# Patient Record
Sex: FEMALE | Race: ASIAN | NOT HISPANIC OR LATINO | URBAN - METROPOLITAN AREA
[De-identification: names, ages, dates, MRNs, and addresses within clinical notes are randomized per-mention and may not be internally consistent; named-entity substitution may affect disease eponyms.]

---

## 2019-08-03 ENCOUNTER — EMERGENCY (EMERGENCY)
Facility: HOSPITAL | Age: 84
LOS: 0 days | Discharge: HOME | End: 2019-08-04
Attending: EMERGENCY MEDICINE | Admitting: EMERGENCY MEDICINE
Payer: MEDICARE

## 2019-08-03 VITALS
RESPIRATION RATE: 20 BRPM | TEMPERATURE: 98 F | DIASTOLIC BLOOD PRESSURE: 86 MMHG | OXYGEN SATURATION: 98 % | SYSTOLIC BLOOD PRESSURE: 189 MMHG | HEART RATE: 96 BPM

## 2019-08-03 DIAGNOSIS — M79.661 PAIN IN RIGHT LOWER LEG: ICD-10-CM

## 2019-08-03 PROCEDURE — 99282 EMERGENCY DEPT VISIT SF MDM: CPT

## 2019-08-03 PROCEDURE — 99053 MED SERV 10PM-8AM 24 HR FAC: CPT

## 2019-08-03 NOTE — ED PROVIDER NOTE - PROGRESS NOTE DETAILS
Patient and family wants to leave because of long wait, patient told risks of leaving, including not evaluating for stroke and that stroke can lead to permanent neurological deficits and not evaluating for DVT which is clotting in legs that can cause clot in lungs. Both of these can cause death. Patient and family understand the risks and wants to see PMD as soon as possible. Grand daughter and grand son translating for patient in Mandarin Chinese. Patient and family wants to leave because of long wait, patient told risks of leaving, including not evaluating for stroke and that stroke can lead to permanent neurological deficits and not evaluating for DVT which is clotting in legs that can cause clot in lungs. Both of these can cause death. Patient and family understand the risks and wants to see PMD as soon as possible.

## 2019-08-03 NOTE — ED PROVIDER NOTE - PHYSICAL EXAMINATION
CONSTITUTIONAL: Well-developed; well-nourished; in no acute distress.   SKIN: warm, dry  HEAD: Normocephalic; atraumatic.  EYES: no conjunctival injection. PERRL.   ENT: No nasal discharge; airway clear.  NECK: Supple; non tender.  CARD: S1, S2 normal; no murmurs, gallops, or rubs. Regular rate and rhythm.   RESP: No wheezes, rales or rhonchi.  ABD: soft ntnd  EXT: Normal ROM.  No clubbing, cyanosis or edema. Not tender in right lower leg, not tender in leg lower leg. 2+ DP pulses b/l. Capillary refill <2 seconds in lower legs b/l.  LYMPH: No acute cervical adenopathy.  NEURO: Alert, oriented, grossly unremarkable  PSYCH: Cooperative, appropriate. CONSTITUTIONAL: Well-developed; well-nourished; in no acute distress.   SKIN: warm, dry  HEAD: Normocephalic; atraumatic.  EYES: no conjunctival injection. PERRL.   ENT: No nasal discharge; airway clear.  NECK: Supple; non tender.  CARD: S1, S2 normal; no murmurs, gallops, or rubs. Regular rate and rhythm.   RESP: No wheezes, rales or rhonchi.  ABD: soft ntnd  EXT: Normal ROM.  No clubbing, cyanosis or edema. Not tender in right lower leg, not tender in leg lower leg. 2+ DP pulses b/l. Capillary refill <2 seconds in lower legs b/l.  LYMPH: No acute cervical adenopathy.  NEURO: Alert, oriented, grossly unremarkable. CN 2 to 12 intact.  PSYCH: Cooperative, appropriate.

## 2019-08-03 NOTE — ED PROVIDER NOTE - ATTENDING CONTRIBUTION TO CARE
83 year old female, pmhx of afib on xarelto, comes in with complaint of rle pain that started this morning, patient denies falls, no cp/sob, no n/v/d, patient was a rapid response in the main lobby. Patient is accompanied by daughter and son who are also providing translation services.     CONSTITUTIONAL: Well-developed; well-nourished; in no acute distress. Sitting up and providing appropriate history and physical examination  SKIN: skin exam is warm and dry, no acute rash.  HEAD: Normocephalic; atraumatic.  EYES: PERRL, 3 mm bilateral, no nystagmus, EOM intact; conjunctiva and sclera clear.  ENT: No nasal discharge; airway clear.  NECK: Supple; non tender. + full passive ROM in all directions. No JVD  CARD: S1, S2 normal; no murmurs, gallops, or rubs. Regular rate and rhythm. + Symmetric Strong Pulses  RESP: No wheezes, rales or rhonchi. Good air movement bilaterally  ABD: soft; non-distended; non-tender. No Rebound, No Guarding, No signs of peritonitis, No CVA tenderness. No pulsatile abdominal mass. + Strong and Symmetric Pulses  EXT: Normal ROM. No clubbing, cyanosis or edema. Dp and Pt Pulses intact. Cap refill less than 3 seconds  NEURO: CN 2-12 intact, normal finger to nose, normal romberg, stable gait, no sensory or motor deficits, Alert, oriented, grossly unremarkable. No Focal deficits. GCS 15. NIH 0  PSYCH: Cooperative, appropriate.

## 2019-08-03 NOTE — ED PROVIDER NOTE - NS ED ROS FT
Review of Systems:  •	CONSTITUTIONAL - No fever, No diaphoresis, No weight change  •	SKIN - No rash  •	HEMATOLOGIC - No abnormal bleeding or bruising  •	EYES - No eye pain, No blurred vision  •	ENT - No change in hearing, No sore throat, No neck pain, No rhinorrhea, No ear pain  •	RESPIRATORY - No shortness of breath, No cough  •	CARDIAC -No chest pain, No palpitations  •	GI - No abdominal pain, No nausea, No vomiting, No diarrhea, No constipation, No bright red blood per rectum or melena. No flank pain  •                 - No dysuria, frequency, hematuria.   •	ENDO - No polydypsia, No polyuria, No heat/cold intolerance  •	MUSCULOSKELETAL - + right lower leg pain, No swelling, No back pain  •	NEUROLOGIC - No numbness, No focal weakness, No headache, No dizziness  All other systems negative, unless specified in HPI

## 2019-08-03 NOTE — ED PROVIDER NOTE - CLINICAL SUMMARY MEDICAL DECISION MAKING FREE TEXT BOX
The patient wishes to leave against medical advice.  I have discussed the risks, benefits and alternatives (including the possibility of worsening of disease, pain, permanent disability, and/or death) with the patient and his/her family (if available).  The patient voices understanding of these risks, benefits, and alternatives and still wishes to sign out against medical advice.  The patient is awake, alert, oriented x 3 and has demonstrated capacity to refuse/direct care.  I have advised the patient that they can and should return immediately should they develop any worse/different/additional symptoms, or if they change their mind and want to continue their care. Patient has full capacity and has verbalized understanding.  Given detailed returned precautions. Discussion held with patient in language of origon. Patient daughter and son understand that patient may be having a stroke, understand and are willing to accept the risks and benfits. patient will follow with PMD in the morning
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
## 2019-08-03 NOTE — ED ADULT NURSE NOTE - CHPI ED NUR SYMPTOMS NEG
no tingling/no weakness/no dizziness/no fever/no vomiting/no decreased eating/drinking/no nausea/no chills

## 2019-08-03 NOTE — ED PROVIDER NOTE - OBJECTIVE STATEMENT
The patient is a 84 yo female with PMHx of HTN and ?atrial fibrillation on Xarelto complaining of right lower leg pain x 12 hours. The patient was sitting down and stood up to walk. While walking, she had sudden onset of pain in the right lower leg on the medial side just under the knee that does not radiate. She is still able to walk but it is difficult for her because of pain. She cannot describe the pain. She denies any leg swelling, hx of PE/DVT, no recent long plane/car rides, no recent surgeries, no hormone therapy. She did have hx of breast cancer that was removed. She denies focal weakness, numbness or tingling, or facial droop. She denies chest pain, palpitations, shortness of breath, abdominal pain, nausea, or vomiting.

## 2019-08-04 NOTE — ED ADULT NURSE REASSESSMENT NOTE - NS ED NURSE REASSESS COMMENT FT1
patient wants to leave AMA.  Patient informed of the risk and dangers of leaving AMA.  Patient signed all AMA forms and witness by provider and RN./ Patient in stable condition and nad.  Patient ambulates with steady gain using cane.

## 2020-03-06 NOTE — ED ADULT NURSE NOTE - NSFALLRSKASSESSTYPE_ED_ALL_ED
Subjective:      Patient ID: Kristy Olson is a 56 y.o. female.    Chief Complaint: Annual Exam    Problem List Items Addressed This Visit     Fatty liver    Overview     She has fatty liver and is tracked on the liver panel.    Lab Results   Component Value Date    ALT 30 03/11/2019    AST 18 03/11/2019    ALKPHOS 64 03/11/2019    BILITOT 0.7 03/11/2019     No meds are given.  Diet is stressed.         History of colon polyps    Overview     The patient has had colon polyps in the past.  The next colonoscopy is due in September..           Hyperlipidemia    Overview     The patient presents with hyperlipidemia.  The patient reports tolerating the medication well and is in excellent compliance.  There have been no medication side effects.  The patient denies chest pain, neuropathy, and myalgias.  The patient has reduced fat intake and has been exercising.  Current treatment has included the medications listed in the med card.    Lab Results   Component Value Date    CHOL 172 03/11/2019    CHOL 171 04/05/2017    CHOL 178 11/30/2016       Lab Results   Component Value Date    HDL 51 03/11/2019    HDL 49 04/05/2017    HDL 51 11/30/2016       Lab Results   Component Value Date    LDLCALC 98.8 03/11/2019    LDLCALC 103.4 04/05/2017    LDLCALC 106.0 11/30/2016       Lab Results   Component Value Date    TRIG 111 03/11/2019    TRIG 93 04/05/2017    TRIG 105 11/30/2016       Lab Results   Component Value Date    CHOLHDL 29.7 03/11/2019    CHOLHDL 28.7 04/05/2017    CHOLHDL 28.7 11/30/2016     Lab Results   Component Value Date    ALT 30 03/11/2019    AST 18 03/11/2019    ALKPHOS 64 03/11/2019    BILITOT 0.7 03/11/2019              Hypertension    Overview     The patient presents with essential hypertension.  The patient is tolerating the medication well and is in excellent compliance.  The patient is experiencing no side effects.  Counseling was offered regarding low salt diets.  The patient has a reduced salt intake.   The patient denies chest pain, palpitations, shortness of breath, dyspnea on exertion, left or murmur neck pain, nausea, vomiting, diaphoresis, paroxysmal nocturnal dyspnea, and orthopnea.   Hypertension Medications             benazepril (LOTENSIN) 20 MG tablet Take 1 tablet (20 mg total) by mouth once daily.                   Other Visit Diagnoses     Annual physical exam    -  Primary          Past Medical History:  Past Medical History:   Diagnosis Date    Elevated liver enzymes 3/2/2015    Fatty liver 3/2/2015    Hyperlipidemia     Hypertension     Liver nodule 3/2/2015     Past Surgical History:   Procedure Laterality Date    COLONOSCOPY N/A 9/22/2015    Procedure: COLONOSCOPY;  Surgeon: Dar Padilla MD;  Location: [facility];  Service: Endoscopy;  Laterality: N/A;     Review of patient's allergies indicates:  No Known Allergies  Current Outpatient Medications on File Prior to Visit   Medication Sig Dispense Refill    atorvastatin (LIPITOR) 80 MG tablet Take 1 tablet (80 mg total) by mouth once daily. 90 tablet 3    benazepril (LOTENSIN) 20 MG tablet Take 1 tablet (20 mg total) by mouth once daily. 90 tablet 3    DOCOSAHEXANOIC ACID/EPA (FISH OIL ORAL) Take by mouth.      estradiol (ESTRACE) 0.5 MG tablet Take 0.5 mg by mouth once daily.      medroxyPROGESTERone (PROVERA) 2.5 MG tablet Take 2.5 mg by mouth once daily.      milk thistle 175 mg tablet Take 175 mg by mouth once daily.      multivitamin capsule Take 1 capsule by mouth once daily.      [DISCONTINUED] estrogen, conjugated,-medroxyprogesterone 0.3-1.5 mg (PREMPRO) 0.3-1.5 mg per tablet Take 1 tablet by mouth once daily.       No current facility-administered medications on file prior to visit.      Social History     Socioeconomic History    Marital status:      Spouse name: Not on file    Number of children: 1    Years of education: Not on file    Highest education level: Not on file   Occupational History    Not on file    Social Needs    Financial resource strain: Not on file    Food insecurity:     Worry: Not on file     Inability: Not on file    Transportation needs:     Medical: Not on file     Non-medical: Not on file   Tobacco Use    Smoking status: Never Smoker    Smokeless tobacco: Never Used   Substance and Sexual Activity    Alcohol use: Yes     Alcohol/week: 12.0 standard drinks     Types: 12 Cans of beer per week     Comment: per week    Drug use: No    Sexual activity: Yes   Lifestyle    Physical activity:     Days per week: Not on file     Minutes per session: Not on file    Stress: Not on file   Relationships    Social connections:     Talks on phone: Not on file     Gets together: Not on file     Attends L[employer] service: Not on file     Active member of club or organization: Not on file     Attends meetings of clubs or organizations: Not on file     Relationship status: Not on file   Other Topics Concern    Not on file   Social History Narrative    Not on file     Family History   Problem Relation Age of Onset    Hyperlipidemia Mother     Hypertension Mother     Stroke Father     Diabetes Father     Hyperlipidemia Maternal Grandmother     Hypertension Maternal Grandmother     Lung cancer Maternal Grandfather     Colon cancer Maternal Grandfather     Heart attack Neg Hx     Stomach cancer Neg Hx     Liver cancer Neg Hx        Review of Systems   Constitutional: Negative for activity change and unexpected weight change.   HENT: Positive for rhinorrhea. Negative for hearing loss and trouble swallowing.    Eyes: Negative for discharge and visual disturbance.   Respiratory: Negative for chest tightness and wheezing.    Cardiovascular: Negative for chest pain and palpitations.   Gastrointestinal: Negative for blood in stool, constipation, diarrhea and vomiting.   Endocrine: Negative for polydipsia and polyuria.   Genitourinary: Negative for difficulty urinating, dysuria, hematuria and menstrual  "problem.   Musculoskeletal: Negative for arthralgias, joint swelling and neck pain.   Neurological: Negative for weakness and headaches.   Psychiatric/Behavioral: Negative for confusion and dysphoric mood.       Objective:     /75   Pulse 84   Temp 97.8 °F (36.6 °C) (Oral)   Ht 5' 0.5" (1.537 m)   Wt 79.2 kg (174 lb 9.6 oz)   BMI 33.54 kg/m²     Physical Exam   Constitutional: She appears well-developed and well-nourished. She is cooperative.   HENT:   Head: Normocephalic and atraumatic.   Right Ear: Tympanic membrane, external ear and ear canal normal.   Left Ear: Tympanic membrane, external ear and ear canal normal.   Nose: Nose normal.   Mouth/Throat: Uvula is midline and mucous membranes are normal. No oral lesions. No oropharyngeal exudate, posterior oropharyngeal edema or posterior oropharyngeal erythema.   Eyes: Pupils are equal, round, and reactive to light. EOM and lids are normal. Right eye exhibits no discharge. Left eye exhibits no discharge. Right conjunctiva is not injected. Right conjunctiva has no hemorrhage. Left conjunctiva is not injected. Left conjunctiva has no hemorrhage. No scleral icterus. Right eye exhibits no nystagmus. Left eye exhibits no nystagmus.   Neck: Normal range of motion and full passive range of motion without pain. Neck supple. No JVD present. No tracheal tenderness present. Carotid bruit is not present. No tracheal deviation present. No thyroid mass and no thyromegaly present.   Cardiovascular: Normal rate, regular rhythm, S1 normal and S2 normal.   No murmur heard.  Pulses:       Carotid pulses are 2+ on the right side, and 2+ on the left side.       Radial pulses are 2+ on the right side, and 2+ on the left side.        Posterior tibial pulses are 2+ on the right side, and 2+ on the left side.   Pulmonary/Chest: Effort normal and breath sounds normal. No respiratory distress. She has no wheezes. She has no rhonchi. She has no rales.   Abdominal: Soft. Normal " appearance, normal aorta and bowel sounds are normal. She exhibits no distension, no abdominal bruit, no pulsatile midline mass and no mass. There is no hepatosplenomegaly. There is no tenderness. There is no rebound.   Musculoskeletal:        Right knee: She exhibits no swelling. No tenderness found.        Left knee: She exhibits no swelling. No tenderness found.   Lymphadenopathy:        Head (right side): No submental and no submandibular adenopathy present.        Head (left side): No submental and no submandibular adenopathy present.     She has no cervical adenopathy.   Neurological: She is alert. She has normal strength. No cranial nerve deficit or sensory deficit.   Skin: Skin is warm and dry. No rash noted. No cyanosis. Nails show no clubbing.   Psychiatric: She has a normal mood and affect. Her speech is normal and behavior is normal. Thought content normal. Cognition and memory are normal.     Assessment:     1. Annual physical exam    2. Hyperlipidemia, unspecified hyperlipidemia type    3. Fatty liver    4. History of colon polyps        Plan:     Problem List Items Addressed This Visit     Fatty liver    History of colon polyps    Hyperlipidemia      Other Visit Diagnoses     Annual physical exam    -  Primary        No follow-ups on file.      I have discontinued Kristy Olson's estrogen (conjugated)-medroxyprogesterone 0.3-1.5 mg. I am also having her maintain her multivitamin, milk thistle, DOCOSAHEXANOIC ACID/EPA (FISH OIL ORAL), atorvastatin, benazepriL, estradiol, and medroxyPROGESTERone.    Kristy was seen today for annual exam.    Diagnoses and all orders for this visit:    Annual physical exam  -     HIV 1/2 Ag/Ab (4th Gen); Future  -     Pneumococcal Polysaccharide Vaccine (23 Valent) (SQ/IM)  -     Tdap Vaccine    Hyperlipidemia, unspecified hyperlipidemia type  -     atorvastatin (LIPITOR) 80 MG tablet; Take 1 tablet (80 mg total) by mouth once daily.  -     Lipid panel; Future    Fatty  liver  -     Comprehensive metabolic panel; Future    History of colon polyps    Essential hypertension  -     benazepriL (LOTENSIN) 20 MG tablet; Take 1 tablet (20 mg total) by mouth once daily.    arrange a colonoscopy later this year.     The patient was instructed to stop the following meds:  Medications Discontinued During This Encounter   Medication Reason    estrogen, conjugated,-medroxyprogesterone 0.3-1.5 mg (PREMPRO) 0.3-1.5 mg per tablet     atorvastatin (LIPITOR) 80 MG tablet Reorder    benazepril (LOTENSIN) 20 MG tablet Reorder     Orders Placed This Encounter   Procedures    Pneumococcal Polysaccharide Vaccine (23 Valent) (SQ/IM)     Administer a Pneumococcal 23 vaccine to the patient.    Tdap Vaccine     Administer a Tdap vaccine to the patient.    Comprehensive metabolic panel     Standing Status:   Future     Standing Expiration Date:   3/6/2021    Lipid panel     Standing Status:   Future     Standing Expiration Date:   3/6/2021    HIV 1/2 Ag/Ab (4th Gen)     Standing Status:   Future     Standing Expiration Date:   5/6/2021   schedule a pap smear later this year with Dr. Stearns.  Get a copy of her last one.   Initial (On Arrival)

## 2021-04-28 PROBLEM — Z86.39 HISTORY OF TYPE 2 DIABETES MELLITUS: Status: RESOLVED | Noted: 2021-04-28 | Resolved: 2021-04-28

## 2021-04-28 PROBLEM — Z85.3 HISTORY OF MALIGNANT NEOPLASM OF BREAST: Status: RESOLVED | Noted: 2021-04-28 | Resolved: 2021-04-28

## 2021-04-28 PROBLEM — Z86.79 HISTORY OF HYPERTENSION: Status: RESOLVED | Noted: 2021-04-28 | Resolved: 2021-04-28

## 2021-04-28 PROBLEM — Z00.00 ENCOUNTER FOR PREVENTIVE HEALTH EXAMINATION: Status: ACTIVE | Noted: 2021-04-28

## 2021-04-29 ENCOUNTER — APPOINTMENT (OUTPATIENT)
Dept: THORACIC SURGERY | Facility: CLINIC | Age: 86
End: 2021-04-29
Payer: MEDICARE

## 2021-04-29 VITALS
DIASTOLIC BLOOD PRESSURE: 77 MMHG | TEMPERATURE: 97.3 F | WEIGHT: 137.1 LBS | BODY MASS INDEX: 26.92 KG/M2 | SYSTOLIC BLOOD PRESSURE: 135 MMHG | RESPIRATION RATE: 18 BRPM | HEIGHT: 59.84 IN | OXYGEN SATURATION: 98 % | HEART RATE: 84 BPM

## 2021-04-29 DIAGNOSIS — Z86.79 PERSONAL HISTORY OF OTHER DISEASES OF THE CIRCULATORY SYSTEM: ICD-10-CM

## 2021-04-29 DIAGNOSIS — Z86.39 PERSONAL HISTORY OF OTHER ENDOCRINE, NUTRITIONAL AND METABOLIC DISEASE: ICD-10-CM

## 2021-04-29 DIAGNOSIS — Z85.3 PERSONAL HISTORY OF MALIGNANT NEOPLASM OF BREAST: ICD-10-CM

## 2021-04-29 DIAGNOSIS — Z80.9 FAMILY HISTORY OF MALIGNANT NEOPLASM, UNSPECIFIED: ICD-10-CM

## 2021-04-29 DIAGNOSIS — Z92.21 PERSONAL HISTORY OF ANTINEOPLASTIC CHEMOTHERAPY: ICD-10-CM

## 2021-04-29 PROBLEM — I10 ESSENTIAL (PRIMARY) HYPERTENSION: Chronic | Status: ACTIVE | Noted: 2019-08-03

## 2021-04-29 PROCEDURE — 99205 OFFICE O/P NEW HI 60 MIN: CPT

## 2021-04-29 PROCEDURE — 99072 ADDL SUPL MATRL&STAF TM PHE: CPT

## 2021-04-30 PROBLEM — Z86.39 HISTORY OF HYPERLIPIDEMIA: Status: RESOLVED | Noted: 2021-04-30 | Resolved: 2021-04-30

## 2021-04-30 PROBLEM — Z80.9 FAMILY HISTORY OF MALIGNANT NEOPLASM: Status: ACTIVE | Noted: 2021-04-30

## 2021-04-30 PROBLEM — Z92.21 HISTORY OF CHEMOTHERAPY: Status: RESOLVED | Noted: 2021-04-30 | Resolved: 2021-04-30

## 2021-04-30 RX ORDER — METOPROLOL SUCCINATE 25 MG/1
25 TABLET, EXTENDED RELEASE ORAL
Refills: 0 | Status: ACTIVE | COMMUNITY

## 2021-04-30 RX ORDER — SITAGLIPTIN AND METFORMIN HYDROCHLORIDE 50; 500 MG/1; MG/1
50-500 TABLET, FILM COATED ORAL
Refills: 0 | Status: ACTIVE | COMMUNITY

## 2021-04-30 RX ORDER — ASPIRIN 81 MG
81 TABLET, DELAYED RELEASE (ENTERIC COATED) ORAL
Refills: 0 | Status: ACTIVE | COMMUNITY

## 2021-04-30 RX ORDER — AMLODIPINE BESYLATE 5 MG/1
5 TABLET ORAL
Refills: 0 | Status: ACTIVE | COMMUNITY

## 2021-04-30 RX ORDER — SIMVASTATIN 20 MG/1
20 TABLET, FILM COATED ORAL
Refills: 0 | Status: ACTIVE | COMMUNITY

## 2021-04-30 RX ORDER — FOLIC ACID 1 MG/1
1 TABLET ORAL
Refills: 0 | Status: ACTIVE | COMMUNITY

## 2021-04-30 RX ORDER — ERGOCALCIFEROL (VITAMIN D2) 1250 MCG
50000 CAPSULE ORAL
Refills: 0 | Status: ACTIVE | COMMUNITY

## 2021-04-30 RX ORDER — IBANDRONATE SODIUM 150 MG/1
150 TABLET ORAL
Refills: 0 | Status: ACTIVE | COMMUNITY

## 2021-04-30 NOTE — HISTORY OF PRESENT ILLNESS
[FreeTextEntry1] : Ms. FLORINDA SCHMID, 85 year old female, never smoker, w/ hx of HTN, HLD, DM2, Breast CA s/p Rt mastectomy in 2004 followed by 4 cycles of chemo and 5 yrs of AI, w/ newly diagnosed colon AdenoCA during routine colonoscopy, PET scan revealed mediastinal lymphadenopathy.\par \par Colonoscopy on 4/5/21 by Dr. Shola Osborne showed an ulcerated mass in the sigmoid colon measuring ~5cm. Path revealed AdenoCA, mod-diff.\par \par CT A/P w/ contrast on 4/6/21:\par - circumferential wall thickening in the rectosigmoid colon, c/w carcinoma, w/ probable invasion into the surrounding fat, involvement of the Rt adnexa/fallopian tube and mets disease in the adjacent lymph nodes\par \par PET/CT on 4/13/21:\par - focal activity in Lt mandible with corresponding lucency involving the canine tooth,. SUV=15.6\par - Lt Lvl 1B LN, 8 mm with SUV=5\par - thyroid is enlarged and heterogeneous without activity\par - extensive mediastinal lymphadenopathy: Rt paratracheal 2.1 cm SUV=17.3, subcarinal 1.5 cm SUV=15.9, mildly active bilateral hilar LNs SUV=4.9\par - bilateral tree-in-bud nodularity in ADALBERTO and RUL, attributed to infectious bronchiolitis\par - a few small areas of subsegmental atelectasis and scarring in both lungs\par - stable mass of sigmoid colon SUV=13.3 inseparable from the Rt adnexa\par \par CT Chest on 4/23/21:\par - subcarinal lymphadenopathy measuring ~1.5cm; Rt paratracheal lymphadenopathy measuring ~1.4cm\par - mosaic attenuation likely due to air trapping\par - enlarged thyroid gland w/ nodules bilaterally, measuring 2.5cm in the Rt\par \par Pt presents today for CT Sx consultation, referred by Dr. Hazel Bazzi. \par \par

## 2021-04-30 NOTE — ASSESSMENT
[FreeTextEntry1] : Ms. FLORINDA SCHMID, 85 year old female, never smoker, w/ hx of HTN, HLD, DM2, Breast CA s/p Rt mastectomy in 2004 followed by 4 cycles of chemo and 5 yrs of AI, w/ newly diagnosed colon AdenoCA during routine colonoscopy, PET scan revealed mediastinal lymphadenopathy.\par \par Colonoscopy on 4/5/21 by Dr. Shola Osborne showed an ulcerated mass in the sigmoid colon measuring ~5cm. Path revealed AdenoCA, mod-diff.\par \par CT A/P w/ contrast on 4/6/21:\par - circumferential wall thickening in the rectosigmoid colon, c/w carcinoma, w/ probable invasion into the surrounding fat, involvement of the Rt adnexa/fallopian tube and mets disease in the adjacent lymph nodes\par \par PET/CT on 4/13/21:\par - focal activity in Lt mandible with corresponding lucency involving the canine tooth,. SUV=15.6\par - Lt Lvl 1B LN, 8 mm with SUV=5\par - thyroid is enlarged and heterogeneous without activity\par - extensive mediastinal lymphadenopathy: Rt paratracheal 2.1 cm SUV=17.3, subcarinal 1.5 cm SUV=15.9, mildly active bilateral hilar LNs SUV=4.9\par - bilateral tree-in-bud nodularity in ADALBERTO and RUL, attributed to infectious bronchiolitis\par - a few small areas of subsegmental atelectasis and scarring in both lungs\par - stable mass of sigmoid colon SUV=13.3 inseparable from the Rt adnexa\par \par CT Chest on 4/23/21:\par - subcarinal lymphadenopathy measuring ~1.5cm; Rt paratracheal lymphadenopathy measuring ~1.4cm\par - mosaic attenuation likely due to air trapping\par - enlarged thyroid gland w/ nodules bilaterally, measuring 2.5cm in the Rt\par \par I have reviewed the patient's medical records and diagnostic images at time of this office consultation and have made the following recommendation:\par 1. CT and PET scans reviewed, mediastinal lymphadenopathy is suspicious for malignancy, must r/o mets disease (breast vs colorectal vs lung), I recommended a Flex Bronch EBUS biopsy on 5/6/21. Risks and benefits and alternatives explained to patient, all questions answered, patient agreed to proceed with procedure.\par 2. Medical clearance, PST and COVID-19 testing.\par \par \par I personally performed the services described in the documentation, reviewed the documentation recorded by the scribe in my presence and it accurately and completely records my words and actions. \par \par I, Christiano Gill NP, am scribing for and the presence of Dr. Speedy Lambert the following sections, HISTORY OF PRESENT ILLNESS, PAST MEDICAL/FAMILY/SOCIAL HISTORY; REVIEW OF SYSTEMS; VITAL SIGNS; PHYSICAL EXAM; DISPOSITION.\par

## 2021-04-30 NOTE — CONSULT LETTER
[Dear  ___] : Dear  [unfilled], [Consult Letter:] : I had the pleasure of evaluating your patient, [unfilled]. [( Thank you for referring [unfilled] for consultation for _____ )] : Thank you for referring [unfilled] for consultation for [unfilled] [Please see my note below.] : Please see my note below. [Consult Closing:] : Thank you very much for allowing me to participate in the care of this patient.  If you have any questions, please do not hesitate to contact me. [Sincerely,] : Sincerely, [DrFrancesco  ___] : Dr. HIGH [DrFrancesco ___] : Dr. HIGH [FreeTextEntry2] : Dr. Hazel Bazzi (Hem/Onc/referring)\par Dr. Shola Osborne (GI)\par Dr. Alirio Smith (Colorectal surgeon)\par Dr. Bret Mckenzie (PCP) [FreeTextEntry3] : Speedy Lambert MD, MPH \par System Director of Thoracic Surgery \par Director of Comprehensive Lung and Foregut [place] \par Professor Cardiovascular & Thoracic Surgery  \par [facility] [employer] [facility]\par

## 2021-04-30 NOTE — DATA REVIEWED
[FreeTextEntry1] : I have independently reviewed the following:\par CT A/P w/ contrast on 4/6/21:\par - circumferential wall thickening in the rectosigmoid colon, c/w carcinoma, w/ probable invasion into the surrounding fat, involvement of the Rt adnexa/fallopian tube and mets disease in the adjacent lymph nodes\par \par PET/CT on 4/13/21:\par - focal activity in Lt mandible with corresponding lucency involving the canine tooth,. SUV=15.6\par - Lt Lvl 1B LN, 8 mm with SUV=5\par - thyroid is enlarged and heterogeneous without activity\par - extensive mediastinal lymphadenopathy: Rt paratracheal 2.1 cm SUV=17.3, subcarinal 1.5 cm SUV=15.9, mildly active bilateral hilar LNs SUV=4.9\par - bilateral tree-in-bud nodularity in ADALBERTO and RUL, attributed to infectious bronchiolitis\par - a few small areas of subsegmental atelectasis and scarring in both lungs\par - stable mass of sigmoid colon SUV=13.3 inseparable from the Rt adnexa\par \par CT Chest on 4/23/21:\par - subcarinal lymphadenopathy measuring ~1.5cm; Rt paratracheal lymphadenopathy measuring ~1.4cm\par - mosaic attenuation likely due to air trapping\par - enlarged thyroid gland w/ nodules bilaterally, measuring 2.5cm in the Rt

## 2021-05-03 ENCOUNTER — LABORATORY RESULT (OUTPATIENT)
Age: 86
End: 2021-05-03

## 2021-05-03 ENCOUNTER — APPOINTMENT (OUTPATIENT)
Dept: DISASTER EMERGENCY | Facility: CLINIC | Age: 86
End: 2021-05-03

## 2021-05-04 ENCOUNTER — OUTPATIENT (OUTPATIENT)
Dept: OUTPATIENT SERVICES | Facility: HOSPITAL | Age: 86
LOS: 1 days | End: 2021-05-04

## 2021-05-04 VITALS
DIASTOLIC BLOOD PRESSURE: 68 MMHG | WEIGHT: 134.92 LBS | OXYGEN SATURATION: 98 % | SYSTOLIC BLOOD PRESSURE: 109 MMHG | HEART RATE: 70 BPM | TEMPERATURE: 99 F | RESPIRATION RATE: 16 BRPM | HEIGHT: 60 IN

## 2021-05-04 DIAGNOSIS — R59.0 LOCALIZED ENLARGED LYMPH NODES: ICD-10-CM

## 2021-05-04 DIAGNOSIS — I10 ESSENTIAL (PRIMARY) HYPERTENSION: ICD-10-CM

## 2021-05-04 DIAGNOSIS — Z90.49 ACQUIRED ABSENCE OF OTHER SPECIFIED PARTS OF DIGESTIVE TRACT: Chronic | ICD-10-CM

## 2021-05-04 DIAGNOSIS — Z98.49 CATARACT EXTRACTION STATUS, UNSPECIFIED EYE: Chronic | ICD-10-CM

## 2021-05-04 DIAGNOSIS — E11.9 TYPE 2 DIABETES MELLITUS WITHOUT COMPLICATIONS: ICD-10-CM

## 2021-05-04 DIAGNOSIS — Z90.11 ACQUIRED ABSENCE OF RIGHT BREAST AND NIPPLE: Chronic | ICD-10-CM

## 2021-05-04 LAB
A1C WITH ESTIMATED AVERAGE GLUCOSE RESULT: 5.9 % — HIGH (ref 4–5.6)
ANION GAP SERPL CALC-SCNC: 13 MMOL/L — SIGNIFICANT CHANGE UP (ref 7–14)
BUN SERPL-MCNC: 11 MG/DL — SIGNIFICANT CHANGE UP (ref 7–23)
CALCIUM SERPL-MCNC: 8.9 MG/DL — SIGNIFICANT CHANGE UP (ref 8.4–10.5)
CHLORIDE SERPL-SCNC: 102 MMOL/L — SIGNIFICANT CHANGE UP (ref 98–107)
CO2 SERPL-SCNC: 25 MMOL/L — SIGNIFICANT CHANGE UP (ref 22–31)
CREAT SERPL-MCNC: 1.34 MG/DL — HIGH (ref 0.5–1.3)
ESTIMATED AVERAGE GLUCOSE: 123 MG/DL — HIGH (ref 68–114)
GLUCOSE SERPL-MCNC: 105 MG/DL — HIGH (ref 70–99)
HCT VFR BLD CALC: 36.4 % — SIGNIFICANT CHANGE UP (ref 34.5–45)
HGB BLD-MCNC: 11.7 G/DL — SIGNIFICANT CHANGE UP (ref 11.5–15.5)
MCHC RBC-ENTMCNC: 28.9 PG — SIGNIFICANT CHANGE UP (ref 27–34)
MCHC RBC-ENTMCNC: 32.1 GM/DL — SIGNIFICANT CHANGE UP (ref 32–36)
MCV RBC AUTO: 89.9 FL — SIGNIFICANT CHANGE UP (ref 80–100)
NRBC # BLD: 0 /100 WBCS — SIGNIFICANT CHANGE UP
NRBC # FLD: 0 K/UL — SIGNIFICANT CHANGE UP
PLATELET # BLD AUTO: 224 K/UL — SIGNIFICANT CHANGE UP (ref 150–400)
POTASSIUM SERPL-MCNC: 3.8 MMOL/L — SIGNIFICANT CHANGE UP (ref 3.5–5.3)
POTASSIUM SERPL-SCNC: 3.8 MMOL/L — SIGNIFICANT CHANGE UP (ref 3.5–5.3)
RBC # BLD: 4.05 M/UL — SIGNIFICANT CHANGE UP (ref 3.8–5.2)
RBC # FLD: 13.6 % — SIGNIFICANT CHANGE UP (ref 10.3–14.5)
SODIUM SERPL-SCNC: 140 MMOL/L — SIGNIFICANT CHANGE UP (ref 135–145)
WBC # BLD: 8.73 K/UL — SIGNIFICANT CHANGE UP (ref 3.8–10.5)
WBC # FLD AUTO: 8.73 K/UL — SIGNIFICANT CHANGE UP (ref 3.8–10.5)

## 2021-05-04 RX ORDER — ASPIRIN/CALCIUM CARB/MAGNESIUM 324 MG
1 TABLET ORAL
Qty: 0 | Refills: 0 | DISCHARGE

## 2021-05-04 NOTE — H&P PST ADULT - NSICDXPROBLEM_GEN_ALL_CORE_FT
PROBLEM DIAGNOSES  Problem: Localized enlarged lymph nodes  Assessment and Plan:  Pt is scheduled for flexible bronchoscopy, endobronchial ultrasound biopsy with cytology on 5/6/21.  Verbal and written pre op instructions reviewed with patient and pt able to verbalize understanding.    COVID testing scheduled preop per pt.        Problem: HTN (hypertension)  Assessment and Plan: Pt instructed to take metoprolol and amlodipine AM of surgery with a sip of water, pt able to verbalize understanding.     Problem: DM2 (diabetes mellitus, type 2)  Assessment and Plan: OR booking notified of pt's DM status via fax.  Pt instructed to hold Janumet 5/5 and 5/6.       PROBLEM DIAGNOSES  Problem: Localized enlarged lymph nodes  Assessment and Plan:  Pt is scheduled for flexible bronchoscopy, endobronchial ultrasound biopsy with cytology on 5/6/21.  Verbal and written pre op instructions reviewed with patient and pt able to verbalize understanding.    COVID testing scheduled preop per pt.  Pt to obtain medical eval as requested by surgeon, will request document.         Problem: HTN (hypertension)  Assessment and Plan: Pt instructed to take metoprolol and amlodipine AM of surgery with a sip of water, pt able to verbalize understanding.     Problem: DM2 (diabetes mellitus, type 2)  Assessment and Plan: OR booking notified of pt's DM status via fax.  Pt instructed to hold Janumet 5/5 and 5/6.

## 2021-05-04 NOTE — H&P PST ADULT - NSICDXPASTMEDICALHX_GEN_ALL_CORE_FT
PAST MEDICAL HISTORY:  Breast cancer right 2004, s/p chemo    Colon cancer newly diagnosed 2021    DM2 (diabetes mellitus, type 2)     HLD (hyperlipidemia)     Hypertension      PAST MEDICAL HISTORY:  Breast cancer right 2004, s/p chemo    Colon cancer newly diagnosed 2021    DM2 (diabetes mellitus, type 2)     HLD (hyperlipidemia)     Hypertension     Localized enlarged lymph nodes

## 2021-05-04 NOTE — H&P PST ADULT - NSANTHOSAYNRD_GEN_A_CORE
No. SELINA screening performed.  STOP BANG Legend: 0-2 = LOW Risk; 3-4 = INTERMEDIATE Risk; 5-8 = HIGH Risk

## 2021-05-04 NOTE — H&P PST ADULT - NSICDXPASTSURGICALHX_GEN_ALL_CORE_FT
PAST SURGICAL HISTORY:  Cataract extraction status bilateral    History of cholecystectomy ?    S/P right mastectomy 2004

## 2021-05-04 NOTE — H&P PST ADULT - HISTORY OF PRESENT ILLNESS
84 yo female with preop dx. localized enlarged lymph nodes presents to pre surgical testing. Pt s/p routine colonoscopy4/2021 colon mass biopsy revealing AdenoCA, pt followed up with PET/CT and CT Chest revealing enlarged lymph nodes.   Pt is scheduled for flexible bronchoscopy, endobronchial ultrasound biopsy with cytology.

## 2021-05-21 PROBLEM — C18.9 MALIGNANT NEOPLASM OF COLON, UNSPECIFIED: Chronic | Status: ACTIVE | Noted: 2021-05-04

## 2021-05-21 PROBLEM — R59.0 LOCALIZED ENLARGED LYMPH NODES: Chronic | Status: ACTIVE | Noted: 2021-05-04

## 2021-05-21 PROBLEM — C50.919 MALIGNANT NEOPLASM OF UNSPECIFIED SITE OF UNSPECIFIED FEMALE BREAST: Chronic | Status: ACTIVE | Noted: 2021-05-04

## 2021-05-21 PROBLEM — E78.5 HYPERLIPIDEMIA, UNSPECIFIED: Chronic | Status: ACTIVE | Noted: 2021-05-04

## 2021-05-21 PROBLEM — E11.9 TYPE 2 DIABETES MELLITUS WITHOUT COMPLICATIONS: Chronic | Status: ACTIVE | Noted: 2021-05-04

## 2021-05-24 ENCOUNTER — APPOINTMENT (OUTPATIENT)
Dept: DISASTER EMERGENCY | Facility: CLINIC | Age: 86
End: 2021-05-24

## 2021-05-24 DIAGNOSIS — Z01.818 ENCOUNTER FOR OTHER PREPROCEDURAL EXAMINATION: ICD-10-CM

## 2021-05-24 LAB — SARS-COV-2 N GENE NPH QL NAA+PROBE: NOT DETECTED

## 2021-05-26 NOTE — ASU PATIENT PROFILE, ADULT - PMH
Breast cancer  right 2004, s/p chemo  Colon cancer  newly diagnosed 2021  DM2 (diabetes mellitus, type 2)    HLD (hyperlipidemia)    Hypertension    Localized enlarged lymph nodes

## 2021-05-27 ENCOUNTER — RESULT REVIEW (OUTPATIENT)
Age: 86
End: 2021-05-27

## 2021-05-27 ENCOUNTER — APPOINTMENT (OUTPATIENT)
Dept: THORACIC SURGERY | Facility: HOSPITAL | Age: 86
End: 2021-05-27

## 2021-05-27 ENCOUNTER — OUTPATIENT (OUTPATIENT)
Dept: OUTPATIENT SERVICES | Facility: HOSPITAL | Age: 86
LOS: 1 days | Discharge: HOME | End: 2021-05-27
Payer: MEDICARE

## 2021-05-27 VITALS
HEART RATE: 78 BPM | WEIGHT: 134.92 LBS | RESPIRATION RATE: 16 BRPM | HEIGHT: 60 IN | OXYGEN SATURATION: 96 % | DIASTOLIC BLOOD PRESSURE: 63 MMHG | SYSTOLIC BLOOD PRESSURE: 136 MMHG | TEMPERATURE: 99 F

## 2021-05-27 VITALS
OXYGEN SATURATION: 97 % | HEART RATE: 76 BPM | DIASTOLIC BLOOD PRESSURE: 61 MMHG | RESPIRATION RATE: 16 BRPM | SYSTOLIC BLOOD PRESSURE: 124 MMHG

## 2021-05-27 DIAGNOSIS — Z98.49 CATARACT EXTRACTION STATUS, UNSPECIFIED EYE: Chronic | ICD-10-CM

## 2021-05-27 DIAGNOSIS — Z90.49 ACQUIRED ABSENCE OF OTHER SPECIFIED PARTS OF DIGESTIVE TRACT: Chronic | ICD-10-CM

## 2021-05-27 DIAGNOSIS — R59.0 LOCALIZED ENLARGED LYMPH NODES: ICD-10-CM

## 2021-05-27 DIAGNOSIS — Z90.11 ACQUIRED ABSENCE OF RIGHT BREAST AND NIPPLE: Chronic | ICD-10-CM

## 2021-05-27 LAB
GLUCOSE BLDC GLUCOMTR-MCNC: 113 MG/DL — HIGH (ref 70–99)
GRAM STN FLD: SIGNIFICANT CHANGE UP
GRAM STN FLD: SIGNIFICANT CHANGE UP
NIGHT BLUE STAIN TISS: SIGNIFICANT CHANGE UP
NIGHT BLUE STAIN TISS: SIGNIFICANT CHANGE UP
SPECIMEN SOURCE: SIGNIFICANT CHANGE UP

## 2021-05-27 PROCEDURE — 88173 CYTOPATH EVAL FNA REPORT: CPT | Mod: 26

## 2021-05-27 PROCEDURE — 31629 BRONCHOSCOPY/NEEDLE BX EACH: CPT

## 2021-05-27 PROCEDURE — 31652 BRONCH EBUS SAMPLNG 1/2 NODE: CPT

## 2021-05-27 PROCEDURE — 31645 BRNCHSC W/THER ASPIR 1ST: CPT

## 2021-05-27 PROCEDURE — 88305 TISSUE EXAM BY PATHOLOGIST: CPT | Mod: 26

## 2021-05-27 PROCEDURE — 88112 CYTOPATH CELL ENHANCE TECH: CPT | Mod: 26,59

## 2021-05-27 PROCEDURE — 71045 X-RAY EXAM CHEST 1 VIEW: CPT | Mod: 26

## 2021-05-27 PROCEDURE — 31624 DX BRONCHOSCOPE/LAVAGE: CPT

## 2021-05-27 PROCEDURE — 31633 BRONCHOSCOPY/NEEDLE BX ADDL: CPT

## 2021-05-27 RX ORDER — METOPROLOL TARTRATE 50 MG
1 TABLET ORAL
Qty: 0 | Refills: 0 | DISCHARGE

## 2021-05-27 RX ORDER — AMLODIPINE BESYLATE 2.5 MG/1
1 TABLET ORAL
Qty: 0 | Refills: 0 | DISCHARGE

## 2021-05-27 RX ORDER — SODIUM CHLORIDE 9 MG/ML
1000 INJECTION, SOLUTION INTRAVENOUS
Refills: 0 | Status: DISCONTINUED | OUTPATIENT
Start: 2021-05-27 | End: 2021-06-10

## 2021-05-27 RX ORDER — SIMVASTATIN 20 MG/1
1 TABLET, FILM COATED ORAL
Qty: 0 | Refills: 0 | DISCHARGE

## 2021-05-27 RX ORDER — ERGOCALCIFEROL 1.25 MG/1
1 CAPSULE ORAL
Qty: 0 | Refills: 0 | DISCHARGE

## 2021-05-27 RX ORDER — FOLIC ACID 0.8 MG
1 TABLET ORAL
Qty: 0 | Refills: 0 | DISCHARGE

## 2021-05-27 RX ORDER — IBANDRONATE SODIUM 150 MG/1
1 TABLET ORAL
Qty: 0 | Refills: 0 | DISCHARGE

## 2021-05-27 RX ORDER — DIPHENHYDRAMINE HCL 50 MG
1 CAPSULE ORAL
Qty: 0 | Refills: 0 | DISCHARGE

## 2021-05-27 RX ORDER — SODIUM CHLORIDE 9 MG/ML
1000 INJECTION, SOLUTION INTRAVENOUS
Refills: 0 | Status: DISCONTINUED | OUTPATIENT
Start: 2021-05-27 | End: 2021-05-27

## 2021-05-27 RX ORDER — SITAGLIPTIN AND METFORMIN HYDROCHLORIDE 500; 50 MG/1; MG/1
1 TABLET, FILM COATED ORAL
Qty: 0 | Refills: 0 | DISCHARGE

## 2021-05-27 NOTE — BRIEF OPERATIVE NOTE - NSICDXBRIEFPROCEDURE_GEN_ALL_CORE_FT
PROCEDURES:  Ultrasound, endobronchial, with biopsy of mediastinal lymph node 27-May-2021 09:31:48  Audie Payne

## 2021-05-27 NOTE — ASU DISCHARGE PLAN (ADULT/PEDIATRIC) - CARE PROVIDER_API CALL
Speedy Lambert (MD)  Surgery; Thoracic Surgery  [phone] [place] [address]  Phone: [phone]  Fax: [phone]  Follow Up Time: 2 weeks

## 2021-05-29 LAB
CULTURE RESULTS: SIGNIFICANT CHANGE UP
SPECIMEN SOURCE: SIGNIFICANT CHANGE UP

## 2021-06-01 LAB
-  AMPICILLIN/SULBACTAM: SIGNIFICANT CHANGE UP
-  CEFAZOLIN: SIGNIFICANT CHANGE UP
-  CLINDAMYCIN: SIGNIFICANT CHANGE UP
-  ERYTHROMYCIN: SIGNIFICANT CHANGE UP
-  GENTAMICIN: SIGNIFICANT CHANGE UP
-  OXACILLIN: SIGNIFICANT CHANGE UP
-  PENICILLIN: SIGNIFICANT CHANGE UP
-  RIFAMPIN: SIGNIFICANT CHANGE UP
-  TETRACYCLINE: SIGNIFICANT CHANGE UP
-  TRIMETHOPRIM/SULFAMETHOXAZOLE: SIGNIFICANT CHANGE UP
-  VANCOMYCIN: SIGNIFICANT CHANGE UP
CULTURE RESULTS: SIGNIFICANT CHANGE UP
METHOD TYPE: SIGNIFICANT CHANGE UP
ORGANISM # SPEC MICROSCOPIC CNT: SIGNIFICANT CHANGE UP
ORGANISM # SPEC MICROSCOPIC CNT: SIGNIFICANT CHANGE UP
SPECIMEN SOURCE: SIGNIFICANT CHANGE UP

## 2021-06-02 LAB — NON-GYNECOLOGICAL CYTOLOGY STUDY: SIGNIFICANT CHANGE UP

## 2021-06-03 LAB — NON-GYNECOLOGICAL CYTOLOGY STUDY: SIGNIFICANT CHANGE UP

## 2021-06-10 ENCOUNTER — NON-APPOINTMENT (OUTPATIENT)
Age: 86
End: 2021-06-10

## 2021-06-10 ENCOUNTER — APPOINTMENT (OUTPATIENT)
Dept: THORACIC SURGERY | Facility: CLINIC | Age: 86
End: 2021-06-10
Payer: MEDICARE

## 2021-06-10 VITALS
DIASTOLIC BLOOD PRESSURE: 73 MMHG | RESPIRATION RATE: 18 BRPM | BODY MASS INDEX: 25.92 KG/M2 | SYSTOLIC BLOOD PRESSURE: 123 MMHG | HEART RATE: 84 BPM | OXYGEN SATURATION: 96 % | TEMPERATURE: 97.6 F | WEIGHT: 132 LBS

## 2021-06-10 DIAGNOSIS — A49.01 METHICILLIN SUSCEPTIBLE STAPHYLOCOCCUS AUREUS INFECTION, UNSPECIFIED SITE: ICD-10-CM

## 2021-06-10 PROCEDURE — 99215 OFFICE O/P EST HI 40 MIN: CPT

## 2021-06-10 RX ORDER — SULFAMETHOXAZOLE AND TRIMETHOPRIM 400; 80 MG/1; MG/1
400-80 TABLET ORAL DAILY
Qty: 7 | Refills: 0 | Status: ACTIVE | COMMUNITY
Start: 2021-06-10 | End: 1900-01-01

## 2021-06-11 NOTE — PHYSICAL EXAM
[Auscultation Breath Sounds / Voice Sounds] : lungs were clear to auscultation bilaterally [Heart Sounds] : normal S1 and S2 [Heart Rate And Rhythm] : heart rate was normal and rhythm regular [Heart Sounds Gallop] : no gallops [Murmurs] : no murmurs [Heart Sounds Pericardial Friction Rub] : no pericardial rub [Examination Of The Chest] : the chest was normal in appearance [Chest Visual Inspection Thoracic Asymmetry] : no chest asymmetry [Diminished Respiratory Excursion] : normal chest expansion [Bowel Sounds] : normal bowel sounds [Abdomen Soft] : soft [Abdomen Tenderness] : non-tender [Abdomen Mass (___ Cm)] : no abdominal mass palpated [Skin Color & Pigmentation] : normal skin color and pigmentation [Skin Turgor] : normal skin turgor [] : no rash

## 2021-06-13 NOTE — CONSULT LETTER
[Dear  ___] : Dear  [unfilled], [Consult Letter:] : I had the pleasure of evaluating your patient, [unfilled]. [( Thank you for referring [unfilled] for consultation for _____ )] : Thank you for referring [unfilled] for consultation for [unfilled] [Please see my note below.] : Please see my note below. [Consult Closing:] : Thank you very much for allowing me to participate in the care of this patient.  If you have any questions, please do not hesitate to contact me. [Sincerely,] : Sincerely, [DrFrancesco  ___] : Dr. HIGH [DrFrancesco ___] : Dr. HIGH [FreeTextEntry2] : Dr. Hazel Bazzi (Hem/Onc/referring)\par Dr. Shola Osborne (GI)\par Dr. Alirio Smith (Colorectal surgeon)\par Dr. Bret Mckenzie (PCP)  [FreeTextEntry3] : Speedy Lambert MD, MPH \par System Director of Thoracic Surgery \par Director of Comprehensive Lung and Foregut [place] \par Professor Cardiovascular & Thoracic Surgery  \par [facility] [employer] [facility]\par \par [facility]\par [address]\par Oncology [address]\[place] [address]\par Tel: [phone]\par Fax: [phone]\par

## 2021-06-13 NOTE — ASSESSMENT
[FreeTextEntry1] : Ms. FLORINDA SCHMID, 85 year old female, never smoker, w/ hx of HTN, HLD, DM2, Breast CA s/p Rt mastectomy in 2004 followed by 4 cycles of chemo and 5 yrs of AI, w/ newly diagnosed colon AdenoCA during routine colonoscopy, PET scan revealed mediastinal lymphadenopathy.\par \par Colonoscopy on 4/5/21 by Dr. Shola Osborne showed an ulcerated mass in the sigmoid colon measuring ~5cm. Path revealed AdenoCA, mod-diff.\par \par Flex Bronch w/ BAL of RUL bronchus, EBUS biopsy of Level 7 subcarinal LNs, biopsy of Rt level IV paratracheal LNs on 5/27/21. Path of Level 7 and Level 4R LNs both negative for malignancy. RUL BAL negative for malignancy.\par \par I have reviewed the patient's medical records and diagnostic images at time of this office consultation and have made the following recommendation:\par 1. path reviewed and explained to patient's son in law, mediastinal lymph nodes negative for malignancy, I recommended a repeat PET scan in 3 months.\par 2. Patient should schedule for colon rxn with Colorectal surgeon.\par \par \par I personally performed the services described in the documentation, reviewed the documentation recorded by the scribe in my presence and it accurately and completely records my words and actions.\par \par I, Christiano Gill NP, am scribing for and the presence of ÁLVARO Winkler, the following sections HISTORY OF PRESENT ILLNESS, PAST MEDICAL/FAMILY/SOCIAL HISTORY; REVIEW OF SYSTEMS; VITAL SIGNS; PHYSICAL EXAM; DISPOSITION.\par \par

## 2021-06-13 NOTE — DATA REVIEWED
[FreeTextEntry1] : I have independently reviewed patient's pathology:\par Flex Bronch w/ BAL of RUL bronchus, EBUS biopsy of Level 7 subcarinal LNs, biopsy of Rt level IV paratracheal LNs on 5/27/21. Path of Level 7 and Level 4R LNs both negative for malignancy. RUL BAL negative for malignancy.\par

## 2021-06-13 NOTE — HISTORY OF PRESENT ILLNESS
[FreeTextEntry1] : Ms. FLORINDA SCHMID, 85 year old female, never smoker, w/ hx of HTN, HLD, DM2, Breast CA s/p Rt mastectomy in 2004 followed by 4 cycles of chemo and 5 yrs of AI, w/ newly diagnosed colon AdenoCA during routine colonoscopy, PET scan revealed mediastinal lymphadenopathy.\par \par Colonoscopy on 4/5/21 by Dr. Shola Osborne showed an ulcerated mass in the sigmoid colon measuring ~5cm. Path revealed AdenoCA, mod-diff.\par \par CT A/P w/ contrast on 4/6/21:\par - circumferential wall thickening in the rectosigmoid colon, c/w carcinoma, w/ probable invasion into the surrounding fat, involvement of the Rt adnexa/fallopian tube and mets disease in the adjacent lymph nodes\par \par PET/CT on 4/13/21:\par - focal activity in Lt mandible with corresponding lucency involving the canine tooth,. SUV=15.6\par - Lt Lvl 1B LN, 8 mm with SUV=5\par - thyroid is enlarged and heterogeneous without activity\par - extensive mediastinal lymphadenopathy: Rt paratracheal 2.1 cm SUV=17.3, subcarinal 1.5 cm SUV=15.9, mildly active bilateral hilar LNs SUV=4.9\par - bilateral tree-in-bud nodularity in ADALBERTO and RUL, attributed to infectious bronchiolitis\par - a few small areas of subsegmental atelectasis and scarring in both lungs\par - stable mass of sigmoid colon SUV=13.3 inseparable from the Rt adnexa\par \par CT Chest on 4/23/21:\par - subcarinal lymphadenopathy measuring ~1.5cm; Rt paratracheal lymphadenopathy measuring ~1.4cm\par - mosaic attenuation likely due to air trapping\par - enlarged thyroid gland w/ nodules bilaterally, measuring 2.5cm in the Rt\par \par ECHO on 5/1/21 by Dr. Kofi Martínez, w/ normal LV systolic function.\par \par Now s/p Flex Bronch w/ BAL of RUL bronchus, EBUS biopsy of Level 7 subcarinal LNs, biopsy of Rt level IV paratracheal LNs on 5/27/21. Path of Level 7 and Level 4R LNs both negative for malignancy. RUL BAL negative for malignancy.\par \par Patient is here today for a follow up. Admits to on/off productive cough after the procedure; also with oral ulcers for a week; had a low grade fever last week, took Tylenol.\par

## 2021-06-26 LAB
CULTURE RESULTS: SIGNIFICANT CHANGE UP
CULTURE RESULTS: SIGNIFICANT CHANGE UP
SPECIMEN SOURCE: SIGNIFICANT CHANGE UP
SPECIMEN SOURCE: SIGNIFICANT CHANGE UP

## 2021-09-15 PROBLEM — R59.0 MEDIASTINAL ADENOPATHY: Status: ACTIVE | Noted: 2021-04-28

## 2021-09-16 ENCOUNTER — APPOINTMENT (OUTPATIENT)
Dept: THORACIC SURGERY | Facility: CLINIC | Age: 86
End: 2021-09-16
Payer: MEDICARE

## 2021-09-16 VITALS
BODY MASS INDEX: 23.95 KG/M2 | RESPIRATION RATE: 18 BRPM | WEIGHT: 122 LBS | DIASTOLIC BLOOD PRESSURE: 79 MMHG | OXYGEN SATURATION: 98 % | SYSTOLIC BLOOD PRESSURE: 131 MMHG | HEART RATE: 88 BPM | TEMPERATURE: 98.2 F

## 2021-09-16 DIAGNOSIS — C19 MALIGNANT NEOPLASM OF RECTOSIGMOID JUNCTION: ICD-10-CM

## 2021-09-16 DIAGNOSIS — R59.0 LOCALIZED ENLARGED LYMPH NODES: ICD-10-CM

## 2021-09-16 PROCEDURE — 99213 OFFICE O/P EST LOW 20 MIN: CPT

## 2021-09-17 PROBLEM — C19 COLORECTAL CANCER: Status: ACTIVE | Noted: 2021-04-30

## 2021-09-20 NOTE — ASSESSMENT
[FreeTextEntry1] : Ms. FLORINDA SCHMID, 85 year old female, never smoker, w/ hx of HTN, HLD, DM2, Breast CA s/p Rt mastectomy in 2004 followed by 4 cycles of chemo and 5 yrs of AI, w/ newly diagnosed colon AdenoCA during routine colonoscopy, PET scan revealed mediastinal lymphadenopathy.\par \par Colonoscopy on 4/5/21 by Dr. Shola Osborne showed an ulcerated mass in the sigmoid colon measuring ~5cm. Path revealed AdenoCA, mod-diff.\par \par CT A/P w/ contrast on 4/6/21:\par - circumferential wall thickening in the rectosigmoid colon, c/w carcinoma, w/ probable invasion into the surrounding fat, involvement of the Rt adnexa/fallopian tube and mets disease in the adjacent lymph nodes\par \par PET/CT on 4/13/21:\par - focal activity in Lt mandible with corresponding lucency involving the canine tooth,. SUV=15.6\par - Lt Lvl 1B LN, 8 mm with SUV=5\par - thyroid is enlarged and heterogeneous without activity\par - extensive mediastinal lymphadenopathy: Rt paratracheal 2.1 cm SUV=17.3, subcarinal 1.5 cm SUV=15.9, mildly active bilateral hilar LNs SUV=4.9\par - bilateral tree-in-bud nodularity in ADALBERTO and RUL, attributed to infectious bronchiolitis\par - a few small areas of subsegmental atelectasis and scarring in both lungs\par - stable mass of sigmoid colon SUV=13.3 inseparable from the Rt adnexa\par \par CT Chest on 4/23/21:\par - subcarinal lymphadenopathy measuring ~1.5cm; Rt paratracheal lymphadenopathy measuring ~1.4cm\par - mosaic attenuation likely due to air trapping\par - enlarged thyroid gland w/ nodules bilaterally, measuring 2.5cm in the Rt\par \par ECHO on 5/1/21 by Dr. Kofi Martínez, w/ normal LV systolic function.\par \par Now s/p Flex Bronch w/ BAL of RUL bronchus, EBUS biopsy of Level 7 subcarinal LNs, biopsy of Rt level IV paratracheal LNs on 5/27/21. Path of Level 7 and Level 4R LNs both negative for malignancy. RUL BAL negative for malignancy.\par \par Patient is s/p colon resection on 08/10/2021. \par \par Patient was recommended to RTC in 3 months with PET/CT, however, PET/CT was denied. Patient is here today for a follow up with CT C/A/P. \par \par CT C/A/P on 9/15/21:\par - tree-in-bud nodularity in periphery of RUL (3:63-69)\par - stable 3 mm ADALBERTO nodule (3:48)\par - stable enlarged Rt precarinal LN 1.7 cm (2:36), 1.5 cm subcarinal  and Rt hilar LN 1 cm\par - 2 cm Rt thyroid nodule, 1.3 cm Lt thyroid nodule \par \par I have reviewed the patient's medical records and diagnostic images at time of this office consultation and have made the following recommendation:\par 1. CT chest reviewed and explained to patient, stable mediastinal adenopathy, I recommended patient to return to office in 6 months with CT Chest without contrast.\par \par I personally performed the services described in the documentation, reviewed the documentation recorded by the scribe in my presence and it accurately and completely records my words and actions.\par \par I, Aide Pal NP, am scribing for and the presence of ÁLVARO Winkler, the following sections HISTORY OF PRESENT ILLNESS, PAST MEDICAL/FAMILY/SOCIAL HISTORY; REVIEW OF SYSTEMS; VITAL SIGNS; PHYSICAL EXAM; DISPOSITION.

## 2021-09-20 NOTE — CONSULT LETTER
[Dear  ___] : Dear  [unfilled], [Consult Letter:] : I had the pleasure of evaluating your patient, [unfilled]. [( Thank you for referring [unfilled] for consultation for _____ )] : Thank you for referring [unfilled] for consultation for [unfilled] [Please see my note below.] : Please see my note below. [Consult Closing:] : Thank you very much for allowing me to participate in the care of this patient.  If you have any questions, please do not hesitate to contact me. [Sincerely,] : Sincerely, [DrFrancesco  ___] : Dr. HIGH [DrFrancesco ___] : Dr. HIGH [FreeTextEntry2] : Dr. Hazel Bazzi (Hem/Onc/referring)\par Dr. Shola Osborne (GI)\par Dr. Alirio Smith (Colorectal surgeon)\par Dr. Bret Mckenzie (PCP)  [FreeTextEntry3] : Speedy Lambert MD, MPH \par System Director of Thoracic Surgery \par Director of Comprehensive Lung and Foregut [place] \par Professor Cardiovascular & Thoracic Surgery  \par [facility] [employer] [facility]\par \par [facility]\par [address]\par Oncology [address]\[place] [address]\par Tel: [phone]\par Fax: [phone]\par

## 2021-09-20 NOTE — HISTORY OF PRESENT ILLNESS
[FreeTextEntry1] : Ms. FLORINDA SCHMID, 85 year old female, never smoker, w/ hx of HTN, HLD, DM2, Breast CA s/p Rt mastectomy in 2004 followed by 4 cycles of chemo and 5 yrs of AI, w/ newly diagnosed colon AdenoCA during routine colonoscopy, PET scan revealed mediastinal lymphadenopathy.\par \par Colonoscopy on 4/5/21 by Dr. Shola Osborne showed an ulcerated mass in the sigmoid colon measuring ~5cm. Path revealed AdenoCA, mod-diff.\par \par CT A/P w/ contrast on 4/6/21:\par - circumferential wall thickening in the rectosigmoid colon, c/w carcinoma, w/ probable invasion into the surrounding fat, involvement of the Rt adnexa/fallopian tube and mets disease in the adjacent lymph nodes\par \par PET/CT on 4/13/21:\par - focal activity in Lt mandible with corresponding lucency involving the canine tooth,. SUV=15.6\par - Lt Lvl 1B LN, 8 mm with SUV=5\par - thyroid is enlarged and heterogeneous without activity\par - extensive mediastinal lymphadenopathy: Rt paratracheal 2.1 cm SUV=17.3, subcarinal 1.5 cm SUV=15.9, mildly active bilateral hilar LNs SUV=4.9\par - bilateral tree-in-bud nodularity in ADALBERTO and RUL, attributed to infectious bronchiolitis\par - a few small areas of subsegmental atelectasis and scarring in both lungs\par - stable mass of sigmoid colon SUV=13.3 inseparable from the Rt adnexa\par \par CT Chest on 4/23/21:\par - subcarinal lymphadenopathy measuring ~1.5cm; Rt paratracheal lymphadenopathy measuring ~1.4cm\par - mosaic attenuation likely due to air trapping\par - enlarged thyroid gland w/ nodules bilaterally, measuring 2.5cm in the Rt\par \par ECHO on 5/1/21 by Dr. Kofi Martínez, w/ normal LV systolic function.\par \par Now s/p Flex Bronch w/ BAL of RUL bronchus, EBUS biopsy of Level 7 subcarinal LNs, biopsy of Rt level IV paratracheal LNs on 5/27/21. Path of Level 7 and Level 4R LNs both negative for malignancy. RUL BAL negative for malignancy.\par \par Patient is s/p colon resection on 08/10/2021. \par \par Patient was recommended to RTC in 3 months with PET/CT, however, PET/CT was denied. Patient is here today for a follow up with CT C/A/P. \par \par CT C/A/P on 9/15/21:\par - tree-in-bud nodularity in periphery of RUL (3:63-69)\par - stable 3 mm ADALBERTO nodule (3:48)\par - stable enlarged Rt precarinal LN 1.7 cm (2:36), 1.5 cm subcarinal  and Rt hilar LN 1 cm\par - 2 cm Rt thyroid nodule, 1.3 cm Lt thyroid nodule \par \par Patient c/o chronic oral ulcer, denies shortness of breath, cough, chest pain, fever, chills.

## 2021-09-20 NOTE — REASON FOR VISIT
[Follow-Up: _____] : a [unfilled] follow-up visit [Other: _____] : [unfilled] [FreeTextEntry1] : Mediastinal adenopathy

## 2022-03-17 ENCOUNTER — APPOINTMENT (OUTPATIENT)
Dept: THORACIC SURGERY | Facility: CLINIC | Age: 87
End: 2022-03-17

## 2022-06-14 NOTE — H&P PST ADULT - CONSTITUTIONAL
Spoke with Rekha Fernandes with Dr. Alma De office. They can see patient next week at 12 or 12:30PM on 6/22 in office. He is available on Fridays after clinic around 12 or 1 for OR at [facility]. He is booked Monday and Tuesday currently and at [facility].  Dr. Meek Ventura and Dr. Rizzo/JOE will coordinate for a time Friday 6/24 at [facility] OR. I spoke with pt to confirm if he could get to Dr. Anna Nolen on Wednesday 6/22 at 12 or 12:30. He chose 12. I will relay this to Rekha Fernandes and she will get patient scheduled. Gave pt Marta's direct line: [phone] and office info: [id number] [facility], [address] [place] [address]. All questions answered. Patient expressed understanding. Statement Selected detailed exam

## 2022-08-04 NOTE — ASU PREOP CHECKLIST - SKIN PREP
Subjective:      Patient ID: Ralf Cuenca is a 45 y.o. male.    Chief Complaint: No chief complaint on file.      HPI  (Sergio)    Last seen by me on 6/10/22 for right wrist pain with normal XRs.     He was given motrin along with a wrist brace to use prn. He was sent to OT (has done 6 visits).     He is here for follow up.     He had covid and after that his wrist pain went away. He has minimal pain in his wrist with minimal stiffness. He did have improvement with OT as well. He had some numbness in both hands after covid for a few day but this has resolved. He is right hand dominant. He continues on prn motrin. He is still in OT.       No past medical history on file.      Current Outpatient Medications:     ibuprofen (ADVIL,MOTRIN) 800 MG tablet, Take 1 tablet (800 mg total) by mouth 3 (three) times daily after meals., Disp: 90 tablet, Rfl: 2    Review of patient's allergies indicates:   Allergen Reactions    Benadryl [diphenhydramine hcl]        Review of Systems   Constitutional: Negative for chills, fever, night sweats and weight gain.   Gastrointestinal: Negative for bowel incontinence, nausea and vomiting.   Genitourinary: Negative for bladder incontinence.   Neurological: Negative for disturbances in coordination and loss of balance.         Objective:        There were no vitals taken for this visit.    Ortho/SPM Exam     RIGHT Hand/Wrist Examination:    Observation/Inspection:  Swelling  none    Deformity  none  Discoloration  none     Scars   none    Atrophy  none    HAND/WRIST EXAMINATION:  Finkelstein's Test   Neg  WHAT Test    Neg  Snuff box tenderness   Neg  Hook of Hamate Tenderness  Neg  CMC grind    Neg    Neurovascular Exam:  Digits WWP, brisk CR < 3s throughout  NVI motor/LTS to M/R/U nerves, radial pulse 2+  Tinel's Test - Carpal Tunnel  Neg  Tinel's Test - Cubital Tunnel  Neg  Phalen's Test    Neg  Median Nerve Compression Test Neg    ROM hand full, painless    Good ROM of wrist with no  pain. No current tenderness.          Assessment:       Encounter Diagnosis   Name Primary?    Acute pain of right wrist           Plan:       Diagnoses and all orders for this visit:    Acute pain of right wrist  -     Discontinue: ibuprofen (ADVIL,MOTRIN) 800 MG tablet; Take 1 tablet (800 mg total) by mouth 3 (three) times daily after meals.  -     ibuprofen (ADVIL,MOTRIN) 800 MG tablet; Take 1 tablet (800 mg total) by mouth 3 (three) times daily after meals.      He was seeing improvement in pain with OT then he had covid and after that his wrist pain went away. He has minimal pain in his wrist with minimal stiffness. Clicking is gone. He his right hand dominant.     Previous XRs showed no wrist fracture or dislocation.     Treatment options reviewed with patient and following plan made:      - Continue OT for strengthening.   - Continue prn motrin 800mg. Reviewed dosing and side effects. Take with food. Refill given as he wanted to use a different pharmacy.   - Stop gel brace since pain is better.      Follow up in about 2 months (around 10/5/2022).            n/a

## 2023-01-14 ENCOUNTER — INPATIENT (INPATIENT)
Facility: HOSPITAL | Age: 88
LOS: 12 days | DRG: 871 | End: 2023-01-27
Attending: INTERNAL MEDICINE | Admitting: STUDENT IN AN ORGANIZED HEALTH CARE EDUCATION/TRAINING PROGRAM
Payer: COMMERCIAL

## 2023-01-14 VITALS
HEIGHT: 61 IN | DIASTOLIC BLOOD PRESSURE: 90 MMHG | RESPIRATION RATE: 20 BRPM | WEIGHT: 125 LBS | TEMPERATURE: 98 F | OXYGEN SATURATION: 100 % | SYSTOLIC BLOOD PRESSURE: 182 MMHG | HEART RATE: 113 BPM

## 2023-01-14 DIAGNOSIS — Z90.49 ACQUIRED ABSENCE OF OTHER SPECIFIED PARTS OF DIGESTIVE TRACT: Chronic | ICD-10-CM

## 2023-01-14 DIAGNOSIS — Z98.49 CATARACT EXTRACTION STATUS, UNSPECIFIED EYE: Chronic | ICD-10-CM

## 2023-01-14 DIAGNOSIS — Z90.11 ACQUIRED ABSENCE OF RIGHT BREAST AND NIPPLE: Chronic | ICD-10-CM

## 2023-01-14 DIAGNOSIS — J18.9 PNEUMONIA, UNSPECIFIED ORGANISM: ICD-10-CM

## 2023-01-14 LAB
ALBUMIN SERPL ELPH-MCNC: 3.4 G/DL — SIGNIFICANT CHANGE UP (ref 3.3–5)
ALP SERPL-CCNC: 761 U/L — HIGH (ref 40–120)
ALT FLD-CCNC: 21 U/L — SIGNIFICANT CHANGE UP (ref 10–45)
ANION GAP SERPL CALC-SCNC: 14 MMOL/L — SIGNIFICANT CHANGE UP (ref 5–17)
ANISOCYTOSIS BLD QL: SLIGHT — SIGNIFICANT CHANGE UP
APPEARANCE UR: CLEAR — SIGNIFICANT CHANGE UP
APTT BLD: 29.9 SEC — SIGNIFICANT CHANGE UP (ref 27.5–35.5)
AST SERPL-CCNC: 42 U/L — HIGH (ref 10–40)
BACTERIA # UR AUTO: NEGATIVE — SIGNIFICANT CHANGE UP
BASE EXCESS BLDV CALC-SCNC: -0.8 MMOL/L — SIGNIFICANT CHANGE UP (ref -2–3)
BASE EXCESS BLDV CALC-SCNC: -1.7 MMOL/L — SIGNIFICANT CHANGE UP (ref -2–3)
BASOPHILS # BLD AUTO: 0 K/UL — SIGNIFICANT CHANGE UP (ref 0–0.2)
BASOPHILS NFR BLD AUTO: 0 % — SIGNIFICANT CHANGE UP (ref 0–2)
BILIRUB SERPL-MCNC: 1 MG/DL — SIGNIFICANT CHANGE UP (ref 0.2–1.2)
BILIRUB UR-MCNC: NEGATIVE — SIGNIFICANT CHANGE UP
BUN SERPL-MCNC: 16 MG/DL — SIGNIFICANT CHANGE UP (ref 7–23)
BURR CELLS BLD QL SMEAR: PRESENT — SIGNIFICANT CHANGE UP
CA-I SERPL-SCNC: 1.1 MMOL/L — LOW (ref 1.15–1.33)
CA-I SERPL-SCNC: 1.11 MMOL/L — LOW (ref 1.15–1.33)
CALCIUM SERPL-MCNC: 8.2 MG/DL — LOW (ref 8.4–10.5)
CHLORIDE BLDV-SCNC: 87 MMOL/L — LOW (ref 96–108)
CHLORIDE BLDV-SCNC: 89 MMOL/L — LOW (ref 96–108)
CHLORIDE SERPL-SCNC: 87 MMOL/L — LOW (ref 96–108)
CO2 BLDV-SCNC: 23 MMOL/L — SIGNIFICANT CHANGE UP (ref 22–26)
CO2 BLDV-SCNC: 24 MMOL/L — SIGNIFICANT CHANGE UP (ref 22–26)
CO2 SERPL-SCNC: 20 MMOL/L — LOW (ref 22–31)
COLOR SPEC: YELLOW — SIGNIFICANT CHANGE UP
CREAT ?TM UR-MCNC: 100 MG/DL — SIGNIFICANT CHANGE UP
CREAT SERPL-MCNC: 0.51 MG/DL — SIGNIFICANT CHANGE UP (ref 0.5–1.3)
DIFF PNL FLD: ABNORMAL
EGFR: 91 ML/MIN/1.73M2 — SIGNIFICANT CHANGE UP
EOSINOPHIL # BLD AUTO: 0 K/UL — SIGNIFICANT CHANGE UP (ref 0–0.5)
EOSINOPHIL NFR BLD AUTO: 0 % — SIGNIFICANT CHANGE UP (ref 0–6)
EPI CELLS # UR: 2 /HPF — SIGNIFICANT CHANGE UP
FLUAV AG NPH QL: SIGNIFICANT CHANGE UP
FLUBV AG NPH QL: SIGNIFICANT CHANGE UP
GAS PNL BLDV: 117 MMOL/L — CRITICAL LOW (ref 136–145)
GAS PNL BLDV: SIGNIFICANT CHANGE UP
GLUCOSE BLDV-MCNC: 154 MG/DL — HIGH (ref 70–99)
GLUCOSE BLDV-MCNC: 160 MG/DL — HIGH (ref 70–99)
GLUCOSE SERPL-MCNC: 162 MG/DL — HIGH (ref 70–99)
GLUCOSE UR QL: NEGATIVE — SIGNIFICANT CHANGE UP
HCO3 BLDV-SCNC: 22 MMOL/L — SIGNIFICANT CHANGE UP (ref 22–29)
HCO3 BLDV-SCNC: 23 MMOL/L — SIGNIFICANT CHANGE UP (ref 22–29)
HCT VFR BLD CALC: 35.2 % — SIGNIFICANT CHANGE UP (ref 34.5–45)
HCT VFR BLDA CALC: 36 % — SIGNIFICANT CHANGE UP (ref 34.5–46.5)
HCT VFR BLDA CALC: 38 % — SIGNIFICANT CHANGE UP (ref 34.5–46.5)
HGB BLD CALC-MCNC: 11.9 G/DL — SIGNIFICANT CHANGE UP (ref 11.7–16.1)
HGB BLD CALC-MCNC: 12.7 G/DL — SIGNIFICANT CHANGE UP (ref 11.7–16.1)
HGB BLD-MCNC: 12.1 G/DL — SIGNIFICANT CHANGE UP (ref 11.5–15.5)
HOROWITZ INDEX BLDV+IHG-RTO: SIGNIFICANT CHANGE UP
HYALINE CASTS # UR AUTO: 3 /LPF — HIGH (ref 0–2)
INR BLD: 1.57 RATIO — HIGH (ref 0.88–1.16)
KETONES UR-MCNC: NEGATIVE — SIGNIFICANT CHANGE UP
LACTATE BLDV-MCNC: 1.2 MMOL/L — SIGNIFICANT CHANGE UP (ref 0.5–2)
LACTATE BLDV-MCNC: 2.7 MMOL/L — HIGH (ref 0.5–2)
LEUKOCYTE ESTERASE UR-ACNC: NEGATIVE — SIGNIFICANT CHANGE UP
LYMPHOCYTES # BLD AUTO: 1.02 K/UL — SIGNIFICANT CHANGE UP (ref 1–3.3)
LYMPHOCYTES # BLD AUTO: 5.1 % — LOW (ref 13–44)
MANUAL SMEAR VERIFICATION: SIGNIFICANT CHANGE UP
MCHC RBC-ENTMCNC: 26.8 PG — LOW (ref 27–34)
MCHC RBC-ENTMCNC: 34.4 GM/DL — SIGNIFICANT CHANGE UP (ref 32–36)
MCV RBC AUTO: 78 FL — LOW (ref 80–100)
MICROCYTES BLD QL: SLIGHT — SIGNIFICANT CHANGE UP
MONOCYTES # BLD AUTO: 3.93 K/UL — HIGH (ref 0–0.9)
MONOCYTES NFR BLD AUTO: 19.7 % — HIGH (ref 2–14)
NEUTROPHILS # BLD AUTO: 15.02 K/UL — HIGH (ref 1.8–7.4)
NEUTROPHILS NFR BLD AUTO: 75.2 % — SIGNIFICANT CHANGE UP (ref 43–77)
NITRITE UR-MCNC: NEGATIVE — SIGNIFICANT CHANGE UP
NT-PROBNP SERPL-SCNC: 2522 PG/ML — HIGH (ref 0–300)
OSMOLALITY UR: 584 MOS/KG — SIGNIFICANT CHANGE UP (ref 300–900)
OVALOCYTES BLD QL SMEAR: SLIGHT — SIGNIFICANT CHANGE UP
PCO2 BLDV: 32 MMHG — LOW (ref 39–42)
PCO2 BLDV: 36 MMHG — LOW (ref 39–42)
PH BLDV: 7.42 — SIGNIFICANT CHANGE UP (ref 7.32–7.43)
PH BLDV: 7.44 — HIGH (ref 7.32–7.43)
PH UR: 6 — SIGNIFICANT CHANGE UP (ref 5–8)
PLAT MORPH BLD: NORMAL — SIGNIFICANT CHANGE UP
PLATELET # BLD AUTO: 129 K/UL — LOW (ref 150–400)
PO2 BLDV: 57 MMHG — HIGH (ref 25–45)
PO2 BLDV: 60 MMHG — HIGH (ref 25–45)
POIKILOCYTOSIS BLD QL AUTO: SIGNIFICANT CHANGE UP
POTASSIUM BLDV-SCNC: 3.6 MMOL/L — SIGNIFICANT CHANGE UP (ref 3.5–5.1)
POTASSIUM BLDV-SCNC: 3.8 MMOL/L — SIGNIFICANT CHANGE UP (ref 3.5–5.1)
POTASSIUM SERPL-MCNC: 3.9 MMOL/L — SIGNIFICANT CHANGE UP (ref 3.5–5.3)
POTASSIUM SERPL-SCNC: 3.9 MMOL/L — SIGNIFICANT CHANGE UP (ref 3.5–5.3)
POTASSIUM UR-SCNC: 38 MMOL/L — SIGNIFICANT CHANGE UP
PROT ?TM UR-MCNC: 51 MG/DL — HIGH (ref 0–12)
PROT SERPL-MCNC: 6.7 G/DL — SIGNIFICANT CHANGE UP (ref 6–8.3)
PROT UR-MCNC: ABNORMAL
PROT/CREAT UR-RTO: 0.5 RATIO — HIGH (ref 0–0.2)
PROTHROM AB SERPL-ACNC: 18.1 SEC — HIGH (ref 10.5–13.4)
RBC # BLD: 4.51 M/UL — SIGNIFICANT CHANGE UP (ref 3.8–5.2)
RBC # FLD: 14.3 % — SIGNIFICANT CHANGE UP (ref 10.3–14.5)
RBC BLD AUTO: ABNORMAL
RBC CASTS # UR COMP ASSIST: 21 /HPF — HIGH (ref 0–4)
RSV RNA NPH QL NAA+NON-PROBE: SIGNIFICANT CHANGE UP
SAO2 % BLDV: 90.5 % — HIGH (ref 67–88)
SAO2 % BLDV: 90.9 % — HIGH (ref 67–88)
SARS-COV-2 RNA SPEC QL NAA+PROBE: SIGNIFICANT CHANGE UP
SCHISTOCYTES BLD QL AUTO: SLIGHT — SIGNIFICANT CHANGE UP
SODIUM SERPL-SCNC: 121 MMOL/L — LOW (ref 135–145)
SODIUM UR-SCNC: 16 MMOL/L — SIGNIFICANT CHANGE UP
SP GR SPEC: 1.02 — SIGNIFICANT CHANGE UP (ref 1.01–1.02)
UROBILINOGEN FLD QL: ABNORMAL
WBC # BLD: 19.97 K/UL — HIGH (ref 3.8–10.5)
WBC # FLD AUTO: 19.97 K/UL — HIGH (ref 3.8–10.5)
WBC UR QL: 4 /HPF — SIGNIFICANT CHANGE UP (ref 0–5)

## 2023-01-14 PROCEDURE — 99285 EMERGENCY DEPT VISIT HI MDM: CPT

## 2023-01-14 PROCEDURE — 71250 CT THORAX DX C-: CPT | Mod: 26

## 2023-01-14 PROCEDURE — 71045 X-RAY EXAM CHEST 1 VIEW: CPT | Mod: 26

## 2023-01-14 PROCEDURE — 93308 TTE F-UP OR LMTD: CPT | Mod: 26

## 2023-01-14 RX ORDER — CEFEPIME 1 G/1
2000 INJECTION, POWDER, FOR SOLUTION INTRAMUSCULAR; INTRAVENOUS ONCE
Refills: 0 | Status: COMPLETED | OUTPATIENT
Start: 2023-01-14 | End: 2023-01-14

## 2023-01-14 RX ORDER — SODIUM CHLORIDE 9 MG/ML
250 INJECTION INTRAMUSCULAR; INTRAVENOUS; SUBCUTANEOUS ONCE
Refills: 0 | Status: COMPLETED | OUTPATIENT
Start: 2023-01-14 | End: 2023-01-14

## 2023-01-14 RX ORDER — ACETAMINOPHEN 500 MG
1000 TABLET ORAL ONCE
Refills: 0 | Status: COMPLETED | OUTPATIENT
Start: 2023-01-14 | End: 2023-01-14

## 2023-01-14 RX ORDER — VANCOMYCIN HCL 1 G
1000 VIAL (EA) INTRAVENOUS ONCE
Refills: 0 | Status: COMPLETED | OUTPATIENT
Start: 2023-01-14 | End: 2023-01-14

## 2023-01-14 RX ORDER — AZITHROMYCIN 500 MG/1
500 TABLET, FILM COATED ORAL ONCE
Refills: 0 | Status: COMPLETED | OUTPATIENT
Start: 2023-01-14 | End: 2023-01-15

## 2023-01-14 RX ADMIN — Medication 250 MILLIGRAM(S): at 23:00

## 2023-01-14 RX ADMIN — CEFEPIME 2000 MILLIGRAM(S): 1 INJECTION, POWDER, FOR SOLUTION INTRAMUSCULAR; INTRAVENOUS at 21:30

## 2023-01-14 RX ADMIN — Medication 1000 MILLIGRAM(S): at 21:30

## 2023-01-14 RX ADMIN — SODIUM CHLORIDE 250 MILLILITER(S): 9 INJECTION INTRAMUSCULAR; INTRAVENOUS; SUBCUTANEOUS at 20:30

## 2023-01-14 RX ADMIN — Medication 1000 MILLIGRAM(S): at 20:45

## 2023-01-14 RX ADMIN — SODIUM CHLORIDE 250 MILLILITER(S): 9 INJECTION INTRAMUSCULAR; INTRAVENOUS; SUBCUTANEOUS at 21:30

## 2023-01-14 RX ADMIN — CEFEPIME 100 MILLIGRAM(S): 1 INJECTION, POWDER, FOR SOLUTION INTRAMUSCULAR; INTRAVENOUS at 21:00

## 2023-01-14 RX ADMIN — Medication 400 MILLIGRAM(S): at 20:30

## 2023-01-14 NOTE — ED ADULT NURSE NOTE - OBJECTIVE STATEMENT
Patient alert and oriented. [place] speaking only. Family at bedside. SOB reported by patient and family members. 02 inplace via n/c for comfort. Sp02 on room air 94-96. Skin warm and dry.  Decreased appetite reported.

## 2023-01-14 NOTE — ED PROVIDER NOTE - PHYSICAL EXAMINATION
GENERAL: Awake. Alert. NAD. Well nourished.  HEENT: NC/AT, PERRL, EOMI, Conjunctiva pink, no scleral icterus. Airway patent. Moist mucous membranes.  LUNGS: Course breath sounds with rales b/l in all lungs fields  CARDIAC: Chest non-tender to palpation. RRR.  ABDOMEN: No masses noted. Soft, NT, ND, no rebound, no guarding.  EXT: No edema, no calf tenderness, distal pulses 2+ bilaterally  NEURO: A&Ox3. Moving all extremities. Sensation and strength intact throughout.   SKIN: Warm and dry.   PSYCH: Normal affect. GENERAL: Awake. Alert. NAD. Well nourished.  HEENT: NC/AT, PERRL, EOMI, Conjunctiva pink, no scleral icterus. Airway patent. Moist mucous membranes.  LUNGS: Course breath sounds with rales b/l in all lungs fields  CARDIAC: Chest non-tender to palpation. RRR.  ABDOMEN: No masses noted. Soft, NT, ND, no rebound, no guarding.  EXT: No edema, no calf tenderness, distal pulses 2+ bilaterally  NEURO: A&Ox3. Moving all extremities. Sensation and strength intact throughout.   SKIN: Warm and dry.   PSYCH: Normal affect.  Attending Isela [place]: Gen: increased wob, heent: atrauamtic, eomi, perrla, mmm, op pink, uvula midline, neck; nttp, no nuchal rigidity, chest: nttp, no crepitus, cv: irregular, lungs:decreased bl, abd: soft, nontender, nondistended, no peritoneal signs, +no guarding, ext: wwp, neg homans, skin: no rash, neuro: awake and alert, following commands, speech clear, sensation and strength intact, no focal deficits

## 2023-01-14 NOTE — ED CLERICAL - DIVISION
[facility]... [facility]... [facility]...

## 2023-01-14 NOTE — ED PROVIDER NOTE - ATTENDING CONTRIBUTION TO CARE
Attending MD Isela [place]:  I personally have seen and examined this patient.  Resident note reviewed and agree on plan of care and except where noted.  See HPI, PE, and MDM for details.

## 2023-01-14 NOTE — ED ADULT NURSE NOTE - CAS EDP DISCH DISPOSITION ADMI
[facility]/[employer] [facility]/[employer]/[employer]

## 2023-01-14 NOTE — ED PROVIDER NOTE - OBJECTIVE STATEMENT
87F PMH HTN, HLD presenting with a few days of worsening SOB, pt diagnosed with pneumonia 2 weeks ago and started on levaquin (just finished antibiotic course). Pt found to be hypoxic to 88% on RA, improved to 96% on 3L. Reports associated dry cough. Denies chest pain, fever, chills, n/v, abdominal pain, diarrhea. Denies headache, calf pain/swelling.

## 2023-01-14 NOTE — ED PROVIDER NOTE - PROGRESS NOTE DETAILS
Shelly Gruber DO (PGY2): rectal temp 100.6, blood cultures sent, abx ordered. Jean PGY2: Patient endorsed to me at sign out. Seen and assessed at bedside. Pt comfortable on NC. Discussed results with pt, daughter and niece Kandi [phone]. PCP is Dr Bret Mckenzie    Confirmed with family and has hx of COLON CA that got surgical removal but knew she had spread with likely nodules in lungs. Was not going to pursue chemo but was told by onc given age that she would likely poorly tolerate aggressive chemo. Agreeable to plan. Jean PGY2: Patient endorsed to me at sign out. Seen and assessed at bedside. Pt comfortable on NC. Discussed results with pt, daughter and niece Kandi [phone]. PCP is Dr Bret Mckenzie    Confirmed with family and has hx of COLON CA that got surgical removal but knew she had spread with likely nodules in lungs. Was not going to pursue chemo but was told by onc given age that she would likely poorly tolerate aggressive chemo. Agreeable to plan. Jean PGY2: Patient endorsed to me at sign out. Seen and assessed at bedside. Pt comfortable on NC. Discussed results with pt, daughter and niece Kandi [phone]. PCP is Dr Bret Mckenzie    Confirmed with family and has hx of COLON CA that got surgical removal but knew she had spread with likely nodules in lungs. Was not going to pursue chemo but was told by onc given age that she would likely poorly tolerate aggressive chemo. Agreeable to plan.

## 2023-01-14 NOTE — ED ADULT NURSE NOTE - PAIN: PRESENCE, MLM
How Severe Is Your Acne?: mild Is This A New Presentation, Or A Follow-Up?: Follow Up Acne denies pain/discomfort

## 2023-01-14 NOTE — ED ADULT TRIAGE NOTE - NS ED NURSE AMBULANCES
[facility]

## 2023-01-14 NOTE — ED PROVIDER NOTE - CLINICAL SUMMARY MEDICAL DECISION MAKING FREE TEXT BOX
87F PMH HTN, HLD presenting with a few days of worsening SOB, pt diagnosed with pneumonia 2 weeks ago and started on levaquin (just finished antibiotic course). Given hx and physical, ddx includes but is not limited to pneumonia, sepsis, pleural effusion, chf. Plan for ecg, xr, meds, labs, tba 87F PMH HTN, HLD presenting with a few days of worsening SOB, pt diagnosed with pneumonia 2 weeks ago and started on levaquin (just finished antibiotic course). Given hx and physical, ddx includes but is not limited to pneumonia, sepsis, pleural effusion, chf. Plan for ecg, xr, meds, labs, tba  Attending Isela [place]: 88 yo female with multiple medical issues presenting with sob. pt was recently treated for pna with levaquin. pt reports worsening sob. upona rrival pt tachypnic and hypoxic requiring supplemental oxygen. pt also with low grade fever. pocus performed showing B lines and IVC with respiratory variation. concern for possible worsening infection, consider empyema. less likely PE.  pt pan cultured and started on broad spectrum abx. will consider ct chest to further evaluate. pt will need admission. 87F PMH HTN, HLD presenting with a few days of worsening SOB, pt diagnosed with pneumonia 2 weeks ago and started on levaquin (just finished antibiotic course). Given hx and physical, ddx includes but is not limited to pneumonia, sepsis, pleural effusion, chf. Plan for ecg, xr, meds, labs, tba  Attending Isela [place]: 86 yo female with multiple medical issues presenting with sob. pt was recently treated for pna with levaquin. pt reports worsening sob. upona rrival pt tachypnic and hypoxic requiring supplemental oxygen. pt also with low grade fever. pocus performed showing B lines and IVC with respiratory variation. concern for possible worsening infection, consider empyema. less likely PE.  pt pan cultured and started on broad spectrum abx. will consider ct chest to further evaluate. pt will need admission.

## 2023-01-15 DIAGNOSIS — Z29.9 ENCOUNTER FOR PROPHYLACTIC MEASURES, UNSPECIFIED: ICD-10-CM

## 2023-01-15 DIAGNOSIS — I48.91 UNSPECIFIED ATRIAL FIBRILLATION: ICD-10-CM

## 2023-01-15 DIAGNOSIS — R74.8 ABNORMAL LEVELS OF OTHER SERUM ENZYMES: ICD-10-CM

## 2023-01-15 DIAGNOSIS — F03.90 UNSPECIFIED DEMENTIA WITHOUT BEHAVIORAL DISTURBANCE: ICD-10-CM

## 2023-01-15 DIAGNOSIS — A41.9 SEPSIS, UNSPECIFIED ORGANISM: ICD-10-CM

## 2023-01-15 DIAGNOSIS — Z90.49 ACQUIRED ABSENCE OF OTHER SPECIFIED PARTS OF DIGESTIVE TRACT: Chronic | ICD-10-CM

## 2023-01-15 DIAGNOSIS — Z90.11 ACQUIRED ABSENCE OF RIGHT BREAST AND NIPPLE: Chronic | ICD-10-CM

## 2023-01-15 DIAGNOSIS — E11.9 TYPE 2 DIABETES MELLITUS WITHOUT COMPLICATIONS: ICD-10-CM

## 2023-01-15 DIAGNOSIS — E04.1 NONTOXIC SINGLE THYROID NODULE: ICD-10-CM

## 2023-01-15 DIAGNOSIS — I50.9 HEART FAILURE, UNSPECIFIED: ICD-10-CM

## 2023-01-15 DIAGNOSIS — E87.1 HYPO-OSMOLALITY AND HYPONATREMIA: ICD-10-CM

## 2023-01-15 DIAGNOSIS — J18.9 PNEUMONIA, UNSPECIFIED ORGANISM: ICD-10-CM

## 2023-01-15 DIAGNOSIS — Z90.12 ACQUIRED ABSENCE OF LEFT BREAST AND NIPPLE: Chronic | ICD-10-CM

## 2023-01-15 DIAGNOSIS — I10 ESSENTIAL (PRIMARY) HYPERTENSION: ICD-10-CM

## 2023-01-15 LAB
A1C WITH ESTIMATED AVERAGE GLUCOSE RESULT: 6.4 % — HIGH (ref 4–5.6)
ALBUMIN SERPL ELPH-MCNC: 3.1 G/DL — LOW (ref 3.3–5)
ALBUMIN SERPL ELPH-MCNC: 3.2 G/DL — LOW (ref 3.3–5)
ALBUMIN SERPL ELPH-MCNC: 3.4 G/DL — SIGNIFICANT CHANGE UP (ref 3.3–5)
ALP SERPL-CCNC: 1051 U/L — HIGH (ref 40–120)
ALP SERPL-CCNC: 1277 U/L — HIGH (ref 40–120)
ALP SERPL-CCNC: 851 U/L — HIGH (ref 40–120)
ALT FLD-CCNC: 19 U/L — SIGNIFICANT CHANGE UP (ref 10–45)
ALT FLD-CCNC: 23 U/L — SIGNIFICANT CHANGE UP (ref 10–45)
ALT FLD-CCNC: 24 U/L — SIGNIFICANT CHANGE UP (ref 10–45)
ANION GAP SERPL CALC-SCNC: 11 MMOL/L — SIGNIFICANT CHANGE UP (ref 5–17)
ANION GAP SERPL CALC-SCNC: 14 MMOL/L — SIGNIFICANT CHANGE UP (ref 5–17)
AST SERPL-CCNC: 43 U/L — HIGH (ref 10–40)
AST SERPL-CCNC: 58 U/L — HIGH (ref 10–40)
AST SERPL-CCNC: 61 U/L — HIGH (ref 10–40)
BASOPHILS # BLD AUTO: 0 K/UL — SIGNIFICANT CHANGE UP (ref 0–0.2)
BASOPHILS NFR BLD AUTO: 0 % — SIGNIFICANT CHANGE UP (ref 0–2)
BILIRUB SERPL-MCNC: 1.1 MG/DL — SIGNIFICANT CHANGE UP (ref 0.2–1.2)
BILIRUB SERPL-MCNC: 1.2 MG/DL — SIGNIFICANT CHANGE UP (ref 0.2–1.2)
BILIRUB SERPL-MCNC: 1.5 MG/DL — HIGH (ref 0.2–1.2)
BUN SERPL-MCNC: 11 MG/DL — SIGNIFICANT CHANGE UP (ref 7–23)
BUN SERPL-MCNC: 12 MG/DL — SIGNIFICANT CHANGE UP (ref 7–23)
BUN SERPL-MCNC: 14 MG/DL — SIGNIFICANT CHANGE UP (ref 7–23)
CALCIUM SERPL-MCNC: 7.6 MG/DL — LOW (ref 8.4–10.5)
CALCIUM SERPL-MCNC: 8 MG/DL — LOW (ref 8.4–10.5)
CHLORIDE SERPL-SCNC: 82 MMOL/L — LOW (ref 96–108)
CHLORIDE SERPL-SCNC: 86 MMOL/L — LOW (ref 96–108)
CHLORIDE SERPL-SCNC: 88 MMOL/L — LOW (ref 96–108)
CK MB BLD-MCNC: 2.9 % — SIGNIFICANT CHANGE UP (ref 0–3.5)
CK MB CFR SERPL CALC: 4.8 NG/ML — HIGH (ref 0–3.8)
CK SERPL-CCNC: 166 U/L — SIGNIFICANT CHANGE UP (ref 25–170)
CO2 SERPL-SCNC: 21 MMOL/L — LOW (ref 22–31)
CO2 SERPL-SCNC: 22 MMOL/L — SIGNIFICANT CHANGE UP (ref 22–31)
CREAT SERPL-MCNC: 0.47 MG/DL — LOW (ref 0.5–1.3)
CREAT SERPL-MCNC: 0.49 MG/DL — LOW (ref 0.5–1.3)
EGFR: 92 ML/MIN/1.73M2 — SIGNIFICANT CHANGE UP
EGFR: 93 ML/MIN/1.73M2 — SIGNIFICANT CHANGE UP
EOSINOPHIL # BLD AUTO: 0 K/UL — SIGNIFICANT CHANGE UP (ref 0–0.5)
EOSINOPHIL NFR BLD AUTO: 0 % — SIGNIFICANT CHANGE UP (ref 0–6)
ESTIMATED AVERAGE GLUCOSE: 137 MG/DL — HIGH (ref 68–114)
GGT SERPL-CCNC: 31 U/L — SIGNIFICANT CHANGE UP (ref 8–40)
GLUCOSE BLDC GLUCOMTR-MCNC: 133 MG/DL — HIGH (ref 70–99)
GLUCOSE BLDC GLUCOMTR-MCNC: 172 MG/DL — HIGH (ref 70–99)
GLUCOSE BLDC GLUCOMTR-MCNC: 183 MG/DL — HIGH (ref 70–99)
GLUCOSE BLDC GLUCOMTR-MCNC: 225 MG/DL — HIGH (ref 70–99)
GLUCOSE SERPL-MCNC: 128 MG/DL — HIGH (ref 70–99)
GLUCOSE SERPL-MCNC: 133 MG/DL — HIGH (ref 70–99)
GLUCOSE SERPL-MCNC: 177 MG/DL — HIGH (ref 70–99)
HAPTOGLOB SERPL-MCNC: 159 MG/DL — SIGNIFICANT CHANGE UP (ref 34–200)
HCT VFR BLD CALC: 33 % — LOW (ref 34.5–45)
HGB BLD-MCNC: 11.6 G/DL — SIGNIFICANT CHANGE UP (ref 11.5–15.5)
LDH SERPL L TO P-CCNC: 597 U/L — HIGH (ref 50–242)
LYMPHOCYTES # BLD AUTO: 1.7 K/UL — SIGNIFICANT CHANGE UP (ref 1–3.3)
LYMPHOCYTES # BLD AUTO: 7 % — LOW (ref 13–44)
MAGNESIUM SERPL-MCNC: 1.8 MG/DL — SIGNIFICANT CHANGE UP (ref 1.6–2.6)
MAGNESIUM SERPL-MCNC: 2.1 MG/DL — SIGNIFICANT CHANGE UP (ref 1.6–2.6)
MCHC RBC-ENTMCNC: 27.2 PG — SIGNIFICANT CHANGE UP (ref 27–34)
MCHC RBC-ENTMCNC: 35.2 GM/DL — SIGNIFICANT CHANGE UP (ref 32–36)
MCV RBC AUTO: 77.5 FL — LOW (ref 80–100)
MONOCYTES # BLD AUTO: 2.19 K/UL — HIGH (ref 0–0.9)
MONOCYTES NFR BLD AUTO: 9 % — SIGNIFICANT CHANGE UP (ref 2–14)
MRSA PCR RESULT.: SIGNIFICANT CHANGE UP
NEUTROPHILS # BLD AUTO: 19.69 K/UL — HIGH (ref 1.8–7.4)
NEUTROPHILS NFR BLD AUTO: 81 % — HIGH (ref 43–77)
OSMOLALITY SERPL: 252 MOSMOL/KG — LOW (ref 280–301)
PHOSPHATE SERPL-MCNC: 2.8 MG/DL — SIGNIFICANT CHANGE UP (ref 2.5–4.5)
PLATELET # BLD AUTO: 111 K/UL — LOW (ref 150–400)
POTASSIUM SERPL-MCNC: 3.4 MMOL/L — LOW (ref 3.5–5.3)
POTASSIUM SERPL-MCNC: 3.8 MMOL/L — SIGNIFICANT CHANGE UP (ref 3.5–5.3)
POTASSIUM SERPL-MCNC: 4.1 MMOL/L — SIGNIFICANT CHANGE UP (ref 3.5–5.3)
POTASSIUM SERPL-SCNC: 3.4 MMOL/L — LOW (ref 3.5–5.3)
POTASSIUM SERPL-SCNC: 3.8 MMOL/L — SIGNIFICANT CHANGE UP (ref 3.5–5.3)
POTASSIUM SERPL-SCNC: 4.1 MMOL/L — SIGNIFICANT CHANGE UP (ref 3.5–5.3)
PROCALCITONIN SERPL-MCNC: 0.27 NG/ML — HIGH (ref 0.02–0.1)
PROT SERPL-MCNC: 6.1 G/DL — SIGNIFICANT CHANGE UP (ref 6–8.3)
PROT SERPL-MCNC: 6.4 G/DL — SIGNIFICANT CHANGE UP (ref 6–8.3)
RAPID RVP RESULT: SIGNIFICANT CHANGE UP
RBC # BLD: 4.26 M/UL — SIGNIFICANT CHANGE UP (ref 3.8–5.2)
RBC # FLD: 14.2 % — SIGNIFICANT CHANGE UP (ref 10.3–14.5)
S AUREUS DNA NOSE QL NAA+PROBE: SIGNIFICANT CHANGE UP
SARS-COV-2 RNA SPEC QL NAA+PROBE: SIGNIFICANT CHANGE UP
SODIUM SERPL-SCNC: 117 MMOL/L — CRITICAL LOW (ref 135–145)
SODIUM SERPL-SCNC: 121 MMOL/L — LOW (ref 135–145)
SODIUM SERPL-SCNC: 122 MMOL/L — LOW (ref 135–145)
TROPONIN T, HIGH SENSITIVITY RESULT: 18 NG/L — SIGNIFICANT CHANGE UP (ref 0–51)
TROPONIN T, HIGH SENSITIVITY RESULT: 19 NG/L — SIGNIFICANT CHANGE UP (ref 0–51)
UUN UR-MCNC: 986 MG/DL — SIGNIFICANT CHANGE UP
WBC # BLD: 24.31 K/UL — HIGH (ref 3.8–10.5)
WBC # FLD AUTO: 24.31 K/UL — HIGH (ref 3.8–10.5)

## 2023-01-15 PROCEDURE — 76705 ECHO EXAM OF ABDOMEN: CPT | Mod: 26

## 2023-01-15 PROCEDURE — 99223 1ST HOSP IP/OBS HIGH 75: CPT | Mod: GC

## 2023-01-15 PROCEDURE — 12345: CPT | Mod: NC,GC

## 2023-01-15 RX ORDER — POTASSIUM CHLORIDE 20 MEQ
40 PACKET (EA) ORAL ONCE
Refills: 0 | Status: COMPLETED | OUTPATIENT
Start: 2023-01-15 | End: 2023-01-15

## 2023-01-15 RX ORDER — SIMVASTATIN 20 MG/1
20 TABLET, FILM COATED ORAL AT BEDTIME
Refills: 0 | Status: DISCONTINUED | OUTPATIENT
Start: 2023-01-15 | End: 2023-01-27

## 2023-01-15 RX ORDER — INSULIN LISPRO 100/ML
VIAL (ML) SUBCUTANEOUS AT BEDTIME
Refills: 0 | Status: DISCONTINUED | OUTPATIENT
Start: 2023-01-15 | End: 2023-01-20

## 2023-01-15 RX ORDER — FUROSEMIDE 40 MG
40 TABLET ORAL
Refills: 0 | Status: DISCONTINUED | OUTPATIENT
Start: 2023-01-15 | End: 2023-01-16

## 2023-01-15 RX ORDER — FOLIC ACID 0.8 MG
1 TABLET ORAL DAILY
Refills: 0 | Status: DISCONTINUED | OUTPATIENT
Start: 2023-01-15 | End: 2023-01-27

## 2023-01-15 RX ORDER — ENOXAPARIN SODIUM 100 MG/ML
40 INJECTION SUBCUTANEOUS EVERY 24 HOURS
Refills: 0 | Status: DISCONTINUED | OUTPATIENT
Start: 2023-01-15 | End: 2023-01-16

## 2023-01-15 RX ORDER — TRAZODONE HCL 50 MG
50 TABLET ORAL AT BEDTIME
Refills: 0 | Status: DISCONTINUED | OUTPATIENT
Start: 2023-01-15 | End: 2023-01-19

## 2023-01-15 RX ORDER — INSULIN LISPRO 100/ML
VIAL (ML) SUBCUTANEOUS
Refills: 0 | Status: DISCONTINUED | OUTPATIENT
Start: 2023-01-15 | End: 2023-01-20

## 2023-01-15 RX ORDER — SODIUM CHLORIDE 9 MG/ML
1000 INJECTION, SOLUTION INTRAVENOUS
Refills: 0 | Status: DISCONTINUED | OUTPATIENT
Start: 2023-01-15 | End: 2023-01-20

## 2023-01-15 RX ORDER — ASPIRIN/CALCIUM CARB/MAGNESIUM 324 MG
81 TABLET ORAL DAILY
Refills: 0 | Status: DISCONTINUED | OUTPATIENT
Start: 2023-01-15 | End: 2023-01-19

## 2023-01-15 RX ORDER — GLUCAGON INJECTION, SOLUTION 0.5 MG/.1ML
1 INJECTION, SOLUTION SUBCUTANEOUS ONCE
Refills: 0 | Status: DISCONTINUED | OUTPATIENT
Start: 2023-01-15 | End: 2023-01-20

## 2023-01-15 RX ORDER — AZITHROMYCIN 500 MG/1
500 TABLET, FILM COATED ORAL EVERY 24 HOURS
Refills: 0 | Status: DISCONTINUED | OUTPATIENT
Start: 2023-01-15 | End: 2023-01-16

## 2023-01-15 RX ORDER — CEFEPIME 1 G/1
1000 INJECTION, POWDER, FOR SOLUTION INTRAMUSCULAR; INTRAVENOUS EVERY 8 HOURS
Refills: 0 | Status: DISCONTINUED | OUTPATIENT
Start: 2023-01-15 | End: 2023-01-20

## 2023-01-15 RX ORDER — DEXTROSE 50 % IN WATER 50 %
15 SYRINGE (ML) INTRAVENOUS ONCE
Refills: 0 | Status: DISCONTINUED | OUTPATIENT
Start: 2023-01-15 | End: 2023-01-20

## 2023-01-15 RX ORDER — DONEPEZIL HYDROCHLORIDE 10 MG/1
5 TABLET, FILM COATED ORAL AT BEDTIME
Refills: 0 | Status: DISCONTINUED | OUTPATIENT
Start: 2023-01-15 | End: 2023-01-27

## 2023-01-15 RX ORDER — SODIUM,POTASSIUM PHOSPHATES 278-250MG
1 POWDER IN PACKET (EA) ORAL ONCE
Refills: 0 | Status: COMPLETED | OUTPATIENT
Start: 2023-01-15 | End: 2023-01-15

## 2023-01-15 RX ORDER — SODIUM CHLORIDE 9 MG/ML
1000 INJECTION, SOLUTION INTRAVENOUS
Refills: 0 | Status: DISCONTINUED | OUTPATIENT
Start: 2023-01-15 | End: 2023-01-15

## 2023-01-15 RX ORDER — METOPROLOL TARTRATE 50 MG
25 TABLET ORAL
Refills: 0 | Status: DISCONTINUED | OUTPATIENT
Start: 2023-01-15 | End: 2023-01-15

## 2023-01-15 RX ORDER — SENNA PLUS 8.6 MG/1
2 TABLET ORAL AT BEDTIME
Refills: 0 | Status: DISCONTINUED | OUTPATIENT
Start: 2023-01-15 | End: 2023-01-27

## 2023-01-15 RX ORDER — MAGNESIUM SULFATE 500 MG/ML
1 VIAL (ML) INJECTION ONCE
Refills: 0 | Status: COMPLETED | OUTPATIENT
Start: 2023-01-15 | End: 2023-01-15

## 2023-01-15 RX ORDER — SODIUM CHLORIDE 9 MG/ML
1 INJECTION INTRAMUSCULAR; INTRAVENOUS; SUBCUTANEOUS ONCE
Refills: 0 | Status: COMPLETED | OUTPATIENT
Start: 2023-01-15 | End: 2023-01-15

## 2023-01-15 RX ORDER — DEXTROSE 50 % IN WATER 50 %
25 SYRINGE (ML) INTRAVENOUS ONCE
Refills: 0 | Status: DISCONTINUED | OUTPATIENT
Start: 2023-01-15 | End: 2023-01-20

## 2023-01-15 RX ORDER — IPRATROPIUM/ALBUTEROL SULFATE 18-103MCG
3 AEROSOL WITH ADAPTER (GRAM) INHALATION EVERY 6 HOURS
Refills: 0 | Status: DISCONTINUED | OUTPATIENT
Start: 2023-01-15 | End: 2023-01-25

## 2023-01-15 RX ORDER — METOPROLOL TARTRATE 50 MG
25 TABLET ORAL
Refills: 0 | Status: DISCONTINUED | OUTPATIENT
Start: 2023-01-15 | End: 2023-01-16

## 2023-01-15 RX ORDER — DEXTROSE 50 % IN WATER 50 %
12.5 SYRINGE (ML) INTRAVENOUS ONCE
Refills: 0 | Status: DISCONTINUED | OUTPATIENT
Start: 2023-01-15 | End: 2023-01-20

## 2023-01-15 RX ORDER — FUROSEMIDE 40 MG
40 TABLET ORAL ONCE
Refills: 0 | Status: COMPLETED | OUTPATIENT
Start: 2023-01-15 | End: 2023-01-15

## 2023-01-15 RX ADMIN — Medication 40 MILLIEQUIVALENT(S): at 13:44

## 2023-01-15 RX ADMIN — Medication 3 MILLILITER(S): at 18:05

## 2023-01-15 RX ADMIN — SENNA PLUS 2 TABLET(S): 8.6 TABLET ORAL at 22:34

## 2023-01-15 RX ADMIN — Medication 1: at 17:44

## 2023-01-15 RX ADMIN — Medication 25 MILLIGRAM(S): at 11:35

## 2023-01-15 RX ADMIN — Medication 100 GRAM(S): at 11:37

## 2023-01-15 RX ADMIN — Medication 81 MILLIGRAM(S): at 11:35

## 2023-01-15 RX ADMIN — AZITHROMYCIN 255 MILLIGRAM(S): 500 TABLET, FILM COATED ORAL at 01:10

## 2023-01-15 RX ADMIN — Medication 25 MILLIGRAM(S): at 17:44

## 2023-01-15 RX ADMIN — Medication 40 MILLIGRAM(S): at 05:38

## 2023-01-15 RX ADMIN — CEFEPIME 100 MILLIGRAM(S): 1 INJECTION, POWDER, FOR SOLUTION INTRAMUSCULAR; INTRAVENOUS at 05:33

## 2023-01-15 RX ADMIN — CEFEPIME 100 MILLIGRAM(S): 1 INJECTION, POWDER, FOR SOLUTION INTRAMUSCULAR; INTRAVENOUS at 23:00

## 2023-01-15 RX ADMIN — SIMVASTATIN 20 MILLIGRAM(S): 20 TABLET, FILM COATED ORAL at 22:35

## 2023-01-15 RX ADMIN — Medication 2: at 11:36

## 2023-01-15 RX ADMIN — SODIUM CHLORIDE 1 GRAM(S): 9 INJECTION INTRAMUSCULAR; INTRAVENOUS; SUBCUTANEOUS at 20:48

## 2023-01-15 RX ADMIN — Medication 50 MILLIGRAM(S): at 22:34

## 2023-01-15 RX ADMIN — Medication 40 MILLIGRAM(S): at 13:43

## 2023-01-15 RX ADMIN — DONEPEZIL HYDROCHLORIDE 5 MILLIGRAM(S): 10 TABLET, FILM COATED ORAL at 21:03

## 2023-01-15 RX ADMIN — Medication 40 MILLIGRAM(S): at 02:30

## 2023-01-15 RX ADMIN — Medication 1 MILLIGRAM(S): at 11:36

## 2023-01-15 RX ADMIN — Medication 1 PACKET(S): at 13:42

## 2023-01-15 RX ADMIN — Medication 3 MILLILITER(S): at 11:36

## 2023-01-15 RX ADMIN — CEFEPIME 100 MILLIGRAM(S): 1 INJECTION, POWDER, FOR SOLUTION INTRAMUSCULAR; INTRAVENOUS at 13:45

## 2023-01-15 RX ADMIN — ENOXAPARIN SODIUM 40 MILLIGRAM(S): 100 INJECTION SUBCUTANEOUS at 17:44

## 2023-01-15 NOTE — H&P ADULT - PROBLEM SELECTOR PLAN 8
-History of dementia noted; will continue to administer home donepezil -History of diabetes mellitus noted and managed at home with metformin and sitagliptin; insulin sliding scale to be administered on inpatient stay

## 2023-01-15 NOTE — PROGRESS NOTE ADULT - PROBLEM SELECTOR PLAN 1
-Persistent shortness of breath refractory to antibiotic therapy with evident bilateral pleural effusions, jugular venous distension, and lower extremity edema and with an elevated serum bnp level   -Most likely that persistent shortness of breath is not secondary to an infectious etiology but rather to acute decompensated heart failure   -Etiology of heart failure is uncertain at this time; tte as outpatient in 2021 demonstrated impaired diastolic function but normal systolic function; patient has no known history of coronary artery disease; however, patient did receive chemotherapy and radiation for management of breast cancer; unknown if this was adriamycin-containing regimen   -Continue Lasix 40mg IV BID. Keep net negative 1L daily   - Strict I/Os, daily weights  -Trans-thoracic echocardiogram ordered -Persistent shortness of breath refractory to antibiotic therapy with evident bilateral pleural effusions, jugular venous distension, and lower extremity edema and with an elevated serum bnp level   -Most likely that persistent shortness of breath is not secondary to an infectious etiology but rather to acute decompensated heart failure   -Etiology of heart failure is uncertain at this time; tte as outpatient in 2021 demonstrated impaired diastolic function but normal systolic function; patient has no known history of coronary artery disease; however, patient did receive chemotherapy and radiation for management of breast cancer; unknown if this was adriamycin-containing regimen   -Continue Lasix 40mg IV BID. Keep net negative 1L daily for now  - Strict I/Os, daily weights  -Trans-thoracic echocardiogram ordered

## 2023-01-15 NOTE — PATIENT PROFILE ADULT - STATED REASON FOR ADMISSION
Unable to get the information, as per enterpreter pt speaks Cantonese dialect and they are unable to provide [facility] with this dialect, unable to get information at this time Unable to get the information, as per enterpreter pt speaks [place]ones[place] dialect and they are unable to provide [facility] with this dialect, unable to get information at this time Unable to get the information, as per enterpreter pt speaks [place]ones[place] dialect and they are unable to provide [facility] with this dialect, unable to get information at this time

## 2023-01-15 NOTE — PROGRESS NOTE ADULT - PROBLEM SELECTOR PLAN 3
-Patient presents with persistent shortness of breath and fevers noted at home (although none noted in hospital) despite outpatient treatment for community acquired pneumonia  -Although pneumonia suggested on chest xray and ct chest with bilateral tree-in-but opacifications, not entirely sure that this is not resolving inflammation form recent pneumonia treated as outpatient   -Will manage as for community-acquired pneumonia at this time; patient already received five-day course of antibiotics on outpatient side;   - F/u legionella and RVP  - On cefepime and azithromycin (1/15 - )

## 2023-01-15 NOTE — PROGRESS NOTE ADULT - PROBLEM SELECTOR PLAN 5
-Elevated serum ast and alkaline phosphatase noted   -Suspect that this is most likely secondary to fluid overload in setting of acute decompensated heart failure   -Will manage with diuretics and will follow serum liver enzymes daily   -Ordered right upper quadrant ultrasound to rule out biliary pathology; in context of predominant elevation in alkaline phosphatase, also worry for post-hepatic pathology or for infiltrative pathology -Elevated serum ast and alkaline phosphatase noted   -Suspect that this is most likely secondary to fluid overload in setting of acute decompensated heart failure   -Will manage with diuretics and will follow serum liver enzymes daily   - RUQ US showing ?7mm calculus in mid common bile duct. Will hold off MRCP for now given absence of abdominal symptoms  - GGT wnl suggestive that elevated Alk phos not biliary in etiology, possibly from bone. Ordered skeletal survey to assess for possible bony mets.

## 2023-01-15 NOTE — H&P ADULT - PROBLEM SELECTOR PLAN 9
-Lovenox for dvt prophylaxis   -PT consult   -Full code -History of dementia noted; will continue to administer home donepezil

## 2023-01-15 NOTE — H&P ADULT - PROBLEM SELECTOR PLAN 2
-Patient does meet sepsis criteria on presentation given leukocytosis to greater than 19 and tachypnea; however, no evident fevers, and despite shortness of breath, patient does not have a productive cough   -Elevated serum lactate (cleared within four hours) on initial presentation is suggestive of impaired organ perfusion   -Cultures of blood and urine sent on presentation   -No obvious source of infection if not for pneumonia; although pneumonia suggested on chest xray and ct chest with bilateral tree-in-but opacifications, not entirely sure that this is not resolving inflammation form recent pneumonia treated as outpatient   -Will manage as for community-acquired pneumonia at this time; patient already received five-day course of antibiotics on outpatient side; will manage at this time with cefepime and with azithromycin; legionella and rvp ordered; can itzel robertson if rvp and legionella negative for atypical organisms   -Ordered serum procalcitonin, if negative, will consider shortening antibiotic course -Patient does meet sepsis criteria on presentation given leukocytosis to greater than 19 and tachypnea; however, no evident fevers, and despite shortness of breath, patient does not have a productive cough   -Elevated serum lactate (cleared within four hours) on initial presentation is suggestive of impaired organ perfusion   -Cultures of blood and urine sent on presentation   -No obvious source of infection if not for pneumonia; although pneumonia suggested on chest xray and ct chest with bilateral tree-in-but opacifications, not entirely sure that this is not resolving inflammation form recent pneumonia treated as outpatient   -Will manage as for community-acquired pneumonia at this time; patient already received five-day course of antibiotics on outpatient side; will manage at this time with cefepime and with azithromycin; legionella and rvp ordered; can itzle robertson if rvp and legionella negative for atypical organisms   -Ordered serum procalcitonin, if negative, will consider shortening antibiotic course

## 2023-01-15 NOTE — H&P ADULT - NSHPSOCIALHISTORY_GEN_ALL_CORE
The patient immigrated from [place] about twenty years ago. She lives at home with her daughter, son-in-law, and grandchildren. She has not worked since she moved to the [place]. She has no known history of alcohol use, tobacco smoking, or illicit drug use.

## 2023-01-15 NOTE — PATIENT PROFILE ADULT - FALL HARM RISK - HARM RISK INTERVENTIONS
Assistance with ambulation/Assistance OOB with selected safe patient handling equipment/Communicate Risk of Fall with Harm to all staff/Reinforce activity limits and safety measures with patient and family/Tailored Fall Risk Interventions/Visual Cue: Yellow wristband and red socks/Bed in lowest position, wheels locked, appropriate side rails in place/Call bell, personal items and telephone in reach/Instruct patient to call for assistance before getting out of bed or chair/Non-slip footwear when patient is out of bed/[place] to call system/Physically safe environment - no spills, clutter or unnecessary equipment/Purposeful Proactive Rounding/Room/bathroom lighting operational, light cord in reach Assistance with ambulation/Assistance OOB with selected safe patient handling equipment/Communicate Risk of Fall with Harm to all staff/Reinforce activity limits and safety measures with patient and family/Tailored Fall Risk Interventions/Visual Cue: Yellow wristband and red socks/Bed in lowest position, wheels locked, appropriate side rails in place/Call bell, personal items and telephone in reach/Instruct patient to call for assistance before getting out of bed or chair/Non-slip footwear when patient is out of bed/[place] to call system/Physically safe environment - no spills, clutter or unnecessary equipment/Purposeful Proactive Rounding/Room/bathroom lighting operational, light cord in reach Assistance with ambulation/Assistance OOB with selected safe patient handling equipment/Communicate Risk of Fall with Harm to all staff/Reinforce activity limits and safety measures with patient and family/Tailored Fall Risk Interventions/Visual Cue: Yellow wristband and red socks/Bed in lowest position, wheels locked, appropriate side rails in place/Call bell, personal items and telephone in reach/Instruct patient to call for assistance before getting out of bed or chair/Non-slip footwear when patient is out of bed/[place] to call system/Physically safe environment - no spills, clutter or unnecessary equipment/Purposeful Proactive Rounding/Room/bathroom lighting operational, light cord in reach

## 2023-01-15 NOTE — H&P ADULT - PROBLEM SELECTOR PLAN 4
-Serum sodium level noted to measure 121; suspect that this is secondary to hypervolemic hyponatremia in context of acute decompensated heart failure but no urine studies yet to confirm this   -SIADH possible in setting of recent pneumonia; volume contraction less likely   -Will obtain urine osmolarity and urine sodium level to confirm suspicion of hypervolemic hyponatremia   -Will manage at this time with intravenous diuretics as above

## 2023-01-15 NOTE — H&P ADULT - PROBLEM SELECTOR PLAN 3
-Patient presents with persistent shortness of breath and fevers noted at home (although none noted in hospital) despite outpatient treatment for community acquired pneumonia  -Although pneumonia suggested on chest xray and ct chest with bilateral tree-in-but opacifications, not entirely sure that this is not resolving inflammation form recent pneumonia treated as outpatient   -Will manage as for community-acquired pneumonia at this time; patient already received five-day course of antibiotics on outpatient side; will manage at this time with cefepime and with azithromycin; legionella and rvp ordered; can itzel robertson if rvp and legionella negative for atypical organisms   -Ordered serum procalcitonin, if negative, will consider shortening antibiotic course

## 2023-01-15 NOTE — H&P ADULT - NSHPPHYSICALEXAM_GEN_ALL_CORE
Vital Signs Last 24 Hrs  T(C): 36.8 (15 Usama 2023 00:00), Max: 38.1 (14 Jan 2023 19:00)  T(F): 98.2 (15 Usama 2023 00:00), Max: 100.5 (14 Jan 2023 19:00)  HR: 107 (15 Usama 2023 00:00) (103 - 114)  BP: 135/81 (15 Usama 2023 00:00) (133/78 - 182/90)  BP(mean): --  RR: 19 (15 Usama 2023 00:00) (19 - 26)  SpO2: 96% (15 Usama 2023 00:00) (96% - 100%)    Parameters below as of 15 Usama 2023 00:00  Patient On (Oxygen Delivery Method): room air    GENERAL: Tachypneic, using accessory muscles; completing full sentences in cantonese via    EYES: EOMI, PERRLA, conjunctiva and sclera clear  ENMT: No tonsillar erythema, exudates, or enlargement; Moist mucous membranes, Good dentition, No lesions  NECK: Supple, about 10 cm JVD, Normal thyroid  NERVOUS SYSTEM:  Alert & Oriented X3, Good concentration; Motor Strength 5/5 B/L upper and lower extremities; DTRs 2+ intact and symmetric  CHEST/LUNG: Clear to auscultation and tympanic to percussion bilaterally; diminished lung sounds at bases; No rales, rhonchi, wheezing, or rubs  HEART: Regular rate and rhythm; No murmurs, rubs, or gallops  ABDOMEN: Soft, Nontender, Nondistended; Bowel sounds present  EXTREMITIES:  2+ Peripheral Pulses,1+ bilateral lower extremity pitting edema; No clubbing, no cyanosis   LYMPH: No lymphadenopathy noted  SKIN: No rashes or lesions

## 2023-01-15 NOTE — CHART NOTE - NSCHARTNOTEFT_GEN_A_CORE
Patient Name: Aminta HewittBirth Date: 1935  Address: [address] [place], [place] [id number]: Female  Rx Written	Rx Dispensed	Drug	Quantity	Days Supply	Prescriber Name	Prescriber Angélica #	Payment Method	Dispenser  01/09/2023	01/09/2023	tramadol hcl 50 mg tablet	7	7	Bret Mckenzie	[id number]	Insurance	[facility]  01/09/2023	01/09/2023	promethazine-codeine solution	240	12	Bret Mckenzie	[id number]	Cash	[facility] Patient Name: Aminta HewittBirth Date: 1935  Address: [address] [place], [place] [id number]: Female  Rx Written	Rx Dispensed	Drug	Quantity	Days Supply	Prescriber Name	Prescriber Angélica #	Payment Method	Dispenser  01/09/2023	01/09/2023	tramadol hcl 50 mg tablet	7	7	Bret Mckenzie	[id number]	Insurance	[facility]  01/09/2023	01/09/2023	promethazine-codeine solution	240	12	Bret Mckenzie	[id number]	Cash	[facility] Patient Name: Aminta HewittBirth Date: 1935  Address: [address] [place], [place] [id number]: Female  Rx Written	Rx Dispensed	Drug	Quantity	Days Supply	Prescriber Name	Prescriber Angélica #	Payment Method	Dispenser  01/09/2023	01/09/2023	tramadol hcl 50 mg tablet	7	7	Bret Mckenzie	[id number]	Insurance	[facility]  01/09/2023	01/09/2023	promethazine-codeine solution	240	12	Bret Mckenzie	[id number]	Cash	[facility]

## 2023-01-15 NOTE — PROGRESS NOTE ADULT - SUBJECTIVE AND OBJECTIVE BOX
PROGRESS NOTE:   Authored by Ivan Martínez MD, MPH  PGY-3, Internal Medicine      Patient is a 87y old  Female who presents with a chief complaint of SOB (15 Usama 2023 00:45)      SUBJECTIVE / OVERNIGHT EVENTS: No events overnight.    ADDITIONAL REVIEW OF SYSTEMS: Denies fevers, chills, n/v.    MEDICATIONS  (STANDING):  aspirin enteric coated 81 milliGRAM(s) Oral daily  azithromycin  IVPB 500 milliGRAM(s) IV Intermittent every 24 hours  cefepime   IVPB 1000 milliGRAM(s) IV Intermittent every 8 hours  dextrose 5%. 1000 milliLiter(s) (100 mL/Hr) IV Continuous <Continuous>  dextrose 5%. 1000 milliLiter(s) (50 mL/Hr) IV Continuous <Continuous>  dextrose 50% Injectable 25 Gram(s) IV Push once  dextrose 50% Injectable 12.5 Gram(s) IV Push once  dextrose 50% Injectable 25 Gram(s) IV Push once  donepezil 5 milliGRAM(s) Oral at bedtime  enoxaparin Injectable 40 milliGRAM(s) SubCutaneous every 24 hours  folic acid 1 milliGRAM(s) Oral daily  furosemide   Injectable 40 milliGRAM(s) IV Push two times a day  glucagon  Injectable 1 milliGRAM(s) IntraMuscular once  insulin lispro (ADMELOG) corrective regimen sliding scale   SubCutaneous three times a day before meals  insulin lispro (ADMELOG) corrective regimen sliding scale   SubCutaneous at bedtime  senna 2 Tablet(s) Oral at bedtime  simvastatin 20 milliGRAM(s) Oral at bedtime  traZODone 50 milliGRAM(s) Oral at bedtime    MEDICATIONS  (PRN):  dextrose Oral Gel 15 Gram(s) Oral once PRN Blood Glucose LESS THAN 70 milliGRAM(s)/deciliter      CAPILLARY BLOOD GLUCOSE        I&O's Summary      PHYSICAL EXAM:  Vital Signs Last 24 Hrs  T(C): 36.4 (15 Usama 2023 05:30), Max: 38.1 (2023 19:00)  T(F): 97.6 (15 Usama 2023 05:30), Max: 100.5 (2023 19:00)  HR: 96 (15 Usama 2023 05:30) (96 - 114)  BP: 124/63 (15 Usama 2023 05:30) (124/63 - 182/90)  BP(mean): --  RR: 20 (15 Usama 2023 05:30) (19 - 26)  SpO2: 96% (15 Usama 2023 05:30) (96% - 100%)    Parameters below as of 15 Usama 2023 05:30  Patient On (Oxygen Delivery Method): nasal cannula  O2 Flow (L/min): 2      CONSTITUTIONAL: NAD, lying in bed comfortably  RESPIRATORY: Normal respiratory effort; CTABL  CARDIOVASCULAR: Regular rate and rhythm, normal S1 and S2, no murmur/rub/gallop  ABDOMEN: Soft, nondistended, nontender to palpation, normoactive bowel sounds, no rebound/guarding  MUSCLOSKELETAL: no joint swelling or tenderness to palpation, FROM all extremities  NEURO: AAOx3 to person, place, and time, full and equal strength all extremities   EXTREMITIES: no pedal edema    LABS:                        11.6   24.31 )-----------( 111      ( 15 Usama 2023 05:37 )             33.0     01-15    122<L>  |  86<L>  |  11  ----------------------------<  133<H>  3.4<L>   |  22  |  0.47<L>    Ca    7.6<L>      15 Usama 2023 05:37  Phos  2.8     -15  Mg     1.8     15    TPro  6.4  /  Alb  3.4  /  TBili  1.5<H>  /  DBili  x   /  AST  61<H>  /  ALT  24  /  AlkPhos  1051<H>  -15    PT/INR - ( 2023 19:06 )   PT: 18.1 sec;   INR: 1.57 ratio         PTT - ( 2023 19:06 )  PTT:29.9 sec      Urinalysis Basic - ( 2023 21:40 )    Color: Yellow / Appearance: Clear / S.023 / pH: x  Gluc: x / Ketone: Negative  / Bili: Negative / Urobili: 3 mg/dL   Blood: x / Protein: 30 mg/dL / Nitrite: Negative   Leuk Esterase: Negative / RBC: 21 /hpf / WBC 4 /HPF   Sq Epi: x / Non Sq Epi: 2 /hpf / Bacteria: Negative          RADIOLOGY & ADDITIONAL TESTS:     PROGRESS NOTE:   Authored by Ivan Martínez MD, MPH  PGY-3, Internal Medicine      Patient is a 87y old  Female who presents with a chief complaint of SOB (15 Usama 2023 00:45)      SUBJECTIVE / OVERNIGHT EVENTS: No events overnight. Intermittent episodes of chest discomfort 5/10 in intensity. Denies other symptoms. SOB improving.     ADDITIONAL REVIEW OF SYSTEMS: Denies fevers, chills, n/v.    MEDICATIONS  (STANDING):  aspirin enteric coated 81 milliGRAM(s) Oral daily  azithromycin  IVPB 500 milliGRAM(s) IV Intermittent every 24 hours  cefepime   IVPB 1000 milliGRAM(s) IV Intermittent every 8 hours  dextrose 5%. 1000 milliLiter(s) (100 mL/Hr) IV Continuous <Continuous>  dextrose 5%. 1000 milliLiter(s) (50 mL/Hr) IV Continuous <Continuous>  dextrose 50% Injectable 25 Gram(s) IV Push once  dextrose 50% Injectable 12.5 Gram(s) IV Push once  dextrose 50% Injectable 25 Gram(s) IV Push once  donepezil 5 milliGRAM(s) Oral at bedtime  enoxaparin Injectable 40 milliGRAM(s) SubCutaneous every 24 hours  folic acid 1 milliGRAM(s) Oral daily  furosemide   Injectable 40 milliGRAM(s) IV Push two times a day  glucagon  Injectable 1 milliGRAM(s) IntraMuscular once  insulin lispro (ADMELOG) corrective regimen sliding scale   SubCutaneous three times a day before meals  insulin lispro (ADMELOG) corrective regimen sliding scale   SubCutaneous at bedtime  senna 2 Tablet(s) Oral at bedtime  simvastatin 20 milliGRAM(s) Oral at bedtime  traZODone 50 milliGRAM(s) Oral at bedtime    MEDICATIONS  (PRN):  dextrose Oral Gel 15 Gram(s) Oral once PRN Blood Glucose LESS THAN 70 milliGRAM(s)/deciliter      CAPILLARY BLOOD GLUCOSE        I&O's Summary      PHYSICAL EXAM:  Vital Signs Last 24 Hrs  T(C): 36.4 (15 Usama 2023 05:30), Max: 38.1 (2023 19:00)  T(F): 97.6 (15 Usama 2023 05:30), Max: 100.5 (2023 19:00)  HR: 96 (15 Usama 2023 05:30) (96 - 114)  BP: 124/63 (15 Usama 2023 05:30) (124/63 - 182/90)  BP(mean): --  RR: 20 (15 Usama 2023 05:30) (19 - 26)  SpO2: 96% (15 Usama 2023 05:30) (96% - 100%)    Parameters below as of 15 Usama 2023 05:30  Patient On (Oxygen Delivery Method): nasal cannula  O2 Flow (L/min): 2      CONSTITUTIONAL: NAD, lying in bed comfortably  RESPIRATORY: Normal respiratory effort; expiratory wheezing noted on exam in R lung anterior lung field.   CARDIOVASCULAR: Regular rate and rhythm, normal S1 and S2, no murmur/rub/gallop  ABDOMEN: Soft, nondistended, nontender to palpation, normoactive bowel sounds, no rebound/guarding  MUSCULOSKELETAL: no joint swelling or tenderness to palpation, FROM all extremities  NEURO: awake, AAOx1-2   EXTREMITIES: no pedal edema    LABS:                        11.6   24.31 )-----------( 111      ( 15 Usama 2023 05:37 )             33.0     01-15    122<L>  |  86<L>  |  11  ----------------------------<  133<H>  3.4<L>   |  22  |  0.47<L>    Ca    7.6<L>      15 Usama 2023 05:37  Phos  2.8     01-15  Mg     1.8     01-15    TPro  6.4  /  Alb  3.4  /  TBili  1.5<H>  /  DBili  x   /  AST  61<H>  /  ALT  24  /  AlkPhos  1051<H>  01-15    PT/INR - ( 2023 19:06 )   PT: 18.1 sec;   INR: 1.57 ratio         PTT - ( 2023 19:06 )  PTT:29.9 sec      Urinalysis Basic - ( 2023 21:40 )    Color: Yellow / Appearance: Clear / S.023 / pH: x  Gluc: x / Ketone: Negative  / Bili: Negative / Urobili: 3 mg/dL   Blood: x / Protein: 30 mg/dL / Nitrite: Negative   Leuk Esterase: Negative / RBC: 21 /hpf / WBC 4 /HPF   Sq Epi: x / Non Sq Epi: 2 /hpf / Bacteria: Negative          RADIOLOGY & ADDITIONAL TESTS:

## 2023-01-15 NOTE — PHYSICAL THERAPY INITIAL EVALUATION ADULT - BALANCE TRAINING, PT EVAL
Pt to improve balance in sitting/standing by 1/st1stgstrstastdstest x 4 wks Pt to improve balance in sitting/standing by 1/rd3rdgrdrrdarddrderd x 4 wks

## 2023-01-15 NOTE — PHYSICAL THERAPY INITIAL EVALUATION ADULT - PERTINENT HX OF CURRENT PROBLEM, REHAB EVAL
Pt is an 86y/o F adm to [facility] on 1/14/23 via ED who is followed for HTN, DM, dementia, h/o of breast and colon Ca (now in remission as per family) who presented today with SOB. Per discussion with the patient's family, she noted progressive shortness of breath that began about one week ago. At that time, she saw her PMD who administered her a 5-day course of oral levofloxacin. Although the patient's sx improved subtly for about two days following completion of her medication regimen, her sx again worsened progressively--prompting her family to bring her to the emergency department today. The patient was noted to have fevers at home (up to about 102 degrees measured orally), and she had a dry cough. The patient has had some swelling in her bilateral lower extremities. She has some chest discomfort and difficulty catching her breath, but she has not noticed any pleuritic chest pain. She has not fallen, and she has not suffered form any trauma to her chest. The patient has no known history of neuromuscular disease and no known history of anemia. The patient does suffer from diabetes mellitus-now in remission--but she has not suffered from any episodes of hypo- or hyperglycemia. In the ED, the pt was noted to be tachypneic. Chest xray/CT demonstrated bilateral pleural effusions with interstitial infiltrates. Her serum bnp was elevated to greater than 2500. The pt was administered a dose each of ceftriaxone, azithromycin, and vancomycin, and the patient was admitted to internal medicine for further mgmt. HC Problem: Acute decompensated HF, Severe sepsis-community acquired pna, hyponatremia Pt is an 86y/o F adm to [facility] on 1/14/23 via ED who is followed for HTN, DM, dementia, h/o of breast and colon Ca (now in remission as per family) who presented today with SOB. Per discussion with the patient's family, she noted progressive shortness of breath that began about one week ago. At that time, she saw her PMD who administered her a 5-day course of oral levofloxacin. Although the patient's sx improved subtly for about two days following completion of her medication regimen, her sx again worsened progressively--prompting her family to bring her to the emergency department today. The patient was noted to have fevers at home (up to about 102 degrees measured orally), and she had a dry cough. The patient has had some swelling in her bilateral lower extremities. She has some chest discomfort and difficulty catching her breath, but she has not noticed any pleuritic chest pain. She has not fallen, and she has not suffered form any trauma to her chest. The patient has no known history of neuromuscular disease and no known history of anemia. The patient does suffer from diabetes mellitus-now in remission--but she has not suffered from any episodes of hypo- or hyperglycemia. In the ED, the pt was noted to be tachypneic. Chest xray/CT demonstrated bilateral pleural effusions with interstitial infiltrates. Her serum bnp was elevated to greater than 2500. The pt was administered a dose each of ceftriaxone, azithromycin, and vancomycin, and the patient was admitted to internal medicine for further mgmt. HC Problem: Acute decompensated HF, Severe sepsis-community acquired pna, hyponatremia Pt is an 88y/o F adm to [facility] on 1/14/23 via ED who is followed for HTN, DM, dementia, h/o of breast and colon Ca (now in remission as per family) who presented today with SOB. Per discussion with the patient's family, she noted progressive shortness of breath that began about one week ago. At that time, she saw her PMD who administered her a 5-day course of oral levofloxacin. Although the patient's sx improved subtly for about two days following completion of her medication regimen, her sx again worsened progressively--prompting her family to bring her to the emergency department today. The patient was noted to have fevers at home (up to about 102 degrees measured orally), and she had a dry cough. The patient has had some swelling in her bilateral lower extremities. She has some chest discomfort and difficulty catching her breath, but she has not noticed any pleuritic chest pain. She has not fallen, and she has not suffered form any trauma to her chest. The patient has no known history of neuromuscular disease and no known history of anemia. The patient does suffer from diabetes mellitus-now in remission--but she has not suffered from any episodes of hypo- or hyperglycemia. In the ED, the pt was noted to be tachypneic. Chest xray/CT demonstrated bilateral pleural effusions with interstitial infiltrates. Her serum bnp was elevated to greater than 2500. The pt was administered a dose each of ceftriaxone, azithromycin, and vancomycin, and the patient was admitted to internal medicine for further mgmt. HC Problem: Acute decompensated HF, Severe sepsis-community acquired pna, hyponatremia

## 2023-01-15 NOTE — H&P ADULT - PROBLEM SELECTOR PLAN 1
-Persistent shortness of breath refractory to antibiotic therapy with evident bilateral pleural effusions, jugular venous distension, and lower extremity edema and with an elevated serum bnp level   -Most likely that persistent shortness of breath is not secondary to an infectious etiology but rather to acute decompensated heart failure   -Etiology of heart failure is uncertain at this time; tte as outpatient in 2021 demonstrated impaired diastolic function but normal systolic function; patient has no known history of coronary artery disease; however, patient did receive chemotherapy and radiation for management of breast cancer; unknown if this was adriamycin-containing regimen   -Will manage at this time for acute decompensated heart failure with administration of furosemide 40 mg iv push twice daily for goal net negative fluid balance about one liter daily; strict intake and output and standing weights daily   -Trans-thoracic echocardiogram ordered

## 2023-01-15 NOTE — H&P ADULT - NSHPREVIEWOFSYSTEMS_GEN_ALL_CORE
CONSTITUTIONAL: No fever, weight loss, or fatigue  EYES: No eye pain, visual disturbances, or discharge  ENMT:  No difficulty hearing, tinnitus, vertigo; No sinus or throat pain  NECK: No pain, no stiffness  BREASTS: No pain, masses, or nipple discharge  RESPIRATORY: No cough, wheezing, chills or hemoptysis; Profound shortness of breath noted   CARDIOVASCULAR: Some chest discomfort noted but no niesha pain; no palpitations, dizziness, or leg swelling  GASTROINTESTINAL: No abdominal or epigastric pain. No nausea, vomiting, or hematemesis; No diarrhea or constipation. No melena or hematochezia.  GENITOURINARY: No dysuria, frequency, hematuria, or incontinence  NEUROLOGICAL: No headaches, memory loss, loss of strength, numbness, or tremors  SKIN: No itching, burning, rashes, or lesions   LYMPH NODES: No enlarged glands  ENDOCRINE: No heat or cold intolerance; No hair loss  MUSCULOSKELETAL: No joint pain or swelling; No muscle, back, or extremity pain  PSYCHIATRIC: No depression, anxiety, mood swings, or difficulty sleeping  HEME/LYMPH: No easy bruising, or bleeding gums  ALLERY AND IMMUNOLOGIC: No hives or eczema

## 2023-01-15 NOTE — H&P ADULT - NSHPLABSRESULTS_GEN_ALL_CORE
LABS personally reviewed:	 	                        12.1   19.97 )-----------( 129      ( 14 Jan 2023 19:06 )             35.2     01-14    121<L>  |  88<L>  |  14  ----------------------------<  128<H>  3.8   |  22  |  0.47<L>  01-14    121<L>  |  87<L>  |  16  ----------------------------<  162<H>  3.9   |  20<L>  |  0.51    Ca    8.0<L>      14 Jan 2023 23:31  Ca    8.2<L>      14 Jan 2023 19:06    TPro  6.1  /  Alb  3.2<L>  /  TBili  1.2  /  DBili  x   /  AST  43<H>  /  ALT  19  /  AlkPhos  851<H>  01-14  TPro  6.7  /  Alb  3.4  /  TBili  1.0  /  DBili  x   /  AST  42<H>  /  ALT  21  /  AlkPhos  761<H>  01-14    BCX/UCX: sent     Activated Partial Thromboplastin Time: 29.9 sec (01-14-23 @ 19:06)  Prothrombin Time, Plasma: 18.1 sec (01-14-23 @ 19:06)  INR: 1.57 ratio (01-14-23 @ 19:06)    CARDIAC MARKERS: troponin T 19, bnp 2522    23:28 - VBG - pH: 7.42  | pCO2: 36    | pO2: 57    | Lactate: 1.2    18:50 - VBG - pH: 7.44  | pCO2: 32    | pO2: 60    | Lactate: 2.7      ECG personally reviewed: sinus tachycardia noted without any evident ischemic changes     RADIOLOGY: chest xray with bilateral interstitial infiltrates; ct chest demonstrates bilateral pleural effusions and interstitial infiltrates

## 2023-01-15 NOTE — H&P ADULT - PROBLEM SELECTOR PLAN 6
-History of hypertension noted and managed at home with metoprolol and with amlodipine; holding at this time in context of acute presentation -Right lobe thyroid nodule noted incidentally on ct scan of abdomen and pelvis   -Will require dedicated thyroid ultrasound and outpatient follow-up for consideration of biopsy vs. close follow-up

## 2023-01-15 NOTE — H&P ADULT - HISTORY OF PRESENT ILLNESS
The patient is an 87-year-old woman who is followed for hypertension, diabetes mellitus, and dementia and who has a history of breast and colon cancers (now in remission as per family) who presented today with shortness of breath. Per discussion with the patient's family, she noted progressive shortness of breath that began about one week ago. At that time, she saw her primary care doctor who administered her a five-day course of oral levofloxacin. Although the patient's symptoms improved subtly for about two days following completion of her medication regimen, her symptoms again worsened progressively--prompting her family to bring her to the emergency department today. The patient was noted to have fevers at home (up to about 102 degrees measured orally), and she had a dry cough. The patient has had some swelling in her bilateral lower extremities. She has some chest discomfort and difficulty catching her breath, but she has not noticed any pleuritic chest pain. She has not fallen, and she has not suffered form any trauma to her chest. The patient has no known history of neuromuscular disease and no known history of anemia. The patient does suffer from diabetes mellitus--now in remission--but she has not suffered from any episodes of hypo- or hyperglycemia.     In the emergency department, the patient was noted to be tachypneic. A chest xray and chest ct were performed, and they demonstrated bilateral pleural effusions with interstitial infiltrates. Her serum bnp was elevated to greater than 2500. The patient was administered a dose each of ceftriaxone, azithromycin, and vancomycin, and the patient was admitted to internal medicine for further management.

## 2023-01-15 NOTE — PROGRESS NOTE ADULT - PROBLEM SELECTOR PLAN 2
-Patient does meet sepsis criteria on presentation given leukocytosis to greater than 19 and tachypnea; however, no evident fevers, and despite shortness of breath, patient does not have a productive cough   -Elevated serum lactate (cleared within four hours) on initial presentation is suggestive of impaired organ perfusion   -Cultures of blood and urine sent on presentation   -No obvious source of infection if not for pneumonia; although pneumonia suggested on chest xray and ct chest with bilateral tree-in-but opacifications, not entirely sure that this is not resolving inflammation form recent pneumonia treated as outpatient   -Will manage as for community-acquired pneumonia at this time; patient already received five-day course of antibiotics on outpatient side;   -Ordered serum procalcitonin, if negative, will consider shortening antibiotic course

## 2023-01-15 NOTE — PATIENT PROFILE ADULT - HAVE YOU HAD COVID IN THE LAST 60 DAYS?
any of your medications? - no    Have you seen any other physician or provider since your last visit? yes - ED Visit 11/7/18    Have you had any other diagnostic tests since your last visit?  no   Have you been seen in the emergency room and/or had an admission in a hospital since we last saw you?  yes - ED visit 2018    Have you had your routine dental cleaning in the past 6 months?  no     Do you have an active MyChart account? If no, what is the barrier? Yes    Patient Care Team:  DAVID Sanchez CNP as PCP - General (Pediatrics)    Medical History Review  Past Medical, Family, and Social History reviewed and does not contribute to the patient presenting condition    Health Maintenance   Topic Date Due    Hepatitis B vaccine 0-18 (3 of 3 - 3-dose primary series) 2018    Hib vaccine 0-6 (3 of 4 - Standard series) 2018    Polio vaccine 0-18 (3 of 4 - All-IPV series) 2018    Pneumococcal (PCV) vaccine 0-5 (3 of 4 - Standard Series) 2018    Rotavirus vaccine 0-6 (3 of 3 - 3-dose series) 2018    DTaP/Tdap/Td vaccine (3 - DTaP) 2018    Flu vaccine (1 of 2) 2018    Hepatitis A vaccine 0-18 (1 of 2 - 2-dose series) 05/21/2019    Measles,Mumps,Rubella (MMR) vaccine (1 of 2 - Standard series) 05/21/2019    Varicella vaccine 1-18 (1 of 2 - 2-dose childhood series) 05/21/2019    Meningococcal (MCV) Vaccine Age 0-22 Years (1 of 2 - 2-dose series) 05/21/2029                  Objective:      Growth parameters are noted and are appropriate for age. General:   alert, appears stated age and cooperative   Skin:   normal   Head:   normal fontanelles, normal appearance, normal palate and supple neck   Eyes:   sclerae white, pupils equal and reactive, red reflex normal bilaterally   Ears:   normal bilaterally   Mouth:   No perioral or gingival cyanosis or lesions. Tongue is normal in appearance.    Lungs:   clear to auscultation bilaterally   Heart:   regular
No

## 2023-01-15 NOTE — PATIENT PROFILE ADULT - OTHER LANGUAGE
Dialect [facility] Dialect [facility] Dialect [facility]

## 2023-01-15 NOTE — ED ADULT NURSE REASSESSMENT NOTE - NS ED NURSE REASSESS COMMENT FT1
Received patient from RN ALANA Nava, patient at baseline mental status, able to make needs known, VSS, patient agreeable to plan of care, pending bed assignment, comfort and safety provided.

## 2023-01-15 NOTE — H&P ADULT - NSICDXPASTMEDICALHX_GEN_ALL_CORE_FT
PAST MEDICAL HISTORY:  Breast cancer     Colon cancer     Dementia     Diabetes mellitus     Hypertension

## 2023-01-15 NOTE — H&P ADULT - PROBLEM SELECTOR PLAN 5
-Elevated serum ast and alkaline phosphatase noted   -Suspect that this is most likely secondary to fluid overload in setting of acute decompensated heart failure   -Will manage with diuretics and will follow serum liver enzymes daily   -Ordered right upper quadrant ultrasound to rule out biliary pathology; in context of predominant elevation in alkaline phosphatase, also worry for post-hepatic pathology or for infiltrative pathology

## 2023-01-15 NOTE — H&P ADULT - ASSESSMENT
The patient is an 87-year-old woman who is followed for hypertension, diabetes mellitus, and dementia and who has a history of breast and colon cancers (now in remission) who is admitted for evaluation and management of persistent shortness of breath, refractory to outpatient antibiotic therapy for community acquired pneumonia, in the context of bilateral pleural effusions, lower extremity edema, and an elevated serum bnp--most consistent with a diagnosis of acute decompensated heart failure of uncertain etiology.

## 2023-01-15 NOTE — PROGRESS NOTE ADULT - PROBLEM SELECTOR PLAN 6
-Right lobe thyroid nodule noted incidentally on ct scan of abdomen and pelvis   -Will require dedicated thyroid ultrasound and outpatient follow-up for consideration of biopsy vs. close follow-up Noted to be in Afib with HR to 130s  - Resumed home lopressor 25mg BID with hold parameters

## 2023-01-15 NOTE — PROGRESS NOTE ADULT - ATTENDING COMMENTS
Hospitalist- Robert Stroud MD  Available on MS Teams  If no response or off-hours, page [phone]  -------------------------------------  Pt with SOB and reportedly with B lines on ED pocus, elevated pro BNP suspicious for new HF. Also with leukocytosis and patchy opacities seen on CT scan concerning for PNA. Hyponatremia which slightly improved with lasix. Will continue management for PNA + ADHF for now, trending BMP q12 and monitoring strict I/O. Also noted to have uptrending ALP of non-biliary origin, suggestive of bone so will get skeletal survey to assess for kirill mets. Wean o2 as tolerated. Hospitalist- Robert Stroud MD  Available on MS Teams  If no response or off-hours, page [phone]  -------------------------------------  Pt with SOB and reportedly with B lines on ED pocus, elevated pro BNP suspicious for new HF. Also with leukocytosis and patchy opacities seen on CT scan concerning for PNA. Hyponatremia which slightly improved with lasix. Will continue management for PNA + ADHF for now, trending BMP q12 and monitoring strict I/O. Also noted to have uptrending ALP of non-biliary origin, suggestive of bone so will get skeletal survey to assess for kirill mets. Wean o2 as tolerated. Hospitalist- Robert Stroud MD  Available on MS Teams  If no response or off-hours, page [phone]  -------------------------------------  Pt with SOB and reportedly with B lines on ED pocus, elevated pro BNP suspicious for new HF. Also with leukocytosis and patchy opacities seen on CT scan concerning for PNA. Hyponatremia which slightly improved with lasix. Will continue management for PNA + ADHF for now, trending BMP q12 and monitoring strict I/O. Also noted to have uptrending ALP of non-biliary origin, suggestive of bone so will get skeletal survey to assess for kirill mets. Wean o2 as tolerated.

## 2023-01-15 NOTE — H&P ADULT - ATTENDING COMMENTS
86yo F w/ PMH of HTN, DM2, dementia, hx of breast and colon CA pw persistent SOB after recent pna treated outpt along w/ c/f AdHF  # PNA  - Unclear if imaging findings are i/s/o recent infx vs new pna  - Given leukocytosis and sepsis on presentation, would c/w abx to treat for CAP  - f/u legionella given atypical pna on CT and hypoNa  - f/u remainder of infectious w/u as above  # AdHF  - LE edema w/ s/s of VOL   - Lasix 40 IV BID, tailor and wean based on daily clinical exam and UOP  - Tele monitoring  - f/u TTE  # HypoNa  - c/f i/s/o VOL  - f/u repeat BMP s/p Lasix and monitor BMP closely to avoid overcorrection    Rest of plan as above 88yo F w/ PMH of HTN, DM2, dementia, hx of breast and colon CA pw persistent SOB after recent pna treated outpt along w/ c/f AdHF  # PNA  - Unclear if imaging findings are i/s/o recent infx vs new pna  - Given leukocytosis and sepsis on presentation, would c/w abx to treat for CAP  - f/u legionella given atypical pna on CT and hypoNa  - f/u remainder of infectious w/u as above  # AdHF  - LE edema w/ s/s of VOL   - Lasix 40 IV BID, tailor and wean based on daily clinical exam and UOP  - Tele monitoring  - f/u TTE  # HypoNa  - c/f i/s/o VOL  - f/u repeat BMP s/p Lasix and monitor BMP closely to avoid overcorrection    Rest of plan as above

## 2023-01-15 NOTE — H&P ADULT - PROBLEM SELECTOR PLAN 7
-History of diabetes mellitus noted and managed at home with metformin and sitagliptin; insulin sliding scale to be administered on inpatient stay -History of hypertension noted and managed at home with metoprolol and with amlodipine; holding at this time in context of acute presentation

## 2023-01-15 NOTE — PROGRESS NOTE ADULT - PROBLEM SELECTOR PLAN 4
-Serum sodium level noted to measure 121; suspect that this is secondary to hypervolemic hyponatremia in context of acute decompensated heart failure but no urine studies yet to confirm this   -SIADH possible in setting of recent pneumonia; volume contraction less likely   -Will obtain urine osmolarity and urine sodium level to confirm suspicion of hypervolemic hyponatremia   -Will manage at this time with intravenous diuretics as above -Serum sodium level noted to measure 121; suspect that this is secondary to hypervolemic hyponatremia in context of acute decompensated heart failure but no urine studies yet to confirm this   - Slight improvement to 122 after lasix  -Will obtain urine osmolarity and urine sodium level to confirm suspicion of hypervolemic hyponatremia   -Will manage at this time with intravenous diuretics as above

## 2023-01-15 NOTE — PHYSICAL THERAPY INITIAL EVALUATION ADULT - LEVEL OF CONSCIOUSNESS, REHAB EVAL
[employer] [place] [place] [id number]/alert [employer] [place] [place] [id number]/alert [employer] [place] [place] [id number]/alert

## 2023-01-15 NOTE — PHYSICAL THERAPY INITIAL EVALUATION ADULT - ADDITIONAL COMMENTS
She lives at home with her daughter, son-in-law, and grandchildren. She lives at home with her daughter, son-in-law, and grandchildren. Uses cane. Lives in pvt house with steps to enter. None inside.

## 2023-01-16 LAB
ALBUMIN SERPL ELPH-MCNC: 3.1 G/DL — LOW (ref 3.3–5)
ALP SERPL-CCNC: 1266 U/L — HIGH (ref 40–120)
ALT FLD-CCNC: 28 U/L — SIGNIFICANT CHANGE UP (ref 10–45)
ANION GAP SERPL CALC-SCNC: 12 MMOL/L — SIGNIFICANT CHANGE UP (ref 5–17)
ANION GAP SERPL CALC-SCNC: 14 MMOL/L — SIGNIFICANT CHANGE UP (ref 5–17)
AST SERPL-CCNC: 65 U/L — HIGH (ref 10–40)
BILIRUB SERPL-MCNC: 1 MG/DL — SIGNIFICANT CHANGE UP (ref 0.2–1.2)
BUN SERPL-MCNC: 21 MG/DL — SIGNIFICANT CHANGE UP (ref 7–23)
CALCIUM SERPL-MCNC: 7.8 MG/DL — LOW (ref 8.4–10.5)
CALCIUM SERPL-MCNC: 8 MG/DL — LOW (ref 8.4–10.5)
CHLORIDE SERPL-SCNC: 82 MMOL/L — LOW (ref 96–108)
CO2 SERPL-SCNC: 21 MMOL/L — LOW (ref 22–31)
CO2 SERPL-SCNC: 23 MMOL/L — SIGNIFICANT CHANGE UP (ref 22–31)
CREAT SERPL-MCNC: 0.49 MG/DL — LOW (ref 0.5–1.3)
CREAT SERPL-MCNC: 0.72 MG/DL — SIGNIFICANT CHANGE UP (ref 0.5–1.3)
CULTURE RESULTS: SIGNIFICANT CHANGE UP
EGFR: 82 ML/MIN/1.73M2 — SIGNIFICANT CHANGE UP
EGFR: 92 ML/MIN/1.73M2 — SIGNIFICANT CHANGE UP
GGT SERPL-CCNC: 29 U/L — SIGNIFICANT CHANGE UP (ref 8–40)
GLUCOSE BLDC GLUCOMTR-MCNC: 125 MG/DL — HIGH (ref 70–99)
GLUCOSE BLDC GLUCOMTR-MCNC: 171 MG/DL — HIGH (ref 70–99)
GLUCOSE BLDC GLUCOMTR-MCNC: 185 MG/DL — HIGH (ref 70–99)
GLUCOSE BLDC GLUCOMTR-MCNC: 188 MG/DL — HIGH (ref 70–99)
GLUCOSE SERPL-MCNC: 159 MG/DL — HIGH (ref 70–99)
GLUCOSE SERPL-MCNC: 162 MG/DL — HIGH (ref 70–99)
HCT VFR BLD CALC: 32.7 % — LOW (ref 34.5–45)
HGB BLD-MCNC: 11.3 G/DL — LOW (ref 11.5–15.5)
LEGIONELLA AG UR QL: NEGATIVE — SIGNIFICANT CHANGE UP
MCHC RBC-ENTMCNC: 26.8 PG — LOW (ref 27–34)
MCHC RBC-ENTMCNC: 34.6 GM/DL — SIGNIFICANT CHANGE UP (ref 32–36)
MCV RBC AUTO: 77.7 FL — LOW (ref 80–100)
NRBC # BLD: 0 /100 WBCS — SIGNIFICANT CHANGE UP (ref 0–0)
OSMOLALITY SERPL: 253 MOSMOL/KG — LOW (ref 280–301)
OSMOLALITY UR: 547 MOS/KG — SIGNIFICANT CHANGE UP (ref 300–900)
PLATELET # BLD AUTO: 97 K/UL — LOW (ref 150–400)
POTASSIUM SERPL-MCNC: 4.8 MMOL/L — SIGNIFICANT CHANGE UP (ref 3.5–5.3)
POTASSIUM SERPL-SCNC: 4.8 MMOL/L — SIGNIFICANT CHANGE UP (ref 3.5–5.3)
PROT SERPL-MCNC: 6.5 G/DL — SIGNIFICANT CHANGE UP (ref 6–8.3)
RBC # BLD: 4.21 M/UL — SIGNIFICANT CHANGE UP (ref 3.8–5.2)
RBC # FLD: 14.5 % — SIGNIFICANT CHANGE UP (ref 10.3–14.5)
SODIUM SERPL-SCNC: 117 MMOL/L — CRITICAL LOW (ref 135–145)
SODIUM UR-SCNC: 9 MMOL/L — SIGNIFICANT CHANGE UP
SPECIMEN SOURCE: SIGNIFICANT CHANGE UP
TROPONIN T, HIGH SENSITIVITY RESULT: 15 NG/L — SIGNIFICANT CHANGE UP (ref 0–51)
URATE SERPL-MCNC: 3.7 MG/DL — SIGNIFICANT CHANGE UP (ref 2.5–7)
WBC # BLD: 26.08 K/UL — HIGH (ref 3.8–10.5)
WBC # FLD AUTO: 26.08 K/UL — HIGH (ref 3.8–10.5)

## 2023-01-16 PROCEDURE — 77075 RADEX OSSEOUS SURVEY COMPL: CPT | Mod: 26

## 2023-01-16 PROCEDURE — 99223 1ST HOSP IP/OBS HIGH 75: CPT | Mod: GC

## 2023-01-16 PROCEDURE — 99232 SBSQ HOSP IP/OBS MODERATE 35: CPT

## 2023-01-16 RX ORDER — SODIUM CHLORIDE 5 G/100ML
500 INJECTION, SOLUTION INTRAVENOUS
Refills: 0 | Status: DISCONTINUED | OUTPATIENT
Start: 2023-01-16 | End: 2023-01-17

## 2023-01-16 RX ORDER — METOPROLOL TARTRATE 50 MG
25 TABLET ORAL THREE TIMES A DAY
Refills: 0 | Status: DISCONTINUED | OUTPATIENT
Start: 2023-01-16 | End: 2023-01-20

## 2023-01-16 RX ORDER — APIXABAN 2.5 MG/1
2.5 TABLET, FILM COATED ORAL EVERY 12 HOURS
Refills: 0 | Status: DISCONTINUED | OUTPATIENT
Start: 2023-01-16 | End: 2023-01-27

## 2023-01-16 RX ORDER — FUROSEMIDE 40 MG
40 TABLET ORAL DAILY
Refills: 0 | Status: DISCONTINUED | OUTPATIENT
Start: 2023-01-17 | End: 2023-01-17

## 2023-01-16 RX ORDER — SODIUM CHLORIDE 9 MG/ML
1 INJECTION INTRAMUSCULAR; INTRAVENOUS; SUBCUTANEOUS
Refills: 0 | Status: DISCONTINUED | OUTPATIENT
Start: 2023-01-16 | End: 2023-01-16

## 2023-01-16 RX ORDER — METOPROLOL TARTRATE 50 MG
5 TABLET ORAL ONCE
Refills: 0 | Status: DISCONTINUED | OUTPATIENT
Start: 2023-01-16 | End: 2023-01-16

## 2023-01-16 RX ADMIN — Medication 50 MILLIGRAM(S): at 21:01

## 2023-01-16 RX ADMIN — Medication 1 MILLIGRAM(S): at 12:25

## 2023-01-16 RX ADMIN — Medication 3 MILLILITER(S): at 05:07

## 2023-01-16 RX ADMIN — CEFEPIME 100 MILLIGRAM(S): 1 INJECTION, POWDER, FOR SOLUTION INTRAMUSCULAR; INTRAVENOUS at 05:08

## 2023-01-16 RX ADMIN — DONEPEZIL HYDROCHLORIDE 5 MILLIGRAM(S): 10 TABLET, FILM COATED ORAL at 21:00

## 2023-01-16 RX ADMIN — Medication 25 MILLIGRAM(S): at 05:07

## 2023-01-16 RX ADMIN — Medication 25 MILLIGRAM(S): at 21:00

## 2023-01-16 RX ADMIN — SODIUM CHLORIDE 30 MILLILITER(S): 5 INJECTION, SOLUTION INTRAVENOUS at 17:52

## 2023-01-16 RX ADMIN — Medication 3 MILLILITER(S): at 00:30

## 2023-01-16 RX ADMIN — SIMVASTATIN 20 MILLIGRAM(S): 20 TABLET, FILM COATED ORAL at 21:04

## 2023-01-16 RX ADMIN — CEFEPIME 100 MILLIGRAM(S): 1 INJECTION, POWDER, FOR SOLUTION INTRAMUSCULAR; INTRAVENOUS at 15:00

## 2023-01-16 RX ADMIN — SENNA PLUS 2 TABLET(S): 8.6 TABLET ORAL at 21:00

## 2023-01-16 RX ADMIN — CEFEPIME 100 MILLIGRAM(S): 1 INJECTION, POWDER, FOR SOLUTION INTRAMUSCULAR; INTRAVENOUS at 22:30

## 2023-01-16 RX ADMIN — Medication 1: at 12:24

## 2023-01-16 RX ADMIN — Medication 25 MILLIGRAM(S): at 14:37

## 2023-01-16 RX ADMIN — Medication 1: at 17:27

## 2023-01-16 RX ADMIN — Medication 81 MILLIGRAM(S): at 12:25

## 2023-01-16 RX ADMIN — Medication 3 MILLILITER(S): at 12:25

## 2023-01-16 RX ADMIN — Medication 3 MILLILITER(S): at 17:28

## 2023-01-16 RX ADMIN — Medication 40 MILLIGRAM(S): at 05:07

## 2023-01-16 RX ADMIN — APIXABAN 2.5 MILLIGRAM(S): 2.5 TABLET, FILM COATED ORAL at 17:29

## 2023-01-16 NOTE — PROGRESS NOTE ADULT - PROBLEM SELECTOR PLAN 8
-History of hypertension noted and managed at home with metoprolol and with amlodipine; holding at this time in context of acute presentation

## 2023-01-16 NOTE — PROGRESS NOTE ADULT - PROBLEM SELECTOR PLAN 1
-Persistent shortness of breath refractory to antibiotic therapy with evident bilateral pleural effusions, jugular venous distension, and lower extremity edema and with an elevated serum bnp level   -Most likely that persistent shortness of breath is not secondary to an infectious etiology but rather to acute decompensated heart failure   -Etiology of heart failure is uncertain at this time; tte as outpatient in 2021 demonstrated impaired diastolic function but normal systolic function; patient has no known history of coronary artery disease; however, patient did receive chemotherapy and radiation for management of breast cancer; unknown if this was adriamycin-containing regimen   -Continue Lasix 40mg IV BID. Keep net negative 1L daily for now  - Strict I/Os, daily weights  -Trans-thoracic echocardiogram ordered -Persistent shortness of breath refractory to antibiotic therapy with evident bilateral pleural effusions, jugular venous distension, and lower extremity edema and with an elevated serum bnp level   -Most likely that persistent shortness of breath is not secondary to an infectious etiology but rather to acute decompensated heart failure   -Etiology of heart failure is uncertain at this time; tte as outpatient in 2021 demonstrated impaired diastolic function but normal systolic function; patient has no known history of coronary artery disease; however, patient did receive chemotherapy and radiation for management of breast cancer; unknown if this was adriamycin-containing regimen   -Decrease lasix to 40 IV qd  - Strict I/Os, daily weights  -Trans-thoracic echocardiogram ordered

## 2023-01-16 NOTE — PROGRESS NOTE ADULT - PROBLEM SELECTOR PLAN 5
-Elevated serum ast and alkaline phosphatase noted   -Suspect that this is most likely secondary to fluid overload in setting of acute decompensated heart failure   -Will manage with diuretics and will follow serum liver enzymes daily   - RUQ US showing ?7mm calculus in mid common bile duct. Will hold off MRCP for now given absence of abdominal symptoms  - GGT wnl suggestive that elevated Alk phos not biliary in etiology, possibly from bone. Ordered skeletal survey to assess for possible bony mets. -Elevated serum ast and alkaline phosphatase noted   -Suspect that this is most likely secondary to fluid overload in setting of acute decompensated heart failure   -Will manage with diuretics and will follow serum liver enzymes daily   - RUQ US showing ?7mm calculus in mid common bile duct.   - GGT wnl suggestive that elevated Alk phos not biliary in etiology, possibly from bone.   - f/u  skeletal survey to assess for possible bony mets.  - f/u MRCP

## 2023-01-16 NOTE — PROGRESS NOTE ADULT - PROBLEM SELECTOR PLAN 2
-Patient does meet sepsis criteria on presentation given leukocytosis to greater than 19 and tachypnea; however, no evident fevers, and despite shortness of breath, patient does not have a productive cough   -Elevated serum lactate (cleared within four hours) on initial presentation is suggestive of impaired organ perfusion   -Cultures of blood and urine sent on presentation   -No obvious source of infection if not for pneumonia; although pneumonia suggested on chest xray and ct chest with bilateral tree-in-but opacifications, not entirely sure that this is not resolving inflammation form recent pneumonia treated as outpatient   -Will manage as for community-acquired pneumonia at this time; patient already received five-day course of antibiotics on outpatient side;   -Ordered serum procalcitonin, if negative, will consider shortening antibiotic course -Patient does meet sepsis criteria on presentation given leukocytosis to greater than 19 and tachypnea; however, no evident fevers, and despite shortness of breath, patient does not have a productive cough   -Elevated serum lactate (cleared within four hours) on initial presentation is suggestive of impaired organ perfusion   -Cultures of blood and urine sent on presentation   -No obvious source of infection if not for pneumonia; although pneumonia suggested on chest xray and ct chest with bilateral tree-in-but opacifications, not entirely sure that this is not resolving inflammation form recent pneumonia treated as outpatient   -Will manage as for community-acquired pneumonia at this time; patient already received five-day course of antibiotics on outpatient side;

## 2023-01-16 NOTE — PROGRESS NOTE ADULT - PROBLEM SELECTOR PLAN 6
Noted to be in Afib with HR to 130s  - Resumed home lopressor 25mg BID with hold parameters Noted to be in Afib with HR to 130s  - Increased home lopressor 25mg BID to TID with hold parameters  - pt not on AC at home, unclear reason why  - per family, pt w/ mild rectal bleeding few years ago, likely 2/2 hemorrhoids   - Chadsvasc 3  - Will start Eliquis   - Expedite TTE

## 2023-01-16 NOTE — PROGRESS NOTE ADULT - PROBLEM SELECTOR PLAN 4
-Serum sodium level noted to measure 121; suspect that this is secondary to hypervolemic hyponatremia in context of acute decompensated heart failure but no urine studies yet to confirm this   - Slight improvement to 122 after lasix  -Will obtain urine osmolarity and urine sodium level to confirm suspicion of hypervolemic hyponatremia   -Will manage at this time with intravenous diuretics as above -Serum sodium level noted to measure 121; suspect that this is secondary to hypervolemic hyponatremia in context of acute decompensated heart failure but no urine studies yet to confirm this   - Worsening to 117 today, despite lasix IV BID  -Will obtain urine osmolarity and urine sodium level to confirm suspicion of hypervolemic hyponatremia   - c/w lasix  - PO fluid restrict, salt tabs  - f/u renal recs

## 2023-01-16 NOTE — CONSULT NOTE ADULT - PROBLEM SELECTOR RECOMMENDATION 9
Hypo-osmolar Hyponatremia due to high ADH state    SNa in 2021 was 140. No SNa on [facility] after that.  SNa on arrival 1/14 was 117 which increased to 121 the same day. Now SNa decreased to 117 today. Pt receiving vanco, cefepime & azithro all in D5 since arrival. Also received 250cc NS. Pt on lasix 40 mg IV daily & started on salt tabs 1 gm bid since this AM.     Pt. with asymptomatic hyponatremia. Unclear etiology but appears          volemic on exam. Juanita 16, UOsm 584, SOsm low at 253. Continue with fluid restriction to <1L/day for now. Start      followed by repeat BMP. Avoid over-correction by >4-6mEQ in 24 hours. Increase PO solute (protein) intake. Avoid isotonic or hypotonic fluids. Monitor SNa Q6 hours. Check TSH and Cortisol.         ###INCOMPLETE Hypo-osmolar Hyponatremia due to high ADH state    SNa in 2021 was 140. No SNa on [facility] after that.  SNa on arrival 1/14 was 117 which increased to 121 the same day. Now SNa decreased to 117 today. Pt receiving vanco, cefepime & azithro all in D5 since arrival. Also received 250cc NS. Pt on lasix 40 mg IV daily & started on salt tabs 1 gm bid since this AM.     Pt. with asymptomatic hyponatremia. Unclear etiology but appears          volemic on exam. Juanita 16, UOsm 584, SOsm low at 253. Continue with fluid restriction to <1L/day for now. Start      followed by repeat BMP. Avoid over-correction by >4-6mEQ in 24 hours. Increase PO solute (protein) intake. Avoid isotonic or hypotonic fluids. Monitor SNa Q6 hours. Check TSH and Cortisol.         ###INCOMPLETE Hypo-osmolar Hyponatremia due to high ADH state    SNa in 2021 was 140. No SNa on [facility] after that.  SNa on arrival 1/14 was 117 which increased to 121 the same day. Now SNa decreased to 117 today. Pt receiving vanco, cefepime & azithro all in D5 since arrival. Also received 250cc NS. Pt on lasix 40 mg IV daily & started on salt tabs 1 gm bid since this AM.     Pt. with asymptomatic hyponatremia. Unclear etiology but appears          volemic on exam. Juanita 16, UOsm 584, SOsm low at 253. Continue with fluid restriction to <1L/day for now. Start      followed by repeat BMP. Avoid over-correction by >4-6mEQ in 24 hours. Increase PO solute (protein) intake. Avoid isotonic or hypotonic fluids. Monitor SNa Q6 hours. Check TSH and Cortisol.         ###INCOMPLETE Hypo-osmolar Hyponatremia due to high ADH state    SNa in 2021 was 140. No SNa on [facility] after that.  SNa on arrival 1/14 was 117 which increased to 121 the same day. Now SNa decreased to 117 today. Pt receiving vanco, cefepime & azithro all in D5 since arrival. Also received 250cc NS. Pt on lasix 40 mg IV daily since 1/15  & started on salt tabs 1 gm bid since this AM.     Pt. with asymptomatic hyponatremia. Unclear etiology but appears          volemic on exam. Juanita 16, UOsm 584, SOsm low at 253. Continue with fluid restriction to <1L/day for now. Start      followed by repeat BMP. Avoid over-correction by >4-6mEQ in 24 hours. Increase PO solute (protein) intake. Avoid isotonic or hypotonic fluids. Monitor SNa Q6 hours. Check TSH and Cortisol.         ###INCOMPLETE Hypo-osmolar Hyponatremia due to high ADH state    SNa in 2021 was 140. No SNa on [facility] after that.  SNa on arrival 1/14 was 117 which increased to 121 the same day. Now SNa decreased to 117 today. Pt receiving vanco, cefepime & azithro all in D5 since arrival. Also received 250cc NS. Pt on lasix 40 mg IV daily since 1/15  & started on salt tabs 1 gm bid since this AM.     Pt. with asymptomatic hyponatremia. Unclear etiology but appears          volemic on exam. Juanita 16, UOsm 584, SOsm low at 253. Continue with fluid restriction to <1L/day for now. Start      followed by repeat BMP. Avoid over-correction by >4-6mEQ in 24 hours. Increase PO solute (protein) intake. Avoid isotonic or hypotonic fluids. Monitor SNa Q6 hours. Check TSH and Cortisol.         ###INCOMPLETE Hypo-osmolar Hyponatremia due to high ADH state    SNa in 2021 was 140. No SNa on [facility] after that.  SNa on arrival 1/14 was 117 which increased to 121 the same day. Now SNa decreased to 117 today. Pt receiving vanco, cefepime & azithro all in D5 since arrival. Also received 250cc NS. Pt on lasix 40 mg IV daily since 1/15  & started on salt tabs 1 gm bid since this AM.     Pt. with asymptomatic hyponatremia. Unclear etiology but appears          volemic on exam. Juanita 16, UOsm 584, SOsm low at 253. Continue with fluid restriction to <1L/day for now. Start      followed by repeat BMP. Avoid over-correction by >4-6mEQ in 24 hours. Increase PO solute (protein) intake. Avoid isotonic or hypotonic fluids. Monitor SNa Q6 hours. Check TSH and Cortisol.         ###INCOMPLETE Hypo-osmolar Hyponatremia due to high ADH state from CHF & possible NSAID use    SNa in 2021 was 140. No SNa on [facility] after that.  SNa on arrival 1/14 was 117 which increased to 121 the same day. Now SNa decreased to 117 today. Pt receiving vanco, cefepime & azithro all in D5 since arrival. Also received 250cc NS. Pt on lasix 40 mg IV daily since 1/15  & started on salt tabs 1 gm bid since this AM.     Pt. with asymptomatic hyponatremia. Unclear etiology but appears mildly hypervolemic on exam. Juanita 16, UOsm 584, SOsm low at 253. Continue with fluid restriction to <1L/day for now. Start  3% HTS @ 30cc/hr x 4 hrs  followed by repeat BMP q4. Continue lasix 40 mg IV daily. Hold salt tabs. Avoid over-correction by >4-6mEQ in 24 hours. Hold celecoxib. NSAIDs can cause hyponatremia via increasing ADH (prostaglandins suppress ADH)Increase PO solute (protein) intake. Avoid isotonic or hypotonic fluids. Monitor SNa Q4 hours. Check bladder scan. Please insert Pal if retaining.    Disccussed with primary team Hypo-osmolar Hyponatremia due to high ADH state from CHF & possible NSAID use    SNa in 2021 was 140. No SNa on [facility] after that.  SNa on arrival 1/14 was 117 which increased to 121 the same day. Now SNa decreased to 117 today. Pt receiving vanco, cefepime & azithro all in D5 since arrival. Also received 250cc NS. Pt on lasix 40 mg IV daily since 1/15  & started on salt tabs 1 gm bid since this AM.     Pt. with asymptomatic hyponatremia. Unclear etiology but appears mildly hypervolemic on exam. Juanita 16, UOsm 584, SOsm low at 253. Continue with fluid restriction to <1L/day for now. Start  3% HTS @ 30cc/hr x 4 hrs  followed by repeat BMP q4. Continue lasix 40 mg IV daily. Hold salt tabs. Avoid over-correction by >4-6mEQ in 24 hours. Hold celecoxib. NSAIDs can cause hyponatremia via increasing ADH (prostaglandins suppress ADH)Increase PO solute (protein) intake. Avoid isotonic or hypotonic fluids. Monitor SNa Q4 hours. Check bladder scan. Please insert Pal if retaining.    Disccussed with primary team Hypo-osmolar Hyponatremia due to high ADH state from CHF & possible NSAID use    SNa in 2021 was 140. No SNa on [facility] after that.  SNa on arrival 1/14 was 117 which increased to 121 the same day. Now SNa decreased to 117 today. Pt receiving vanco, cefepime & azithro all in D5 since arrival. Also received 250cc NS. Pt on lasix 40 mg IV daily since 1/15  & started on salt tabs 1 gm bid since this AM.     Pt. with asymptomatic hyponatremia. Unclear etiology but appears mildly hypervolemic on exam. Juanita 16, UOsm 584, SOsm low at 253. Continue with fluid restriction to <1L/day for now. Start  3% HTS @ 30cc/hr x 4 hrs  followed by repeat BMP q4. Continue lasix 40 mg IV daily. Hold salt tabs. Avoid over-correction by >4-6mEQ in 24 hours. Hold celecoxib. NSAIDs can cause hyponatremia via increasing ADH (prostaglandins suppress ADH)Increase PO solute (protein) intake. Avoid isotonic or hypotonic fluids. Monitor SNa Q4 hours. Check bladder scan. Please insert Pal if retaining.    Disccussed with primary team

## 2023-01-16 NOTE — PROGRESS NOTE ADULT - ATTENDING COMMENTS
87F with PMH HTN, T2DM, dementia, history of breast and colon cancers (now in remission) p/w persistent shortness of breath; recently treated with abx for CAP. Pt with LE swelling, elevated BNP, elevated WBC, and imaging showing diffuse tree-in-bud nodules and airspace opacities - a/w AHRF 2/2 ADHF and CAP. C/w cefepime for gram negative CAP, MRSA negative, legionella negative. Started on lasix 40 IV BID, with improvement in SOB --> will switch to 40 IV lasix, wean O2 as tolerated. Labs also notable for hypoNa, worse yest evening 117 --> repeat labs from this AM pending - will check urine osm, serum osm, urine lytes; if Na not improved, will consult renal. Significantly elevated AlkP, GGT normal - unclear etiology, skeletal survey ordered given h/o mets; RUQ-US ordered showing 7mm ?stone in CBD, will check MRCP. 87F with PMH HTN, T2DM, dementia, history of breast and colon cancers (now in remission) p/w persistent shortness of breath; recently treated with abx for CAP. Pt with LE swelling, elevated BNP, elevated WBC, and imaging showing diffuse tree-in-bud nodules and airspace opacities - a/w AHRF 2/2 ADHF and CAP. C/w cefepime for gram negative CAP, MRSA negative, legionella negative. Started on lasix 40 IV BID, with improvement in SOB --> will switch to 40 IV lasix, wean O2 as tolerated. Labs also notable for hypoNa, worse yest evening 117 --> repeat labs from this AM pending - will check urine osm, serum osm, urine lytes; if Na not improved, will consult renal. Significantly elevated AlkP, GGT normal - unclear etiology, skeletal survey ordered given h/o mets; RUQ-US ordered showing 7mm ?stone in CBD, will check MRCP - pending imaging, will consider hepatology/GI consult. 87F with PMH HTN, T2DM, dementia, history of breast and colon cancers (now in remission) p/w persistent shortness of breath; recently treated with abx for CAP. Pt with LE swelling, elevated BNP, elevated WBC, and imaging showing diffuse tree-in-bud nodules and airspace opacities - a/w AHRF 2/2 ADHF and CAP. C/w cefepime for gram negative CAP, MRSA negative, legionella negative. Started on lasix 40 IV BID, with improvement in SOB --> will switch to 40 IV lasix, wean O2 as tolerated. Labs also notable for hypoNa, worse yest evening 117 --> repeat labs from this AM pending - will check urine osm, serum osm, urine lytes; if Na not improved, will consult renal. Significantly elevated AlkP, GGT normal - unclear etiology, skeletal survey ordered given h/o mets; RUQ-US ordered showing 7mm ?stone in CBD, will check MRCP - pending imaging, will consider hepatology/GI consult.  Dtr updated at bedside using [employer]  ID [id number] 87F with PMH HTN, T2DM, dementia, history of breast and colon cancers (now in remission) p/w persistent shortness of breath; recently treated with abx for CAP. Pt with LE swelling, elevated BNP, elevated WBC, and imaging showing diffuse tree-in-bud nodules and airspace opacities - a/w AHRF 2/2 ADHF and CAP. C/w cefepime for gram negative CAP, MRSA negative, legionella negative. Started on lasix 40 IV BID, with improvement in SOB --> will switch to 40 IV lasix, wean O2 as tolerated. Labs also notable for hypoNa, worse yest evening 117 --> repeat labs from this AM pending - will check urine osm, serum osm, urine lytes; if Na not improved, will consult renal. Significantly elevated AlkP, GGT normal - unclear etiology, skeletal survey ordered given h/o mets; RUQ-US ordered showing 7mm ?stone in CBD, will check MRCP - pending imaging, will consider hepatology/GI consult.  Dtr updated at bedside using [employer]  ID [id number] 87F with PMH HTN, T2DM, dementia, history of breast and colon cancers (now in remission) p/w persistent shortness of breath; recently treated with abx for CAP. Pt with LE swelling, elevated BNP, elevated WBC, and imaging showing diffuse tree-in-bud nodules and airspace opacities - a/w AHRF 2/2 ADHF and CAP. C/w cefepime for gram negative CAP, MRSA negative, legionella negative. Started on lasix 40 IV BID, with improvement in SOB --> will switch to 40 IV lasix, wean O2 as tolerated. Labs also notable for hypoNa, worse yest evening 117 --> repeat labs from this AM pending - will check urine osm, serum osm, urine lytes; if Na not improved, will consult renal. Significantly elevated AlkP, GGT normal - unclear etiology, skeletal survey ordered given h/o mets; RUQ-US ordered showing 7mm ?stone in CBD, will check MRCP - pending imaging, will consider hepatology/GI consult.  Dtr updated at bedside using [email or web address]  ID [id number]

## 2023-01-16 NOTE — PROGRESS NOTE ADULT - PROBLEM SELECTOR PLAN 9
-History of diabetes mellitus noted and managed at home with metformin and sitagliptin; insulin sliding scale to be administered on inpatient stay

## 2023-01-16 NOTE — CONSULT NOTE ADULT - ATTENDING COMMENTS
Rest per Dr.Gosalia Manjit Campbell MD  O: [phone]  Contact me on teams Rest per Dr.Gosalia Manjit Campbell MD  O: [phone]  Contact me on teams Rest per Dr.Gosalia Manjit Campbell MD  O: [phone]  Contact me on teams

## 2023-01-16 NOTE — PROGRESS NOTE ADULT - PROBLEM SELECTOR PLAN 3
-Patient presents with persistent shortness of breath and fevers noted at home (although none noted in hospital) despite outpatient treatment for community acquired pneumonia  -Although pneumonia suggested on chest xray and ct chest with bilateral tree-in-but opacifications, not entirely sure that this is not resolving inflammation form recent pneumonia treated as outpatient   -Will manage as for community-acquired pneumonia at this time; patient already received five-day course of antibiotics on outpatient side;   - F/u legionella and RVP  - On cefepime and azithromycin (1/15 - ) -Patient presents with persistent shortness of breath and fevers noted at home (although none noted in hospital) despite outpatient treatment for community acquired pneumonia  -Although pneumonia suggested on chest xray and ct chest with bilateral tree-in-but opacifications, not entirely sure that this is not resolving inflammation form recent pneumonia treated as outpatient   -Will manage as for community-acquired pneumonia at this time; patient already received five-day course of antibiotics on outpatient side;   - Legionella negative, will d/c azithromycin (1/15-1/16)  - C/w cefepime

## 2023-01-16 NOTE — CHART NOTE - NSCHARTNOTEFT_GEN_A_CORE
Pt w/ history of atrial fibrillation, on home metoprolol 25mg BID for rate control. Pt not on AC, unclear reason. Does not have outpatient cardiologist. Per grand-daughter, pt had mild rectal bleeding few years ago, which self resolved with bowel regimen. CHADSVASC score 3. Pt will benefit from AC. Given age >82 and weight <60kg, will start Eliquis 2.5mg BID. Will obtain TTE to further evaluate component of heart failure.

## 2023-01-16 NOTE — PROGRESS NOTE ADULT - PROBLEM SELECTOR PLAN 7
-Right lobe thyroid nodule noted incidentally on ct scan of abdomen and pelvis   -Will require dedicated thyroid ultrasound and outpatient follow-up for consideration of biopsy vs. close follow-up

## 2023-01-16 NOTE — CONSULT NOTE ADULT - SUBJECTIVE AND OBJECTIVE BOX
[facility] DIVISION OF KIDNEY DISEASES AND HYPERTENSION -- [phone]  -- INITIAL CONSULT NOTE  --------------------------------------------------------------------------------  HPI:  87-year-old with PMH of hypertension, diabetes mellitus, dementia, history of breast and colon cancers (now in remission) who is admitted for evaluation of persistent SOB after completing therapy for CAP. Currently being treated for acute decompensated CHF. CT chest with b/l patchy opacities PNA vs plum edema & small b/l pleural effusions. BNP 2K. Nephrology called for hyponatremia. SNa in 2021 was 140. No SNa on [facility] after that.  SNa on arrival 1/14 was 117 which increased to 121 the same day. Now SNa decreased to 117 today. Pt receiving vanco, cefepime & azithro all in D5 since arrival. Also received 250cc NS. Pt on lasix 40 mg IV daily & started on salt tabs 1 gm bid since this AM.       Patient seen & examined. Mental status at baseline. Pt on 3L NC. UOP not recorded      PAST HISTORY  --------------------------------------------------------------------------------  PAST MEDICAL & SURGICAL HISTORY:  Hypertension      Diabetes mellitus      Dementia      Breast cancer      Colon cancer      S/P cholecystectomy      S/P mastectomy, right      H/O hemicolectomy        FAMILY HISTORY:  No pertinent family history in first degree relatives      PAST SOCIAL HISTORY:    ALLERGIES & MEDICATIONS  --------------------------------------------------------------------------------  Allergies    No Known Allergies    Intolerances      Standing Inpatient Medications  albuterol/ipratropium for Nebulization 3 milliLiter(s) Nebulizer every 6 hours  aspirin enteric coated 81 milliGRAM(s) Oral daily  cefepime   IVPB 1000 milliGRAM(s) IV Intermittent every 8 hours  dextrose 5%. 1000 milliLiter(s) IV Continuous <Continuous>  dextrose 5%. 1000 milliLiter(s) IV Continuous <Continuous>  dextrose 50% Injectable 25 Gram(s) IV Push once  dextrose 50% Injectable 12.5 Gram(s) IV Push once  dextrose 50% Injectable 25 Gram(s) IV Push once  donepezil 5 milliGRAM(s) Oral at bedtime  enoxaparin Injectable 40 milliGRAM(s) SubCutaneous every 24 hours  folic acid 1 milliGRAM(s) Oral daily  glucagon  Injectable 1 milliGRAM(s) IntraMuscular once  insulin lispro (ADMELOG) corrective regimen sliding scale   SubCutaneous three times a day before meals  insulin lispro (ADMELOG) corrective regimen sliding scale   SubCutaneous at bedtime  metoprolol tartrate 25 milliGRAM(s) Oral two times a day  metoprolol tartrate Injectable 5 milliGRAM(s) IV Push once  senna 2 Tablet(s) Oral at bedtime  simvastatin 20 milliGRAM(s) Oral at bedtime  sodium chloride 1 Gram(s) Oral two times a day  traZODone 50 milliGRAM(s) Oral at bedtime    PRN Inpatient Medications  dextrose Oral Gel 15 Gram(s) Oral once PRN      REVIEW OF SYSTEMS  --------------------------------------------------------------------------------      All other systems were reviewed and are negative, except as noted.    VITALS/PHYSICAL EXAM  --------------------------------------------------------------------------------  T(C): 36.2 (01-16-23 @ 04:22), Max: 37.3 (01-15-23 @ 16:33)  HR: 130 (01-16-23 @ 12:55) (103 - 130)  BP: 145/88 (01-16-23 @ 12:55) (115/57 - 149/67)  RR: 20 (01-16-23 @ 12:55) (18 - 20)  SpO2: 97% (01-16-23 @ 12:55) (97% - 100%)  Wt(kg): --  Height (cm): 154.9 (01-14-23 @ 17:31)  Weight (kg): 56.7 (01-14-23 @ 17:31)  BMI (kg/m2): 23.6 (01-14-23 @ 17:31)  BSA (m2): 1.55 (01-14-23 @ 17:31)      01-16-23 @ 07:01  -  01-16-23 @ 14:18  --------------------------------------------------------  IN: 180 mL / OUT: 0 mL / NET: 180 mL      Physical Exam:  	Gen: elderly, NAD, 3L NC  	HEENT: Anicteric, MMM, no JVD  	Pulm: CTA B/L  	CV: S1S2, no rub  	Abd: Soft, +BS          No presacral edema  	Ext: No LE edema B/L  	Neuro: Awake, alert  	Skin: Warm and dry  	Vascular access:    LABS/STUDIES  --------------------------------------------------------------------------------              11.3   26.08 >-----------<  97       [01-16-23 @ 11:58]              32.7     117  |  82  |  21  ----------------------------<  162      [01-16-23 @ 11:59]  4.8   |  23  |  0.49        Ca     8.0     [01-16-23 @ 11:59]      Mg     2.1     [01-15-23 @ 18:30]      Phos  2.8     [01-15-23 @ 18:30]    TPro  6.5  /  Alb  3.1  /  TBili  1.0  /  DBili  x   /  AST  65  /  ALT  28  /  AlkPhos  1266  [01-16-23 @ 11:59]    PT/INR: PT 18.1 , INR 1.57       [01-14-23 @ 19:06]  PTT: 29.9       [01-14-23 @ 19:06]          [01-15-23 @ 10:49]        [01-14-23 @ 23:31]  Serum Osmolality 253      [01-16-23 @ 11:57]    Creatinine Trend:  SCr 0.49 [01-16 @ 11:59]  SCr 0.49 [01-15 @ 18:30]  SCr 0.47 [01-15 @ 05:37]  SCr 0.47 [01-14 @ 23:31]  SCr 0.51 [01-14 @ 19:06]    Urinalysis - [01-14-23 @ 21:40]      Color Yellow / Appearance Clear / SG 1.023 / pH 6.0      Gluc Negative / Ketone Negative  / Bili Negative / Urobili 3 mg/dL       Blood Moderate / Protein 30 mg/dL / Leuk Est Negative / Nitrite Negative      RBC 21 / WBC 4 / Hyaline 3 / Gran  / Sq Epi  / Non Sq Epi 2 / Bacteria Negative    Urine Creatinine 100      [01-14-23 @ 21:40]  Urine Protein 51      [01-14-23 @ 21:40]  Urine Sodium 16      [01-14-23 @ 21:40]  Urine Urea Nitrogen 986      [01-14-23 @ 21:40]  Urine Potassium 38      [01-14-23 @ 21:40]  Urine Osmolality 584      [01-14-23 @ 21:40]         [facility] DIVISION OF KIDNEY DISEASES AND HYPERTENSION -- [phone]  -- INITIAL CONSULT NOTE  --------------------------------------------------------------------------------  HPI:  87-year-old with PMH of hypertension, diabetes mellitus, dementia, history of breast and colon cancers (now in remission) who is admitted for evaluation of persistent SOB after completing therapy for CAP. Currently being treated for acute decompensated CHF. CT chest with b/l patchy opacities PNA vs plum edema & small b/l pleural effusions. BNP 2K. Nephrology called for hyponatremia. SNa in 2021 was 140. No SNa on [facility] after that.  SNa on arrival 1/14 was 117 which increased to 121 the same day. Now SNa decreased to 117 today. Pt receiving vanco, cefepime & azithro all in D5 since arrival. Also received 250cc NS. Pt on lasix 40 mg IV daily & started on salt tabs 1 gm bid since this AM.       Patient seen & examined. Mental status at baseline. Pt on 3L NC. UOP not recorded      PAST HISTORY  --------------------------------------------------------------------------------  PAST MEDICAL & SURGICAL HISTORY:  Hypertension      Diabetes mellitus      Dementia      Breast cancer      Colon cancer      S/P cholecystectomy      S/P mastectomy, right      H/O hemicolectomy        FAMILY HISTORY:  No pertinent family history in first degree relatives      PAST SOCIAL HISTORY:    ALLERGIES & MEDICATIONS  --------------------------------------------------------------------------------  Allergies    No Known Allergies    Intolerances      Standing Inpatient Medications  albuterol/ipratropium for Nebulization 3 milliLiter(s) Nebulizer every 6 hours  aspirin enteric coated 81 milliGRAM(s) Oral daily  cefepime   IVPB 1000 milliGRAM(s) IV Intermittent every 8 hours  dextrose 5%. 1000 milliLiter(s) IV Continuous <Continuous>  dextrose 5%. 1000 milliLiter(s) IV Continuous <Continuous>  dextrose 50% Injectable 25 Gram(s) IV Push once  dextrose 50% Injectable 12.5 Gram(s) IV Push once  dextrose 50% Injectable 25 Gram(s) IV Push once  donepezil 5 milliGRAM(s) Oral at bedtime  enoxaparin Injectable 40 milliGRAM(s) SubCutaneous every 24 hours  folic acid 1 milliGRAM(s) Oral daily  glucagon  Injectable 1 milliGRAM(s) IntraMuscular once  insulin lispro (ADMELOG) corrective regimen sliding scale   SubCutaneous three times a day before meals  insulin lispro (ADMELOG) corrective regimen sliding scale   SubCutaneous at bedtime  metoprolol tartrate 25 milliGRAM(s) Oral two times a day  metoprolol tartrate Injectable 5 milliGRAM(s) IV Push once  senna 2 Tablet(s) Oral at bedtime  simvastatin 20 milliGRAM(s) Oral at bedtime  sodium chloride 1 Gram(s) Oral two times a day  traZODone 50 milliGRAM(s) Oral at bedtime    PRN Inpatient Medications  dextrose Oral Gel 15 Gram(s) Oral once PRN      REVIEW OF SYSTEMS  --------------------------------------------------------------------------------      All other systems were reviewed and are negative, except as noted.    VITALS/PHYSICAL EXAM  --------------------------------------------------------------------------------  T(C): 36.2 (01-16-23 @ 04:22), Max: 37.3 (01-15-23 @ 16:33)  HR: 130 (01-16-23 @ 12:55) (103 - 130)  BP: 145/88 (01-16-23 @ 12:55) (115/57 - 149/67)  RR: 20 (01-16-23 @ 12:55) (18 - 20)  SpO2: 97% (01-16-23 @ 12:55) (97% - 100%)  Wt(kg): --  Height (cm): 154.9 (01-14-23 @ 17:31)  Weight (kg): 56.7 (01-14-23 @ 17:31)  BMI (kg/m2): 23.6 (01-14-23 @ 17:31)  BSA (m2): 1.55 (01-14-23 @ 17:31)      01-16-23 @ 07:01  -  01-16-23 @ 14:18  --------------------------------------------------------  IN: 180 mL / OUT: 0 mL / NET: 180 mL      Physical Exam:  	Gen: elderly, NAD, 3L NC  	HEENT: Anicteric, MMM, no JVD  	Pulm: CTA B/L  	CV: S1S2, no rub  	Abd: Soft, +BS          No presacral edema  	Ext: No LE edema B/L  	Neuro: Awake, alert  	Skin: Warm and dry  	Vascular access:    LABS/STUDIES  --------------------------------------------------------------------------------              11.3   26.08 >-----------<  97       [01-16-23 @ 11:58]              32.7     117  |  82  |  21  ----------------------------<  162      [01-16-23 @ 11:59]  4.8   |  23  |  0.49        Ca     8.0     [01-16-23 @ 11:59]      Mg     2.1     [01-15-23 @ 18:30]      Phos  2.8     [01-15-23 @ 18:30]    TPro  6.5  /  Alb  3.1  /  TBili  1.0  /  DBili  x   /  AST  65  /  ALT  28  /  AlkPhos  1266  [01-16-23 @ 11:59]    PT/INR: PT 18.1 , INR 1.57       [01-14-23 @ 19:06]  PTT: 29.9       [01-14-23 @ 19:06]          [01-15-23 @ 10:49]        [01-14-23 @ 23:31]  Serum Osmolality 253      [01-16-23 @ 11:57]    Creatinine Trend:  SCr 0.49 [01-16 @ 11:59]  SCr 0.49 [01-15 @ 18:30]  SCr 0.47 [01-15 @ 05:37]  SCr 0.47 [01-14 @ 23:31]  SCr 0.51 [01-14 @ 19:06]    Urinalysis - [01-14-23 @ 21:40]      Color Yellow / Appearance Clear / SG 1.023 / pH 6.0      Gluc Negative / Ketone Negative  / Bili Negative / Urobili 3 mg/dL       Blood Moderate / Protein 30 mg/dL / Leuk Est Negative / Nitrite Negative      RBC 21 / WBC 4 / Hyaline 3 / Gran  / Sq Epi  / Non Sq Epi 2 / Bacteria Negative    Urine Creatinine 100      [01-14-23 @ 21:40]  Urine Protein 51      [01-14-23 @ 21:40]  Urine Sodium 16      [01-14-23 @ 21:40]  Urine Urea Nitrogen 986      [01-14-23 @ 21:40]  Urine Potassium 38      [01-14-23 @ 21:40]  Urine Osmolality 584      [01-14-23 @ 21:40]         [facility] DIVISION OF KIDNEY DISEASES AND HYPERTENSION -- [phone]  -- INITIAL CONSULT NOTE  --------------------------------------------------------------------------------  HPI:  87-year-old with PMH of hypertension, diabetes mellitus, dementia, history of breast and colon cancers (now in remission) who is admitted for evaluation of persistent SOB after completing therapy for CAP. Currently being treated for acute decompensated CHF. CT chest with b/l patchy opacities PNA vs plum edema & small b/l pleural effusions. BNP 2K. Nephrology called for hyponatremia. SNa in 2021 was 140. No SNa on [facility] after that.  SNa on arrival 1/14 was 117 which increased to 121 the same day. Now SNa decreased to 117 today. Pt receiving vanco, cefepime & azithro all in D5 since arrival. Also received 250cc NS. Pt on lasix 40 mg IV daily & started on salt tabs 1 gm bid since this AM.       Patient seen & examined. Mental status at baseline. Pt on 3L NC. UOP not recorded      PAST HISTORY  --------------------------------------------------------------------------------  PAST MEDICAL & SURGICAL HISTORY:  Hypertension      Diabetes mellitus      Dementia      Breast cancer      Colon cancer      S/P cholecystectomy      S/P mastectomy, right      H/O hemicolectomy        FAMILY HISTORY:  No pertinent family history in first degree relatives      PAST SOCIAL HISTORY:    ALLERGIES & MEDICATIONS  --------------------------------------------------------------------------------  Allergies    No Known Allergies    Intolerances      Standing Inpatient Medications  albuterol/ipratropium for Nebulization 3 milliLiter(s) Nebulizer every 6 hours  aspirin enteric coated 81 milliGRAM(s) Oral daily  cefepime   IVPB 1000 milliGRAM(s) IV Intermittent every 8 hours  dextrose 5%. 1000 milliLiter(s) IV Continuous <Continuous>  dextrose 5%. 1000 milliLiter(s) IV Continuous <Continuous>  dextrose 50% Injectable 25 Gram(s) IV Push once  dextrose 50% Injectable 12.5 Gram(s) IV Push once  dextrose 50% Injectable 25 Gram(s) IV Push once  donepezil 5 milliGRAM(s) Oral at bedtime  enoxaparin Injectable 40 milliGRAM(s) SubCutaneous every 24 hours  folic acid 1 milliGRAM(s) Oral daily  glucagon  Injectable 1 milliGRAM(s) IntraMuscular once  insulin lispro (ADMELOG) corrective regimen sliding scale   SubCutaneous three times a day before meals  insulin lispro (ADMELOG) corrective regimen sliding scale   SubCutaneous at bedtime  metoprolol tartrate 25 milliGRAM(s) Oral two times a day  metoprolol tartrate Injectable 5 milliGRAM(s) IV Push once  senna 2 Tablet(s) Oral at bedtime  simvastatin 20 milliGRAM(s) Oral at bedtime  sodium chloride 1 Gram(s) Oral two times a day  traZODone 50 milliGRAM(s) Oral at bedtime    PRN Inpatient Medications  dextrose Oral Gel 15 Gram(s) Oral once PRN      REVIEW OF SYSTEMS  --------------------------------------------------------------------------------      All other systems were reviewed and are negative, except as noted.    VITALS/PHYSICAL EXAM  --------------------------------------------------------------------------------  T(C): 36.2 (01-16-23 @ 04:22), Max: 37.3 (01-15-23 @ 16:33)  HR: 130 (01-16-23 @ 12:55) (103 - 130)  BP: 145/88 (01-16-23 @ 12:55) (115/57 - 149/67)  RR: 20 (01-16-23 @ 12:55) (18 - 20)  SpO2: 97% (01-16-23 @ 12:55) (97% - 100%)  Wt(kg): --  Height (cm): 154.9 (01-14-23 @ 17:31)  Weight (kg): 56.7 (01-14-23 @ 17:31)  BMI (kg/m2): 23.6 (01-14-23 @ 17:31)  BSA (m2): 1.55 (01-14-23 @ 17:31)      01-16-23 @ 07:01  -  01-16-23 @ 14:18  --------------------------------------------------------  IN: 180 mL / OUT: 0 mL / NET: 180 mL      Physical Exam:  	Gen: elderly, NAD, 3L NC  	HEENT: Anicteric, MMM, no JVD  	Pulm: CTA B/L  	CV: S1S2, no rub  	Abd: Soft, +BS          No presacral edema  	Ext: No LE edema B/L  	Neuro: Awake, alert  	Skin: Warm and dry  	Vascular access:    LABS/STUDIES  --------------------------------------------------------------------------------              11.3   26.08 >-----------<  97       [01-16-23 @ 11:58]              32.7     117  |  82  |  21  ----------------------------<  162      [01-16-23 @ 11:59]  4.8   |  23  |  0.49        Ca     8.0     [01-16-23 @ 11:59]      Mg     2.1     [01-15-23 @ 18:30]      Phos  2.8     [01-15-23 @ 18:30]    TPro  6.5  /  Alb  3.1  /  TBili  1.0  /  DBili  x   /  AST  65  /  ALT  28  /  AlkPhos  1266  [01-16-23 @ 11:59]    PT/INR: PT 18.1 , INR 1.57       [01-14-23 @ 19:06]  PTT: 29.9       [01-14-23 @ 19:06]          [01-15-23 @ 10:49]        [01-14-23 @ 23:31]  Serum Osmolality 253      [01-16-23 @ 11:57]    Creatinine Trend:  SCr 0.49 [01-16 @ 11:59]  SCr 0.49 [01-15 @ 18:30]  SCr 0.47 [01-15 @ 05:37]  SCr 0.47 [01-14 @ 23:31]  SCr 0.51 [01-14 @ 19:06]    Urinalysis - [01-14-23 @ 21:40]      Color Yellow / Appearance Clear / SG 1.023 / pH 6.0      Gluc Negative / Ketone Negative  / Bili Negative / Urobili 3 mg/dL       Blood Moderate / Protein 30 mg/dL / Leuk Est Negative / Nitrite Negative      RBC 21 / WBC 4 / Hyaline 3 / Gran  / Sq Epi  / Non Sq Epi 2 / Bacteria Negative    Urine Creatinine 100      [01-14-23 @ 21:40]  Urine Protein 51      [01-14-23 @ 21:40]  Urine Sodium 16      [01-14-23 @ 21:40]  Urine Urea Nitrogen 986      [01-14-23 @ 21:40]  Urine Potassium 38      [01-14-23 @ 21:40]  Urine Osmolality 584      [01-14-23 @ 21:40]         [facility] DIVISION OF KIDNEY DISEASES AND HYPERTENSION -- [phone]  -- INITIAL CONSULT NOTE  --------------------------------------------------------------------------------  HPI:  87-year-old with PMH of hypertension, diabetes mellitus, dementia, history of breast and colon cancers (now in remission) who is admitted for evaluation of persistent SOB after completing therapy for CAP. Currently being treated for acute decompensated CHF. CT chest with b/l patchy opacities PNA vs plum edema & small b/l pleural effusions. BNP 2K. Nephrology called for hyponatremia. SNa in 2021 was 140. No SNa on [facility] after that.  SNa on arrival 1/14 was 117 which increased to 121 the same day. Now SNa decreased to 117 today. Pt receiving vanco, cefepime & azithro all in D5 since arrival. Also received 250cc NS. Pt on lasix 40 mg IV daily since yesterday 1/15 & started on salt tabs 1 gm bid since this AM.       Patient seen & examined. Mental status at baseline. Pt on 3L NC. UOP not recorded      PAST HISTORY  --------------------------------------------------------------------------------  PAST MEDICAL & SURGICAL HISTORY:  Hypertension      Diabetes mellitus      Dementia      Breast cancer      Colon cancer      S/P cholecystectomy      S/P mastectomy, right      H/O hemicolectomy        FAMILY HISTORY:  No pertinent family history in first degree relatives      PAST SOCIAL HISTORY:    ALLERGIES & MEDICATIONS  --------------------------------------------------------------------------------  Allergies    No Known Allergies    Intolerances      Standing Inpatient Medications  albuterol/ipratropium for Nebulization 3 milliLiter(s) Nebulizer every 6 hours  aspirin enteric coated 81 milliGRAM(s) Oral daily  cefepime   IVPB 1000 milliGRAM(s) IV Intermittent every 8 hours  dextrose 5%. 1000 milliLiter(s) IV Continuous <Continuous>  dextrose 5%. 1000 milliLiter(s) IV Continuous <Continuous>  dextrose 50% Injectable 25 Gram(s) IV Push once  dextrose 50% Injectable 12.5 Gram(s) IV Push once  dextrose 50% Injectable 25 Gram(s) IV Push once  donepezil 5 milliGRAM(s) Oral at bedtime  enoxaparin Injectable 40 milliGRAM(s) SubCutaneous every 24 hours  folic acid 1 milliGRAM(s) Oral daily  glucagon  Injectable 1 milliGRAM(s) IntraMuscular once  insulin lispro (ADMELOG) corrective regimen sliding scale   SubCutaneous three times a day before meals  insulin lispro (ADMELOG) corrective regimen sliding scale   SubCutaneous at bedtime  metoprolol tartrate 25 milliGRAM(s) Oral two times a day  metoprolol tartrate Injectable 5 milliGRAM(s) IV Push once  senna 2 Tablet(s) Oral at bedtime  simvastatin 20 milliGRAM(s) Oral at bedtime  sodium chloride 1 Gram(s) Oral two times a day  traZODone 50 milliGRAM(s) Oral at bedtime    PRN Inpatient Medications  dextrose Oral Gel 15 Gram(s) Oral once PRN      REVIEW OF SYSTEMS  --------------------------------------------------------------------------------      All other systems were reviewed and are negative, except as noted.    VITALS/PHYSICAL EXAM  --------------------------------------------------------------------------------  T(C): 36.2 (01-16-23 @ 04:22), Max: 37.3 (01-15-23 @ 16:33)  HR: 130 (01-16-23 @ 12:55) (103 - 130)  BP: 145/88 (01-16-23 @ 12:55) (115/57 - 149/67)  RR: 20 (01-16-23 @ 12:55) (18 - 20)  SpO2: 97% (01-16-23 @ 12:55) (97% - 100%)  Wt(kg): --  Height (cm): 154.9 (01-14-23 @ 17:31)  Weight (kg): 56.7 (01-14-23 @ 17:31)  BMI (kg/m2): 23.6 (01-14-23 @ 17:31)  BSA (m2): 1.55 (01-14-23 @ 17:31)      01-16-23 @ 07:01  -  01-16-23 @ 14:18  --------------------------------------------------------  IN: 180 mL / OUT: 0 mL / NET: 180 mL      Physical Exam:  	Gen: elderly, NAD, 3L NC  	HEENT: Anicteric, MMM, no JVD  	Pulm: CTA B/L  	CV: S1S2, no rub  	Abd: Soft, +BS          No presacral edema  	Ext: No LE edema B/L  	Neuro: Awake, alert  	Skin: Warm and dry  	Vascular access:    LABS/STUDIES  --------------------------------------------------------------------------------              11.3   26.08 >-----------<  97       [01-16-23 @ 11:58]              32.7     117  |  82  |  21  ----------------------------<  162      [01-16-23 @ 11:59]  4.8   |  23  |  0.49        Ca     8.0     [01-16-23 @ 11:59]      Mg     2.1     [01-15-23 @ 18:30]      Phos  2.8     [01-15-23 @ 18:30]    TPro  6.5  /  Alb  3.1  /  TBili  1.0  /  DBili  x   /  AST  65  /  ALT  28  /  AlkPhos  1266  [01-16-23 @ 11:59]    PT/INR: PT 18.1 , INR 1.57       [01-14-23 @ 19:06]  PTT: 29.9       [01-14-23 @ 19:06]          [01-15-23 @ 10:49]        [01-14-23 @ 23:31]  Serum Osmolality 253      [01-16-23 @ 11:57]    Creatinine Trend:  SCr 0.49 [01-16 @ 11:59]  SCr 0.49 [01-15 @ 18:30]  SCr 0.47 [01-15 @ 05:37]  SCr 0.47 [01-14 @ 23:31]  SCr 0.51 [01-14 @ 19:06]    Urinalysis - [01-14-23 @ 21:40]      Color Yellow / Appearance Clear / SG 1.023 / pH 6.0      Gluc Negative / Ketone Negative  / Bili Negative / Urobili 3 mg/dL       Blood Moderate / Protein 30 mg/dL / Leuk Est Negative / Nitrite Negative      RBC 21 / WBC 4 / Hyaline 3 / Gran  / Sq Epi  / Non Sq Epi 2 / Bacteria Negative    Urine Creatinine 100      [01-14-23 @ 21:40]  Urine Protein 51      [01-14-23 @ 21:40]  Urine Sodium 16      [01-14-23 @ 21:40]  Urine Urea Nitrogen 986      [01-14-23 @ 21:40]  Urine Potassium 38      [01-14-23 @ 21:40]  Urine Osmolality 584      [01-14-23 @ 21:40]         [facility] DIVISION OF KIDNEY DISEASES AND HYPERTENSION -- [phone]  -- INITIAL CONSULT NOTE  --------------------------------------------------------------------------------  HPI:  87-year-old with PMH of hypertension, diabetes mellitus, dementia, history of breast and colon cancers (now in remission) who is admitted for evaluation of persistent SOB after completing therapy for CAP. Currently being treated for acute decompensated CHF. CT chest with b/l patchy opacities PNA vs plum edema & small b/l pleural effusions. BNP 2K. Nephrology called for hyponatremia. SNa in 2021 was 140. No SNa on [facility] after that.  SNa on arrival 1/14 was 117 which increased to 121 the same day. Now SNa decreased to 117 today. Pt receiving vanco, cefepime & azithro all in D5 since arrival. Also received 250cc NS. Pt on lasix 40 mg IV daily since yesterday 1/15 & started on salt tabs 1 gm bid since this AM.       Patient seen & examined. Mental status at baseline. Pt on 3L NC. UOP not recorded      PAST HISTORY  --------------------------------------------------------------------------------  PAST MEDICAL & SURGICAL HISTORY:  Hypertension      Diabetes mellitus      Dementia      Breast cancer      Colon cancer      S/P cholecystectomy      S/P mastectomy, right      H/O hemicolectomy        FAMILY HISTORY:  No pertinent family history in first degree relatives      PAST SOCIAL HISTORY:    ALLERGIES & MEDICATIONS  --------------------------------------------------------------------------------  Allergies    No Known Allergies    Intolerances      Standing Inpatient Medications  albuterol/ipratropium for Nebulization 3 milliLiter(s) Nebulizer every 6 hours  aspirin enteric coated 81 milliGRAM(s) Oral daily  cefepime   IVPB 1000 milliGRAM(s) IV Intermittent every 8 hours  dextrose 5%. 1000 milliLiter(s) IV Continuous <Continuous>  dextrose 5%. 1000 milliLiter(s) IV Continuous <Continuous>  dextrose 50% Injectable 25 Gram(s) IV Push once  dextrose 50% Injectable 12.5 Gram(s) IV Push once  dextrose 50% Injectable 25 Gram(s) IV Push once  donepezil 5 milliGRAM(s) Oral at bedtime  enoxaparin Injectable 40 milliGRAM(s) SubCutaneous every 24 hours  folic acid 1 milliGRAM(s) Oral daily  glucagon  Injectable 1 milliGRAM(s) IntraMuscular once  insulin lispro (ADMELOG) corrective regimen sliding scale   SubCutaneous three times a day before meals  insulin lispro (ADMELOG) corrective regimen sliding scale   SubCutaneous at bedtime  metoprolol tartrate 25 milliGRAM(s) Oral two times a day  metoprolol tartrate Injectable 5 milliGRAM(s) IV Push once  senna 2 Tablet(s) Oral at bedtime  simvastatin 20 milliGRAM(s) Oral at bedtime  sodium chloride 1 Gram(s) Oral two times a day  traZODone 50 milliGRAM(s) Oral at bedtime    PRN Inpatient Medications  dextrose Oral Gel 15 Gram(s) Oral once PRN      REVIEW OF SYSTEMS  --------------------------------------------------------------------------------      All other systems were reviewed and are negative, except as noted.    VITALS/PHYSICAL EXAM  --------------------------------------------------------------------------------  T(C): 36.2 (01-16-23 @ 04:22), Max: 37.3 (01-15-23 @ 16:33)  HR: 130 (01-16-23 @ 12:55) (103 - 130)  BP: 145/88 (01-16-23 @ 12:55) (115/57 - 149/67)  RR: 20 (01-16-23 @ 12:55) (18 - 20)  SpO2: 97% (01-16-23 @ 12:55) (97% - 100%)  Wt(kg): --  Height (cm): 154.9 (01-14-23 @ 17:31)  Weight (kg): 56.7 (01-14-23 @ 17:31)  BMI (kg/m2): 23.6 (01-14-23 @ 17:31)  BSA (m2): 1.55 (01-14-23 @ 17:31)      01-16-23 @ 07:01  -  01-16-23 @ 14:18  --------------------------------------------------------  IN: 180 mL / OUT: 0 mL / NET: 180 mL      Physical Exam:  	Gen: elderly, NAD, 3L NC  	HEENT: Anicteric, MMM, no JVD  	Pulm: CTA B/L  	CV: S1S2, no rub  	Abd: Soft, +BS          No presacral edema  	Ext: No LE edema B/L  	Neuro: Awake, alert  	Skin: Warm and dry  	Vascular access:    LABS/STUDIES  --------------------------------------------------------------------------------              11.3   26.08 >-----------<  97       [01-16-23 @ 11:58]              32.7     117  |  82  |  21  ----------------------------<  162      [01-16-23 @ 11:59]  4.8   |  23  |  0.49        Ca     8.0     [01-16-23 @ 11:59]      Mg     2.1     [01-15-23 @ 18:30]      Phos  2.8     [01-15-23 @ 18:30]    TPro  6.5  /  Alb  3.1  /  TBili  1.0  /  DBili  x   /  AST  65  /  ALT  28  /  AlkPhos  1266  [01-16-23 @ 11:59]    PT/INR: PT 18.1 , INR 1.57       [01-14-23 @ 19:06]  PTT: 29.9       [01-14-23 @ 19:06]          [01-15-23 @ 10:49]        [01-14-23 @ 23:31]  Serum Osmolality 253      [01-16-23 @ 11:57]    Creatinine Trend:  SCr 0.49 [01-16 @ 11:59]  SCr 0.49 [01-15 @ 18:30]  SCr 0.47 [01-15 @ 05:37]  SCr 0.47 [01-14 @ 23:31]  SCr 0.51 [01-14 @ 19:06]    Urinalysis - [01-14-23 @ 21:40]      Color Yellow / Appearance Clear / SG 1.023 / pH 6.0      Gluc Negative / Ketone Negative  / Bili Negative / Urobili 3 mg/dL       Blood Moderate / Protein 30 mg/dL / Leuk Est Negative / Nitrite Negative      RBC 21 / WBC 4 / Hyaline 3 / Gran  / Sq Epi  / Non Sq Epi 2 / Bacteria Negative    Urine Creatinine 100      [01-14-23 @ 21:40]  Urine Protein 51      [01-14-23 @ 21:40]  Urine Sodium 16      [01-14-23 @ 21:40]  Urine Urea Nitrogen 986      [01-14-23 @ 21:40]  Urine Potassium 38      [01-14-23 @ 21:40]  Urine Osmolality 584      [01-14-23 @ 21:40]         [facility] DIVISION OF KIDNEY DISEASES AND HYPERTENSION -- [phone]  -- INITIAL CONSULT NOTE  --------------------------------------------------------------------------------  HPI:  87-year-old with PMH of hypertension, diabetes mellitus, dementia, history of breast and colon cancers (now in remission) who is admitted for evaluation of persistent SOB after completing therapy for CAP. Currently being treated for acute decompensated CHF. CT chest with b/l patchy opacities PNA vs plum edema & small b/l pleural effusions. BNP 2K. Nephrology called for hyponatremia. SNa in 2021 was 140. No SNa on [facility] after that.  SNa on arrival 1/14 was 117 which increased to 121 the same day. Now SNa decreased to 117 today. Pt receiving vanco, cefepime & azithro all in D5 since arrival. Also received 250cc NS. Pt on lasix 40 mg IV daily since yesterday 1/15 & started on salt tabs 1 gm bid since this AM.       Patient seen & examined. Mental status at baseline. Pt on 3L NC. UOP not recorded      PAST HISTORY  --------------------------------------------------------------------------------  PAST MEDICAL & SURGICAL HISTORY:  Hypertension      Diabetes mellitus      Dementia      Breast cancer      Colon cancer      S/P cholecystectomy      S/P mastectomy, right      H/O hemicolectomy        FAMILY HISTORY:  No pertinent family history in first degree relatives      PAST SOCIAL HISTORY:    ALLERGIES & MEDICATIONS  --------------------------------------------------------------------------------  Allergies    No Known Allergies    Intolerances      Standing Inpatient Medications  albuterol/ipratropium for Nebulization 3 milliLiter(s) Nebulizer every 6 hours  aspirin enteric coated 81 milliGRAM(s) Oral daily  cefepime   IVPB 1000 milliGRAM(s) IV Intermittent every 8 hours  dextrose 5%. 1000 milliLiter(s) IV Continuous <Continuous>  dextrose 5%. 1000 milliLiter(s) IV Continuous <Continuous>  dextrose 50% Injectable 25 Gram(s) IV Push once  dextrose 50% Injectable 12.5 Gram(s) IV Push once  dextrose 50% Injectable 25 Gram(s) IV Push once  donepezil 5 milliGRAM(s) Oral at bedtime  enoxaparin Injectable 40 milliGRAM(s) SubCutaneous every 24 hours  folic acid 1 milliGRAM(s) Oral daily  glucagon  Injectable 1 milliGRAM(s) IntraMuscular once  insulin lispro (ADMELOG) corrective regimen sliding scale   SubCutaneous three times a day before meals  insulin lispro (ADMELOG) corrective regimen sliding scale   SubCutaneous at bedtime  metoprolol tartrate 25 milliGRAM(s) Oral two times a day  metoprolol tartrate Injectable 5 milliGRAM(s) IV Push once  senna 2 Tablet(s) Oral at bedtime  simvastatin 20 milliGRAM(s) Oral at bedtime  sodium chloride 1 Gram(s) Oral two times a day  traZODone 50 milliGRAM(s) Oral at bedtime    PRN Inpatient Medications  dextrose Oral Gel 15 Gram(s) Oral once PRN      REVIEW OF SYSTEMS  --------------------------------------------------------------------------------      All other systems were reviewed and are negative, except as noted.    VITALS/PHYSICAL EXAM  --------------------------------------------------------------------------------  T(C): 36.2 (01-16-23 @ 04:22), Max: 37.3 (01-15-23 @ 16:33)  HR: 130 (01-16-23 @ 12:55) (103 - 130)  BP: 145/88 (01-16-23 @ 12:55) (115/57 - 149/67)  RR: 20 (01-16-23 @ 12:55) (18 - 20)  SpO2: 97% (01-16-23 @ 12:55) (97% - 100%)  Wt(kg): --  Height (cm): 154.9 (01-14-23 @ 17:31)  Weight (kg): 56.7 (01-14-23 @ 17:31)  BMI (kg/m2): 23.6 (01-14-23 @ 17:31)  BSA (m2): 1.55 (01-14-23 @ 17:31)      01-16-23 @ 07:01  -  01-16-23 @ 14:18  --------------------------------------------------------  IN: 180 mL / OUT: 0 mL / NET: 180 mL      Physical Exam:  	Gen: elderly, NAD, 3L NC  	HEENT: Anicteric, MMM, no JVD  	Pulm: CTA B/L  	CV: S1S2, no rub  	Abd: Soft, +BS          No presacral edema  	Ext: No LE edema B/L  	Neuro: Awake, alert  	Skin: Warm and dry  	Vascular access:    LABS/STUDIES  --------------------------------------------------------------------------------              11.3   26.08 >-----------<  97       [01-16-23 @ 11:58]              32.7     117  |  82  |  21  ----------------------------<  162      [01-16-23 @ 11:59]  4.8   |  23  |  0.49        Ca     8.0     [01-16-23 @ 11:59]      Mg     2.1     [01-15-23 @ 18:30]      Phos  2.8     [01-15-23 @ 18:30]    TPro  6.5  /  Alb  3.1  /  TBili  1.0  /  DBili  x   /  AST  65  /  ALT  28  /  AlkPhos  1266  [01-16-23 @ 11:59]    PT/INR: PT 18.1 , INR 1.57       [01-14-23 @ 19:06]  PTT: 29.9       [01-14-23 @ 19:06]          [01-15-23 @ 10:49]        [01-14-23 @ 23:31]  Serum Osmolality 253      [01-16-23 @ 11:57]    Creatinine Trend:  SCr 0.49 [01-16 @ 11:59]  SCr 0.49 [01-15 @ 18:30]  SCr 0.47 [01-15 @ 05:37]  SCr 0.47 [01-14 @ 23:31]  SCr 0.51 [01-14 @ 19:06]    Urinalysis - [01-14-23 @ 21:40]      Color Yellow / Appearance Clear / SG 1.023 / pH 6.0      Gluc Negative / Ketone Negative  / Bili Negative / Urobili 3 mg/dL       Blood Moderate / Protein 30 mg/dL / Leuk Est Negative / Nitrite Negative      RBC 21 / WBC 4 / Hyaline 3 / Gran  / Sq Epi  / Non Sq Epi 2 / Bacteria Negative    Urine Creatinine 100      [01-14-23 @ 21:40]  Urine Protein 51      [01-14-23 @ 21:40]  Urine Sodium 16      [01-14-23 @ 21:40]  Urine Urea Nitrogen 986      [01-14-23 @ 21:40]  Urine Potassium 38      [01-14-23 @ 21:40]  Urine Osmolality 584      [01-14-23 @ 21:40]         [facility] DIVISION OF KIDNEY DISEASES AND HYPERTENSION -- [phone]  -- INITIAL CONSULT NOTE  --------------------------------------------------------------------------------  HPI:  87-year-old with PMH of hypertension, diabetes mellitus, dementia, history of breast and colon cancers (now in remission) who is admitted for evaluation of persistent SOB after completing therapy for CAP. Currently being treated for acute decompensated CHF. CT chest with b/l patchy opacities PNA vs plum edema & small b/l pleural effusions. BNP 2K. Nephrology called for hyponatremia. SNa in 2021 was 140. No SNa on [facility] after that.  SNa on arrival 1/14 was 117 which increased to 121 the same day. Now SNa decreased to 117 today. Pt receiving vanco, cefepime & azithro all in D5 since arrival. Also received 250cc NS. Pt on lasix 40 mg IV daily since yesterday 1/15 & started on salt tabs 1 gm bid since this AM.       Patient seen & examined. Mental status at baseline. Pt on 3L NC. UOP not recorded      PAST HISTORY  --------------------------------------------------------------------------------  PAST MEDICAL & SURGICAL HISTORY:  Hypertension      Diabetes mellitus      Dementia      Breast cancer      Colon cancer      S/P cholecystectomy      S/P mastectomy, right      H/O hemicolectomy        FAMILY HISTORY:  No pertinent family history in first degree relatives      PAST SOCIAL HISTORY:    ALLERGIES & MEDICATIONS  --------------------------------------------------------------------------------  Allergies    No Known Allergies    Intolerances      Standing Inpatient Medications  albuterol/ipratropium for Nebulization 3 milliLiter(s) Nebulizer every 6 hours  aspirin enteric coated 81 milliGRAM(s) Oral daily  cefepime   IVPB 1000 milliGRAM(s) IV Intermittent every 8 hours  dextrose 5%. 1000 milliLiter(s) IV Continuous <Continuous>  dextrose 5%. 1000 milliLiter(s) IV Continuous <Continuous>  dextrose 50% Injectable 25 Gram(s) IV Push once  dextrose 50% Injectable 12.5 Gram(s) IV Push once  dextrose 50% Injectable 25 Gram(s) IV Push once  donepezil 5 milliGRAM(s) Oral at bedtime  enoxaparin Injectable 40 milliGRAM(s) SubCutaneous every 24 hours  folic acid 1 milliGRAM(s) Oral daily  glucagon  Injectable 1 milliGRAM(s) IntraMuscular once  insulin lispro (ADMELOG) corrective regimen sliding scale   SubCutaneous three times a day before meals  insulin lispro (ADMELOG) corrective regimen sliding scale   SubCutaneous at bedtime  metoprolol tartrate 25 milliGRAM(s) Oral two times a day  metoprolol tartrate Injectable 5 milliGRAM(s) IV Push once  senna 2 Tablet(s) Oral at bedtime  simvastatin 20 milliGRAM(s) Oral at bedtime  sodium chloride 1 Gram(s) Oral two times a day  traZODone 50 milliGRAM(s) Oral at bedtime    PRN Inpatient Medications  dextrose Oral Gel 15 Gram(s) Oral once PRN      REVIEW OF SYSTEMS  --------------------------------------------------------------------------------      All other systems were reviewed and are negative, except as noted.    VITALS/PHYSICAL EXAM  --------------------------------------------------------------------------------  T(C): 36.2 (01-16-23 @ 04:22), Max: 37.3 (01-15-23 @ 16:33)  HR: 130 (01-16-23 @ 12:55) (103 - 130)  BP: 145/88 (01-16-23 @ 12:55) (115/57 - 149/67)  RR: 20 (01-16-23 @ 12:55) (18 - 20)  SpO2: 97% (01-16-23 @ 12:55) (97% - 100%)  Wt(kg): --  Height (cm): 154.9 (01-14-23 @ 17:31)  Weight (kg): 56.7 (01-14-23 @ 17:31)  BMI (kg/m2): 23.6 (01-14-23 @ 17:31)  BSA (m2): 1.55 (01-14-23 @ 17:31)      01-16-23 @ 07:01  -  01-16-23 @ 14:18  --------------------------------------------------------  IN: 180 mL / OUT: 0 mL / NET: 180 mL      Physical Exam:  	Gen: elderly, mildly SOB at rest, 3L NC  	HEENT: Anicteric, MMM, no JVD  	Pulm: faint bibasilar crackles  	CV: S1S2, no rub  	Abd: Soft, +BS          No presacral edema  	Ext: No LE edema B/L  	Neuro: Awake  	Skin: Warm and dry  	Vascular access:    LABS/STUDIES  --------------------------------------------------------------------------------              11.3   26.08 >-----------<  97       [01-16-23 @ 11:58]              32.7     117  |  82  |  21  ----------------------------<  162      [01-16-23 @ 11:59]  4.8   |  23  |  0.49        Ca     8.0     [01-16-23 @ 11:59]      Mg     2.1     [01-15-23 @ 18:30]      Phos  2.8     [01-15-23 @ 18:30]    TPro  6.5  /  Alb  3.1  /  TBili  1.0  /  DBili  x   /  AST  65  /  ALT  28  /  AlkPhos  1266  [01-16-23 @ 11:59]    PT/INR: PT 18.1 , INR 1.57       [01-14-23 @ 19:06]  PTT: 29.9       [01-14-23 @ 19:06]          [01-15-23 @ 10:49]        [01-14-23 @ 23:31]  Serum Osmolality 253      [01-16-23 @ 11:57]    Creatinine Trend:  SCr 0.49 [01-16 @ 11:59]  SCr 0.49 [01-15 @ 18:30]  SCr 0.47 [01-15 @ 05:37]  SCr 0.47 [01-14 @ 23:31]  SCr 0.51 [01-14 @ 19:06]    Urinalysis - [01-14-23 @ 21:40]      Color Yellow / Appearance Clear / SG 1.023 / pH 6.0      Gluc Negative / Ketone Negative  / Bili Negative / Urobili 3 mg/dL       Blood Moderate / Protein 30 mg/dL / Leuk Est Negative / Nitrite Negative      RBC 21 / WBC 4 / Hyaline 3 / Gran  / Sq Epi  / Non Sq Epi 2 / Bacteria Negative    Urine Creatinine 100      [01-14-23 @ 21:40]  Urine Protein 51      [01-14-23 @ 21:40]  Urine Sodium 16      [01-14-23 @ 21:40]  Urine Urea Nitrogen 986      [01-14-23 @ 21:40]  Urine Potassium 38      [01-14-23 @ 21:40]  Urine Osmolality 584      [01-14-23 @ 21:40]         [facility] DIVISION OF KIDNEY DISEASES AND HYPERTENSION -- [phone]  -- INITIAL CONSULT NOTE  --------------------------------------------------------------------------------  HPI:  87-year-old with PMH of hypertension, diabetes mellitus, dementia, history of breast and colon cancers (now in remission) who is admitted for evaluation of persistent SOB after completing therapy for CAP. Currently being treated for acute decompensated CHF. CT chest with b/l patchy opacities PNA vs plum edema & small b/l pleural effusions. BNP 2K. Nephrology called for hyponatremia. SNa in 2021 was 140. No SNa on [facility] after that.  SNa on arrival 1/14 was 117 which increased to 121 the same day. Now SNa decreased to 117 today. Pt receiving vanco, cefepime & azithro all in D5 since arrival. Also received 250cc NS. Pt on lasix 40 mg IV daily since yesterday 1/15 & started on salt tabs 1 gm bid since this AM.       Patient seen & examined. Mental status at baseline. Pt on 3L NC. UOP not recorded      PAST HISTORY  --------------------------------------------------------------------------------  PAST MEDICAL & SURGICAL HISTORY:  Hypertension      Diabetes mellitus      Dementia      Breast cancer      Colon cancer      S/P cholecystectomy      S/P mastectomy, right      H/O hemicolectomy        FAMILY HISTORY:  No pertinent family history in first degree relatives      PAST SOCIAL HISTORY:    ALLERGIES & MEDICATIONS  --------------------------------------------------------------------------------  Allergies    No Known Allergies    Intolerances      Standing Inpatient Medications  albuterol/ipratropium for Nebulization 3 milliLiter(s) Nebulizer every 6 hours  aspirin enteric coated 81 milliGRAM(s) Oral daily  cefepime   IVPB 1000 milliGRAM(s) IV Intermittent every 8 hours  dextrose 5%. 1000 milliLiter(s) IV Continuous <Continuous>  dextrose 5%. 1000 milliLiter(s) IV Continuous <Continuous>  dextrose 50% Injectable 25 Gram(s) IV Push once  dextrose 50% Injectable 12.5 Gram(s) IV Push once  dextrose 50% Injectable 25 Gram(s) IV Push once  donepezil 5 milliGRAM(s) Oral at bedtime  enoxaparin Injectable 40 milliGRAM(s) SubCutaneous every 24 hours  folic acid 1 milliGRAM(s) Oral daily  glucagon  Injectable 1 milliGRAM(s) IntraMuscular once  insulin lispro (ADMELOG) corrective regimen sliding scale   SubCutaneous three times a day before meals  insulin lispro (ADMELOG) corrective regimen sliding scale   SubCutaneous at bedtime  metoprolol tartrate 25 milliGRAM(s) Oral two times a day  metoprolol tartrate Injectable 5 milliGRAM(s) IV Push once  senna 2 Tablet(s) Oral at bedtime  simvastatin 20 milliGRAM(s) Oral at bedtime  sodium chloride 1 Gram(s) Oral two times a day  traZODone 50 milliGRAM(s) Oral at bedtime    PRN Inpatient Medications  dextrose Oral Gel 15 Gram(s) Oral once PRN      REVIEW OF SYSTEMS  --------------------------------------------------------------------------------      All other systems were reviewed and are negative, except as noted.    VITALS/PHYSICAL EXAM  --------------------------------------------------------------------------------  T(C): 36.2 (01-16-23 @ 04:22), Max: 37.3 (01-15-23 @ 16:33)  HR: 130 (01-16-23 @ 12:55) (103 - 130)  BP: 145/88 (01-16-23 @ 12:55) (115/57 - 149/67)  RR: 20 (01-16-23 @ 12:55) (18 - 20)  SpO2: 97% (01-16-23 @ 12:55) (97% - 100%)  Wt(kg): --  Height (cm): 154.9 (01-14-23 @ 17:31)  Weight (kg): 56.7 (01-14-23 @ 17:31)  BMI (kg/m2): 23.6 (01-14-23 @ 17:31)  BSA (m2): 1.55 (01-14-23 @ 17:31)      01-16-23 @ 07:01  -  01-16-23 @ 14:18  --------------------------------------------------------  IN: 180 mL / OUT: 0 mL / NET: 180 mL      Physical Exam:  	Gen: elderly, mildly SOB at rest, 3L NC  	HEENT: Anicteric, MMM, no JVD  	Pulm: faint bibasilar crackles  	CV: S1S2, no rub  	Abd: Soft, +BS          No presacral edema  	Ext: No LE edema B/L  	Neuro: Awake  	Skin: Warm and dry  	Vascular access:    LABS/STUDIES  --------------------------------------------------------------------------------              11.3   26.08 >-----------<  97       [01-16-23 @ 11:58]              32.7     117  |  82  |  21  ----------------------------<  162      [01-16-23 @ 11:59]  4.8   |  23  |  0.49        Ca     8.0     [01-16-23 @ 11:59]      Mg     2.1     [01-15-23 @ 18:30]      Phos  2.8     [01-15-23 @ 18:30]    TPro  6.5  /  Alb  3.1  /  TBili  1.0  /  DBili  x   /  AST  65  /  ALT  28  /  AlkPhos  1266  [01-16-23 @ 11:59]    PT/INR: PT 18.1 , INR 1.57       [01-14-23 @ 19:06]  PTT: 29.9       [01-14-23 @ 19:06]          [01-15-23 @ 10:49]        [01-14-23 @ 23:31]  Serum Osmolality 253      [01-16-23 @ 11:57]    Creatinine Trend:  SCr 0.49 [01-16 @ 11:59]  SCr 0.49 [01-15 @ 18:30]  SCr 0.47 [01-15 @ 05:37]  SCr 0.47 [01-14 @ 23:31]  SCr 0.51 [01-14 @ 19:06]    Urinalysis - [01-14-23 @ 21:40]      Color Yellow / Appearance Clear / SG 1.023 / pH 6.0      Gluc Negative / Ketone Negative  / Bili Negative / Urobili 3 mg/dL       Blood Moderate / Protein 30 mg/dL / Leuk Est Negative / Nitrite Negative      RBC 21 / WBC 4 / Hyaline 3 / Gran  / Sq Epi  / Non Sq Epi 2 / Bacteria Negative    Urine Creatinine 100      [01-14-23 @ 21:40]  Urine Protein 51      [01-14-23 @ 21:40]  Urine Sodium 16      [01-14-23 @ 21:40]  Urine Urea Nitrogen 986      [01-14-23 @ 21:40]  Urine Potassium 38      [01-14-23 @ 21:40]  Urine Osmolality 584      [01-14-23 @ 21:40]         [facility] DIVISION OF KIDNEY DISEASES AND HYPERTENSION -- [phone]  -- INITIAL CONSULT NOTE  --------------------------------------------------------------------------------  HPI:  87-year-old with PMH of hypertension, diabetes mellitus, dementia, history of breast and colon cancers (now in remission) who is admitted for evaluation of persistent SOB after completing therapy for CAP. Currently being treated for acute decompensated CHF. CT chest with b/l patchy opacities PNA vs plum edema & small b/l pleural effusions. BNP 2K. Nephrology called for hyponatremia. SNa in 2021 was 140. No SNa on [facility] after that.  SNa on arrival 1/14 was 117 which increased to 121 the same day. Now SNa decreased to 117 today. Pt receiving vanco, cefepime & azithro all in D5 since arrival. Also received 250cc NS. Pt on lasix 40 mg IV daily since yesterday 1/15 & started on salt tabs 1 gm bid since this AM.       Patient seen & examined. Mental status at baseline. Pt on 3L NC. UOP not recorded      PAST HISTORY  --------------------------------------------------------------------------------  PAST MEDICAL & SURGICAL HISTORY:  Hypertension      Diabetes mellitus      Dementia      Breast cancer      Colon cancer      S/P cholecystectomy      S/P mastectomy, right      H/O hemicolectomy        FAMILY HISTORY:  No pertinent family history in first degree relatives      PAST SOCIAL HISTORY:    ALLERGIES & MEDICATIONS  --------------------------------------------------------------------------------  Allergies    No Known Allergies    Intolerances      Standing Inpatient Medications  albuterol/ipratropium for Nebulization 3 milliLiter(s) Nebulizer every 6 hours  aspirin enteric coated 81 milliGRAM(s) Oral daily  cefepime   IVPB 1000 milliGRAM(s) IV Intermittent every 8 hours  dextrose 5%. 1000 milliLiter(s) IV Continuous <Continuous>  dextrose 5%. 1000 milliLiter(s) IV Continuous <Continuous>  dextrose 50% Injectable 25 Gram(s) IV Push once  dextrose 50% Injectable 12.5 Gram(s) IV Push once  dextrose 50% Injectable 25 Gram(s) IV Push once  donepezil 5 milliGRAM(s) Oral at bedtime  enoxaparin Injectable 40 milliGRAM(s) SubCutaneous every 24 hours  folic acid 1 milliGRAM(s) Oral daily  glucagon  Injectable 1 milliGRAM(s) IntraMuscular once  insulin lispro (ADMELOG) corrective regimen sliding scale   SubCutaneous three times a day before meals  insulin lispro (ADMELOG) corrective regimen sliding scale   SubCutaneous at bedtime  metoprolol tartrate 25 milliGRAM(s) Oral two times a day  metoprolol tartrate Injectable 5 milliGRAM(s) IV Push once  senna 2 Tablet(s) Oral at bedtime  simvastatin 20 milliGRAM(s) Oral at bedtime  sodium chloride 1 Gram(s) Oral two times a day  traZODone 50 milliGRAM(s) Oral at bedtime    PRN Inpatient Medications  dextrose Oral Gel 15 Gram(s) Oral once PRN      REVIEW OF SYSTEMS  --------------------------------------------------------------------------------      All other systems were reviewed and are negative, except as noted.    VITALS/PHYSICAL EXAM  --------------------------------------------------------------------------------  T(C): 36.2 (01-16-23 @ 04:22), Max: 37.3 (01-15-23 @ 16:33)  HR: 130 (01-16-23 @ 12:55) (103 - 130)  BP: 145/88 (01-16-23 @ 12:55) (115/57 - 149/67)  RR: 20 (01-16-23 @ 12:55) (18 - 20)  SpO2: 97% (01-16-23 @ 12:55) (97% - 100%)  Wt(kg): --  Height (cm): 154.9 (01-14-23 @ 17:31)  Weight (kg): 56.7 (01-14-23 @ 17:31)  BMI (kg/m2): 23.6 (01-14-23 @ 17:31)  BSA (m2): 1.55 (01-14-23 @ 17:31)      01-16-23 @ 07:01  -  01-16-23 @ 14:18  --------------------------------------------------------  IN: 180 mL / OUT: 0 mL / NET: 180 mL      Physical Exam:  	Gen: elderly, mildly SOB at rest, 3L NC  	HEENT: Anicteric, MMM, no JVD  	Pulm: faint bibasilar crackles  	CV: S1S2, no rub  	Abd: Soft, +BS          No presacral edema  	Ext: No LE edema B/L  	Neuro: Awake  	Skin: Warm and dry  	Vascular access:    LABS/STUDIES  --------------------------------------------------------------------------------              11.3   26.08 >-----------<  97       [01-16-23 @ 11:58]              32.7     117  |  82  |  21  ----------------------------<  162      [01-16-23 @ 11:59]  4.8   |  23  |  0.49        Ca     8.0     [01-16-23 @ 11:59]      Mg     2.1     [01-15-23 @ 18:30]      Phos  2.8     [01-15-23 @ 18:30]    TPro  6.5  /  Alb  3.1  /  TBili  1.0  /  DBili  x   /  AST  65  /  ALT  28  /  AlkPhos  1266  [01-16-23 @ 11:59]    PT/INR: PT 18.1 , INR 1.57       [01-14-23 @ 19:06]  PTT: 29.9       [01-14-23 @ 19:06]          [01-15-23 @ 10:49]        [01-14-23 @ 23:31]  Serum Osmolality 253      [01-16-23 @ 11:57]    Creatinine Trend:  SCr 0.49 [01-16 @ 11:59]  SCr 0.49 [01-15 @ 18:30]  SCr 0.47 [01-15 @ 05:37]  SCr 0.47 [01-14 @ 23:31]  SCr 0.51 [01-14 @ 19:06]    Urinalysis - [01-14-23 @ 21:40]      Color Yellow / Appearance Clear / SG 1.023 / pH 6.0      Gluc Negative / Ketone Negative  / Bili Negative / Urobili 3 mg/dL       Blood Moderate / Protein 30 mg/dL / Leuk Est Negative / Nitrite Negative      RBC 21 / WBC 4 / Hyaline 3 / Gran  / Sq Epi  / Non Sq Epi 2 / Bacteria Negative    Urine Creatinine 100      [01-14-23 @ 21:40]  Urine Protein 51      [01-14-23 @ 21:40]  Urine Sodium 16      [01-14-23 @ 21:40]  Urine Urea Nitrogen 986      [01-14-23 @ 21:40]  Urine Potassium 38      [01-14-23 @ 21:40]  Urine Osmolality 584      [01-14-23 @ 21:40]

## 2023-01-16 NOTE — PROGRESS NOTE ADULT - SUBJECTIVE AND OBJECTIVE BOX
Patient is a 87y old  Female who presents with a chief complaint of SOB (15 Usama 2023 00:45)      SUBJECTIVE / OVERNIGHT EVENTS: No events overnight. Intermittent episodes of chest discomfort 5/10 in intensity. Denies other symptoms. SOB improving.     ADDITIONAL REVIEW OF SYSTEMS: Denies fevers, chills, n/v.    MEDICATIONS  (STANDING):  aspirin enteric coated 81 milliGRAM(s) Oral daily  azithromycin  IVPB 500 milliGRAM(s) IV Intermittent every 24 hours  cefepime   IVPB 1000 milliGRAM(s) IV Intermittent every 8 hours  dextrose 5%. 1000 milliLiter(s) (100 mL/Hr) IV Continuous <Continuous>  dextrose 5%. 1000 milliLiter(s) (50 mL/Hr) IV Continuous <Continuous>  dextrose 50% Injectable 25 Gram(s) IV Push once  dextrose 50% Injectable 12.5 Gram(s) IV Push once  dextrose 50% Injectable 25 Gram(s) IV Push once  donepezil 5 milliGRAM(s) Oral at bedtime  enoxaparin Injectable 40 milliGRAM(s) SubCutaneous every 24 hours  folic acid 1 milliGRAM(s) Oral daily  furosemide   Injectable 40 milliGRAM(s) IV Push two times a day  glucagon  Injectable 1 milliGRAM(s) IntraMuscular once  insulin lispro (ADMELOG) corrective regimen sliding scale   SubCutaneous three times a day before meals  insulin lispro (ADMELOG) corrective regimen sliding scale   SubCutaneous at bedtime  senna 2 Tablet(s) Oral at bedtime  simvastatin 20 milliGRAM(s) Oral at bedtime  traZODone 50 milliGRAM(s) Oral at bedtime    MEDICATIONS  (PRN):  dextrose Oral Gel 15 Gram(s) Oral once PRN Blood Glucose LESS THAN 70 milliGRAM(s)/deciliter      CAPILLARY BLOOD GLUCOSE        I&O's Summary      PHYSICAL EXAM:  Vital Signs Last 24 Hrs  T(C): 36.4 (15 Usama 2023 05:30), Max: 38.1 (2023 19:00)  T(F): 97.6 (15 Usama 2023 05:30), Max: 100.5 (2023 19:00)  HR: 96 (15 Usama 2023 05:30) (96 - 114)  BP: 124/63 (15 Usama 2023 05:30) (124/63 - 182/90)  BP(mean): --  RR: 20 (15 Usama 2023 05:30) (19 - 26)  SpO2: 96% (15 Usama 2023 05:30) (96% - 100%)    Parameters below as of 15 Usama 2023 05:30  Patient On (Oxygen Delivery Method): nasal cannula  O2 Flow (L/min): 2      CONSTITUTIONAL: NAD, lying in bed comfortably  RESPIRATORY: Normal respiratory effort; expiratory wheezing noted on exam in R lung anterior lung field.   CARDIOVASCULAR: Regular rate and rhythm, normal S1 and S2, no murmur/rub/gallop  ABDOMEN: Soft, nondistended, nontender to palpation, normoactive bowel sounds, no rebound/guarding  MUSCULOSKELETAL: no joint swelling or tenderness to palpation, FROM all extremities  NEURO: awake, AAOx1-2   EXTREMITIES: no pedal edema    LABS:                        11.6   24.31 )-----------( 111      ( 15 Usama 2023 05:37 )             33.0     01-15    122<L>  |  86<L>  |  11  ----------------------------<  133<H>  3.4<L>   |  22  |  0.47<L>    Ca    7.6<L>      15 Usama 2023 05:37  Phos  2.8     -15  Mg     1.8     15    TPro  6.4  /  Alb  3.4  /  TBili  1.5<H>  /  DBili  x   /  AST  61<H>  /  ALT  24  /  AlkPhos  1051<H>  15    PT/INR - ( 2023 19:06 )   PT: 18.1 sec;   INR: 1.57 ratio         PTT - ( 2023 19:06 )  PTT:29.9 sec      Urinalysis Basic - ( 2023 21:40 )    Color: Yellow / Appearance: Clear / S.023 / pH: x  Gluc: x / Ketone: Negative  / Bili: Negative / Urobili: 3 mg/dL   Blood: x / Protein: 30 mg/dL / Nitrite: Negative   Leuk Esterase: Negative / RBC: 21 /hpf / WBC 4 /HPF   Sq Epi: x / Non Sq Epi: 2 /hpf / Bacteria: Negative          RADIOLOGY & ADDITIONAL TESTS:

## 2023-01-17 DIAGNOSIS — J96.01 ACUTE RESPIRATORY FAILURE WITH HYPOXIA: ICD-10-CM

## 2023-01-17 LAB
ALBUMIN SERPL ELPH-MCNC: 2.7 G/DL — LOW (ref 3.3–5)
ALP SERPL-CCNC: 1207 U/L — HIGH (ref 40–120)
ALT FLD-CCNC: 27 U/L — SIGNIFICANT CHANGE UP (ref 10–45)
ANION GAP SERPL CALC-SCNC: 11 MMOL/L — SIGNIFICANT CHANGE UP (ref 5–17)
ANION GAP SERPL CALC-SCNC: 12 MMOL/L — SIGNIFICANT CHANGE UP (ref 5–17)
ANION GAP SERPL CALC-SCNC: 14 MMOL/L — SIGNIFICANT CHANGE UP (ref 5–17)
ANION GAP SERPL CALC-SCNC: 7 MMOL/L — SIGNIFICANT CHANGE UP (ref 5–17)
AST SERPL-CCNC: 54 U/L — HIGH (ref 10–40)
BASE EXCESS BLDV CALC-SCNC: 3.3 MMOL/L — HIGH (ref -2–3)
BASOPHILS # BLD AUTO: 0 K/UL — SIGNIFICANT CHANGE UP (ref 0–0.2)
BASOPHILS NFR BLD AUTO: 0 % — SIGNIFICANT CHANGE UP (ref 0–2)
BILIRUB SERPL-MCNC: 0.8 MG/DL — SIGNIFICANT CHANGE UP (ref 0.2–1.2)
BUN SERPL-MCNC: 18 MG/DL — SIGNIFICANT CHANGE UP (ref 7–23)
BUN SERPL-MCNC: 19 MG/DL — SIGNIFICANT CHANGE UP (ref 7–23)
BUN SERPL-MCNC: 20 MG/DL — SIGNIFICANT CHANGE UP (ref 7–23)
BUN SERPL-MCNC: 21 MG/DL — SIGNIFICANT CHANGE UP (ref 7–23)
CA-I SERPL-SCNC: 1.16 MMOL/L — SIGNIFICANT CHANGE UP (ref 1.15–1.33)
CALCIUM SERPL-MCNC: 6.1 MG/DL — CRITICAL LOW (ref 8.4–10.5)
CALCIUM SERPL-MCNC: 7.7 MG/DL — LOW (ref 8.4–10.5)
CALCIUM SERPL-MCNC: 7.9 MG/DL — LOW (ref 8.4–10.5)
CALCIUM SERPL-MCNC: 8.2 MG/DL — LOW (ref 8.4–10.5)
CALCIUM SERPL-MCNC: 8.3 MG/DL — LOW (ref 8.4–10.5)
CHLORIDE BLDV-SCNC: 85 MMOL/L — LOW (ref 96–108)
CHLORIDE SERPL-SCNC: 135 MMOL/L — HIGH (ref 96–108)
CHLORIDE SERPL-SCNC: 80 MMOL/L — LOW (ref 96–108)
CHLORIDE SERPL-SCNC: 82 MMOL/L — LOW (ref 96–108)
CHLORIDE SERPL-SCNC: 83 MMOL/L — LOW (ref 96–108)
CHLORIDE UR-SCNC: 52 MMOL/L — SIGNIFICANT CHANGE UP
CO2 BLDV-SCNC: 31 MMOL/L — HIGH (ref 22–26)
CO2 SERPL-SCNC: 18 MMOL/L — LOW (ref 22–31)
CO2 SERPL-SCNC: 23 MMOL/L — SIGNIFICANT CHANGE UP (ref 22–31)
CO2 SERPL-SCNC: 24 MMOL/L — SIGNIFICANT CHANGE UP (ref 22–31)
CO2 SERPL-SCNC: 25 MMOL/L — SIGNIFICANT CHANGE UP (ref 22–31)
CO2 SERPL-SCNC: 28 MMOL/L — SIGNIFICANT CHANGE UP (ref 22–31)
CREAT ?TM UR-MCNC: 56 MG/DL — SIGNIFICANT CHANGE UP
CREAT SERPL-MCNC: 0.4 MG/DL — LOW (ref 0.5–1.3)
CREAT SERPL-MCNC: 0.41 MG/DL — LOW (ref 0.5–1.3)
CREAT SERPL-MCNC: 0.48 MG/DL — LOW (ref 0.5–1.3)
CREAT SERPL-MCNC: 0.55 MG/DL — SIGNIFICANT CHANGE UP (ref 0.5–1.3)
CREAT SERPL-MCNC: 0.76 MG/DL — SIGNIFICANT CHANGE UP (ref 0.5–1.3)
EGFR: 77 ML/MIN/1.73M2 — SIGNIFICANT CHANGE UP
EGFR: 90 ML/MIN/1.73M2 — SIGNIFICANT CHANGE UP
EGFR: 93 ML/MIN/1.73M2 — SIGNIFICANT CHANGE UP
EGFR: 96 ML/MIN/1.73M2 — SIGNIFICANT CHANGE UP
EGFR: 97 ML/MIN/1.73M2 — SIGNIFICANT CHANGE UP
EOSINOPHIL # BLD AUTO: 0 K/UL — SIGNIFICANT CHANGE UP (ref 0–0.5)
EOSINOPHIL NFR BLD AUTO: 0 % — SIGNIFICANT CHANGE UP (ref 0–6)
GAS PNL BLDV: 116 MMOL/L — CRITICAL LOW (ref 136–145)
GAS PNL BLDV: SIGNIFICANT CHANGE UP
GLUCOSE BLDC GLUCOMTR-MCNC: 135 MG/DL — HIGH (ref 70–99)
GLUCOSE BLDC GLUCOMTR-MCNC: 154 MG/DL — HIGH (ref 70–99)
GLUCOSE BLDC GLUCOMTR-MCNC: 161 MG/DL — HIGH (ref 70–99)
GLUCOSE BLDC GLUCOMTR-MCNC: 166 MG/DL — HIGH (ref 70–99)
GLUCOSE BLDV-MCNC: 124 MG/DL — HIGH (ref 70–99)
GLUCOSE SERPL-MCNC: 108 MG/DL — HIGH (ref 70–99)
GLUCOSE SERPL-MCNC: 142 MG/DL — HIGH (ref 70–99)
GLUCOSE SERPL-MCNC: 144 MG/DL — HIGH (ref 70–99)
GLUCOSE SERPL-MCNC: 195 MG/DL — HIGH (ref 70–99)
GLUCOSE SERPL-MCNC: 261 MG/DL — HIGH (ref 70–99)
HCO3 BLDV-SCNC: 30 MMOL/L — HIGH (ref 22–29)
HCT VFR BLD CALC: 34.6 % — SIGNIFICANT CHANGE UP (ref 34.5–45)
HCT VFR BLDA CALC: 35 % — SIGNIFICANT CHANGE UP (ref 34.5–46.5)
HGB BLD CALC-MCNC: 11.5 G/DL — LOW (ref 11.7–16.1)
HGB BLD-MCNC: 11.3 G/DL — LOW (ref 11.5–15.5)
LACTATE BLDV-MCNC: 1.7 MMOL/L — SIGNIFICANT CHANGE UP (ref 0.5–2)
LACTATE BLDV-MCNC: 1.9 MMOL/L — SIGNIFICANT CHANGE UP (ref 0.5–2)
LDH SERPL L TO P-CCNC: 764 U/L — HIGH (ref 50–242)
LYMPHOCYTES # BLD AUTO: 0.61 K/UL — LOW (ref 1–3.3)
LYMPHOCYTES # BLD AUTO: 1.8 % — LOW (ref 13–44)
MAGNESIUM SERPL-MCNC: 2.1 MG/DL — SIGNIFICANT CHANGE UP (ref 1.6–2.6)
MANUAL SMEAR VERIFICATION: SIGNIFICANT CHANGE UP
MCHC RBC-ENTMCNC: 26.6 PG — LOW (ref 27–34)
MCHC RBC-ENTMCNC: 32.7 GM/DL — SIGNIFICANT CHANGE UP (ref 32–36)
MCV RBC AUTO: 81.4 FL — SIGNIFICANT CHANGE UP (ref 80–100)
MONOCYTES # BLD AUTO: 2.21 K/UL — HIGH (ref 0–0.9)
MONOCYTES NFR BLD AUTO: 6.5 % — SIGNIFICANT CHANGE UP (ref 2–14)
NEUTROPHILS # BLD AUTO: 31.12 K/UL — HIGH (ref 1.8–7.4)
NEUTROPHILS NFR BLD AUTO: 89.8 % — HIGH (ref 43–77)
NEUTS BAND # BLD: 1.9 % — SIGNIFICANT CHANGE UP (ref 0–8)
OSMOLALITY UR: 379 MOS/KG — SIGNIFICANT CHANGE UP (ref 300–900)
PCO2 BLDV: 51 MMHG — HIGH (ref 39–42)
PH BLDV: 7.37 — SIGNIFICANT CHANGE UP (ref 7.32–7.43)
PHOSPHATE SERPL-MCNC: 2.7 MG/DL — SIGNIFICANT CHANGE UP (ref 2.5–4.5)
PLAT MORPH BLD: NORMAL — SIGNIFICANT CHANGE UP
PLATELET # BLD AUTO: 115 K/UL — LOW (ref 150–400)
PO2 BLDV: 60 MMHG — HIGH (ref 25–45)
POTASSIUM BLDV-SCNC: 3.9 MMOL/L — SIGNIFICANT CHANGE UP (ref 3.5–5.1)
POTASSIUM SERPL-MCNC: 3 MMOL/L — LOW (ref 3.5–5.3)
POTASSIUM SERPL-MCNC: 3.6 MMOL/L — SIGNIFICANT CHANGE UP (ref 3.5–5.3)
POTASSIUM SERPL-MCNC: 3.9 MMOL/L — SIGNIFICANT CHANGE UP (ref 3.5–5.3)
POTASSIUM SERPL-MCNC: 4 MMOL/L — SIGNIFICANT CHANGE UP (ref 3.5–5.3)
POTASSIUM SERPL-MCNC: 4.2 MMOL/L — SIGNIFICANT CHANGE UP (ref 3.5–5.3)
POTASSIUM SERPL-MCNC: 4.9 MMOL/L — SIGNIFICANT CHANGE UP (ref 3.5–5.3)
POTASSIUM SERPL-SCNC: 3 MMOL/L — LOW (ref 3.5–5.3)
POTASSIUM SERPL-SCNC: 3.6 MMOL/L — SIGNIFICANT CHANGE UP (ref 3.5–5.3)
POTASSIUM SERPL-SCNC: 3.9 MMOL/L — SIGNIFICANT CHANGE UP (ref 3.5–5.3)
POTASSIUM SERPL-SCNC: 4 MMOL/L — SIGNIFICANT CHANGE UP (ref 3.5–5.3)
POTASSIUM SERPL-SCNC: 4.2 MMOL/L — SIGNIFICANT CHANGE UP (ref 3.5–5.3)
POTASSIUM SERPL-SCNC: 4.9 MMOL/L — SIGNIFICANT CHANGE UP (ref 3.5–5.3)
POTASSIUM UR-SCNC: 39 MMOL/L — SIGNIFICANT CHANGE UP
PROT ?TM UR-MCNC: 34 MG/DL — HIGH (ref 0–12)
PROT SERPL-MCNC: 6.2 G/DL — SIGNIFICANT CHANGE UP (ref 6–8.3)
PROT/CREAT UR-RTO: 0.6 RATIO — HIGH (ref 0–0.2)
RBC # BLD: 4.25 M/UL — SIGNIFICANT CHANGE UP (ref 3.8–5.2)
RBC # FLD: 14.6 % — HIGH (ref 10.3–14.5)
RBC BLD AUTO: SIGNIFICANT CHANGE UP
S PNEUM AG UR QL: NEGATIVE — SIGNIFICANT CHANGE UP
SAO2 % BLDV: 88.2 % — HIGH (ref 67–88)
SODIUM SERPL-SCNC: 114 MMOL/L — CRITICAL LOW (ref 135–145)
SODIUM SERPL-SCNC: 115 MMOL/L — CRITICAL LOW (ref 135–145)
SODIUM SERPL-SCNC: 117 MMOL/L — CRITICAL LOW (ref 135–145)
SODIUM SERPL-SCNC: 120 MMOL/L — CRITICAL LOW (ref 135–145)
SODIUM SERPL-SCNC: 121 MMOL/L — LOW (ref 135–145)
SODIUM SERPL-SCNC: 166 MMOL/L — CRITICAL HIGH (ref 135–145)
SODIUM UR-SCNC: 31 MMOL/L — SIGNIFICANT CHANGE UP
T3 SERPL-MCNC: 103 NG/DL — SIGNIFICANT CHANGE UP (ref 80–200)
T4 FREE SERPL-MCNC: 1.9 NG/DL — HIGH (ref 0.9–1.8)
TSH SERPL-MCNC: 0.08 UIU/ML — LOW (ref 0.27–4.2)
UUN UR-MCNC: 519 MG/DL — SIGNIFICANT CHANGE UP
WBC # BLD: 33.94 K/UL — HIGH (ref 3.8–10.5)
WBC # FLD AUTO: 33.94 K/UL — HIGH (ref 3.8–10.5)

## 2023-01-17 PROCEDURE — 99233 SBSQ HOSP IP/OBS HIGH 50: CPT | Mod: GC

## 2023-01-17 PROCEDURE — 74181 MRI ABDOMEN W/O CONTRAST: CPT | Mod: 26

## 2023-01-17 PROCEDURE — 93306 TTE W/DOPPLER COMPLETE: CPT | Mod: 26

## 2023-01-17 PROCEDURE — 70450 CT HEAD/BRAIN W/O DYE: CPT | Mod: 26

## 2023-01-17 RX ORDER — SODIUM CHLORIDE 5 G/100ML
500 INJECTION, SOLUTION INTRAVENOUS
Refills: 0 | Status: DISCONTINUED | OUTPATIENT
Start: 2023-01-17 | End: 2023-01-18

## 2023-01-17 RX ORDER — SODIUM CHLORIDE 5 G/100ML
500 INJECTION, SOLUTION INTRAVENOUS
Refills: 0 | Status: DISCONTINUED | OUTPATIENT
Start: 2023-01-17 | End: 2023-01-17

## 2023-01-17 RX ORDER — SODIUM CHLORIDE 5 G/100ML
100 INJECTION, SOLUTION INTRAVENOUS
Refills: 0 | Status: COMPLETED | OUTPATIENT
Start: 2023-01-17 | End: 2023-01-17

## 2023-01-17 RX ORDER — FUROSEMIDE 40 MG
40 TABLET ORAL
Refills: 0 | Status: DISCONTINUED | OUTPATIENT
Start: 2023-01-17 | End: 2023-01-20

## 2023-01-17 RX ADMIN — Medication 25 MILLIGRAM(S): at 13:17

## 2023-01-17 RX ADMIN — CEFEPIME 100 MILLIGRAM(S): 1 INJECTION, POWDER, FOR SOLUTION INTRAMUSCULAR; INTRAVENOUS at 07:05

## 2023-01-17 RX ADMIN — SODIUM CHLORIDE 30 MILLILITER(S): 5 INJECTION, SOLUTION INTRAVENOUS at 03:18

## 2023-01-17 RX ADMIN — SENNA PLUS 2 TABLET(S): 8.6 TABLET ORAL at 21:21

## 2023-01-17 RX ADMIN — Medication 3 MILLILITER(S): at 23:34

## 2023-01-17 RX ADMIN — Medication 50 MILLIGRAM(S): at 21:22

## 2023-01-17 RX ADMIN — SODIUM CHLORIDE 30 MILLILITER(S): 5 INJECTION, SOLUTION INTRAVENOUS at 11:40

## 2023-01-17 RX ADMIN — SIMVASTATIN 20 MILLIGRAM(S): 20 TABLET, FILM COATED ORAL at 21:22

## 2023-01-17 RX ADMIN — Medication 25 MILLIGRAM(S): at 05:21

## 2023-01-17 RX ADMIN — APIXABAN 2.5 MILLIGRAM(S): 2.5 TABLET, FILM COATED ORAL at 19:08

## 2023-01-17 RX ADMIN — Medication 3 MILLILITER(S): at 19:26

## 2023-01-17 RX ADMIN — Medication 3 MILLILITER(S): at 11:41

## 2023-01-17 RX ADMIN — CEFEPIME 100 MILLIGRAM(S): 1 INJECTION, POWDER, FOR SOLUTION INTRAMUSCULAR; INTRAVENOUS at 13:58

## 2023-01-17 RX ADMIN — Medication 81 MILLIGRAM(S): at 11:41

## 2023-01-17 RX ADMIN — Medication 1 MILLIGRAM(S): at 11:41

## 2023-01-17 RX ADMIN — Medication 1: at 13:17

## 2023-01-17 RX ADMIN — CEFEPIME 100 MILLIGRAM(S): 1 INJECTION, POWDER, FOR SOLUTION INTRAMUSCULAR; INTRAVENOUS at 21:38

## 2023-01-17 RX ADMIN — Medication 1: at 10:50

## 2023-01-17 RX ADMIN — SODIUM CHLORIDE 30 MILLILITER(S): 5 INJECTION, SOLUTION INTRAVENOUS at 22:11

## 2023-01-17 RX ADMIN — DONEPEZIL HYDROCHLORIDE 5 MILLIGRAM(S): 10 TABLET, FILM COATED ORAL at 21:21

## 2023-01-17 RX ADMIN — APIXABAN 2.5 MILLIGRAM(S): 2.5 TABLET, FILM COATED ORAL at 05:22

## 2023-01-17 RX ADMIN — SODIUM CHLORIDE 100 MILLILITER(S): 5 INJECTION, SOLUTION INTRAVENOUS at 10:38

## 2023-01-17 RX ADMIN — Medication 25 MILLIGRAM(S): at 21:22

## 2023-01-17 RX ADMIN — Medication 3 MILLILITER(S): at 05:22

## 2023-01-17 RX ADMIN — Medication 40 MILLIGRAM(S): at 13:17

## 2023-01-17 RX ADMIN — Medication 40 MILLIGRAM(S): at 05:22

## 2023-01-17 NOTE — PROGRESS NOTE ADULT - PROBLEM SELECTOR PLAN 8
-History of hypertension noted and managed at home with metoprolol and with amlodipine; holding at this time in context of acute presentation -Right lobe thyroid nodule noted incidentally on ct scan of abdomen and pelvis   -Will require dedicated thyroid ultrasound and outpatient follow-up for consideration of biopsy vs. close follow-up  - f/u TSH, T3, T4

## 2023-01-17 NOTE — PROGRESS NOTE ADULT - PROBLEM SELECTOR PLAN 1
-Persistent shortness of breath refractory to antibiotic therapy with evident bilateral pleural effusions, jugular venous distension, and lower extremity edema and with an elevated serum bnp level   -Most likely that persistent shortness of breath is not secondary to an infectious etiology but rather to acute decompensated heart failure   -Etiology of heart failure is uncertain at this time; tte as outpatient in 2021 demonstrated impaired diastolic function but normal systolic function; patient has no known history of coronary artery disease; however, patient did receive chemotherapy and radiation for management of breast cancer; unknown if this was adriamycin-containing regimen   -Decrease lasix to 40 IV qd  - Strict I/Os, daily weights  -Trans-thoracic echocardiogram ordered -Serum sodium level noted to measure 121; suspect that this is secondary to hypervolemic hyponatremia in context of acute decompensated heart failure but no urine studies yet to confirm this   - Worsening to 117 today, despite lasix IV BID  -Will obtain urine osmolarity and urine sodium level to confirm suspicion of hypervolemic hyponatremia   - c/w lasix  - PO fluid restrict, salt tabs  - f/u renal recs

## 2023-01-17 NOTE — PROGRESS NOTE ADULT - PROBLEM SELECTOR PLAN 2
-Patient does meet sepsis criteria on presentation given leukocytosis to greater than 19 and tachypnea; however, no evident fevers, and despite shortness of breath, patient does not have a productive cough   -Elevated serum lactate (cleared within four hours) on initial presentation is suggestive of impaired organ perfusion   -Cultures of blood and urine sent on presentation   -No obvious source of infection if not for pneumonia; although pneumonia suggested on chest xray and ct chest with bilateral tree-in-but opacifications, not entirely sure that this is not resolving inflammation form recent pneumonia treated as outpatient   -Will manage as for community-acquired pneumonia at this time; patient already received five-day course of antibiotics on outpatient side; -Persistent shortness of breath refractory to antibiotic therapy with evident bilateral pleural effusions, jugular venous distension, and lower extremity edema and with an elevated serum bnp level, suggesting heart failure   - ct chest with bilateral tree-in-but opacifications, not entirely sure that this is not resolving inflammation form recent pneumonia treated as outpatient   - c/w lasix to 40 IV qd  - Strict I/Os, daily weights  - f/u TTE  - sepsis/ PNA treatment as below

## 2023-01-17 NOTE — PROGRESS NOTE ADULT - PROBLEM SELECTOR PLAN 5
-Elevated serum ast and alkaline phosphatase noted   -Suspect that this is most likely secondary to fluid overload in setting of acute decompensated heart failure   -Will manage with diuretics and will follow serum liver enzymes daily   - RUQ US showing ?7mm calculus in mid common bile duct.   - GGT wnl suggestive that elevated Alk phos not biliary in etiology, possibly from bone.   - f/u  skeletal survey to assess for possible bony mets.  - f/u MRCP -Patient presents with persistent shortness of breath and fevers noted at home (although none noted in hospital) despite outpatient treatment for community acquired pneumonia  -Although pneumonia suggested on chest xray and ct chest with bilateral tree-in-but opacifications, not entirely sure that this is not resolving inflammation form recent pneumonia treated as outpatient   -Will manage as for community-acquired pneumonia at this time; patient already received five-day course of antibiotics on outpatient side;   - Legionella negative, will d/c azithromycin (1/15-1/16)  - Strep pneumo negative  - C/w cefepime  - wean down O2 as tolerated

## 2023-01-17 NOTE — CHART NOTE - NSCHARTNOTEFT_GEN_A_CORE
2pm sodium improved to 121. Will stop HTS at this time. Patient was 117 all day yesterday goal correction by this evening is ~123-125.   Please repeat labs around 6:30 pm and contact the nephrology fellow on call.

## 2023-01-17 NOTE — PROGRESS NOTE ADULT - ATTENDING COMMENTS
87F with PMH HTN, T2DM, dementia, history of breast and colon cancers (now in remission) p/w persistent shortness of breath; recently treated with abx for CAP. Pt with LE swelling, elevated BNP, elevated WBC, and imaging showing diffuse tree-in-bud nodules and airspace opacities, significantly elevated alk phos (normal GGT) - a/w AHRF 2/2 ADHF and CAP.   1. Hyponatremia - acute on chronic - appears SIADH, renal input appreciated, fluid restriction, 2% NaCl bolus, followed by maintenance, q4 hrs BMP with close followup.   2. Hypoxic respiratory failure - differential includes ADHF vs. pneumonia vs. malignancy -   c/w IV lasix 40mg IV bid - monitor fluid status, may need to reduce, follow up TTE  C/w cefepime for gram negative CAP, MRSA negative, legionella negative.   ? malignancy - follow up skeletal survey, may need CT with contrast to eval  3. Leukocytosis infectious vs. malignant - in setting of alk phos elevation -   - c/w cefepime for empiric coverage  - malignant evaluation - follow up skeletal survey, MRCP for GB duct lesion  4. Afib - c/w rate control, restarted on eliquis 1/16 - patient CHADSVASC 3, was on previously (stopped because of hematuria - ?malignancy)  6. Thrombocytopenia - could be sepsis vs. malignancy - on AC, no s/s of bleeding   7. Encephalopathy - multifactorial - likely due to hyponatremia, sepsis, vs. malignancy - supportive care  d/w daughter at length with

## 2023-01-17 NOTE — PROGRESS NOTE ADULT - PROBLEM SELECTOR PLAN 7
-Right lobe thyroid nodule noted incidentally on ct scan of abdomen and pelvis   -Will require dedicated thyroid ultrasound and outpatient follow-up for consideration of biopsy vs. close follow-up Noted to be in Afib with HR to 130s  - Increased home lopressor 25mg BID to TID with hold parameters  - pt not on AC at home, unclear reason why  - per family, pt w/ hematuria 5 years ago, AC was held, and never restarted. Hematuria self-resolved, no intervention/transfusion was needed.  - Chadsvasc 3  - Will start Eliquis   - f/u TTE

## 2023-01-17 NOTE — PROGRESS NOTE ADULT - PROBLEM SELECTOR PLAN 1
Hypo-osmolar Hyponatremia due to high ADH state from CHF & possible NSAID use.  SNa on arrival 1/14 was 117 which increased to 121 the same day, however on day of consult Na decreased to 117. Pt receiving vanco, cefepime & azithro all in D5 since arrival. S/p 8 hours of 3% HTS without improvement. Juanita 16, UOsm 584, SOsm low at 253.     Repeat U lytes and U osm. Will give 100 cc of 2% HTS and start 3% at 30/hour for 4 hours. Continue with fluid restriction to <1L/day for now.  BMP q4. Continue lasix 40 mg IV daily. Avoid over-correction by >6-8mEQ in 24 hours. Hold celecoxib. Avoid isotonic or hypotonic fluids. Liberalize PO intake of solute and protein    If you have any questions, please feel free to contact me  Jer Tamez  Nephrology Fellow  [phone]; Prefer [employer] TEAMS  (After 5pm or on weekends please page the on-call fellow).    Patient was discussed with Dr. Zapata who agrees with my A/P. Addendum to follow Hypo-osmolar Hyponatremia due to high ADH state from CHF & possible NSAID use.  SNa on arrival 1/14 was 117 which increased to 121 the same day, however on day of consult Na decreased to 117. Pt receiving vanco, cefepime & azithro all in D5 since arrival. S/p 8 hours of 3% HTS without improvement. Juanita 16, UOsm 584, SOsm low at 253.     Repeat U lytes and U osm. Will give 100 cc of 2% HTS and start 3% at 30/hour for 4 hours. Continue with fluid restriction to <1L/day for now.  BMP q4. Continue lasix 40 mg IV daily. Avoid over-correction by >6-8mEQ in 24 hours. Hold celecoxib. Avoid isotonic or hypotonic fluids. Liberalize PO intake of solute and protein    If you have any questions, please feel free to contact me  Jer Tamez  Nephrology Fellow  [phone]; Prefer [employer] TEAMS  (After 5pm or on weekends please page the on-call fellow).    Patient was discussed with Dr. Zapata who agrees with my A/P. Addendum to follow Hypo-osmolar Hyponatremia due to high ADH state from CHF & possible NSAID use.  SNa on arrival 1/14 was 117 which increased to 121 the same day, however on day of consult Na decreased to 117. Pt receiving vanco, cefepime & azithro all in D5 since arrival. S/p 8 hours of 3% HTS without improvement. Juanita 16, UOsm 584, SOsm low at 253.     Repeat U lytes and U osm. Will give 100 cc of 2% HTS and start 3% at 30/hour for 4 hours. Continue with fluid restriction to <1L/day for now.  BMP q4. Continue lasix 40 mg IV daily. Avoid over-correction by >6-8mEQ in 24 hours. Hold celecoxib. Avoid isotonic or hypotonic fluids. Liberalize PO intake of solute and protein    If you have any questions, please feel free to contact me  Jer Tamez  Nephrology Fellow  [phone]; Prefer [employer] TEAMS  (After 5pm or on weekends please page the on-call fellow).    Patient was discussed with Dr. Zapata who agrees with my A/P. Addendum to follow Hypo-osmolar Hyponatremia due to high ADH state from CHF & possible NSAID use.  SNa on arrival 1/14 was 117 which increased to 121 the same day, however on day of consult Na decreased to 117. Pt receiving vanco, cefepime & azithro all in D5 since arrival. S/p 8 hours of 3% HTS without improvement. Juanita 16, UOsm 584, SOsm low at 253.     Repeat U lytes and U osm. Will give 100 cc of 2% HTS and start 3% at 30/hour for 4 hours. Increase lasix 40 mg IV to BID. A Continue with fluid restriction to <1L/day for now.  BMP q4. Avoid over-correction by >6-8mEQ in 24 hours. Hold celecoxib. Avoid isotonic or hypotonic fluids. Liberalize PO intake of solute and protein    If you have any questions, please feel free to contact me  Jer Tamez  Nephrology Fellow  [phone]; Prefer [employer] TEAMS  (After 5pm or on weekends please page the on-call fellow).    Patient was discussed with Dr. Zapata who agrees with my A/P. Addendum to follow Hypo-osmolar Hyponatremia due to high ADH state from CHF & possible NSAID use.  SNa on arrival 1/14 was 117 which increased to 121 the same day, however on day of consult Na decreased to 117. Pt receiving vanco, cefepime & azithro all in D5 since arrival. S/p 8 hours of 3% HTS without improvement. Juanita 16, UOsm 584, SOsm low at 253.     Repeat U lytes and U osm. Will give 100 cc of 2% HTS and start 3% at 30/hour for 4 hours. Increase lasix 40 mg IV to BID. A Continue with fluid restriction to <1L/day for now.  BMP q4. Avoid over-correction by >6-8mEQ in 24 hours. Hold celecoxib. Avoid isotonic or hypotonic fluids. Liberalize PO intake of solute and protein    If you have any questions, please feel free to contact me  Jer Tamez  Nephrology Fellow  [phone]; Prefer [employer] TEAMS  (After 5pm or on weekends please page the on-call fellow).    Patient was discussed with Dr. Zapata who agrees with my A/P. Addendum to follow Hypo-osmolar Hyponatremia due to high ADH state from CHF & possible NSAID use.  SNa on arrival 1/14 was 117 which increased to 121 the same day, however on day of consult Na decreased to 117. Pt receiving vanco, cefepime & azithro all in D5 since arrival. S/p 8 hours of 3% HTS without improvement. Juanita 16, UOsm 584, SOsm low at 253.     Repeat U lytes and U osm. Will give 100 cc of 2% HTS and start 3% at 30/hour for 4 hours. Increase lasix 40 mg IV to BID. A Continue with fluid restriction to <1L/day for now.  BMP q4. Avoid over-correction by >6-8mEQ in 24 hours. Hold celecoxib. Avoid isotonic or hypotonic fluids. Liberalize PO intake of solute and protein    If you have any questions, please feel free to contact me  Jer Tamez  Nephrology Fellow  [phone]; Prefer [employer] TEAMS  (After 5pm or on weekends please page the on-call fellow).    Patient was discussed with Dr. Zapata who agrees with my A/P. Addendum to follow Hypo-osmolar Hyponatremia due to high ADH state from CHF & possible NSAID use.  SNa on arrival 1/14 was 117 which increased to 121 the same day, however on day of consult Na decreased to 117. Pt receiving vanco, cefepime & azithro all in D5 since arrival. S/p 8 hours of 3% HTS without improvement. Juanita 16, UOsm 584, SOsm low at 253.     Repeat U lytes and U osm. Will give 100 cc of 2% HTS and start 3% at 30/hour for 4 hours. Increase lasix 40 mg IV to BID. A Continue with fluid restriction to <1L/day for now.  BMP q4. Avoid over-correction by >6-8mEQ in 24 hours. Hold celecoxib. Avoid isotonic or hypotonic fluids. Liberalize PO intake of solute and protein    If you have any questions, please feel free to contact me  Jer Tamez  Nephrology Fellow  [phone]; Prefer [employer] TEAMS  (After 5pm or on weekends please page the on-call fellow). Hypo-osmolar Hyponatremia due to high ADH state from CHF & possible NSAID use.  SNa on arrival 1/14 was 117 which increased to 121 the same day, however on day of consult Na decreased to 117. Pt receiving vanco, cefepime & azithro all in D5 since arrival. S/p 8 hours of 3% HTS without improvement. Juanita 16, UOsm 584, SOsm low at 253.     Repeat U lytes and U osm. Will give 100 cc of 2% HTS and start 3% at 30/hour for 4 hours. Increase lasix 40 mg IV to BID. A Continue with fluid restriction to <1L/day for now.  BMP q4. Avoid over-correction by >6-8mEQ in 24 hours. Hold celecoxib. Avoid isotonic or hypotonic fluids. Liberalize PO intake of solute and protein    If you have any questions, please feel free to contact me  Jer Tamez  Nephrology Fellow  [phone]; Prefer [employer] TEAMS  (After 5pm or on weekends please page the on-call fellow). Hypo-osmolar Hyponatremia due to high ADH state from CHF & possible NSAID use.  SNa on arrival 1/14 was 117 which increased to 121 the same day, however on day of consult Na decreased to 117. Pt receiving vanco, cefepime & azithro all in D5 since arrival. S/p 8 hours of 3% HTS without improvement. Juanita 16, UOsm 584, SOsm low at 253.     Repeat U lytes and U osm. Will give 100 cc of 2% HTS and start 3% at 30/hour for 4 hours. Increase lasix 40 mg IV to BID. A Continue with fluid restriction to <1L/day for now.  BMP q4. Avoid over-correction by >6-8mEQ in 24 hours. Hold celecoxib. Avoid isotonic or hypotonic fluids. Liberalize PO intake of solute and protein    If you have any questions, please feel free to contact me  Jer Tamez  Nephrology Fellow  [phone]; Prefer [employer] TEAMS  (After 5pm or on weekends please page the on-call fellow).

## 2023-01-17 NOTE — PROGRESS NOTE ADULT - PROBLEM SELECTOR PLAN 3
-Patient presents with persistent shortness of breath and fevers noted at home (although none noted in hospital) despite outpatient treatment for community acquired pneumonia  -Although pneumonia suggested on chest xray and ct chest with bilateral tree-in-but opacifications, not entirely sure that this is not resolving inflammation form recent pneumonia treated as outpatient   -Will manage as for community-acquired pneumonia at this time; patient already received five-day course of antibiotics on outpatient side;   - Legionella negative, will d/c azithromycin (1/15-1/16)  - C/w cefepime -Persistent shortness of breath refractory to antibiotic therapy with evident bilateral pleural effusions, jugular venous distension, and lower extremity edema and with an elevated serum bnp level   -Most likely that persistent shortness of breath is not secondary to an infectious etiology but rather to acute decompensated heart failure   -Etiology of heart failure is uncertain at this time; tte as outpatient in 2021 demonstrated impaired diastolic function but normal systolic function; patient has no known history of coronary artery disease; however, patient did receive chemotherapy and radiation for management of breast cancer; unknown if this was adriamycin-containing regimen   - c/w lasix to 40 IV qd  - Strict I/Os, daily weights  - f/u TTE

## 2023-01-17 NOTE — PROVIDER CONTACT NOTE (CRITICAL VALUE NOTIFICATION) - BACKGROUND
Patient with hyponatremia. pneumonia and CHF.  Patient has received multiple fluids of 3%NS.
Patient admitted for CHF, pneumonia.  Patient's NA has been trending at 117.  Next Na draw is 0200 4 hours post fluids.
Pt admitted with SOB CHF
Pt with low Na. NS 3% infusing
Patient admitted with pneumonia, CHF, and mass in bile duct
Pt admitted with SOB CHF

## 2023-01-17 NOTE — PROGRESS NOTE ADULT - PROBLEM SELECTOR PLAN 6
Noted to be in Afib with HR to 130s  - Increased home lopressor 25mg BID to TID with hold parameters  - pt not on AC at home, unclear reason why  - per family, pt w/ mild rectal bleeding few years ago, likely 2/2 hemorrhoids   - Chadsvasc 3  - Will start Eliquis   - Expedite TTE -Elevated serum ast and alkaline phosphatase noted   -Suspect that this is most likely secondary to fluid overload in setting of acute decompensated heart failure   -Will manage with diuretics and will follow serum liver enzymes daily   - RUQ US showing ?7mm calculus in mid common bile duct.   - GGT wnl suggestive that elevated Alk phos not biliary in etiology, possibly from bone.   -  skeletal survey w/ questionable skull lesion  - f/u CTH  - f/u MRCP

## 2023-01-17 NOTE — PROGRESS NOTE ADULT - SUBJECTIVE AND OBJECTIVE BOX
Patient is a 87y old  Female who presents with a chief complaint of SOB (15 Usama 2023 00:45)      SUBJECTIVE / OVERNIGHT EVENTS:      ADDITIONAL REVIEW OF SYSTEMS: Denies fevers, chills, n/v.    MEDICATIONS  (STANDING):  aspirin enteric coated 81 milliGRAM(s) Oral daily  azithromycin  IVPB 500 milliGRAM(s) IV Intermittent every 24 hours  cefepime   IVPB 1000 milliGRAM(s) IV Intermittent every 8 hours  dextrose 5%. 1000 milliLiter(s) (100 mL/Hr) IV Continuous <Continuous>  dextrose 5%. 1000 milliLiter(s) (50 mL/Hr) IV Continuous <Continuous>  dextrose 50% Injectable 25 Gram(s) IV Push once  dextrose 50% Injectable 12.5 Gram(s) IV Push once  dextrose 50% Injectable 25 Gram(s) IV Push once  donepezil 5 milliGRAM(s) Oral at bedtime  enoxaparin Injectable 40 milliGRAM(s) SubCutaneous every 24 hours  folic acid 1 milliGRAM(s) Oral daily  furosemide   Injectable 40 milliGRAM(s) IV Push two times a day  glucagon  Injectable 1 milliGRAM(s) IntraMuscular once  insulin lispro (ADMELOG) corrective regimen sliding scale   SubCutaneous three times a day before meals  insulin lispro (ADMELOG) corrective regimen sliding scale   SubCutaneous at bedtime  senna 2 Tablet(s) Oral at bedtime  simvastatin 20 milliGRAM(s) Oral at bedtime  traZODone 50 milliGRAM(s) Oral at bedtime    MEDICATIONS  (PRN):  dextrose Oral Gel 15 Gram(s) Oral once PRN Blood Glucose LESS THAN 70 milliGRAM(s)/deciliter      CAPILLARY BLOOD GLUCOSE        I&O's Summary      PHYSICAL EXAM:  Vital Signs Last 24 Hrs  T(C): 36.4 (15 Usama 2023 05:30), Max: 38.1 (2023 19:00)  T(F): 97.6 (15 Usama 2023 05:30), Max: 100.5 (2023 19:00)  HR: 96 (15 Usama 2023 05:30) (96 - 114)  BP: 124/63 (15 Usama 2023 05:30) (124/63 - 182/90)  BP(mean): --  RR: 20 (15 Usama 2023 05:30) (19 - 26)  SpO2: 96% (15 Usama 2023 05:30) (96% - 100%)    Parameters below as of 15 Usama 2023 05:30  Patient On (Oxygen Delivery Method): nasal cannula  O2 Flow (L/min): 2      CONSTITUTIONAL: NAD, lying in bed comfortably  RESPIRATORY: Normal respiratory effort; expiratory wheezing noted on exam in R lung anterior lung field.   CARDIOVASCULAR: Regular rate and rhythm, normal S1 and S2, no murmur/rub/gallop  ABDOMEN: Soft, nondistended, nontender to palpation, normoactive bowel sounds, no rebound/guarding  MUSCULOSKELETAL: no joint swelling or tenderness to palpation, FROM all extremities  NEURO: awake, AAOx1-2   EXTREMITIES: no pedal edema    LABS:                        11.6   24.31 )-----------( 111      ( 15 Usama 2023 05:37 )             33.0     01-15    122<L>  |  86<L>  |  11  ----------------------------<  133<H>  3.4<L>   |  22  |  0.47<L>    Ca    7.6<L>      15 Usama 2023 05:37  Phos  2.8     -15  Mg     1.8     -15    TPro  6.4  /  Alb  3.4  /  TBili  1.5<H>  /  DBili  x   /  AST  61<H>  /  ALT  24  /  AlkPhos  1051<H>  -15    PT/INR - ( 2023 19:06 )   PT: 18.1 sec;   INR: 1.57 ratio         PTT - ( 2023 19:06 )  PTT:29.9 sec      Urinalysis Basic - ( 2023 21:40 )    Color: Yellow / Appearance: Clear / S.023 / pH: x  Gluc: x / Ketone: Negative  / Bili: Negative / Urobili: 3 mg/dL   Blood: x / Protein: 30 mg/dL / Nitrite: Negative   Leuk Esterase: Negative / RBC: 21 /hpf / WBC 4 /HPF   Sq Epi: x / Non Sq Epi: 2 /hpf / Bacteria: Negative          RADIOLOGY & ADDITIONAL TESTS:     Patient is a 87y old  Female who presents with a chief complaint of SOB (15 Usama 2023 00:45)    SUBJECTIVE / OVERNIGHT EVENTS:  This AM, per daughter, pt more lethargic than usual.     ADDITIONAL REVIEW OF SYSTEMS: Denies fevers, chills, n/v.    MEDICATIONS  (STANDING):  aspirin enteric coated 81 milliGRAM(s) Oral daily  azithromycin  IVPB 500 milliGRAM(s) IV Intermittent every 24 hours  cefepime   IVPB 1000 milliGRAM(s) IV Intermittent every 8 hours  dextrose 5%. 1000 milliLiter(s) (100 mL/Hr) IV Continuous <Continuous>  dextrose 5%. 1000 milliLiter(s) (50 mL/Hr) IV Continuous <Continuous>  dextrose 50% Injectable 25 Gram(s) IV Push once  dextrose 50% Injectable 12.5 Gram(s) IV Push once  dextrose 50% Injectable 25 Gram(s) IV Push once  donepezil 5 milliGRAM(s) Oral at bedtime  enoxaparin Injectable 40 milliGRAM(s) SubCutaneous every 24 hours  folic acid 1 milliGRAM(s) Oral daily  furosemide   Injectable 40 milliGRAM(s) IV Push two times a day  glucagon  Injectable 1 milliGRAM(s) IntraMuscular once  insulin lispro (ADMELOG) corrective regimen sliding scale   SubCutaneous three times a day before meals  insulin lispro (ADMELOG) corrective regimen sliding scale   SubCutaneous at bedtime  senna 2 Tablet(s) Oral at bedtime  simvastatin 20 milliGRAM(s) Oral at bedtime  traZODone 50 milliGRAM(s) Oral at bedtime    MEDICATIONS  (PRN):  dextrose Oral Gel 15 Gram(s) Oral once PRN Blood Glucose LESS THAN 70 milliGRAM(s)/deciliter      CAPILLARY BLOOD GLUCOSE        I&O's Summary      PHYSICAL EXAM:  Vital Signs Last 24 Hrs  T(C): 36.4 (15 Usama 2023 05:30), Max: 38.1 (2023 19:00)  T(F): 97.6 (15 Usama 2023 05:30), Max: 100.5 (2023 19:00)  HR: 96 (15 Usama 2023 05:30) (96 - 114)  BP: 124/63 (15 Usama 2023 05:30) (124/63 - 182/90)  BP(mean): --  RR: 20 (15 Usama 2023 05:30) (19 - 26)  SpO2: 96% (15 Usama 2023 05:30) (96% - 100%)    Parameters below as of 15 Usama 2023 05:30  Patient On (Oxygen Delivery Method): nasal cannula  O2 Flow (L/min): 2      CONSTITUTIONAL: NAD, lying in bed comfortably  RESPIRATORY: Normal respiratory effort; expiratory wheezing noted on exam in R lung anterior lung field.   CARDIOVASCULAR: Regular rate and rhythm, normal S1 and S2, no murmur/rub/gallop  ABDOMEN: Soft, nondistended, nontender to palpation, normoactive bowel sounds, no rebound/guarding  MUSCULOSKELETAL: no joint swelling or tenderness to palpation, FROM all extremities  NEURO: awake, AAOx1-2   EXTREMITIES: no pedal edema    LABS:                        11.6   24.31 )-----------( 111      ( 15 Usama 2023 05:37 )             33.0     01-15    122<L>  |  86<L>  |  11  ----------------------------<  133<H>  3.4<L>   |  22  |  0.47<L>    Ca    7.6<L>      15 Usama 2023 05:37  Phos  2.8     01-15  Mg     1.8     01-15    TPro  6.4  /  Alb  3.4  /  TBili  1.5<H>  /  DBili  x   /  AST  61<H>  /  ALT  24  /  AlkPhos  1051<H>  01-15    PT/INR - ( 2023 19:06 )   PT: 18.1 sec;   INR: 1.57 ratio         PTT - ( 2023 19:06 )  PTT:29.9 sec      Urinalysis Basic - ( 2023 21:40 )    Color: Yellow / Appearance: Clear / S.023 / pH: x  Gluc: x / Ketone: Negative  / Bili: Negative / Urobili: 3 mg/dL   Blood: x / Protein: 30 mg/dL / Nitrite: Negative   Leuk Esterase: Negative / RBC: 21 /hpf / WBC 4 /HPF   Sq Epi: x / Non Sq Epi: 2 /hpf / Bacteria: Negative          RADIOLOGY & ADDITIONAL TESTS:     Patient is a 87y old  Female who presents with a chief complaint of SOB (15 Usama 2023 00:45)    SUBJECTIVE / OVERNIGHT EVENTS:  This AM, per daughter, pt more lethargic than usual.     ADDITIONAL REVIEW OF SYSTEMS: Denies fevers, chills, n/v.    MEDICATIONS  (STANDING):  aspirin enteric coated 81 milliGRAM(s) Oral daily  azithromycin  IVPB 500 milliGRAM(s) IV Intermittent every 24 hours  cefepime   IVPB 1000 milliGRAM(s) IV Intermittent every 8 hours  dextrose 5%. 1000 milliLiter(s) (100 mL/Hr) IV Continuous <Continuous>  dextrose 5%. 1000 milliLiter(s) (50 mL/Hr) IV Continuous <Continuous>  dextrose 50% Injectable 25 Gram(s) IV Push once  dextrose 50% Injectable 12.5 Gram(s) IV Push once  dextrose 50% Injectable 25 Gram(s) IV Push once  donepezil 5 milliGRAM(s) Oral at bedtime  enoxaparin Injectable 40 milliGRAM(s) SubCutaneous every 24 hours  folic acid 1 milliGRAM(s) Oral daily  furosemide   Injectable 40 milliGRAM(s) IV Push two times a day  glucagon  Injectable 1 milliGRAM(s) IntraMuscular once  insulin lispro (ADMELOG) corrective regimen sliding scale   SubCutaneous three times a day before meals  insulin lispro (ADMELOG) corrective regimen sliding scale   SubCutaneous at bedtime  senna 2 Tablet(s) Oral at bedtime  simvastatin 20 milliGRAM(s) Oral at bedtime  traZODone 50 milliGRAM(s) Oral at bedtime    MEDICATIONS  (PRN):  dextrose Oral Gel 15 Gram(s) Oral once PRN Blood Glucose LESS THAN 70 milliGRAM(s)/deciliter      CAPILLARY BLOOD GLUCOSE        I&O's Summary      PHYSICAL EXAM:  Vital Signs Last 24 Hrs  T(C): 36.4 (15 Usama 2023 05:30), Max: 38.1 (2023 19:00)  T(F): 97.6 (15 Usama 2023 05:30), Max: 100.5 (2023 19:00)  HR: 96 (15 Usama 2023 05:30) (96 - 114)  BP: 124/63 (15 Usama 2023 05:30) (124/63 - 182/90)  BP(mean): --  RR: 20 (15 Usama 2023 05:30) (19 - 26)  SpO2: 96% (15 Usama 2023 05:30) (96% - 100%)    Parameters below as of 15 [place] 2023 05:30  Patient On (Oxygen Delivery Method): nasal cannula  O2 Flow (L/min): 2      CONSTITUTIONAL: NAD, lying in bed comfortably  RESPIRATORY: Normal respiratory effort; expiratory wheezing noted on exam in R lung anterior lung field.   CARDIOVASCULAR: Regular rate and rhythm, normal S1 and S2, no murmur/rub/gallop  ABDOMEN: Soft, nondistended, nontender to palpation, normoactive bowel sounds, no rebound/guarding  MUSCULOSKELETAL: no joint swelling or tenderness to palpation, FROM all extremities  NEURO: awake, AAOx1-2   EXTREMITIES: no pedal edema    LABS:                        11.6   24.31 )-----------( 111      ( 15 Usama 2023 05:37 )             33.0     01-15    122<L>  |  86<L>  |  11  ----------------------------<  133<H>  3.4<L>   |  22  |  0.47<L>    Ca    7.6<L>      15 Usama 2023 05:37  Phos  2.8     01-15  Mg     1.8     01-15    TPro  6.4  /  Alb  3.4  /  TBili  1.5<H>  /  DBili  x   /  AST  61<H>  /  ALT  24  /  AlkPhos  1051<H>  01-15    PT/INR - ( 2023 19:06 )   PT: 18.1 sec;   INR: 1.57 ratio         PTT - ( 2023 19:06 )  PTT:29.9 sec      Urinalysis Basic - ( 2023 21:40 )    Color: Yellow / Appearance: Clear / S.023 / pH: x  Gluc: x / Ketone: Negative  / Bili: Negative / Urobili: 3 mg/dL   Blood: x / Protein: 30 mg/dL / Nitrite: Negative   Leuk Esterase: Negative / RBC: 21 /hpf / WBC 4 /HPF   Sq Epi: x / Non Sq Epi: 2 /hpf / Bacteria: Negative          RADIOLOGY & ADDITIONAL TESTS:     Patient is a 87y old  Female who presents with a chief complaint of SOB (15 Usama 2023 00:45)    SUBJECTIVE / OVERNIGHT EVENTS:  This AM, per daughter, pt more lethargic than usual. Denies fevers, chills, n/v.    MEDICATIONS  (STANDING):  aspirin enteric coated 81 milliGRAM(s) Oral daily  azithromycin  IVPB 500 milliGRAM(s) IV Intermittent every 24 hours  cefepime   IVPB 1000 milliGRAM(s) IV Intermittent every 8 hours  dextrose 5%. 1000 milliLiter(s) (100 mL/Hr) IV Continuous <Continuous>  dextrose 5%. 1000 milliLiter(s) (50 mL/Hr) IV Continuous <Continuous>  dextrose 50% Injectable 25 Gram(s) IV Push once  dextrose 50% Injectable 12.5 Gram(s) IV Push once  dextrose 50% Injectable 25 Gram(s) IV Push once  donepezil 5 milliGRAM(s) Oral at bedtime  enoxaparin Injectable 40 milliGRAM(s) SubCutaneous every 24 hours  folic acid 1 milliGRAM(s) Oral daily  furosemide   Injectable 40 milliGRAM(s) IV Push two times a day  glucagon  Injectable 1 milliGRAM(s) IntraMuscular once  insulin lispro (ADMELOG) corrective regimen sliding scale   SubCutaneous three times a day before meals  insulin lispro (ADMELOG) corrective regimen sliding scale   SubCutaneous at bedtime  senna 2 Tablet(s) Oral at bedtime  simvastatin 20 milliGRAM(s) Oral at bedtime  traZODone 50 milliGRAM(s) Oral at bedtime    MEDICATIONS  (PRN):  dextrose Oral Gel 15 Gram(s) Oral once PRN Blood Glucose LESS THAN 70 milliGRAM(s)/deciliter      CAPILLARY BLOOD GLUCOSE        I&O's Summary      PHYSICAL EXAM:  Vital Signs Last 24 Hrs  T(C): 36.4 (17 Jan 2023 11:30), Max: 36.4 (16 Jan 2023 20:17)  T(F): 97.5 (17 Jan 2023 11:30), Max: 97.6 (16 Jan 2023 20:17)  HR: 102 (17 Jan 2023 11:30) (89 - 121)  BP: 106/64 (17 Jan 2023 11:30) (106/64 - 162/76)  BP(mean): --  RR: 18 (17 Jan 2023 11:30) (18 - 18)  SpO2: 98% (17 Jan 2023 11:30) (95% - 98%)    Parameters below as of 17 Jan 2023 11:30  Patient On (Oxygen Delivery Method): nasal cannula  O2 Flow (L/min): 3      CONSTITUTIONAL: NAD, lying in bed comfortably  RESPIRATORY: Normal respiratory effort; expiratory wheezing noted on exam in R lung anterior lung field.   CARDIOVASCULAR: Regular rate and rhythm, normal S1 and S2, no murmur/rub/gallop  ABDOMEN: Soft, nondistended, nontender to palpation, normoactive bowel sounds, no rebound/guarding  MUSCULOSKELETAL: no joint swelling or tenderness to palpation, FROM all extremities  NEURO: awake, AAOx1-2   EXTREMITIES: no pedal edema      LABS:                        11.3   33.94 )-----------( 115      ( 17 Jan 2023 07:08 )             34.6     01-17    121<L>  |  82<L>  |  20  ----------------------------<  142<H>  3.6   |  28  |  0.48<L>    Ca    8.2<L>      17 Jan 2023 14:35  Phos  2.7     01-17  Mg     2.1     01-17    TPro  6.2  /  Alb  2.7<L>  /  TBili  0.8  /  DBili  x   /  AST  54<H>  /  ALT  27  /  AlkPhos  1207<H>  01-17              Culture - Urine (collected 14 Jan 2023 21:37)  Source: Clean Catch Clean Catch (Midstream)  Final Report (16 Jan 2023 09:54):    <10,000 CFU/mL Normal Urogenital [place]    Culture - Blood (collected 14 Jan 2023 19:00)  Source: .Blood Blood-Peripheral  Preliminary Report (16 Jan 2023 01:02):    No growth to date.    Culture - Blood (collected 14 Jan 2023 18:45)  Source: .Blood Blood-Peripheral  Preliminary Report (16 Jan 2023 01:02):    No growth to date.

## 2023-01-17 NOTE — PROVIDER CONTACT NOTE (CRITICAL VALUE NOTIFICATION) - RECOMMENDATIONS
?
?
Blood was not drawn any where neer IV but all labs are abnormal
Re evaluated Na at 0200
additional 3% NS fluids
Unknown

## 2023-01-17 NOTE — PROGRESS NOTE ADULT - PROBLEM SELECTOR PLAN 4
-Serum sodium level noted to measure 121; suspect that this is secondary to hypervolemic hyponatremia in context of acute decompensated heart failure but no urine studies yet to confirm this   - Worsening to 117 today, despite lasix IV BID  -Will obtain urine osmolarity and urine sodium level to confirm suspicion of hypervolemic hyponatremia   - c/w lasix  - PO fluid restrict, salt tabs  - f/u renal recs -Patient does meet sepsis criteria on presentation given leukocytosis to greater than 19 and tachypnea; however, no evident fevers, and despite shortness of breath, patient does not have a productive cough   -Elevated serum lactate (cleared within four hours) on initial presentation is suggestive of impaired organ perfusion   -No obvious source of infection if not for pneumonia; although pneumonia suggested on chest xray and ct chest with bilateral tree-in-but opacifications, not entirely sure that this is not resolving inflammation form recent pneumonia treated as outpatient   - BCx NGTD  - MRSA negative  - c/w cefepime

## 2023-01-17 NOTE — PROGRESS NOTE ADULT - SUBJECTIVE AND OBJECTIVE BOX
[facility] Division of Kidney Diseases & Hypertension  FOLLOW UP NOTE  [phone]--------------------------------------------------------------------------------  Chief Complaint:Pneumonia due to infectious organism      87-year-old with PMH of hypertension, diabetes mellitus, dementia, history of breast and colon cancers (now in remission) who is admitted for evaluation of persistent SOB after completing therapy for CAP. Currently being treated for acute decompensated CHF. CT chest with b/l patchy opacities PNA vs plum edema & small b/l pleural effusions. BNP 2K. Nephrology called for hyponatremia. SNa in 2021 was 140. No SNa on [facility] after that.  SNa on arrival 1/14 was 117 which increased to 121 the same day. Now SNa decreased to 117 today. Pt receiving vanco, cefepime & azithro all in D5 since arrival. Also received 250cc NS. Pt on lasix 40 mg IV daily and on salt tabs on day of consult      Interval History:  Patient was given 8 hours of 3% HTS without improvement in Serum osm. Urine osm of ~500. Patient seen walking from bathroom to the chair today with 1 assist.     PAST HISTORY  --------------------------------------------------------------------------------  No significant changes to PMH, PSH, FHx, SHx, unless otherwise noted    ALLERGIES & MEDICATIONS  --------------------------------------------------------------------------------  Allergies    No Known Allergies    Intolerances      Standing Inpatient Medications  albuterol/ipratropium for Nebulization 3 milliLiter(s) Nebulizer every 6 hours  apixaban 2.5 milliGRAM(s) Oral every 12 hours  aspirin enteric coated 81 milliGRAM(s) Oral daily  cefepime   IVPB 1000 milliGRAM(s) IV Intermittent every 8 hours  dextrose 5%. 1000 milliLiter(s) IV Continuous <Continuous>  dextrose 5%. 1000 milliLiter(s) IV Continuous <Continuous>  dextrose 50% Injectable 25 Gram(s) IV Push once  dextrose 50% Injectable 12.5 Gram(s) IV Push once  dextrose 50% Injectable 25 Gram(s) IV Push once  donepezil 5 milliGRAM(s) Oral at bedtime  folic acid 1 milliGRAM(s) Oral daily  furosemide   Injectable 40 milliGRAM(s) IV Push two times a day  glucagon  Injectable 1 milliGRAM(s) IntraMuscular once  insulin lispro (ADMELOG) corrective regimen sliding scale   SubCutaneous three times a day before meals  insulin lispro (ADMELOG) corrective regimen sliding scale   SubCutaneous at bedtime  metoprolol tartrate 25 milliGRAM(s) Oral three times a day  senna 2 Tablet(s) Oral at bedtime  simvastatin 20 milliGRAM(s) Oral at bedtime  sodium chloride 3%. 500 milliLiter(s) IV Continuous <Continuous>  traZODone 50 milliGRAM(s) Oral at bedtime    PRN Inpatient Medications  dextrose Oral Gel 15 Gram(s) Oral once PRN      REVIEW OF SYSTEMS  --------------------------------------------------------------------------------  Gen: No  fevers/chills  Skin: No rashes  Head/Eyes/Ears/Mouth: No headache; Normal hearing; Normal vision w/o blurriness  Respiratory: No dyspnea, cough, wheezing, hemoptysis  CV: No chest pain, PND, orthopnea  GI: No abdominal pain, diarrhea, constipation, nausea, vomiting  : No increased frequency, dysuria, hematuria, nocturia  MSK: No joint pain/swelling; no back pain; no edema  Neuro: No dizziness/lightheadedness, weakness, seizures, numbness, tingling      All other systems were reviewed and are negative, except as noted.    VITALS/PHYSICAL EXAM  --------------------------------------------------------------------------------  T(C): 36.3 (01-17-23 @ 04:26), Max: 37.2 (01-16-23 @ 16:00)  HR: 89 (01-17-23 @ 04:26) (89 - 130)  BP: 161/93 (01-17-23 @ 04:26) (132/62 - 162/76)  RR: 18 (01-17-23 @ 04:26) (18 - 20)  SpO2: 95% (01-17-23 @ 04:26) (95% - 97%)  Wt(kg): --        01-16-23 @ 07:01  -  01-17-23 @ 07:00  --------------------------------------------------------  IN: 1050 mL / OUT: 1200 mL / NET: -150 mL      Physical Exam:  	Gen: elderly, walking around withNC  	HEENT: Anicteric, MMM, no JVD  	Pulm: faint bibasilar crackles  	CV: S1S2, no rub  	Abd: Soft, +BS          No presacral edema  	Ext: No LE edema B/L  	Neuro: Awake  	Skin: Warm and dry  	Vascular access:      LABS/STUDIES  --------------------------------------------------------------------------------              11.3   33.94 >-----------<  115      [01-17-23 @ 07:08]              34.6     114  |  82  |  19  ----------------------------<  108      [01-17-23 @ 07:08]  4.9   |  18  |  0.40        Ca     8.3     [01-17-23 @ 07:08]      Mg     2.1     [01-17-23 @ 07:08]      Phos  2.7     [01-17-23 @ 07:08]    TPro  6.2  /  Alb  2.7  /  TBili  0.8  /  DBili  x   /  AST  54  /  ALT  27  /  AlkPhos  1207  [01-17-23 @ 07:08]        Uric acid 3.7      [01-16-23 @ 11:59]        [01-17-23 @ 07:08]  Serum Osmolality 253      [01-16-23 @ 11:57]    Creatinine Trend:  SCr 0.40 [01-17 @ 07:08]  SCr 0.55 [01-17 @ 01:36]  SCr 0.72 [01-16 @ 22:03]  SCr 0.49 [01-16 @ 11:59]  SCr 0.49 [01-15 @ 18:30]    Urinalysis - [01-14-23 @ 21:40]      Color Yellow / Appearance Clear / SG 1.023 / pH 6.0      Gluc Negative / Ketone Negative  / Bili Negative / Urobili 3 mg/dL       Blood Moderate / Protein 30 mg/dL / Leuk Est Negative / Nitrite Negative      RBC 21 / WBC 4 / Hyaline 3 / Gran  / Sq Epi  / Non Sq Epi 2 / Bacteria Negative    Urine Creatinine 100      [01-14-23 @ 21:40]  Urine Protein 51      [01-14-23 @ 21:40]  Urine Sodium 31      [01-17-23 @ 10:51]  Urine Urea Nitrogen 986      [01-14-23 @ 21:40]  Urine Potassium 38      [01-14-23 @ 21:40]  Urine Chloride 52      [01-17-23 @ 10:51]  Urine Osmolality 547      [01-16-23 @ 15:02]         [facility] Division of Kidney Diseases & Hypertension  FOLLOW UP NOTE  [phone]--------------------------------------------------------------------------------  Chief Complaint:Pneumonia due to infectious organism      87-year-old with PMH of hypertension, diabetes mellitus, dementia, history of breast and colon cancers (now in remission) who is admitted for evaluation of persistent SOB after completing therapy for CAP. Currently being treated for acute decompensated CHF. CT chest with b/l patchy opacities PNA vs plum edema & small b/l pleural effusions. BNP 2K. Nephrology called for hyponatremia. SNa in 2021 was 140. No SNa on [facility] after that.  SNa on arrival 1/14 was 117 which increased to 121 the same day. Now SNa decreased to 117 today. Pt receiving vanco, cefepime & azithro all in D5 since arrival. Also received 250cc NS. Pt on lasix 40 mg IV daily and on salt tabs on day of consult      Interval History:  Patient was given 8 hours of 3% HTS without improvement in Serum osm. Urine osm of ~500. Patient seen walking from bathroom to the chair today with 1 assist.     PAST HISTORY  --------------------------------------------------------------------------------  No significant changes to PMH, PSH, FHx, SHx, unless otherwise noted    ALLERGIES & MEDICATIONS  --------------------------------------------------------------------------------  Allergies    No Known Allergies    Intolerances      Standing Inpatient Medications  albuterol/ipratropium for Nebulization 3 milliLiter(s) Nebulizer every 6 hours  apixaban 2.5 milliGRAM(s) Oral every 12 hours  aspirin enteric coated 81 milliGRAM(s) Oral daily  cefepime   IVPB 1000 milliGRAM(s) IV Intermittent every 8 hours  dextrose 5%. 1000 milliLiter(s) IV Continuous <Continuous>  dextrose 5%. 1000 milliLiter(s) IV Continuous <Continuous>  dextrose 50% Injectable 25 Gram(s) IV Push once  dextrose 50% Injectable 12.5 Gram(s) IV Push once  dextrose 50% Injectable 25 Gram(s) IV Push once  donepezil 5 milliGRAM(s) Oral at bedtime  folic acid 1 milliGRAM(s) Oral daily  furosemide   Injectable 40 milliGRAM(s) IV Push two times a day  glucagon  Injectable 1 milliGRAM(s) IntraMuscular once  insulin lispro (ADMELOG) corrective regimen sliding scale   SubCutaneous three times a day before meals  insulin lispro (ADMELOG) corrective regimen sliding scale   SubCutaneous at bedtime  metoprolol tartrate 25 milliGRAM(s) Oral three times a day  senna 2 Tablet(s) Oral at bedtime  simvastatin 20 milliGRAM(s) Oral at bedtime  sodium chloride 3%. 500 milliLiter(s) IV Continuous <Continuous>  traZODone 50 milliGRAM(s) Oral at bedtime    PRN Inpatient Medications  dextrose Oral Gel 15 Gram(s) Oral once PRN      REVIEW OF SYSTEMS  --------------------------------------------------------------------------------  Gen: No  fevers/chills  Skin: No rashes  Head/Eyes/Ears/Mouth: No headache; Normal hearing; Normal vision w/o blurriness  Respiratory: No dyspnea, cough, wheezing, hemoptysis  CV: No chest pain, PND, orthopnea  GI: No abdominal pain, diarrhea, constipation, nausea, vomiting  : No increased frequency, dysuria, hematuria, nocturia  MSK: No joint pain/swelling; no back pain; no edema  Neuro: No dizziness/lightheadedness, weakness, seizures, numbness, tingling      All other systems were reviewed and are negative, except as noted.    VITALS/PHYSICAL EXAM  --------------------------------------------------------------------------------  T(C): 36.3 (01-17-23 @ 04:26), Max: 37.2 (01-16-23 @ 16:00)  HR: 89 (01-17-23 @ 04:26) (89 - 130)  BP: 161/93 (01-17-23 @ 04:26) (132/62 - 162/76)  RR: 18 (01-17-23 @ 04:26) (18 - 20)  SpO2: 95% (01-17-23 @ 04:26) (95% - 97%)  Wt(kg): --        01-16-23 @ 07:01  -  01-17-23 @ 07:00  --------------------------------------------------------  IN: 1050 mL / OUT: 1200 mL / NET: -150 mL      Physical Exam:  	Gen: elderly, walking around withNC  	HEENT: Anicteric, MMM, no JVD  	Pulm: faint bibasilar crackles  	CV: S1S2, no rub  	Abd: Soft, +BS          No presacral edema  	Ext: No LE edema B/L  	Neuro: Awake  	Skin: Warm and dry  	Vascular access:      LABS/STUDIES  --------------------------------------------------------------------------------              11.3   33.94 >-----------<  115      [01-17-23 @ 07:08]              34.6     114  |  82  |  19  ----------------------------<  108      [01-17-23 @ 07:08]  4.9   |  18  |  0.40        Ca     8.3     [01-17-23 @ 07:08]      Mg     2.1     [01-17-23 @ 07:08]      Phos  2.7     [01-17-23 @ 07:08]    TPro  6.2  /  Alb  2.7  /  TBili  0.8  /  DBili  x   /  AST  54  /  ALT  27  /  AlkPhos  1207  [01-17-23 @ 07:08]        Uric acid 3.7      [01-16-23 @ 11:59]        [01-17-23 @ 07:08]  Serum Osmolality 253      [01-16-23 @ 11:57]    Creatinine Trend:  SCr 0.40 [01-17 @ 07:08]  SCr 0.55 [01-17 @ 01:36]  SCr 0.72 [01-16 @ 22:03]  SCr 0.49 [01-16 @ 11:59]  SCr 0.49 [01-15 @ 18:30]    Urinalysis - [01-14-23 @ 21:40]      Color Yellow / Appearance Clear / SG 1.023 / pH 6.0      Gluc Negative / Ketone Negative  / Bili Negative / Urobili 3 mg/dL       Blood Moderate / Protein 30 mg/dL / Leuk Est Negative / Nitrite Negative      RBC 21 / WBC 4 / Hyaline 3 / Gran  / Sq Epi  / Non Sq Epi 2 / Bacteria Negative    Urine Creatinine 100      [01-14-23 @ 21:40]  Urine Protein 51      [01-14-23 @ 21:40]  Urine Sodium 31      [01-17-23 @ 10:51]  Urine Urea Nitrogen 986      [01-14-23 @ 21:40]  Urine Potassium 38      [01-14-23 @ 21:40]  Urine Chloride 52      [01-17-23 @ 10:51]  Urine Osmolality 547      [01-16-23 @ 15:02]         [facility] Division of Kidney Diseases & Hypertension  FOLLOW UP NOTE  [phone]--------------------------------------------------------------------------------  Chief Complaint:Pneumonia due to infectious organism      87-year-old with PMH of hypertension, diabetes mellitus, dementia, history of breast and colon cancers (now in remission) who is admitted for evaluation of persistent SOB after completing therapy for CAP. Currently being treated for acute decompensated CHF. CT chest with b/l patchy opacities PNA vs plum edema & small b/l pleural effusions. BNP 2K. Nephrology called for hyponatremia. SNa in 2021 was 140. No SNa on [facility] after that.  SNa on arrival 1/14 was 117 which increased to 121 the same day. Now SNa decreased to 117 today. Pt receiving vanco, cefepime & azithro all in D5 since arrival. Also received 250cc NS. Pt on lasix 40 mg IV daily and on salt tabs on day of consult      Interval History:  Patient was given 8 hours of 3% HTS without improvement in Serum osm. Urine osm of ~500. Patient seen walking from bathroom to the chair today with 1 assist.     PAST HISTORY  --------------------------------------------------------------------------------  No significant changes to PMH, PSH, FHx, SHx, unless otherwise noted    ALLERGIES & MEDICATIONS  --------------------------------------------------------------------------------  Allergies    No Known Allergies    Intolerances      Standing Inpatient Medications  albuterol/ipratropium for Nebulization 3 milliLiter(s) Nebulizer every 6 hours  apixaban 2.5 milliGRAM(s) Oral every 12 hours  aspirin enteric coated 81 milliGRAM(s) Oral daily  cefepime   IVPB 1000 milliGRAM(s) IV Intermittent every 8 hours  dextrose 5%. 1000 milliLiter(s) IV Continuous <Continuous>  dextrose 5%. 1000 milliLiter(s) IV Continuous <Continuous>  dextrose 50% Injectable 25 Gram(s) IV Push once  dextrose 50% Injectable 12.5 Gram(s) IV Push once  dextrose 50% Injectable 25 Gram(s) IV Push once  donepezil 5 milliGRAM(s) Oral at bedtime  folic acid 1 milliGRAM(s) Oral daily  furosemide   Injectable 40 milliGRAM(s) IV Push two times a day  glucagon  Injectable 1 milliGRAM(s) IntraMuscular once  insulin lispro (ADMELOG) corrective regimen sliding scale   SubCutaneous three times a day before meals  insulin lispro (ADMELOG) corrective regimen sliding scale   SubCutaneous at bedtime  metoprolol tartrate 25 milliGRAM(s) Oral three times a day  senna 2 Tablet(s) Oral at bedtime  simvastatin 20 milliGRAM(s) Oral at bedtime  sodium chloride 3%. 500 milliLiter(s) IV Continuous <Continuous>  traZODone 50 milliGRAM(s) Oral at bedtime    PRN Inpatient Medications  dextrose Oral Gel 15 Gram(s) Oral once PRN      REVIEW OF SYSTEMS  --------------------------------------------------------------------------------  Gen: No  fevers/chills  Skin: No rashes  Head/Eyes/Ears/Mouth: No headache; Normal hearing; Normal vision w/o blurriness  Respiratory: No dyspnea, cough, wheezing, hemoptysis  CV: No chest pain, PND, orthopnea  GI: No abdominal pain, diarrhea, constipation, nausea, vomiting  : No increased frequency, dysuria, hematuria, nocturia  MSK: No joint pain/swelling; no back pain; no edema  Neuro: No dizziness/lightheadedness, weakness, seizures, numbness, tingling      All other systems were reviewed and are negative, except as noted.    VITALS/PHYSICAL EXAM  --------------------------------------------------------------------------------  T(C): 36.3 (01-17-23 @ 04:26), Max: 37.2 (01-16-23 @ 16:00)  HR: 89 (01-17-23 @ 04:26) (89 - 130)  BP: 161/93 (01-17-23 @ 04:26) (132/62 - 162/76)  RR: 18 (01-17-23 @ 04:26) (18 - 20)  SpO2: 95% (01-17-23 @ 04:26) (95% - 97%)  Wt(kg): --        01-16-23 @ 07:01  -  01-17-23 @ 07:00  --------------------------------------------------------  IN: 1050 mL / OUT: 1200 mL / NET: -150 mL      Physical Exam:  	Gen: elderly, walking around withNC  	HEENT: Anicteric, MMM, no JVD  	Pulm: faint bibasilar crackles  	CV: S1S2, no rub  	Abd: Soft, +BS          No presacral edema  	Ext: No LE edema B/L  	Neuro: Awake  	Skin: Warm and dry  	Vascular access:      LABS/STUDIES  --------------------------------------------------------------------------------              11.3   33.94 >-----------<  115      [01-17-23 @ 07:08]              34.6     114  |  82  |  19  ----------------------------<  108      [01-17-23 @ 07:08]  4.9   |  18  |  0.40        Ca     8.3     [01-17-23 @ 07:08]      Mg     2.1     [01-17-23 @ 07:08]      Phos  2.7     [01-17-23 @ 07:08]    TPro  6.2  /  Alb  2.7  /  TBili  0.8  /  DBili  x   /  AST  54  /  ALT  27  /  AlkPhos  1207  [01-17-23 @ 07:08]        Uric acid 3.7      [01-16-23 @ 11:59]        [01-17-23 @ 07:08]  Serum Osmolality 253      [01-16-23 @ 11:57]    Creatinine Trend:  SCr 0.40 [01-17 @ 07:08]  SCr 0.55 [01-17 @ 01:36]  SCr 0.72 [01-16 @ 22:03]  SCr 0.49 [01-16 @ 11:59]  SCr 0.49 [01-15 @ 18:30]    Urinalysis - [01-14-23 @ 21:40]      Color Yellow / Appearance Clear / SG 1.023 / pH 6.0      Gluc Negative / Ketone Negative  / Bili Negative / Urobili 3 mg/dL       Blood Moderate / Protein 30 mg/dL / Leuk Est Negative / Nitrite Negative      RBC 21 / WBC 4 / Hyaline 3 / Gran  / Sq Epi  / Non Sq Epi 2 / Bacteria Negative    Urine Creatinine 100      [01-14-23 @ 21:40]  Urine Protein 51      [01-14-23 @ 21:40]  Urine Sodium 31      [01-17-23 @ 10:51]  Urine Urea Nitrogen 986      [01-14-23 @ 21:40]  Urine Potassium 38      [01-14-23 @ 21:40]  Urine Chloride 52      [01-17-23 @ 10:51]  Urine Osmolality 547      [01-16-23 @ 15:02]

## 2023-01-17 NOTE — PROGRESS NOTE ADULT - ATTENDING COMMENTS
Pt. with acute on chronic, severe hyponatremia in the setting of NSAIDs use and HF.   Noted to have worsened hypoNa on most recent labs, declines any symptoms of hypoNa.   Recommend HTS.   continue loop diuretics.   Recommend to switch IV cefepime (currently running in D5w) to IV NS.   Recommend free water restriction, and encourage PO solute intake.   Monitor serum sodium.

## 2023-01-17 NOTE — PROVIDER CONTACT NOTE (CRITICAL VALUE NOTIFICATION) - ASSESSMENT
Pt stable. VSS.
Patient stable. No signs of distress.
Patient stable. No signs of distress.
Patient stable, No signs of distress.  Sinus tach 109 on the monitor.
Pt stable. VSS.
Pt stable. VSS.

## 2023-01-18 LAB
ALBUMIN SERPL ELPH-MCNC: 2.8 G/DL — LOW (ref 3.3–5)
ALP SERPL-CCNC: 1011 U/L — HIGH (ref 40–120)
ALT FLD-CCNC: 56 U/L — HIGH (ref 10–45)
ANION GAP SERPL CALC-SCNC: 11 MMOL/L — SIGNIFICANT CHANGE UP (ref 5–17)
ANION GAP SERPL CALC-SCNC: 13 MMOL/L — SIGNIFICANT CHANGE UP (ref 5–17)
AST SERPL-CCNC: 88 U/L — HIGH (ref 10–40)
BASE EXCESS BLDV CALC-SCNC: 3.6 MMOL/L — HIGH (ref -2–3)
BILIRUB SERPL-MCNC: 0.8 MG/DL — SIGNIFICANT CHANGE UP (ref 0.2–1.2)
BUN SERPL-MCNC: 18 MG/DL — SIGNIFICANT CHANGE UP (ref 7–23)
BUN SERPL-MCNC: 19 MG/DL — SIGNIFICANT CHANGE UP (ref 7–23)
BUN SERPL-MCNC: 20 MG/DL — SIGNIFICANT CHANGE UP (ref 7–23)
CA-I SERPL-SCNC: 1.1 MMOL/L — LOW (ref 1.15–1.33)
CALCIUM SERPL-MCNC: 7.5 MG/DL — LOW (ref 8.4–10.5)
CALCIUM SERPL-MCNC: 7.6 MG/DL — LOW (ref 8.4–10.5)
CALCIUM SERPL-MCNC: 8.1 MG/DL — LOW (ref 8.4–10.5)
CALCIUM SERPL-MCNC: 8.5 MG/DL — SIGNIFICANT CHANGE UP (ref 8.4–10.5)
CHLORIDE BLDV-SCNC: 87 MMOL/L — LOW (ref 96–108)
CHLORIDE SERPL-SCNC: 85 MMOL/L — LOW (ref 96–108)
CHLORIDE SERPL-SCNC: 86 MMOL/L — LOW (ref 96–108)
CHLORIDE SERPL-SCNC: 87 MMOL/L — LOW (ref 96–108)
CO2 BLDV-SCNC: 30 MMOL/L — HIGH (ref 22–26)
CO2 SERPL-SCNC: 23 MMOL/L — SIGNIFICANT CHANGE UP (ref 22–31)
CO2 SERPL-SCNC: 25 MMOL/L — SIGNIFICANT CHANGE UP (ref 22–31)
CO2 SERPL-SCNC: 26 MMOL/L — SIGNIFICANT CHANGE UP (ref 22–31)
CO2 SERPL-SCNC: 27 MMOL/L — SIGNIFICANT CHANGE UP (ref 22–31)
CREAT SERPL-MCNC: 0.56 MG/DL — SIGNIFICANT CHANGE UP (ref 0.5–1.3)
CREAT SERPL-MCNC: 0.57 MG/DL — SIGNIFICANT CHANGE UP (ref 0.5–1.3)
CREAT SERPL-MCNC: 0.58 MG/DL — SIGNIFICANT CHANGE UP (ref 0.5–1.3)
CREAT SERPL-MCNC: 0.64 MG/DL — SIGNIFICANT CHANGE UP (ref 0.5–1.3)
EGFR: 87 ML/MIN/1.73M2 — SIGNIFICANT CHANGE UP
EGFR: 89 ML/MIN/1.73M2 — SIGNIFICANT CHANGE UP
GAS PNL BLDV: 121 MMOL/L — LOW (ref 136–145)
GAS PNL BLDV: SIGNIFICANT CHANGE UP
GLUCOSE BLDC GLUCOMTR-MCNC: 127 MG/DL — HIGH (ref 70–99)
GLUCOSE BLDC GLUCOMTR-MCNC: 132 MG/DL — HIGH (ref 70–99)
GLUCOSE BLDC GLUCOMTR-MCNC: 166 MG/DL — HIGH (ref 70–99)
GLUCOSE BLDV-MCNC: 111 MG/DL — HIGH (ref 70–99)
GLUCOSE SERPL-MCNC: 112 MG/DL — HIGH (ref 70–99)
GLUCOSE SERPL-MCNC: 118 MG/DL — HIGH (ref 70–99)
GLUCOSE SERPL-MCNC: 138 MG/DL — HIGH (ref 70–99)
GLUCOSE SERPL-MCNC: 199 MG/DL — HIGH (ref 70–99)
HCO3 BLDV-SCNC: 29 MMOL/L — SIGNIFICANT CHANGE UP (ref 22–29)
HCT VFR BLD CALC: 30.7 % — LOW (ref 34.5–45)
HCT VFR BLDA CALC: 33 % — LOW (ref 34.5–46.5)
HGB BLD CALC-MCNC: 10.9 G/DL — LOW (ref 11.7–16.1)
HGB BLD-MCNC: 10.3 G/DL — LOW (ref 11.5–15.5)
LACTATE BLDV-MCNC: 1.5 MMOL/L — SIGNIFICANT CHANGE UP (ref 0.5–2)
MAGNESIUM SERPL-MCNC: 2.3 MG/DL — SIGNIFICANT CHANGE UP (ref 1.6–2.6)
MCHC RBC-ENTMCNC: 26.5 PG — LOW (ref 27–34)
MCHC RBC-ENTMCNC: 33.6 GM/DL — SIGNIFICANT CHANGE UP (ref 32–36)
MCV RBC AUTO: 79.1 FL — LOW (ref 80–100)
NRBC # BLD: 0 /100 WBCS — SIGNIFICANT CHANGE UP (ref 0–0)
PCO2 BLDV: 47 MMHG — HIGH (ref 39–42)
PH BLDV: 7.4 — SIGNIFICANT CHANGE UP (ref 7.32–7.43)
PHOSPHATE SERPL-MCNC: 2.3 MG/DL — LOW (ref 2.5–4.5)
PLATELET # BLD AUTO: 128 K/UL — LOW (ref 150–400)
PO2 BLDV: 66 MMHG — HIGH (ref 25–45)
POTASSIUM BLDV-SCNC: 3.7 MMOL/L — SIGNIFICANT CHANGE UP (ref 3.5–5.1)
POTASSIUM SERPL-MCNC: 3.6 MMOL/L — SIGNIFICANT CHANGE UP (ref 3.5–5.3)
POTASSIUM SERPL-MCNC: 3.9 MMOL/L — SIGNIFICANT CHANGE UP (ref 3.5–5.3)
POTASSIUM SERPL-MCNC: 4 MMOL/L — SIGNIFICANT CHANGE UP (ref 3.5–5.3)
POTASSIUM SERPL-SCNC: 3.6 MMOL/L — SIGNIFICANT CHANGE UP (ref 3.5–5.3)
POTASSIUM SERPL-SCNC: 3.9 MMOL/L — SIGNIFICANT CHANGE UP (ref 3.5–5.3)
POTASSIUM SERPL-SCNC: 4 MMOL/L — SIGNIFICANT CHANGE UP (ref 3.5–5.3)
PROT SERPL-MCNC: 6 G/DL — SIGNIFICANT CHANGE UP (ref 6–8.3)
RBC # BLD: 3.88 M/UL — SIGNIFICANT CHANGE UP (ref 3.8–5.2)
RBC # FLD: 14.6 % — HIGH (ref 10.3–14.5)
SAO2 % BLDV: 93.8 % — HIGH (ref 67–88)
SODIUM SERPL-SCNC: 123 MMOL/L — LOW (ref 135–145)
SODIUM SERPL-SCNC: 125 MMOL/L — LOW (ref 135–145)
T3 SERPL-MCNC: 86 NG/DL — SIGNIFICANT CHANGE UP (ref 80–200)
T4 FREE SERPL-MCNC: 1.7 NG/DL — SIGNIFICANT CHANGE UP (ref 0.9–1.8)
TSH SERPL-MCNC: 0.1 UIU/ML — LOW (ref 0.27–4.2)
WBC # BLD: 24.49 K/UL — HIGH (ref 3.8–10.5)
WBC # FLD AUTO: 24.49 K/UL — HIGH (ref 3.8–10.5)

## 2023-01-18 PROCEDURE — 99233 SBSQ HOSP IP/OBS HIGH 50: CPT | Mod: GC

## 2023-01-18 PROCEDURE — 99233 SBSQ HOSP IP/OBS HIGH 50: CPT

## 2023-01-18 RX ORDER — SODIUM CHLORIDE 5 G/100ML
120 INJECTION, SOLUTION INTRAVENOUS
Refills: 0 | Status: DISCONTINUED | OUTPATIENT
Start: 2023-01-18 | End: 2023-01-18

## 2023-01-18 RX ORDER — SODIUM CHLORIDE 5 G/100ML
120 INJECTION, SOLUTION INTRAVENOUS
Refills: 0 | Status: DISCONTINUED | OUTPATIENT
Start: 2023-01-18 | End: 2023-01-20

## 2023-01-18 RX ORDER — SODIUM,POTASSIUM PHOSPHATES 278-250MG
1 POWDER IN PACKET (EA) ORAL EVERY 6 HOURS
Refills: 0 | Status: COMPLETED | OUTPATIENT
Start: 2023-01-18 | End: 2023-01-18

## 2023-01-18 RX ADMIN — Medication 25 MILLIGRAM(S): at 05:20

## 2023-01-18 RX ADMIN — Medication 81 MILLIGRAM(S): at 12:10

## 2023-01-18 RX ADMIN — Medication 25 MILLIGRAM(S): at 21:45

## 2023-01-18 RX ADMIN — SENNA PLUS 2 TABLET(S): 8.6 TABLET ORAL at 21:45

## 2023-01-18 RX ADMIN — CEFEPIME 100 MILLIGRAM(S): 1 INJECTION, POWDER, FOR SOLUTION INTRAMUSCULAR; INTRAVENOUS at 14:03

## 2023-01-18 RX ADMIN — Medication 3 MILLILITER(S): at 12:11

## 2023-01-18 RX ADMIN — APIXABAN 2.5 MILLIGRAM(S): 2.5 TABLET, FILM COATED ORAL at 05:20

## 2023-01-18 RX ADMIN — Medication 40 MILLIGRAM(S): at 05:22

## 2023-01-18 RX ADMIN — Medication 50 MILLIGRAM(S): at 21:45

## 2023-01-18 RX ADMIN — Medication 1 PACKET(S): at 12:10

## 2023-01-18 RX ADMIN — APIXABAN 2.5 MILLIGRAM(S): 2.5 TABLET, FILM COATED ORAL at 18:18

## 2023-01-18 RX ADMIN — Medication 1 PACKET(S): at 18:18

## 2023-01-18 RX ADMIN — Medication 1 MILLIGRAM(S): at 12:10

## 2023-01-18 RX ADMIN — Medication 40 MILLIGRAM(S): at 14:03

## 2023-01-18 RX ADMIN — DONEPEZIL HYDROCHLORIDE 5 MILLIGRAM(S): 10 TABLET, FILM COATED ORAL at 21:45

## 2023-01-18 RX ADMIN — CEFEPIME 100 MILLIGRAM(S): 1 INJECTION, POWDER, FOR SOLUTION INTRAMUSCULAR; INTRAVENOUS at 05:24

## 2023-01-18 RX ADMIN — Medication 3 MILLILITER(S): at 18:17

## 2023-01-18 RX ADMIN — Medication 25 MILLIGRAM(S): at 14:03

## 2023-01-18 RX ADMIN — SODIUM CHLORIDE 30 MILLILITER(S): 5 INJECTION, SOLUTION INTRAVENOUS at 22:53

## 2023-01-18 RX ADMIN — CEFEPIME 100 MILLIGRAM(S): 1 INJECTION, POWDER, FOR SOLUTION INTRAMUSCULAR; INTRAVENOUS at 21:45

## 2023-01-18 RX ADMIN — SODIUM CHLORIDE 30 MILLILITER(S): 5 INJECTION, SOLUTION INTRAVENOUS at 14:45

## 2023-01-18 RX ADMIN — SIMVASTATIN 20 MILLIGRAM(S): 20 TABLET, FILM COATED ORAL at 21:45

## 2023-01-18 RX ADMIN — Medication 3 MILLILITER(S): at 05:20

## 2023-01-18 NOTE — PROGRESS NOTE ADULT - PROBLEM SELECTOR PLAN 2
-Persistent shortness of breath refractory to antibiotic therapy with evident bilateral pleural effusions, jugular venous distension, and lower extremity edema and with an elevated serum bnp level, suggesting heart failure   - ct chest with bilateral tree-in-but opacifications, not entirely sure that this is not resolving inflammation form recent pneumonia treated as outpatient   - c/w lasix to 40 IV qd  - Strict I/Os, daily weights  - TTE 75%, trace pericardial effusion  - sepsis/ PNA treatment as below

## 2023-01-18 NOTE — DISCHARGE NOTE NURSING/CASE MANAGEMENT/SOCIAL WORK - NSDCPEFALRISK_GEN_ALL_CORE
For information on Fall & Injury Prevention, visit: [email or web address] OR  [email or web address] OR  https://www.cdc.gov/steadi/patient.html For information on Fall & Injury Prevention, visit: [email or web address] OR  [email or web address] OR  https://www.cdc.gov/steadi/patient.html For information on Fall & Injury Prevention, visit: [email or web address] OR  [email or web address] OR  https://www.cdc.gov/steadi/patient.html

## 2023-01-18 NOTE — PROGRESS NOTE ADULT - PROBLEM SELECTOR PLAN 1
Hypo-osmolar Hyponatremia due to high ADH state from CHF & possible NSAID use.  SNa on arrival 1/14 was 117 which increased to 121 the same day, however on day of consult Na decreased to 117. Pt receiving vanco, cefepime & azithro all in D5 since arrival. S/p 8 hours of 3% HTS without improvement. Juanita 16, UOsm 584, SOsm low at 253.     SNa improved to 123 today. Will follow up repeat BMP and decide on further treatment (more HTS vs contine with fluid restriction). C/w lasix 40 IV BID for now. Continue with fluid restriction to <1L/day for now.  BMP q6. Avoid over-correction by >6-8mEQ in 24 hours. Hold celecoxib. Avoid isotonic or hypotonic fluids. Liberalize PO intake of solute and protein    If you have any questions, please feel free to contact me  Jer Tamez  Nephrology Fellow  [phone]; Prefer [employer] TEAMS  (After 5pm or on weekends please page the on-call fellow). Hypo-osmolar Hyponatremia due to high ADH state from CHF & possible NSAID use.  SNa on arrival 1/14 was 117 which increased to 121 the same day, however on day of consult Na decreased to 117. Pt receiving vanco, cefepime & azithro all in D5 since arrival. S/p 8 hours of 3% HTS without improvement. Juanita 16, UOsm 584, SOsm low at 253.     SNa improved to 123 today. Will follow up repeat BMP and decide on further treatment (more HTS vs contine with fluid restriction). C/w lasix 40 IV BID for now. Continue with fluid restriction to <1L/day for now.  BMP q6. Avoid over-correction by >6-8mEQ in 24 hours. Hold celecoxib. Avoid isotonic or hypotonic fluids. Liberalize PO intake of solute and protein    If you have any questions, please feel free to contact me  Jer Tamez  Nephrology Fellow  [phone]; Prefer [employer] TEAMS  (After 5pm or on weekends please page the on-call fellow). Hypo-osmolar Hyponatremia due to high ADH state from CHF & possible NSAID use.  SNa on arrival 1/14 was 117 which increased to 121 the same day, however on day of consult Na decreased to 117. Pt receiving vanco, cefepime & azithro all in D5 since arrival. S/p 8 hours of 3% HTS without improvement. Juanita 16, UOsm 584, SOsm low at 253.     SNa improved to 123 today. Will follow up repeat BMP and decide on further treatment (more HTS vs contine with fluid restriction). C/w lasix 40 IV BID for now. Continue with fluid restriction to <1L/day for now.  BMP q6. Avoid over-correction by >6-8mEQ in 24 hours. Hold celecoxib. Avoid isotonic or hypotonic fluids. Liberalize PO intake of solute and protein    If you have any questions, please feel free to contact me  Jer Tamez  Nephrology Fellow  [phone]; Prefer [employer] TEAMS  (After 5pm or on weekends please page the on-call fellow).

## 2023-01-18 NOTE — PROGRESS NOTE ADULT - PROBLEM SELECTOR PLAN 4
-Patient does meet sepsis criteria on presentation given leukocytosis to greater than 19 and tachypnea; however, no evident fevers, and despite shortness of breath, patient does not have a productive cough   -Elevated serum lactate (cleared within four hours) on initial presentation is suggestive of impaired organ perfusion   -No obvious source of infection if not for pneumonia; although pneumonia suggested on chest xray and ct chest with bilateral tree-in-but opacifications, not entirely sure that this is not resolving inflammation form recent pneumonia treated as outpatient   - BCx NGTD  - MRSA negative  - c/w cefepime

## 2023-01-18 NOTE — PROGRESS NOTE ADULT - SUBJECTIVE AND OBJECTIVE BOX
Patient is a 87y old  Female who presents with a chief complaint of SOB (15 Usama 2023 00:45)    SUBJECTIVE / OVERNIGHT EVENTS: No acute events ON.  Denies fevers, chills, n/v.    MEDICATIONS  (STANDING):  aspirin enteric coated 81 milliGRAM(s) Oral daily  azithromycin  IVPB 500 milliGRAM(s) IV Intermittent every 24 hours  cefepime   IVPB 1000 milliGRAM(s) IV Intermittent every 8 hours  dextrose 5%. 1000 milliLiter(s) (100 mL/Hr) IV Continuous <Continuous>  dextrose 5%. 1000 milliLiter(s) (50 mL/Hr) IV Continuous <Continuous>  dextrose 50% Injectable 25 Gram(s) IV Push once  dextrose 50% Injectable 12.5 Gram(s) IV Push once  dextrose 50% Injectable 25 Gram(s) IV Push once  donepezil 5 milliGRAM(s) Oral at bedtime  enoxaparin Injectable 40 milliGRAM(s) SubCutaneous every 24 hours  folic acid 1 milliGRAM(s) Oral daily  furosemide   Injectable 40 milliGRAM(s) IV Push two times a day  glucagon  Injectable 1 milliGRAM(s) IntraMuscular once  insulin lispro (ADMELOG) corrective regimen sliding scale   SubCutaneous three times a day before meals  insulin lispro (ADMELOG) corrective regimen sliding scale   SubCutaneous at bedtime  senna 2 Tablet(s) Oral at bedtime  simvastatin 20 milliGRAM(s) Oral at bedtime  traZODone 50 milliGRAM(s) Oral at bedtime    MEDICATIONS  (PRN):  dextrose Oral Gel 15 Gram(s) Oral once PRN Blood Glucose LESS THAN 70 milliGRAM(s)/deciliter      CAPILLARY BLOOD GLUCOSE        I&O's Summary      PHYSICAL EXAM:  Vital Signs Last 24 Hrs  T(C): 36.3 (18 Jan 2023 04:33), Max: 36.4 (17 Jan 2023 11:30)  T(F): 97.3 (18 Jan 2023 04:33), Max: 97.5 (17 Jan 2023 11:30)  HR: 108 (18 Jan 2023 04:33) (102 - 108)  BP: 139/64 (18 Jan 2023 04:33) (106/64 - 139/64)  BP(mean): --  RR: 18 (18 Jan 2023 04:33) (18 - 18)  SpO2: 98% (18 Jan 2023 04:33) (98% - 98%)    Parameters below as of 18 Jan 2023 04:33  Patient On (Oxygen Delivery Method): nasal cannula  O2 Flow (L/min): 3      CONSTITUTIONAL: NAD, lying in bed comfortably  RESPIRATORY: Normal respiratory effort; expiratory wheezing noted on exam in R lung anterior lung field.   CARDIOVASCULAR: Regular rate and rhythm, normal S1 and S2, no murmur/rub/gallop  ABDOMEN: Soft, nondistended, nontender to palpation, normoactive bowel sounds, no rebound/guarding  MUSCULOSKELETAL: no joint swelling or tenderness to palpation, FROM all extremities  NEURO: awake, AAOx1-2   EXTREMITIES: no pedal edema        LABS:                        10.3   24.49 )-----------( 128      ( 18 Jan 2023 06:33 )             30.7     01-18    123<L>  |  87<L>  |  18  ----------------------------<  118<H>  3.9   |  25  |  0.57    Ca    8.1<L>      18 Jan 2023 06:33  Phos  2.3     01-18  Mg     2.3     01-18    TPro  6.0  /  Alb  2.8<L>  /  TBili  0.8  /  DBili  x   /  AST  88<H>  /  ALT  56<H>  /  AlkPhos  1011<H>  01-18

## 2023-01-18 NOTE — PROGRESS NOTE ADULT - PROBLEM SELECTOR PLAN 8
-Right lobe thyroid nodule noted incidentally on ct scan of abdomen and pelvis   -Will require dedicated thyroid ultrasound and outpatient follow-up for consideration of biopsy vs. close follow-up  - f/u TSH, T3, T4

## 2023-01-18 NOTE — PROGRESS NOTE ADULT - ATTENDING COMMENTS
87F who is followed for hypertension, diabetes mellitus, and dementia and who has a history of breast and colon cancers (now in remission) who is admitted for evaluation and management of persistent shortness of breath, refractory to outpatient antibiotic therapy for community acquired pneumonia, in the context of bilateral pleural effusions, lower extremity edema, and an elevated serum bnp--most consistent with a diagnosis of acute decompensated heart failure of uncertain etiology.     PE: Frail elderly lady in [place]. Lungs CTA, S1S2. Abd soft nontender. LE: no edema.     : [id number]: as per the son and daughter in law, states patient has been declining for the past few months. We discussed patient has lesions of the skull, which can from malignancy either a primary or more likely a secondary source (either from the breast or colon). Family would like to talk together, waiting for sister, regarding GOC and workup for malignancy.     Plan:   -C/w Cefepime for PNA, broaden antibiotics if patient clinically worsens. Maintain supplemental O2   -Hyponatremia, will give hypertonic saline and monitor BMP closely. Avoid overcorrection >6-8mEQ in 24 hours. Nephro following  -AFib: CHAVASC score of 3, on Eliquis 2.5mg BID   -Will continue with GOC with family 87F who is followed for hypertension, diabetes mellitus, and dementia and who has a history of breast and colon cancers (now in remission) who is admitted for evaluation and management of persistent shortness of breath, refractory to outpatient antibiotic therapy for community acquired pneumonia, in the context of bilateral pleural effusions, lower extremity edema, and an elevated serum bnp--most consistent with a diagnosis of acute decompensated heart failure of uncertain etiology.     PE: Frail elderly lady in [place]. Lungs CTA, S1S2. Abd soft nontender. LE: no edema.     : [id number]: as per the son and daughter in law, states patient has been declining for the past few months. We discussed patient has lesions of the skull, which can from malignancy either a primary or more likely a secondary source (either from the breast or colon). Family would like to talk together, waiting for sister, regarding GOC and workup for malignancy.     Plan:   -C/w Cefepime for PNA, broaden antibiotics if patient clinically worsens. Maintain supplemental O2   -Hyponatremia, will give hypertonic saline and monitor BMP closely. Avoid overcorrection >6-8mEQ in 24 hours. Nephro following  -AFib: CHAVASC score of 3, on Eliquis 2.5mg BID   -Will continue with GOC with family 87F who is followed for hypertension, diabetes mellitus, and dementia and who has a history of breast and colon cancers (now in remission) who is admitted for evaluation and management of persistent shortness of breath, refractory to outpatient antibiotic therapy for community acquired pneumonia, in the context of bilateral pleural effusions, lower extremity edema, and an elevated serum bnp--most consistent with a diagnosis of acute decompensated heart failure of uncertain etiology.     PE: Frail elderly lady in [place]. Lungs CTA, S1S2. Abd soft nontender. LE: no edema.     : [id number]: as per the son and daughter in law, states patient has been declining for the past few months. We discussed patient has lesions of the skull, which can from malignancy either a primary or more likely a secondary source (either from the breast or colon). Family would like to talk together, waiting for sister, regarding GOC and workup for malignancy.     Plan:   -C/w Cefepime for PNA, broaden antibiotics if patient clinically worsens. Maintain supplemental O2   -Hyponatremia, will give hypertonic saline and monitor BMP closely. Avoid overcorrection >6-8mEQ in 24 hours. Nephro following  -AFib: CHAVASC score of 3, on Eliquis 2.5mg BID   -Will continue with GOC with family

## 2023-01-18 NOTE — PROGRESS NOTE ADULT - PROBLEM SELECTOR PLAN 6
-Elevated serum ast and alkaline phosphatase noted   -Suspect that this is most likely secondary to fluid overload in setting of acute decompensated heart failure   -Will manage with diuretics and will follow serum liver enzymes daily   - RUQ US showing ?7mm calculus in mid common bile duct.   - GGT wnl suggestive that elevated Alk phos not biliary in etiology, possibly from bone.   -  skeletal survey w/ questionable skull lesion  - f/u [facility]  - f/u MRCP

## 2023-01-18 NOTE — PROGRESS NOTE ADULT - PROBLEM SELECTOR PLAN 1
-Serum sodium level noted to measure 121; suspect that this is secondary to hypervolemic hyponatremia in context of acute decompensated heart failure but no urine studies yet to confirm this   - Worsening to 117 today, despite lasix IV BID  -Will obtain urine osmolarity and urine sodium level to confirm suspicion of hypervolemic hyponatremia   - c/w lasix  - PO fluid restrict, salt tabs  - f/u renal recs

## 2023-01-18 NOTE — PROGRESS NOTE ADULT - PROBLEM SELECTOR PLAN 5
-Patient presents with persistent shortness of breath and fevers noted at home (although none noted in hospital) despite outpatient treatment for community acquired pneumonia  -Although pneumonia suggested on chest xray and ct chest with bilateral tree-in-but opacifications, not entirely sure that this is not resolving inflammation form recent pneumonia treated as outpatient   -Will manage as for community-acquired pneumonia at this time; patient already received five-day course of antibiotics on outpatient side;   - Legionella negative, will d/c azithromycin (1/15-1/16)  - Strep pneumo negative  - C/w cefepime  - wean down O2 as tolerated

## 2023-01-18 NOTE — PROGRESS NOTE ADULT - SUBJECTIVE AND OBJECTIVE BOX
[facility] Division of Kidney Diseases & Hypertension  FOLLOW UP NOTE  [phone]--------------------------------------------------------------------------------  Chief Complaint:Pneumonia due to infectious organism    87-year-old with PMH of hypertension, diabetes mellitus, dementia, history of breast and colon cancers (now in remission) who is admitted for evaluation of persistent SOB after completing therapy for CAP. Currently being treated for acute decompensated CHF. CT chest with b/l patchy opacities PNA vs plum edema & small b/l pleural effusions. BNP 2K. Nephrology called for hyponatremia. SNa in 2021 was 140. No SNa on [facility] after that.  SNa on arrival 1/14 was 117 which increased to 121 the same day. Now SNa decreased to 117 today. Pt receiving vanco, cefepime & azithro all in D5 since arrival. Also received 250cc NS. Pt on lasix 40 mg IV daily and on salt tabs on day of consult      Interval History:  Spoke to daughter at bedside with a Cantonese . Daughter reports patient appeared confused and weak yesterday. Today, unable to assess as patient had not woken up completely and had not tried to walk.  Sna improved today to 123 with HTS 3% yesterday ~7 hours of 30/hour and 2% 100 cc bolus.    PAST HISTORY  --------------------------------------------------------------------------------  No significant changes to PMH, PSH, FHx, SHx, unless otherwise noted    ALLERGIES & MEDICATIONS  --------------------------------------------------------------------------------  Allergies    No Known Allergies    Intolerances      Standing Inpatient Medications  albuterol/ipratropium for Nebulization 3 milliLiter(s) Nebulizer every 6 hours  apixaban 2.5 milliGRAM(s) Oral every 12 hours  aspirin enteric coated 81 milliGRAM(s) Oral daily  cefepime   IVPB 1000 milliGRAM(s) IV Intermittent every 8 hours  dextrose 5%. 1000 milliLiter(s) IV Continuous <Continuous>  dextrose 5%. 1000 milliLiter(s) IV Continuous <Continuous>  dextrose 50% Injectable 25 Gram(s) IV Push once  dextrose 50% Injectable 12.5 Gram(s) IV Push once  dextrose 50% Injectable 25 Gram(s) IV Push once  donepezil 5 milliGRAM(s) Oral at bedtime  folic acid 1 milliGRAM(s) Oral daily  furosemide   Injectable 40 milliGRAM(s) IV Push two times a day  glucagon  Injectable 1 milliGRAM(s) IntraMuscular once  insulin lispro (ADMELOG) corrective regimen sliding scale   SubCutaneous three times a day before meals  insulin lispro (ADMELOG) corrective regimen sliding scale   SubCutaneous at bedtime  metoprolol tartrate 25 milliGRAM(s) Oral three times a day  potassium phosphate / sodium phosphate Powder (PHOS-NaK) 1 Packet(s) Oral every 6 hours  senna 2 Tablet(s) Oral at bedtime  simvastatin 20 milliGRAM(s) Oral at bedtime  traZODone 50 milliGRAM(s) Oral at bedtime    PRN Inpatient Medications  dextrose Oral Gel 15 Gram(s) Oral once PRN      REVIEW OF SYSTEMS  --------------------------------------------------------------------------------  Unable to obtain given clinical status.    All other systems were reviewed and are negative, except as noted.    VITALS/PHYSICAL EXAM  --------------------------------------------------------------------------------  T(C): 36.6 (01-18-23 @ 11:45), Max: 36.6 (01-18-23 @ 11:45)  HR: 113 (01-18-23 @ 11:45) (107 - 113)  BP: 116/79 (01-18-23 @ 11:45) (116/79 - 139/64)  RR: 18 (01-18-23 @ 11:45) (18 - 18)  SpO2: 98% (01-18-23 @ 11:45) (98% - 98%)  Wt(kg): --        01-17-23 @ 07:01  -  01-18-23 @ 07:00  --------------------------------------------------------  IN: 180 mL / OUT: 401 mL / NET: -221 mL      Physical Exam:  	Gen: elderly, laying in bed in no acute distress  	HEENT: Anicteric, MMM, no JVD  	Pulm: CTA b/l  	CV: S1S2, no rub  	Abd: Soft, +BS          No presacral edema  	Ext: No LE edema B/L  	Neuro: Awake  	Skin: Warm and dry  	Vascular access: none      LABS/STUDIES  --------------------------------------------------------------------------------              10.3   24.49 >-----------<  128      [01-18-23 @ 06:33]              30.7     123  |  87  |  18  ----------------------------<  118      [01-18-23 @ 06:33]  3.9   |  25  |  0.57        Ca     8.1     [01-18-23 @ 06:33]      Mg     2.3     [01-18-23 @ 06:33]      Phos  2.3     [01-18-23 @ 06:33]    TPro  6.0  /  Alb  2.8  /  TBili  0.8  /  DBili  x   /  AST  88  /  ALT  56  /  AlkPhos  1011  [01-18-23 @ 06:33]        Uric acid 3.7      [01-16-23 @ 11:59]        [01-17-23 @ 07:08]  Serum Osmolality 253      [01-16-23 @ 11:57]    Creatinine Trend:  SCr 0.57 [01-18 @ 06:33]  SCr 0.58 [01-18 @ 02:48]  SCr 0.76 [01-17 @ 20:32]  SCr 0.48 [01-17 @ 14:35]  SCr 0.41 [01-17 @ 13:27]    Urinalysis - [01-14-23 @ 21:40]      Color Yellow / Appearance Clear / SG 1.023 / pH 6.0      Gluc Negative / Ketone Negative  / Bili Negative / Urobili 3 mg/dL       Blood Moderate / Protein 30 mg/dL / Leuk Est Negative / Nitrite Negative      RBC 21 / WBC 4 / Hyaline 3 / Gran  / Sq Epi  / Non Sq Epi 2 / Bacteria Negative    Urine Creatinine 56      [01-17-23 @ 10:51]  Urine Protein 34      [01-17-23 @ 10:51]  Urine Sodium 31      [01-17-23 @ 10:51]  Urine Urea Nitrogen 519      [01-17-23 @ 10:49]  Urine Potassium 39      [01-17-23 @ 10:51]  Urine Chloride 52      [01-17-23 @ 10:51]  Urine Osmolality 379      [01-17-23 @ 10:52]    TSH 0.10      [01-18-23 @ 06:33]       [facility] Division of Kidney Diseases & Hypertension  FOLLOW UP NOTE  [phone]--------------------------------------------------------------------------------  Chief Complaint:Pneumonia due to infectious organism    87-year-old with PMH of hypertension, diabetes mellitus, dementia, history of breast and colon cancers (now in remission) who is admitted for evaluation of persistent SOB after completing therapy for CAP. Currently being treated for acute decompensated CHF. CT chest with b/l patchy opacities PNA vs plum edema & small b/l pleural effusions. BNP 2K. Nephrology called for hyponatremia. SNa in 2021 was 140. No SNa on [facility] after that.  SNa on arrival 1/14 was 117 which increased to 121 the same day. Now SNa decreased to 117 today. Pt receiving vanco, cefepime & azithro all in D5 since arrival. Also received 250cc NS. Pt on lasix 40 mg IV daily and on salt tabs on day of consult      Interval History:  Spoke to daughter at bedside with a Cantonese . Daughter reports patient appeared confused and weak yesterday. Today, unable to assess as patient had not woken up completely and had not tried to walk.  Sna improved today to 123 with HTS 3% yesterday ~7 hours of 30/hour and 2% 100 cc bolus.    PAST HISTORY  --------------------------------------------------------------------------------  No significant changes to PMH, PSH, FHx, SHx, unless otherwise noted    ALLERGIES & MEDICATIONS  --------------------------------------------------------------------------------  Allergies    No Known Allergies    Intolerances      Standing Inpatient Medications  albuterol/ipratropium for Nebulization 3 milliLiter(s) Nebulizer every 6 hours  apixaban 2.5 milliGRAM(s) Oral every 12 hours  aspirin enteric coated 81 milliGRAM(s) Oral daily  cefepime   IVPB 1000 milliGRAM(s) IV Intermittent every 8 hours  dextrose 5%. 1000 milliLiter(s) IV Continuous <Continuous>  dextrose 5%. 1000 milliLiter(s) IV Continuous <Continuous>  dextrose 50% Injectable 25 Gram(s) IV Push once  dextrose 50% Injectable 12.5 Gram(s) IV Push once  dextrose 50% Injectable 25 Gram(s) IV Push once  donepezil 5 milliGRAM(s) Oral at bedtime  folic acid 1 milliGRAM(s) Oral daily  furosemide   Injectable 40 milliGRAM(s) IV Push two times a day  glucagon  Injectable 1 milliGRAM(s) IntraMuscular once  insulin lispro (ADMELOG) corrective regimen sliding scale   SubCutaneous three times a day before meals  insulin lispro (ADMELOG) corrective regimen sliding scale   SubCutaneous at bedtime  metoprolol tartrate 25 milliGRAM(s) Oral three times a day  potassium phosphate / sodium phosphate Powder (PHOS-NaK) 1 Packet(s) Oral every 6 hours  senna 2 Tablet(s) Oral at bedtime  simvastatin 20 milliGRAM(s) Oral at bedtime  traZODone 50 milliGRAM(s) Oral at bedtime    PRN Inpatient Medications  dextrose Oral Gel 15 Gram(s) Oral once PRN      REVIEW OF SYSTEMS  --------------------------------------------------------------------------------  Unable to obtain given clinical status.    All other systems were reviewed and are negative, except as noted.    VITALS/PHYSICAL EXAM  --------------------------------------------------------------------------------  T(C): 36.6 (01-18-23 @ 11:45), Max: 36.6 (01-18-23 @ 11:45)  HR: 113 (01-18-23 @ 11:45) (107 - 113)  BP: 116/79 (01-18-23 @ 11:45) (116/79 - 139/64)  RR: 18 (01-18-23 @ 11:45) (18 - 18)  SpO2: 98% (01-18-23 @ 11:45) (98% - 98%)  Wt(kg): --        01-17-23 @ 07:01  -  01-18-23 @ 07:00  --------------------------------------------------------  IN: 180 mL / OUT: 401 mL / NET: -221 mL      Physical Exam:  	Gen: elderly, laying in bed in no acute distress  	HEENT: Anicteric, MMM, no JVD  	Pulm: CTA b/l  	CV: S1S2, no rub  	Abd: Soft, +BS          No presacral edema  	Ext: No LE edema B/L  	Neuro: Awake  	Skin: Warm and dry  	Vascular access: none      LABS/STUDIES  --------------------------------------------------------------------------------              10.3   24.49 >-----------<  128      [01-18-23 @ 06:33]              30.7     123  |  87  |  18  ----------------------------<  118      [01-18-23 @ 06:33]  3.9   |  25  |  0.57        Ca     8.1     [01-18-23 @ 06:33]      Mg     2.3     [01-18-23 @ 06:33]      Phos  2.3     [01-18-23 @ 06:33]    TPro  6.0  /  Alb  2.8  /  TBili  0.8  /  DBili  x   /  AST  88  /  ALT  56  /  AlkPhos  1011  [01-18-23 @ 06:33]        Uric acid 3.7      [01-16-23 @ 11:59]        [01-17-23 @ 07:08]  Serum Osmolality 253      [01-16-23 @ 11:57]    Creatinine Trend:  SCr 0.57 [01-18 @ 06:33]  SCr 0.58 [01-18 @ 02:48]  SCr 0.76 [01-17 @ 20:32]  SCr 0.48 [01-17 @ 14:35]  SCr 0.41 [01-17 @ 13:27]    Urinalysis - [01-14-23 @ 21:40]      Color Yellow / Appearance Clear / SG 1.023 / pH 6.0      Gluc Negative / Ketone Negative  / Bili Negative / Urobili 3 mg/dL       Blood Moderate / Protein 30 mg/dL / Leuk Est Negative / Nitrite Negative      RBC 21 / WBC 4 / Hyaline 3 / Gran  / Sq Epi  / Non Sq Epi 2 / Bacteria Negative    Urine Creatinine 56      [01-17-23 @ 10:51]  Urine Protein 34      [01-17-23 @ 10:51]  Urine Sodium 31      [01-17-23 @ 10:51]  Urine Urea Nitrogen 519      [01-17-23 @ 10:49]  Urine Potassium 39      [01-17-23 @ 10:51]  Urine Chloride 52      [01-17-23 @ 10:51]  Urine Osmolality 379      [01-17-23 @ 10:52]    TSH 0.10      [01-18-23 @ 06:33]       [facility] Division of Kidney Diseases & Hypertension  FOLLOW UP NOTE  [phone]--------------------------------------------------------------------------------  Chief Complaint:Pneumonia due to infectious organism    87-year-old with PMH of hypertension, diabetes mellitus, dementia, history of breast and colon cancers (now in remission) who is admitted for evaluation of persistent SOB after completing therapy for CAP. Currently being treated for acute decompensated CHF. CT chest with b/l patchy opacities PNA vs plum edema & small b/l pleural effusions. BNP 2K. Nephrology called for hyponatremia. SNa in 2021 was 140. No SNa on [facility] after that.  SNa on arrival 1/14 was 117 which increased to 121 the same day. Now SNa decreased to 117 today. Pt receiving vanco, cefepime & azithro all in D5 since arrival. Also received 250cc NS. Pt on lasix 40 mg IV daily and on salt tabs on day of consult      Interval History:  Spoke to daughter at bedside with a Cantonese . Daughter reports patient appeared confused and weak yesterday. Today, unable to assess as patient had not woken up completely and had not tried to walk.  Sna improved today to 123 with HTS 3% yesterday ~7 hours of 30/hour and 2% 100 cc bolus.    PAST HISTORY  --------------------------------------------------------------------------------  No significant changes to PMH, PSH, FHx, SHx, unless otherwise noted    ALLERGIES & MEDICATIONS  --------------------------------------------------------------------------------  Allergies    No Known Allergies    Intolerances      Standing Inpatient Medications  albuterol/ipratropium for Nebulization 3 milliLiter(s) Nebulizer every 6 hours  apixaban 2.5 milliGRAM(s) Oral every 12 hours  aspirin enteric coated 81 milliGRAM(s) Oral daily  cefepime   IVPB 1000 milliGRAM(s) IV Intermittent every 8 hours  dextrose 5%. 1000 milliLiter(s) IV Continuous <Continuous>  dextrose 5%. 1000 milliLiter(s) IV Continuous <Continuous>  dextrose 50% Injectable 25 Gram(s) IV Push once  dextrose 50% Injectable 12.5 Gram(s) IV Push once  dextrose 50% Injectable 25 Gram(s) IV Push once  donepezil 5 milliGRAM(s) Oral at bedtime  folic acid 1 milliGRAM(s) Oral daily  furosemide   Injectable 40 milliGRAM(s) IV Push two times a day  glucagon  Injectable 1 milliGRAM(s) IntraMuscular once  insulin lispro (ADMELOG) corrective regimen sliding scale   SubCutaneous three times a day before meals  insulin lispro (ADMELOG) corrective regimen sliding scale   SubCutaneous at bedtime  metoprolol tartrate 25 milliGRAM(s) Oral three times a day  potassium phosphate / sodium phosphate Powder (PHOS-NaK) 1 Packet(s) Oral every 6 hours  senna 2 Tablet(s) Oral at bedtime  simvastatin 20 milliGRAM(s) Oral at bedtime  traZODone 50 milliGRAM(s) Oral at bedtime    PRN Inpatient Medications  dextrose Oral Gel 15 Gram(s) Oral once PRN      REVIEW OF SYSTEMS  --------------------------------------------------------------------------------  Unable to obtain given clinical status.    All other systems were reviewed and are negative, except as noted.    VITALS/PHYSICAL EXAM  --------------------------------------------------------------------------------  T(C): 36.6 (01-18-23 @ 11:45), Max: 36.6 (01-18-23 @ 11:45)  HR: 113 (01-18-23 @ 11:45) (107 - 113)  BP: 116/79 (01-18-23 @ 11:45) (116/79 - 139/64)  RR: 18 (01-18-23 @ 11:45) (18 - 18)  SpO2: 98% (01-18-23 @ 11:45) (98% - 98%)  Wt(kg): --        01-17-23 @ 07:01  -  01-18-23 @ 07:00  --------------------------------------------------------  IN: 180 mL / OUT: 401 mL / NET: -221 mL      Physical Exam:  	Gen: elderly, laying in bed in no acute distress  	HEENT: Anicteric, MMM, no JVD  	Pulm: CTA b/l  	CV: S1S2, no rub  	Abd: Soft, +BS          No presacral edema  	Ext: No LE edema B/L  	Neuro: Awake  	Skin: Warm and dry  	Vascular access: none      LABS/STUDIES  --------------------------------------------------------------------------------              10.3   24.49 >-----------<  128      [01-18-23 @ 06:33]              30.7     123  |  87  |  18  ----------------------------<  118      [01-18-23 @ 06:33]  3.9   |  25  |  0.57        Ca     8.1     [01-18-23 @ 06:33]      Mg     2.3     [01-18-23 @ 06:33]      Phos  2.3     [01-18-23 @ 06:33]    TPro  6.0  /  Alb  2.8  /  TBili  0.8  /  DBili  x   /  AST  88  /  ALT  56  /  AlkPhos  1011  [01-18-23 @ 06:33]        Uric acid 3.7      [01-16-23 @ 11:59]        [01-17-23 @ 07:08]  Serum Osmolality 253      [01-16-23 @ 11:57]    Creatinine Trend:  SCr 0.57 [01-18 @ 06:33]  SCr 0.58 [01-18 @ 02:48]  SCr 0.76 [01-17 @ 20:32]  SCr 0.48 [01-17 @ 14:35]  SCr 0.41 [01-17 @ 13:27]    Urinalysis - [01-14-23 @ 21:40]      Color Yellow / Appearance Clear / SG 1.023 / pH 6.0      Gluc Negative / Ketone Negative  / Bili Negative / Urobili 3 mg/dL       Blood Moderate / Protein 30 mg/dL / Leuk Est Negative / Nitrite Negative      RBC 21 / WBC 4 / Hyaline 3 / Gran  / Sq Epi  / Non Sq Epi 2 / Bacteria Negative    Urine Creatinine 56      [01-17-23 @ 10:51]  Urine Protein 34      [01-17-23 @ 10:51]  Urine Sodium 31      [01-17-23 @ 10:51]  Urine Urea Nitrogen 519      [01-17-23 @ 10:49]  Urine Potassium 39      [01-17-23 @ 10:51]  Urine Chloride 52      [01-17-23 @ 10:51]  Urine Osmolality 379      [01-17-23 @ 10:52]    TSH 0.10      [01-18-23 @ 06:33]

## 2023-01-18 NOTE — DISCHARGE NOTE NURSING/CASE MANAGEMENT/SOCIAL WORK - PATIENT PORTAL LINK FT
You can access the FollowMyHealth Patient Portal offered by [facility] by registering at the following website: [email or web address]. By joining [employer]’s FollowMyHealth portal, you will also be able to view your health information using other applications (apps) compatible with our system. You can access the FollowMyHealth Patient Portal offered by [facility] by registering at the following website: [email or web address]. By joining [employer]’s FollowMyHealth portal, you will also be able to view your health information using other applications (apps) compatible with our system. You can access the FollowMyHealth Patient Portal offered by [facility] by registering at the following website: [email or web address]. By joining [employer]’s FollowMyHealth portal, you will also be able to view your health information using other applications (apps) compatible with our system.

## 2023-01-18 NOTE — PROGRESS NOTE ADULT - PROBLEM SELECTOR PLAN 3
-Persistent shortness of breath refractory to antibiotic therapy with evident bilateral pleural effusions, jugular venous distension, and lower extremity edema and with an elevated serum bnp level   -Most likely that persistent shortness of breath is not secondary to an infectious etiology but rather to acute decompensated heart failure   -Etiology of heart failure is uncertain at this time; tte as outpatient in 2021 demonstrated impaired diastolic function but normal systolic function; patient has no known history of coronary artery disease; however, patient did receive chemotherapy and radiation for management of breast cancer; unknown if this was adriamycin-containing regimen   - c/w lasix to 40 IV qd  - Strict I/Os, daily weights  - TTE 75%, mild pericardial effusion

## 2023-01-18 NOTE — PROGRESS NOTE ADULT - PROBLEM SELECTOR PLAN 7
Noted to be in Afib with HR to 130s  - Increased home lopressor 25mg BID to TID with hold parameters  - pt not on AC at home, unclear reason why  - per family, pt w/ hematuria 5 years ago, AC was held, and never restarted. Hematuria self-resolved, no intervention/transfusion was needed.  - Chadsvasc 3  - Will start Eliquis   - f/u TTE

## 2023-01-19 LAB
ALBUMIN SERPL ELPH-MCNC: 2.7 G/DL — LOW (ref 3.3–5)
ALP SERPL-CCNC: 932 U/L — HIGH (ref 40–120)
ALT FLD-CCNC: 75 U/L — HIGH (ref 10–45)
ANION GAP SERPL CALC-SCNC: 10 MMOL/L — SIGNIFICANT CHANGE UP (ref 5–17)
ANION GAP SERPL CALC-SCNC: 11 MMOL/L — SIGNIFICANT CHANGE UP (ref 5–17)
AST SERPL-CCNC: 92 U/L — HIGH (ref 10–40)
BASE EXCESS BLDV CALC-SCNC: 5.3 MMOL/L — HIGH (ref -2–3)
BILIRUB SERPL-MCNC: 0.9 MG/DL — SIGNIFICANT CHANGE UP (ref 0.2–1.2)
BUN SERPL-MCNC: 17 MG/DL — SIGNIFICANT CHANGE UP (ref 7–23)
BUN SERPL-MCNC: 19 MG/DL — SIGNIFICANT CHANGE UP (ref 7–23)
CA-I SERPL-SCNC: 1.11 MMOL/L — LOW (ref 1.15–1.33)
CALCIUM SERPL-MCNC: 7.8 MG/DL — LOW (ref 8.4–10.5)
CALCIUM SERPL-MCNC: 8 MG/DL — LOW (ref 8.4–10.5)
CALCIUM SERPL-MCNC: 8.5 MG/DL — SIGNIFICANT CHANGE UP (ref 8.4–10.5)
CHLORIDE BLDV-SCNC: 88 MMOL/L — LOW (ref 96–108)
CHLORIDE SERPL-SCNC: 88 MMOL/L — LOW (ref 96–108)
CHLORIDE SERPL-SCNC: 89 MMOL/L — LOW (ref 96–108)
CHLORIDE SERPL-SCNC: 91 MMOL/L — LOW (ref 96–108)
CO2 BLDV-SCNC: 34 MMOL/L — HIGH (ref 22–26)
CO2 SERPL-SCNC: 28 MMOL/L — SIGNIFICANT CHANGE UP (ref 22–31)
CO2 SERPL-SCNC: 29 MMOL/L — SIGNIFICANT CHANGE UP (ref 22–31)
CO2 SERPL-SCNC: 30 MMOL/L — SIGNIFICANT CHANGE UP (ref 22–31)
CREAT SERPL-MCNC: 0.51 MG/DL — SIGNIFICANT CHANGE UP (ref 0.5–1.3)
CREAT SERPL-MCNC: 0.58 MG/DL — SIGNIFICANT CHANGE UP (ref 0.5–1.3)
CREAT SERPL-MCNC: 0.86 MG/DL — SIGNIFICANT CHANGE UP (ref 0.5–1.3)
EGFR: 66 ML/MIN/1.73M2 — SIGNIFICANT CHANGE UP
EGFR: 89 ML/MIN/1.73M2 — SIGNIFICANT CHANGE UP
EGFR: 91 ML/MIN/1.73M2 — SIGNIFICANT CHANGE UP
GAS PNL BLDV: 124 MMOL/L — LOW (ref 136–145)
GAS PNL BLDV: SIGNIFICANT CHANGE UP
GLUCOSE BLDC GLUCOMTR-MCNC: 105 MG/DL — HIGH (ref 70–99)
GLUCOSE BLDC GLUCOMTR-MCNC: 112 MG/DL — HIGH (ref 70–99)
GLUCOSE BLDC GLUCOMTR-MCNC: 114 MG/DL — HIGH (ref 70–99)
GLUCOSE BLDC GLUCOMTR-MCNC: 162 MG/DL — HIGH (ref 70–99)
GLUCOSE BLDV-MCNC: 117 MG/DL — HIGH (ref 70–99)
GLUCOSE SERPL-MCNC: 117 MG/DL — HIGH (ref 70–99)
GLUCOSE SERPL-MCNC: 159 MG/DL — HIGH (ref 70–99)
GLUCOSE SERPL-MCNC: 91 MG/DL — SIGNIFICANT CHANGE UP (ref 70–99)
HCO3 BLDV-SCNC: 32 MMOL/L — HIGH (ref 22–29)
HCT VFR BLD CALC: 33 % — LOW (ref 34.5–45)
HCT VFR BLDA CALC: 35 % — SIGNIFICANT CHANGE UP (ref 34.5–46.5)
HGB BLD CALC-MCNC: 11.5 G/DL — LOW (ref 11.7–16.1)
HGB BLD-MCNC: 11.1 G/DL — LOW (ref 11.5–15.5)
LACTATE BLDV-MCNC: 1.7 MMOL/L — SIGNIFICANT CHANGE UP (ref 0.5–2)
MAGNESIUM SERPL-MCNC: 2.4 MG/DL — SIGNIFICANT CHANGE UP (ref 1.6–2.6)
MCHC RBC-ENTMCNC: 27.1 PG — SIGNIFICANT CHANGE UP (ref 27–34)
MCHC RBC-ENTMCNC: 33.6 GM/DL — SIGNIFICANT CHANGE UP (ref 32–36)
MCV RBC AUTO: 80.7 FL — SIGNIFICANT CHANGE UP (ref 80–100)
NRBC # BLD: 0 /100 WBCS — SIGNIFICANT CHANGE UP (ref 0–0)
OSMOLALITY UR: 325 MOS/KG — SIGNIFICANT CHANGE UP (ref 300–900)
PCO2 BLDV: 55 MMHG — HIGH (ref 39–42)
PH BLDV: 7.37 — SIGNIFICANT CHANGE UP (ref 7.32–7.43)
PHOSPHATE SERPL-MCNC: 3 MG/DL — SIGNIFICANT CHANGE UP (ref 2.5–4.5)
PLATELET # BLD AUTO: 147 K/UL — LOW (ref 150–400)
PO2 BLDV: 82 MMHG — HIGH (ref 25–45)
POTASSIUM BLDV-SCNC: 3.9 MMOL/L — SIGNIFICANT CHANGE UP (ref 3.5–5.1)
POTASSIUM SERPL-MCNC: 3.9 MMOL/L — SIGNIFICANT CHANGE UP (ref 3.5–5.3)
POTASSIUM SERPL-MCNC: 4 MMOL/L — SIGNIFICANT CHANGE UP (ref 3.5–5.3)
POTASSIUM SERPL-SCNC: 3.9 MMOL/L — SIGNIFICANT CHANGE UP (ref 3.5–5.3)
POTASSIUM SERPL-SCNC: 4 MMOL/L — SIGNIFICANT CHANGE UP (ref 3.5–5.3)
PROT SERPL-MCNC: 6 G/DL — SIGNIFICANT CHANGE UP (ref 6–8.3)
RBC # BLD: 4.09 M/UL — SIGNIFICANT CHANGE UP (ref 3.8–5.2)
RBC # FLD: 14.7 % — HIGH (ref 10.3–14.5)
SAO2 % BLDV: 96.8 % — HIGH (ref 67–88)
SODIUM SERPL-SCNC: 127 MMOL/L — LOW (ref 135–145)
SODIUM SERPL-SCNC: 129 MMOL/L — LOW (ref 135–145)
SODIUM SERPL-SCNC: 130 MMOL/L — LOW (ref 135–145)
SODIUM UR-SCNC: 49 MMOL/L — SIGNIFICANT CHANGE UP
TROPONIN T, HIGH SENSITIVITY RESULT: 16 NG/L — SIGNIFICANT CHANGE UP (ref 0–51)
TROPONIN T, HIGH SENSITIVITY RESULT: 19 NG/L — SIGNIFICANT CHANGE UP (ref 0–51)
WBC # BLD: 23.48 K/UL — HIGH (ref 3.8–10.5)
WBC # FLD AUTO: 23.48 K/UL — HIGH (ref 3.8–10.5)

## 2023-01-19 PROCEDURE — 71045 X-RAY EXAM CHEST 1 VIEW: CPT | Mod: 26

## 2023-01-19 PROCEDURE — 99233 SBSQ HOSP IP/OBS HIGH 50: CPT | Mod: GC

## 2023-01-19 PROCEDURE — 74177 CT ABD & PELVIS W/CONTRAST: CPT | Mod: 26

## 2023-01-19 PROCEDURE — 99222 1ST HOSP IP/OBS MODERATE 55: CPT

## 2023-01-19 PROCEDURE — 99233 SBSQ HOSP IP/OBS HIGH 50: CPT

## 2023-01-19 PROCEDURE — 71275 CT ANGIOGRAPHY CHEST: CPT | Mod: 26

## 2023-01-19 RX ORDER — METOPROLOL TARTRATE 50 MG
5 TABLET ORAL ONCE
Refills: 0 | Status: DISCONTINUED | OUTPATIENT
Start: 2023-01-19 | End: 2023-01-19

## 2023-01-19 RX ORDER — ACETAMINOPHEN 500 MG
650 TABLET ORAL EVERY 6 HOURS
Refills: 0 | Status: DISCONTINUED | OUTPATIENT
Start: 2023-01-19 | End: 2023-01-27

## 2023-01-19 RX ORDER — SODIUM CHLORIDE 5 G/100ML
120 INJECTION, SOLUTION INTRAVENOUS
Refills: 0 | Status: DISCONTINUED | OUTPATIENT
Start: 2023-01-19 | End: 2023-01-20

## 2023-01-19 RX ADMIN — Medication 40 MILLIGRAM(S): at 05:25

## 2023-01-19 RX ADMIN — Medication 1 MILLIGRAM(S): at 13:34

## 2023-01-19 RX ADMIN — DONEPEZIL HYDROCHLORIDE 5 MILLIGRAM(S): 10 TABLET, FILM COATED ORAL at 22:01

## 2023-01-19 RX ADMIN — Medication 3 MILLILITER(S): at 13:35

## 2023-01-19 RX ADMIN — Medication 25 MILLIGRAM(S): at 05:26

## 2023-01-19 RX ADMIN — SODIUM CHLORIDE 30 MILLILITER(S): 5 INJECTION, SOLUTION INTRAVENOUS at 12:16

## 2023-01-19 RX ADMIN — Medication 3 MILLILITER(S): at 05:26

## 2023-01-19 RX ADMIN — CEFEPIME 100 MILLIGRAM(S): 1 INJECTION, POWDER, FOR SOLUTION INTRAMUSCULAR; INTRAVENOUS at 13:34

## 2023-01-19 RX ADMIN — APIXABAN 2.5 MILLIGRAM(S): 2.5 TABLET, FILM COATED ORAL at 18:06

## 2023-01-19 RX ADMIN — Medication 25 MILLIGRAM(S): at 13:34

## 2023-01-19 RX ADMIN — APIXABAN 2.5 MILLIGRAM(S): 2.5 TABLET, FILM COATED ORAL at 05:26

## 2023-01-19 RX ADMIN — Medication 40 MILLIGRAM(S): at 13:34

## 2023-01-19 RX ADMIN — SIMVASTATIN 20 MILLIGRAM(S): 20 TABLET, FILM COATED ORAL at 22:01

## 2023-01-19 RX ADMIN — Medication 3 MILLILITER(S): at 18:06

## 2023-01-19 RX ADMIN — CEFEPIME 100 MILLIGRAM(S): 1 INJECTION, POWDER, FOR SOLUTION INTRAMUSCULAR; INTRAVENOUS at 05:26

## 2023-01-19 RX ADMIN — Medication 1: at 09:32

## 2023-01-19 RX ADMIN — Medication 25 MILLIGRAM(S): at 22:01

## 2023-01-19 RX ADMIN — SENNA PLUS 2 TABLET(S): 8.6 TABLET ORAL at 22:01

## 2023-01-19 RX ADMIN — Medication 81 MILLIGRAM(S): at 13:35

## 2023-01-19 RX ADMIN — CEFEPIME 100 MILLIGRAM(S): 1 INJECTION, POWDER, FOR SOLUTION INTRAMUSCULAR; INTRAVENOUS at 22:39

## 2023-01-19 NOTE — PROGRESS NOTE ADULT - ATTENDING COMMENTS
Katy Zapata MD  Office : [phone]  Contact me on [employer] Teams. Katy Zapata MD  Office : [phone]  Contact me on [employer]. Katy Zapata MD  Office : [phone]  Contact me on [employer].

## 2023-01-19 NOTE — PROGRESS NOTE ADULT - PROBLEM SELECTOR PLAN 1
-Serum sodium level noted to measure 121; suspect that this is secondary to hypervolemic hyponatremia in context of acute decompensated heart failure but no urine studies yet to confirm this   - Worsening to 117 today, despite lasix IV BID  -Will obtain urine osmolarity and urine sodium level to confirm suspicion of hypervolemic hyponatremia   - c/w lasix  - PO fluid restrict, salt tabs  - f/u renal recs -Serum sodium level noted to measure 121 on admission, downtrended to 114 at lowest ; suspect that this is secondary to hypervolemic hyponatremia in context of acute decompensated heart failure vs SIADH.    - Hyponatremia progressed despite lasix IV BID  - urine lytes w. osmolality in 500s, sodium 9-16  - c/w lasix IV BID  - PO fluid restrict, liberalize solutes   - BMP q4  - c/w hypertonic saline PRN  - f/u renal recs

## 2023-01-19 NOTE — PROGRESS NOTE ADULT - CONVERSATION DETAILS
Discussed with the patient's daughter bedside with [facility] josee  [id number] Manjit. Discussed with the daughter that the patient likely has met Discussed with the patient's daughter bedside with [facility] josee  [id number] Manjit. Discussed with the daughter that the patient likely has met Discussed with the patient's daughter bedside with [facility] josee  [id number] Manjit. Discussed with the daughter that the patient likely has met Discussed with the patient's daughter bedside with [facility] diane  [id number] Manjit. Discussed with the daughter that the patient likely has metastatic disease contributing to her current condition. Discussed with the daughter that further workup of malignancy may not result in obtaining treatment, as patient has severe weakness and comorbidities. Also discussed whether patient would want CPR, intubation, mechanical ventilation. The daughter at bedside could not make a decision, but stated she would discuss with other family members and that Kandi, the grand-daughter will get in touch with us regarding this matter.     Later on, spoke to Kandi over the phone. Kandi states that they are discussing the options, and would likely not want very aggressive measures. She would like us to further work up for malignancy with imaging, but to hold off any biopsies or invasive procedures for now. She stated that the family would likely not want intubation or CPR, but needs to further discuss with other family members before making a decision. Will continue with further [facility] discussion tomorrow. Discussed with the patient's daughter bedside with [facility] diane  [id number] Manjit. Discussed with the daughter that the patient likely has metastatic disease contributing to her current condition. Discussed with the daughter that further workup of malignancy may not result in obtaining treatment, as patient has severe weakness and comorbidities. Also discussed whether patient would want CPR, intubation, mechanical ventilation. The daughter at bedside could not make a decision, but stated she would discuss with other family members and that Kandi, the grand-daughter will get in touch with us regarding this matter.     Later on, spoke to Kandi over the phone. Kandi states that they are discussing the options, and would likely not want very aggressive measures. She would like us to further work up for malignancy with imaging, but to hold off any biopsies or invasive procedures for now. She stated that the family would likely not want intubation or CPR, but needs to further discuss with other family members before making a decision. Will continue with further [facility] discussion tomorrow. Discussed with the patient's daughter bedside with [facility] diane  [id number] Manjit. Discussed with the daughter that the patient likely has metastatic disease contributing to her current condition. Discussed with the daughter that further workup of malignancy may not result in obtaining treatment, as patient has severe weakness and comorbidities. Also discussed whether patient would want CPR, intubation, mechanical ventilation. The daughter at bedside could not make a decision, but stated she would discuss with other family members and that Kandi, the grand-daughter will get in touch with us regarding this matter.     Later on, spoke to Kandi over the phone. Kandi states that they are discussing the options, and would likely not want very aggressive measures. She would like us to further work up for malignancy with imaging, but to hold off any biopsies or invasive procedures for now. She stated that the family would likely not want intubation or CPR, but needs to further discuss with other family members before making a decision. Will continue with further [facility] discussion tomorrow.

## 2023-01-19 NOTE — CONSULT NOTE ADULT - SUBJECTIVE AND OBJECTIVE BOX
Patient is a 87y old  Female who presents with a chief complaint of SOB (19 Jan 2023 10:26)    HPI:    87-year-old female with a past medical history Mo significant for hypertension, diabetes, dementia, breast and colon cancer who was admitted to the hospital due to shortness of breath.    Family reports patient had become progressively short of breath over a week prior to admission.  Was seen by primary care, prescribed levofloxacin however due to worsening symptoms brought to the ED.  In addition to shortness of breath, patient was noted to have fevers reportedly to 102 Fahrenheit as well as a dry cough.  Patient also reported some chest discomfort as well as bilateral lower extremity swelling.  Denies falls, trauma.    In the ED, T-max 100.5 Fahrenheit, tachycardic, tachypneic.  Initial labs showed leukocytosis of 20, BMP with hyponatremia, renal function within normal limits, , ALT 42.,  BNP 2522.  Urinalysis did not show evidence for infection.  SARS-CoV-2 negative.  Blood cultures obtained on admission are negative to date.  Urine culture also negative.  CT chest obtained showing tree-in-bud nodules, airspace opacities. Patient was admitted for further management of sepsis, heart failure. Started on cefepime and azithromycin.    Following admission, Legionella negative then azithromycin discontinued, continues on cefepime.  Nephrology consulted for management of hyponatremia.    Patient has been afebrile at this point more than 24 hours, continues to require supplemental oxygen at 3 L.  Latest labs show leukocytosis of 23.4, BMP with improved sodium to 127, renal function within normal limits, ALP 9 3282, AST 92, ALT 75.  CT head obtained on 1/17/2023 shows no acute findings.  Ultrasound of the right upper quadrant on 1/15/2023 shows dilated CBD up to 1 cm, has previous cholecystectomy.            prior hospital charts reviewed [  ]  primary team notes reviewed [  ]  other consultant notes reviewed [  ]    PAST MEDICAL & SURGICAL HISTORY:  Hypertension      Diabetes mellitus      Dementia      Breast cancer      Colon cancer      S/P cholecystectomy      S/P mastectomy, right      H/O hemicolectomy          Allergies  No Known Allergies    ANTIMICROBIALS (past 90 days)  MEDICATIONS  (STANDING):  azithromycin  IVPB   255 mL/Hr IV Intermittent (01-15-23 @ 01:10)    cefepime   IVPB   100 mL/Hr IV Intermittent (01-19-23 @ 13:34)   100 mL/Hr IV Intermittent (01-19-23 @ 05:26)   100 mL/Hr IV Intermittent (01-18-23 @ 21:45)   100 mL/Hr IV Intermittent (01-18-23 @ 14:03)   100 mL/Hr IV Intermittent (01-18-23 @ 05:24)   100 mL/Hr IV Intermittent (01-17-23 @ 21:38)   100 mL/Hr IV Intermittent (01-17-23 @ 13:58)   100 mL/Hr IV Intermittent (01-17-23 @ 07:05)   100 mL/Hr IV Intermittent (01-16-23 @ 22:30)   100 mL/Hr IV Intermittent (01-16-23 @ 15:00)   100 mL/Hr IV Intermittent (01-16-23 @ 05:08)   100 mL/Hr IV Intermittent (01-15-23 @ 23:00)   100 mL/Hr IV Intermittent (01-15-23 @ 13:45)   100 mL/Hr IV Intermittent (01-15-23 @ 05:33)    cefepime   IVPB   100 mL/Hr IV Intermittent (01-14-23 @ 21:00)    vancomycin  IVPB.   250 mL/Hr IV Intermittent (01-14-23 @ 23:00)        cefepime   IVPB 1000 every 8 hours    MEDICATIONS  (STANDING):  acetaminophen     Tablet .. 650 every 6 hours PRN  albuterol/ipratropium for Nebulization 3 every 6 hours  apixaban 2.5 every 12 hours  aspirin enteric coated 81 daily  dextrose 50% Injectable 25 once  dextrose 50% Injectable 12.5 once  dextrose 50% Injectable 25 once  dextrose Oral Gel 15 once PRN  donepezil 5 at bedtime  furosemide   Injectable 40 two times a day  glucagon  Injectable 1 once  insulin lispro (ADMELOG) corrective regimen sliding scale  three times a day before meals  insulin lispro (ADMELOG) corrective regimen sliding scale  at bedtime  metoprolol tartrate 25 three times a day  senna 2 at bedtime  simvastatin 20 at bedtime    SOCIAL HISTORY:       FAMILY HISTORY:  No pertinent family history in first degree relatives      REVIEW OF SYSTEMS  [  ] ROS unobtainable because:    [  ] All other systems negative except as noted below:	    Constitutional:  [ ] fever [ ] chills  [ ] weight loss  [ ] weakness  Skin:  [ ] rash [ ] phlebitis	  Eyes: [ ] icterus [ ] pain  [ ] discharge	  ENMT: [ ] sore throat  [ ] thrush [ ] ulcers [ ] exudates  Respiratory: [ ] dyspnea [ ] hemoptysis [ ] cough [ ] sputum	  Cardiovascular:  [ ] chest pain [ ] palpitations [ ] edema	  Gastrointestinal:  [ ] nausea [ ] vomiting [ ] diarrhea [ ] constipation [ ] pain	  Genitourinary:  [ ] dysuria [ ] frequency [ ] hematuria [ ] discharge [ ] flank pain  [ ] incontinence  Musculoskeletal:  [ ] myalgias [ ] arthralgias [ ] arthritis  [ ] back pain  Neurological:  [ ] headache [ ] seizures  [ ] confusion/altered mental status  Psychiatric:  [ ] anxiety [ ] depression	  Hematology/Lymphatics:  [ ] lymphadenopathy  Endocrine:  [ ] adrenal [ ] thyroid  Allergic/Immunologic:	 [ ] transplant [ ] seasonal    Vital Signs Last 24 Hrs  T(F): 97.5 (01-19-23 @ 12:10), Max: 100.5 (01-14-23 @ 19:00)  Vital Signs Last 24 Hrs  HR: 114 (01-19-23 @ 12:10) (107 - 116)  BP: 107/75 (01-19-23 @ 12:10) (107/75 - 135/81)  RR: 18 (01-19-23 @ 12:10)  SpO2: 100% (01-19-23 @ 12:10) (97% - 100%)  Wt(kg): --    PHYSICAL EXAM:  Constitutional: non-toxic, no distress  HEAD/EYES: anicteric, no conjunctival injection  ENT:  supple, no thrush  Cardiovascular:   normal S1, S2, no murmur, no edema  Respiratory:  clear BS bilaterally, no wheezes, no rales  GI:  soft, non-tender, normal bowel sounds  :  no elias, no CVA tenderness  Musculoskeletal:  no synovitis, normal ROM  Neurologic: awake and alert, normal strength, no focal findings  Skin:  no rash, no erythema, no phlebitis  Heme/Onc: no lymphadenopathy   Psychiatric:  awake, alert, appropriate mood                            11.1   23.48 )-----------( 147      ( 19 Jan 2023 07:17 )             33.0   01-19    127<L>  |  88<L>  |  17  ----------------------------<  117<H>  4.0   |  28  |  0.51    Ca    8.5      19 Jan 2023 07:17  Phos  3.0     01-19  Mg     2.4     01-19    TPro  6.0  /  Alb  2.7<L>  /  TBili  0.9  /  DBili  x   /  AST  92<H>  /  ALT  75<H>  /  AlkPhos  932<H>  01-19    MICROBIOLOGY:  Culture - Urine (collected 14 Jan 2023 21:37)  Source: Clean Catch Clean Catch (Midstream)  Final Report (16 Jan 2023 09:54):    <10,000 CFU/mL Normal Urogenital [place]    Culture - Blood (collected 14 Jan 2023 19:00)  Source: .Blood Blood-Peripheral  Preliminary Report (16 Jan 2023 01:02):    No growth to date.    Culture - Blood (collected 14 Jan 2023 18:45)  Source: .Blood Blood-Peripheral  Preliminary Report (16 Jan 2023 01:02):    No growth to date.              Rapid RVP Result: NotDetec (01-15 @ 01:52)      RADIOLOGY:  imaging below personally reviewed and agree with findings Patient is a 87y old  Female who presents with a chief complaint of SOB (19 Jan 2023 10:26)    HPI:    87-year-old female with a past medical history Mo significant for hypertension, diabetes, dementia, breast and colon cancer who was admitted to the hospital due to shortness of breath.    Family reports patient had become progressively short of breath over a week prior to admission.  Was seen by primary care, prescribed levofloxacin however due to worsening symptoms brought to the ED.  In addition to shortness of breath, patient was noted to have fevers reportedly to 102 Fahrenheit as well as a dry cough.  Patient also reported some chest discomfort as well as bilateral lower extremity swelling.  Denies falls, trauma.    In the ED, T-max 100.5 Fahrenheit, tachycardic, tachypneic.  Initial labs showed leukocytosis of 20, BMP with hyponatremia, renal function within normal limits, , ALT 42.,  BNP 2522.  Urinalysis did not show evidence for infection.  SARS-CoV-2 negative.  Blood cultures obtained on admission are negative to date.  Urine culture also negative.  CT chest obtained showing tree-in-bud nodules, airspace opacities. Patient was admitted for further management of sepsis, heart failure. Started on cefepime and azithromycin.    Following admission, Legionella negative then azithromycin discontinued, continues on cefepime.  Nephrology consulted for management of hyponatremia.    Patient has been afebrile at this point more than 24 hours, continues to require supplemental oxygen at 3 L.  Latest labs show leukocytosis of 23.4, BMP with improved sodium to 127, renal function within normal limits, ALP 9 3282, AST 92, ALT 75.  CT head obtained on 1/17/2023 shows no acute findings.  Ultrasound of the right upper quadrant on 1/15/2023 shows dilated CBD up to 1 cm, has previous cholecystectomy.            prior hospital charts reviewed [ x ]  primary team notes reviewed [x  ]  other consultant notes reviewed [x  ]    PAST MEDICAL & SURGICAL HISTORY:  Hypertension      Diabetes mellitus      Dementia      Breast cancer      Colon cancer      S/P cholecystectomy      S/P mastectomy, right      H/O hemicolectomy          Allergies  No Known Allergies    ANTIMICROBIALS (past 90 days)  MEDICATIONS  (STANDING):  azithromycin  IVPB   255 mL/Hr IV Intermittent (01-15-23 @ 01:10)    cefepime   IVPB   100 mL/Hr IV Intermittent (01-19-23 @ 13:34)   100 mL/Hr IV Intermittent (01-19-23 @ 05:26)   100 mL/Hr IV Intermittent (01-18-23 @ 21:45)   100 mL/Hr IV Intermittent (01-18-23 @ 14:03)   100 mL/Hr IV Intermittent (01-18-23 @ 05:24)   100 mL/Hr IV Intermittent (01-17-23 @ 21:38)   100 mL/Hr IV Intermittent (01-17-23 @ 13:58)   100 mL/Hr IV Intermittent (01-17-23 @ 07:05)   100 mL/Hr IV Intermittent (01-16-23 @ 22:30)   100 mL/Hr IV Intermittent (01-16-23 @ 15:00)   100 mL/Hr IV Intermittent (01-16-23 @ 05:08)   100 mL/Hr IV Intermittent (01-15-23 @ 23:00)   100 mL/Hr IV Intermittent (01-15-23 @ 13:45)   100 mL/Hr IV Intermittent (01-15-23 @ 05:33)    cefepime   IVPB   100 mL/Hr IV Intermittent (01-14-23 @ 21:00)    vancomycin  IVPB.   250 mL/Hr IV Intermittent (01-14-23 @ 23:00)        cefepime   IVPB 1000 every 8 hours    MEDICATIONS  (STANDING):  acetaminophen     Tablet .. 650 every 6 hours PRN  albuterol/ipratropium for Nebulization 3 every 6 hours  apixaban 2.5 every 12 hours  aspirin enteric coated 81 daily  dextrose 50% Injectable 25 once  dextrose 50% Injectable 12.5 once  dextrose 50% Injectable 25 once  dextrose Oral Gel 15 once PRN  donepezil 5 at bedtime  furosemide   Injectable 40 two times a day  glucagon  Injectable 1 once  insulin lispro (ADMELOG) corrective regimen sliding scale  three times a day before meals  insulin lispro (ADMELOG) corrective regimen sliding scale  at bedtime  metoprolol tartrate 25 three times a day  senna 2 at bedtime  simvastatin 20 at bedtime    SOCIAL HISTORY:     Social History:  · Substance use	No  · Social History (marital status, living situation, occupation, and sexual history)	The patient immigrated from [place] about twenty years ago. She lives at home with her daughter, son-in-law, and grandchildren. She has not worked since she moved to the [facility]. She has no known history of alcohol use, tobacco smoking, or illicit drug use.    FAMILY HISTORY:  No pertinent family history in first degree relatives of heart disease      REVIEW OF SYSTEMS  [ x ] ROS unobtainable because: mentation    [  ] All other systems negative except as noted below:	    Constitutional:  [ ] fever [ ] chills  [ ] weight loss  [ ] weakness  Skin:  [ ] rash [ ] phlebitis	  Eyes: [ ] icterus [ ] pain  [ ] discharge	  ENMT: [ ] sore throat  [ ] thrush [ ] ulcers [ ] exudates  Respiratory: [ ] dyspnea [ ] hemoptysis [ ] cough [ ] sputum	  Cardiovascular:  [ ] chest pain [ ] palpitations [ ] edema	  Gastrointestinal:  [ ] nausea [ ] vomiting [ ] diarrhea [ ] constipation [ ] pain	  Genitourinary:  [ ] dysuria [ ] frequency [ ] hematuria [ ] discharge [ ] flank pain  [ ] incontinence  Musculoskeletal:  [ ] myalgias [ ] arthralgias [ ] arthritis  [ ] back pain  Neurological:  [ ] headache [ ] seizures  [ ] confusion/altered mental status  Psychiatric:  [ ] anxiety [ ] depression	  Hematology/Lymphatics:  [ ] lymphadenopathy  Endocrine:  [ ] adrenal [ ] thyroid  Allergic/Immunologic:	 [ ] transplant [ ] seasonal    Vital Signs Last 24 Hrs  T(F): 97.5 (01-19-23 @ 12:10), Max: 100.5 (01-14-23 @ 19:00)  Vital Signs Last 24 Hrs  HR: 114 (01-19-23 @ 12:10) (107 - 116)  BP: 107/75 (01-19-23 @ 12:10) (107/75 - 135/81)  RR: 18 (01-19-23 @ 12:10)  SpO2: 100% (01-19-23 @ 12:10) (97% - 100%)  Wt(kg): --    PHYSICAL EXAM:  Constitutional: ill appearing, responsive however unable to clearly state symptoms  HEAD/EYES: anicteric, no conjunctival injection  ENT:  supple, no thrush  Cardiovascular:   normal S1, S2, no murmur, no edema  Respiratory:  mild diffuse rales  GI:  soft, non-tender, normal bowel sounds  :  no elias, no CVA tenderness  Musculoskeletal:  no synovitis, normal ROM  Neurologic: lethargic  Skin:  no rash, hematoma on left arm  Heme/Onc: no lymphadenopathy   Psychiatric: lethargic                            11.1   23.48 )-----------( 147      ( 19 Jan 2023 07:17 )             33.0   01-19    127<L>  |  88<L>  |  17  ----------------------------<  117<H>  4.0   |  28  |  0.51    Ca    8.5      19 Jan 2023 07:17  Phos  3.0     01-19  Mg     2.4     01-19    TPro  6.0  /  Alb  2.7<L>  /  TBili  0.9  /  DBili  x   /  AST  92<H>  /  ALT  75<H>  /  AlkPhos  932<H>  01-19    MICROBIOLOGY:  Culture - Urine (collected 14 Jan 2023 21:37)  Source: Clean Catch Clean Catch (Midstream)  Final Report (16 Jan 2023 09:54):    <10,000 CFU/mL Normal Urogenital [place]    Culture - Blood (collected 14 Jan 2023 19:00)  Source: .Blood Blood-Peripheral  Preliminary Report (16 Jan 2023 01:02):    No growth to date.    Culture - Blood (collected 14 Jan 2023 18:45)  Source: .Blood Blood-Peripheral  Preliminary Report (16 Jan 2023 01:02):    No growth to date.              Rapid RVP Result: NotDetec (01-15 @ 01:52)      RADIOLOGY:  imaging below personally reviewed and agree with findings Patient is a 87y old  Female who presents with a chief complaint of SOB (19 Jan 2023 10:26)    HPI:    87-year-old female with a past medical history Mo significant for hypertension, diabetes, dementia, breast and colon cancer who was admitted to the hospital due to shortness of breath.    Family reports patient had become progressively short of breath over a week prior to admission.  Was seen by primary care, prescribed levofloxacin however due to worsening symptoms brought to the ED.  In addition to shortness of breath, patient was noted to have fevers reportedly to 102 Fahrenheit as well as a dry cough.  Patient also reported some chest discomfort as well as bilateral lower extremity swelling.  Denies falls, trauma.    In the ED, T-max 100.5 Fahrenheit, tachycardic, tachypneic.  Initial labs showed leukocytosis of 20, BMP with hyponatremia, renal function within normal limits, , ALT 42.,  BNP 2522.  Urinalysis did not show evidence for infection.  SARS-CoV-2 negative.  Blood cultures obtained on admission are negative to date.  Urine culture also negative.  CT chest obtained showing tree-in-bud nodules, airspace opacities. Patient was admitted for further management of sepsis, heart failure. Started on cefepime and azithromycin.    Following admission, Legionella negative then azithromycin discontinued, continues on cefepime.  Nephrology consulted for management of hyponatremia.    Patient has been afebrile at this point more than 24 hours, continues to require supplemental oxygen at 3 L.  Latest labs show leukocytosis of 23.4, BMP with improved sodium to 127, renal function within normal limits, ALP 9 3282, AST 92, ALT 75.  CT head obtained on 1/17/2023 shows no acute findings.  Ultrasound of the right upper quadrant on 1/15/2023 shows dilated CBD up to 1 cm, has previous cholecystectomy.            prior hospital charts reviewed [ x ]  primary team notes reviewed [x  ]  other consultant notes reviewed [x  ]    PAST MEDICAL & SURGICAL HISTORY:  Hypertension      Diabetes mellitus      Dementia      Breast cancer      Colon cancer      S/P cholecystectomy      S/P mastectomy, right      H/O hemicolectomy          Allergies  No Known Allergies    ANTIMICROBIALS (past 90 days)  MEDICATIONS  (STANDING):  azithromycin  IVPB   255 mL/Hr IV Intermittent (01-15-23 @ 01:10)    cefepime   IVPB   100 mL/Hr IV Intermittent (01-19-23 @ 13:34)   100 mL/Hr IV Intermittent (01-19-23 @ 05:26)   100 mL/Hr IV Intermittent (01-18-23 @ 21:45)   100 mL/Hr IV Intermittent (01-18-23 @ 14:03)   100 mL/Hr IV Intermittent (01-18-23 @ 05:24)   100 mL/Hr IV Intermittent (01-17-23 @ 21:38)   100 mL/Hr IV Intermittent (01-17-23 @ 13:58)   100 mL/Hr IV Intermittent (01-17-23 @ 07:05)   100 mL/Hr IV Intermittent (01-16-23 @ 22:30)   100 mL/Hr IV Intermittent (01-16-23 @ 15:00)   100 mL/Hr IV Intermittent (01-16-23 @ 05:08)   100 mL/Hr IV Intermittent (01-15-23 @ 23:00)   100 mL/Hr IV Intermittent (01-15-23 @ 13:45)   100 mL/Hr IV Intermittent (01-15-23 @ 05:33)    cefepime   IVPB   100 mL/Hr IV Intermittent (01-14-23 @ 21:00)    vancomycin  IVPB.   250 mL/Hr IV Intermittent (01-14-23 @ 23:00)        cefepime   IVPB 1000 every 8 hours    MEDICATIONS  (STANDING):  acetaminophen     Tablet .. 650 every 6 hours PRN  albuterol/ipratropium for Nebulization 3 every 6 hours  apixaban 2.5 every 12 hours  aspirin enteric coated 81 daily  dextrose 50% Injectable 25 once  dextrose 50% Injectable 12.5 once  dextrose 50% Injectable 25 once  dextrose Oral Gel 15 once PRN  donepezil 5 at bedtime  furosemide   Injectable 40 two times a day  glucagon  Injectable 1 once  insulin lispro (ADMELOG) corrective regimen sliding scale  three times a day before meals  insulin lispro (ADMELOG) corrective regimen sliding scale  at bedtime  metoprolol tartrate 25 three times a day  senna 2 at bedtime  simvastatin 20 at bedtime    SOCIAL HISTORY:     Social History:  · Substance use	No  · Social History (marital status, living situation, occupation, and sexual history)	The patient immigrated from [place] about twenty years ago. She lives at home with her daughter, son-in-law, and grandchildren. She has not worked since she moved to the [facility]. She has no known history of alcohol use, tobacco smoking, or illicit drug use.    FAMILY HISTORY:  No pertinent family history in first degree relatives of heart disease      REVIEW OF SYSTEMS  [ x ] ROS unobtainable because: mentation    [  ] All other systems negative except as noted below:	    Constitutional:  [ ] fever [ ] chills  [ ] weight loss  [ ] weakness  Skin:  [ ] rash [ ] phlebitis	  Eyes: [ ] icterus [ ] pain  [ ] discharge	  ENMT: [ ] sore throat  [ ] thrush [ ] ulcers [ ] exudates  Respiratory: [ ] dyspnea [ ] hemoptysis [ ] cough [ ] sputum	  Cardiovascular:  [ ] chest pain [ ] palpitations [ ] edema	  Gastrointestinal:  [ ] nausea [ ] vomiting [ ] diarrhea [ ] constipation [ ] pain	  Genitourinary:  [ ] dysuria [ ] frequency [ ] hematuria [ ] discharge [ ] flank pain  [ ] incontinence  Musculoskeletal:  [ ] myalgias [ ] arthralgias [ ] arthritis  [ ] back pain  Neurological:  [ ] headache [ ] seizures  [ ] confusion/altered mental status  Psychiatric:  [ ] anxiety [ ] depression	  Hematology/Lymphatics:  [ ] lymphadenopathy  Endocrine:  [ ] adrenal [ ] thyroid  Allergic/Immunologic:	 [ ] transplant [ ] seasonal    Vital Signs Last 24 Hrs  T(F): 97.5 (01-19-23 @ 12:10), Max: 100.5 (01-14-23 @ 19:00)  Vital Signs Last 24 Hrs  HR: 114 (01-19-23 @ 12:10) (107 - 116)  BP: 107/75 (01-19-23 @ 12:10) (107/75 - 135/81)  RR: 18 (01-19-23 @ 12:10)  SpO2: 100% (01-19-23 @ 12:10) (97% - 100%)  Wt(kg): --    PHYSICAL EXAM:  Constitutional: ill appearing, responsive however unable to clearly state symptoms  HEAD/EYES: anicteric, no conjunctival injection  ENT:  supple, no thrush  Cardiovascular:   normal S1, S2, no murmur, no edema  Respiratory:  mild diffuse rales  GI:  soft, non-tender, normal bowel sounds  :  no elias, no CVA tenderness  Musculoskeletal:  no synovitis, normal ROM  Neurologic: lethargic  Skin:  no rash, hematoma on left arm  Heme/Onc: no lymphadenopathy   Psychiatric: lethargic                            11.1   23.48 )-----------( 147      ( 19 Jan 2023 07:17 )             33.0   01-19    127<L>  |  88<L>  |  17  ----------------------------<  117<H>  4.0   |  28  |  0.51    Ca    8.5      19 Jan 2023 07:17  Phos  3.0     01-19  Mg     2.4     01-19    TPro  6.0  /  Alb  2.7<L>  /  TBili  0.9  /  DBili  x   /  AST  92<H>  /  ALT  75<H>  /  AlkPhos  932<H>  01-19    MICROBIOLOGY:  Culture - Urine (collected 14 Jan 2023 21:37)  Source: Clean Catch Clean Catch (Midstream)  Final Report (16 Jan 2023 09:54):    <10,000 CFU/mL Normal Urogenital [place]    Culture - Blood (collected 14 Jan 2023 19:00)  Source: .Blood Blood-Peripheral  Preliminary Report (16 Jan 2023 01:02):    No growth to date.    Culture - Blood (collected 14 Jan 2023 18:45)  Source: .Blood Blood-Peripheral  Preliminary Report (16 Jan 2023 01:02):    No growth to date.              Rapid RVP Result: NotDetec (01-15 @ 01:52)      RADIOLOGY:  imaging below personally reviewed and agree with findings Patient is a 87y old  Female who presents with a chief complaint of SOB (19 Jan 2023 10:26)    HPI:    87-year-old female with a past medical history Mo significant for hypertension, diabetes, dementia, breast and colon cancer who was admitted to the hospital due to shortness of breath.    Family reports patient had become progressively short of breath over a week prior to admission.  Was seen by primary care, prescribed levofloxacin however due to worsening symptoms brought to the ED.  In addition to shortness of breath, patient was noted to have fevers reportedly to 102 Fahrenheit as well as a dry cough.  Patient also reported some chest discomfort as well as bilateral lower extremity swelling.  Denies falls, trauma.    In the ED, T-max 100.5 Fahrenheit, tachycardic, tachypneic.  Initial labs showed leukocytosis of 20, BMP with hyponatremia, renal function within normal limits, , ALT 42.,  BNP 2522.  Urinalysis did not show evidence for infection.  SARS-CoV-2 negative.  Blood cultures obtained on admission are negative to date.  Urine culture also negative.  CT chest obtained showing tree-in-bud nodules, airspace opacities. Patient was admitted for further management of sepsis, heart failure. Started on cefepime and azithromycin.    Following admission, Legionella negative then azithromycin discontinued, continues on cefepime.  Nephrology consulted for management of hyponatremia.    Patient has been afebrile at this point more than 24 hours, continues to require supplemental oxygen at 3 L.  Latest labs show leukocytosis of 23.4, BMP with improved sodium to 127, renal function within normal limits, ALP 9 3282, AST 92, ALT 75.  CT head obtained on 1/17/2023 shows no acute findings.  Ultrasound of the right upper quadrant on 1/15/2023 shows dilated CBD up to 1 cm, has previous cholecystectomy.            prior hospital charts reviewed [ x ]  primary team notes reviewed [x  ]  other consultant notes reviewed [x  ]    PAST MEDICAL & SURGICAL HISTORY:  Hypertension      Diabetes mellitus      Dementia      Breast cancer      Colon cancer      S/P cholecystectomy      S/P mastectomy, right      H/O hemicolectomy          Allergies  No Known Allergies    ANTIMICROBIALS (past 90 days)  MEDICATIONS  (STANDING):  azithromycin  IVPB   255 mL/Hr IV Intermittent (01-15-23 @ 01:10)    cefepime   IVPB   100 mL/Hr IV Intermittent (01-19-23 @ 13:34)   100 mL/Hr IV Intermittent (01-19-23 @ 05:26)   100 mL/Hr IV Intermittent (01-18-23 @ 21:45)   100 mL/Hr IV Intermittent (01-18-23 @ 14:03)   100 mL/Hr IV Intermittent (01-18-23 @ 05:24)   100 mL/Hr IV Intermittent (01-17-23 @ 21:38)   100 mL/Hr IV Intermittent (01-17-23 @ 13:58)   100 mL/Hr IV Intermittent (01-17-23 @ 07:05)   100 mL/Hr IV Intermittent (01-16-23 @ 22:30)   100 mL/Hr IV Intermittent (01-16-23 @ 15:00)   100 mL/Hr IV Intermittent (01-16-23 @ 05:08)   100 mL/Hr IV Intermittent (01-15-23 @ 23:00)   100 mL/Hr IV Intermittent (01-15-23 @ 13:45)   100 mL/Hr IV Intermittent (01-15-23 @ 05:33)    cefepime   IVPB   100 mL/Hr IV Intermittent (01-14-23 @ 21:00)    vancomycin  IVPB.   250 mL/Hr IV Intermittent (01-14-23 @ 23:00)        cefepime   IVPB 1000 every 8 hours    MEDICATIONS  (STANDING):  acetaminophen     Tablet .. 650 every 6 hours PRN  albuterol/ipratropium for Nebulization 3 every 6 hours  apixaban 2.5 every 12 hours  aspirin enteric coated 81 daily  dextrose 50% Injectable 25 once  dextrose 50% Injectable 12.5 once  dextrose 50% Injectable 25 once  dextrose Oral Gel 15 once PRN  donepezil 5 at bedtime  furosemide   Injectable 40 two times a day  glucagon  Injectable 1 once  insulin lispro (ADMELOG) corrective regimen sliding scale  three times a day before meals  insulin lispro (ADMELOG) corrective regimen sliding scale  at bedtime  metoprolol tartrate 25 three times a day  senna 2 at bedtime  simvastatin 20 at bedtime    SOCIAL HISTORY:     Social History:  · Substance use	No  · Social History (marital status, living situation, occupation, and sexual history)	The patient immigrated from [place] about twenty years ago. She lives at home with her daughter, son-in-law, and grandchildren. She has not worked since she moved to the [facility]. She has no known history of alcohol use, tobacco smoking, or illicit drug use.    FAMILY HISTORY:  No pertinent family history in first degree relatives of heart disease      REVIEW OF SYSTEMS  [ x ] ROS unobtainable because: mentation    [  ] All other systems negative except as noted below:	    Constitutional:  [ ] fever [ ] chills  [ ] weight loss  [ ] weakness  Skin:  [ ] rash [ ] phlebitis	  Eyes: [ ] icterus [ ] pain  [ ] discharge	  ENMT: [ ] sore throat  [ ] thrush [ ] ulcers [ ] exudates  Respiratory: [ ] dyspnea [ ] hemoptysis [ ] cough [ ] sputum	  Cardiovascular:  [ ] chest pain [ ] palpitations [ ] edema	  Gastrointestinal:  [ ] nausea [ ] vomiting [ ] diarrhea [ ] constipation [ ] pain	  Genitourinary:  [ ] dysuria [ ] frequency [ ] hematuria [ ] discharge [ ] flank pain  [ ] incontinence  Musculoskeletal:  [ ] myalgias [ ] arthralgias [ ] arthritis  [ ] back pain  Neurological:  [ ] headache [ ] seizures  [ ] confusion/altered mental status  Psychiatric:  [ ] anxiety [ ] depression	  Hematology/Lymphatics:  [ ] lymphadenopathy  Endocrine:  [ ] adrenal [ ] thyroid  Allergic/Immunologic:	 [ ] transplant [ ] seasonal    Vital Signs Last 24 Hrs  T(F): 97.5 (01-19-23 @ 12:10), Max: 100.5 (01-14-23 @ 19:00)  Vital Signs Last 24 Hrs  HR: 114 (01-19-23 @ 12:10) (107 - 116)  BP: 107/75 (01-19-23 @ 12:10) (107/75 - 135/81)  RR: 18 (01-19-23 @ 12:10)  SpO2: 100% (01-19-23 @ 12:10) (97% - 100%)  Wt(kg): --    PHYSICAL EXAM:  Constitutional: ill appearing, responsive however unable to clearly state symptoms  HEAD/EYES: anicteric, no conjunctival injection  ENT:  supple, no thrush  Cardiovascular:   normal S1, S2, no murmur, no edema  Respiratory:  mild diffuse rales  GI:  soft, non-tender, normal bowel sounds  :  no elias, no CVA tenderness  Musculoskeletal:  no synovitis, normal ROM  Neurologic: lethargic  Skin:  no rash, hematoma on left arm  Heme/Onc: no lymphadenopathy   Psychiatric: lethargic                            11.1   23.48 )-----------( 147      ( 19 Jan 2023 07:17 )             33.0   01-19    127<L>  |  88<L>  |  17  ----------------------------<  117<H>  4.0   |  28  |  0.51    Ca    8.5      19 Jan 2023 07:17  Phos  3.0     01-19  Mg     2.4     01-19    TPro  6.0  /  Alb  2.7<L>  /  TBili  0.9  /  DBili  x   /  AST  92<H>  /  ALT  75<H>  /  AlkPhos  932<H>  01-19    MICROBIOLOGY:  Culture - Urine (collected 14 Jan 2023 21:37)  Source: Clean Catch Clean Catch (Midstream)  Final Report (16 Jan 2023 09:54):    <10,000 CFU/mL Normal Urogenital [place]    Culture - Blood (collected 14 Jan 2023 19:00)  Source: .Blood Blood-Peripheral  Preliminary Report (16 Jan 2023 01:02):    No growth to date.    Culture - Blood (collected 14 Jan 2023 18:45)  Source: .Blood Blood-Peripheral  Preliminary Report (16 Jan 2023 01:02):    No growth to date.              Rapid RVP Result: NotDetec (01-15 @ 01:52)      RADIOLOGY:  imaging below personally reviewed and agree with findings

## 2023-01-19 NOTE — CONSULT NOTE ADULT - ASSESSMENT
87-year-old female with a past medical history Mo significant for hypertension, diabetes, dementia, breast and colon cancer who was admitted to the hospital due to shortness of breath.      #Sepsis, resolved  #Abnormal lung imaging  #Concern for pneumonia    #Leukocytosis     87-year-old female with a past medical history Mo significant for hypertension, diabetes, dementia, breast and colon cancer who was admitted to the hospital due to shortness of breath.    #Sepsis, resolved  #Abnormal lung imaging  #Concern for pneumonia  #Leukocytosis  Concern for infection remains, continues on cefepime  Previous outpatient antibiotics may be causing negative blood cultures however no other evidence for localizing infection at this time  Imaging thus far reveals infiltrate in CT lung  legionella, strep pneumo, MRSA nares negative  U/A negative  RVP negative       87-year-old female with a past medical history Mo significant for hypertension, diabetes, dementia, breast and colon cancer who was admitted to the hospital due to shortness of breath.    #Sepsis, resolved  #Abnormal lung imaging  #Concern for pneumonia  #Leukocytosis  Concern for infection remains, continues on cefepime  Previous outpatient antibiotics may be causing negative blood cultures however no other evidence for localizing infection at this time  Imaging thus far reveals infiltrate in CT lung  legionella, strep pneumo, MRSA nares negative  U/A negative  RVP negative  Bcx negative from admission  MRCP without evidence for calculus  Patient lethargic, microaspiration?    Recommendations   Would continue cefepime for a 7 day course at this time  Obtain CT abdomen/pelvis with contrast to evaluate for intraabdominal infection  Obtain repeat blood cultures  Speech eval  Follow fever curve and WBC count    Bridger Alves MD  Division of Infectious Diseases  Available on Teams     87-year-old female with a past medical history Mo significant for hypertension, diabetes, dementia, breast and colon cancer who was admitted to the hospital due to shortness of breath.    #Sepsis, resolved  #Abnormal lung imaging  #Concern for pneumonia  #Leukocytosis  Concern for infection remains, continues on cefepime  Previous outpatient antibiotics may be causing negative blood cultures however no other evidence for localizing infection at this time  Imaging thus far reveals infiltrate in CT lung  legionella, strep pneumo, MRSA nares negative  U/A negative  RVP negative  Bcx negative from admission  MRCP without evidence for calculus  Patient lethargic, microaspiration?    Recommendations   Would continue cefepime for a 7 day course at this time  Obtain CT abdomen/pelvis with contrast to evaluate for intraabdominal infection  Obtain repeat blood cultures  Speech eval  Obtain procalcitonin  Follow fever curve and WBC count    Bridger Alves MD  Division of Infectious Diseases  Available on Teams

## 2023-01-19 NOTE — PROGRESS NOTE ADULT - PROBLEM SELECTOR PLAN 8
-Right lobe thyroid nodule noted incidentally on ct scan of abdomen and pelvis   -Will require dedicated thyroid ultrasound and outpatient follow-up for consideration of biopsy vs. close follow-up  - TSH low, T3 and T4 normal. Pt will need to repeat TSH 4-6 weeks

## 2023-01-19 NOTE — PROGRESS NOTE ADULT - SUBJECTIVE AND OBJECTIVE BOX
[facility] Division of Kidney Diseases & Hypertension  FOLLOW UP NOTE  [phone]--------------------------------------------------------------------------------  Chief Complaint:Pneumonia due to infectious organism    87-year-old with PMH of hypertension, diabetes mellitus, dementia, history of breast and colon cancers (now in remission) who is admitted for evaluation of persistent SOB after completing therapy for CAP. Currently being treated for acute decompensated CHF. CT chest with b/l patchy opacities PNA vs plum edema & small b/l pleural effusions. BNP 2K. Nephrology called for hyponatremia. SNa in 2021 was 140. No SNa on [facility] after that.  SNa on arrival 1/14 was 117 which increased to 121 the same day. Now SNa decreased to 117 today. Pt receiving vanco, cefepime & azithro all in D5 since arrival. Also received 250cc NS. Pt on lasix 40 mg IV daily and on salt tabs on day of consult      Interval History:  Spoke to daughter at bedside with translation. Daughter reports patient still appears confused and weak. Sodium has improved to 127 so unlikely that the mental status is from low sodium. It appears patient has baseline dementia as she was on donepezil at home.       PAST HISTORY  --------------------------------------------------------------------------------  No significant changes to PMH, PSH, FHx, SHx, unless otherwise noted    ALLERGIES & MEDICATIONS  --------------------------------------------------------------------------------  Allergies    No Known Allergies    Intolerances      Standing Inpatient Medications  albuterol/ipratropium for Nebulization 3 milliLiter(s) Nebulizer every 6 hours  apixaban 2.5 milliGRAM(s) Oral every 12 hours  aspirin enteric coated 81 milliGRAM(s) Oral daily  cefepime   IVPB 1000 milliGRAM(s) IV Intermittent every 8 hours  dextrose 5%. 1000 milliLiter(s) IV Continuous <Continuous>  dextrose 5%. 1000 milliLiter(s) IV Continuous <Continuous>  dextrose 50% Injectable 25 Gram(s) IV Push once  dextrose 50% Injectable 25 Gram(s) IV Push once  dextrose 50% Injectable 12.5 Gram(s) IV Push once  donepezil 5 milliGRAM(s) Oral at bedtime  folic acid 1 milliGRAM(s) Oral daily  furosemide   Injectable 40 milliGRAM(s) IV Push two times a day  glucagon  Injectable 1 milliGRAM(s) IntraMuscular once  insulin lispro (ADMELOG) corrective regimen sliding scale   SubCutaneous three times a day before meals  insulin lispro (ADMELOG) corrective regimen sliding scale   SubCutaneous at bedtime  metoprolol tartrate 25 milliGRAM(s) Oral three times a day  senna 2 Tablet(s) Oral at bedtime  simvastatin 20 milliGRAM(s) Oral at bedtime  sodium chloride 2% . 120 milliLiter(s) IV Continuous <Continuous>  traZODone 50 milliGRAM(s) Oral at bedtime    PRN Inpatient Medications  acetaminophen     Tablet .. 650 milliGRAM(s) Oral every 6 hours PRN  dextrose Oral Gel 15 Gram(s) Oral once PRN      REVIEW OF SYSTEMS  --------------------------------------------------------------------------------  Unable to be obtained    All other systems were reviewed and are negative, except as noted.    VITALS/PHYSICAL EXAM  --------------------------------------------------------------------------------  T(C): 37.7 (01-19-23 @ 08:55), Max: 37.7 (01-19-23 @ 08:55)  HR: 116 (01-19-23 @ 07:58) (107 - 116)  BP: 115/78 (01-19-23 @ 07:58) (114/75 - 135/81)  RR: 18 (01-19-23 @ 07:58) (18 - 18)  SpO2: 98% (01-19-23 @ 07:58) (97% - 98%)  Wt(kg): --        01-18-23 @ 07:01  -  01-19-23 @ 07:00  --------------------------------------------------------  IN: 600 mL / OUT: 0 mL / NET: 600 mL      Physical Exam:    	Gen: elderly, sleeping with NC  	HEENT: Anicteric, MMM, no JVD  	Pulm: faint bibasilar crackles  	CV: S1S2, no rub  	Abd: Soft, +BS          No presacral edema  	Ext: No LE edema B/L  	Neuro: Awake  	Skin: Warm and dry  	Vascular access:        LABS/STUDIES  --------------------------------------------------------------------------------              11.1   23.48 >-----------<  147      [01-19-23 @ 07:17]              33.0     127  |  88  |  17  ----------------------------<  117      [01-19-23 @ 07:17]  4.0   |  28  |  0.51        Ca     8.5     [01-19-23 @ 07:17]      Mg     2.4     [01-19-23 @ 07:17]      Phos  3.0     [01-19-23 @ 07:17]    TPro  6.0  /  Alb  2.7  /  TBili  0.9  /  DBili  x   /  AST  92  /  ALT  75  /  AlkPhos  932  [01-19-23 @ 07:17]          Creatinine Trend:  SCr 0.51 [01-19 @ 07:17]  SCr 0.64 [01-18 @ 21:08]  SCr 0.56 [01-18 @ 12:40]  SCr 0.57 [01-18 @ 06:33]  SCr 0.58 [01-18 @ 02:48]    Urinalysis - [01-14-23 @ 21:40]      Color Yellow / Appearance Clear / SG 1.023 / pH 6.0      Gluc Negative / Ketone Negative  / Bili Negative / Urobili 3 mg/dL       Blood Moderate / Protein 30 mg/dL / Leuk Est Negative / Nitrite Negative      RBC 21 / WBC 4 / Hyaline 3 / Gran  / Sq Epi  / Non Sq Epi 2 / Bacteria Negative    Urine Creatinine 56      [01-17-23 @ 10:51]  Urine Protein 34      [01-17-23 @ 10:51]  Urine Sodium 31      [01-17-23 @ 10:51]  Urine Urea Nitrogen 519      [01-17-23 @ 10:49]  Urine Potassium 39      [01-17-23 @ 10:51]  Urine Chloride 52      [01-17-23 @ 10:51]  Urine Osmolality 379      [01-17-23 @ 10:52]    TSH 0.10      [01-18-23 @ 06:33]       [facility] Division of Kidney Diseases & Hypertension  FOLLOW UP NOTE  [phone]--------------------------------------------------------------------------------  Chief Complaint:Pneumonia due to infectious organism    87-year-old with PMH of hypertension, diabetes mellitus, dementia, history of breast and colon cancers (now in remission) who is admitted for evaluation of persistent SOB after completing therapy for CAP. Currently being treated for acute decompensated CHF. CT chest with b/l patchy opacities PNA vs plum edema & small b/l pleural effusions. BNP 2K. Nephrology called for hyponatremia. SNa in 2021 was 140. No SNa on [facility] after that.  SNa on arrival 1/14 was 117 which increased to 121 the same day. Now SNa decreased to 117 today. Pt receiving vanco, cefepime & azithro all in D5 since arrival. Also received 250cc NS. Pt on lasix 40 mg IV daily and on salt tabs on day of consult      Interval History:  Spoke to daughter at bedside with translation. Daughter reports patient still appears confused and weak. Sodium has improved to 127 so unlikely that the mental status is from low sodium. It appears patient has baseline dementia as she was on donepezil at home.       PAST HISTORY  --------------------------------------------------------------------------------  No significant changes to PMH, PSH, FHx, SHx, unless otherwise noted    ALLERGIES & MEDICATIONS  --------------------------------------------------------------------------------  Allergies    No Known Allergies    Intolerances      Standing Inpatient Medications  albuterol/ipratropium for Nebulization 3 milliLiter(s) Nebulizer every 6 hours  apixaban 2.5 milliGRAM(s) Oral every 12 hours  aspirin enteric coated 81 milliGRAM(s) Oral daily  cefepime   IVPB 1000 milliGRAM(s) IV Intermittent every 8 hours  dextrose 5%. 1000 milliLiter(s) IV Continuous <Continuous>  dextrose 5%. 1000 milliLiter(s) IV Continuous <Continuous>  dextrose 50% Injectable 25 Gram(s) IV Push once  dextrose 50% Injectable 25 Gram(s) IV Push once  dextrose 50% Injectable 12.5 Gram(s) IV Push once  donepezil 5 milliGRAM(s) Oral at bedtime  folic acid 1 milliGRAM(s) Oral daily  furosemide   Injectable 40 milliGRAM(s) IV Push two times a day  glucagon  Injectable 1 milliGRAM(s) IntraMuscular once  insulin lispro (ADMELOG) corrective regimen sliding scale   SubCutaneous three times a day before meals  insulin lispro (ADMELOG) corrective regimen sliding scale   SubCutaneous at bedtime  metoprolol tartrate 25 milliGRAM(s) Oral three times a day  senna 2 Tablet(s) Oral at bedtime  simvastatin 20 milliGRAM(s) Oral at bedtime  sodium chloride 2% . 120 milliLiter(s) IV Continuous <Continuous>  traZODone 50 milliGRAM(s) Oral at bedtime    PRN Inpatient Medications  acetaminophen     Tablet .. 650 milliGRAM(s) Oral every 6 hours PRN  dextrose Oral Gel 15 Gram(s) Oral once PRN      REVIEW OF SYSTEMS  --------------------------------------------------------------------------------  Unable to be obtained    All other systems were reviewed and are negative, except as noted.    VITALS/PHYSICAL EXAM  --------------------------------------------------------------------------------  T(C): 37.7 (01-19-23 @ 08:55), Max: 37.7 (01-19-23 @ 08:55)  HR: 116 (01-19-23 @ 07:58) (107 - 116)  BP: 115/78 (01-19-23 @ 07:58) (114/75 - 135/81)  RR: 18 (01-19-23 @ 07:58) (18 - 18)  SpO2: 98% (01-19-23 @ 07:58) (97% - 98%)  Wt(kg): --        01-18-23 @ 07:01  -  01-19-23 @ 07:00  --------------------------------------------------------  IN: 600 mL / OUT: 0 mL / NET: 600 mL      Physical Exam:    	Gen: elderly, sleeping with NC  	HEENT: Anicteric, MMM, no JVD  	Pulm: faint bibasilar crackles  	CV: S1S2, no rub  	Abd: Soft, +BS          No presacral edema  	Ext: No LE edema B/L  	Neuro: Awake  	Skin: Warm and dry  	Vascular access:        LABS/STUDIES  --------------------------------------------------------------------------------              11.1   23.48 >-----------<  147      [01-19-23 @ 07:17]              33.0     127  |  88  |  17  ----------------------------<  117      [01-19-23 @ 07:17]  4.0   |  28  |  0.51        Ca     8.5     [01-19-23 @ 07:17]      Mg     2.4     [01-19-23 @ 07:17]      Phos  3.0     [01-19-23 @ 07:17]    TPro  6.0  /  Alb  2.7  /  TBili  0.9  /  DBili  x   /  AST  92  /  ALT  75  /  AlkPhos  932  [01-19-23 @ 07:17]          Creatinine Trend:  SCr 0.51 [01-19 @ 07:17]  SCr 0.64 [01-18 @ 21:08]  SCr 0.56 [01-18 @ 12:40]  SCr 0.57 [01-18 @ 06:33]  SCr 0.58 [01-18 @ 02:48]    Urinalysis - [01-14-23 @ 21:40]      Color Yellow / Appearance Clear / SG 1.023 / pH 6.0      Gluc Negative / Ketone Negative  / Bili Negative / Urobili 3 mg/dL       Blood Moderate / Protein 30 mg/dL / Leuk Est Negative / Nitrite Negative      RBC 21 / WBC 4 / Hyaline 3 / Gran  / Sq Epi  / Non Sq Epi 2 / Bacteria Negative    Urine Creatinine 56      [01-17-23 @ 10:51]  Urine Protein 34      [01-17-23 @ 10:51]  Urine Sodium 31      [01-17-23 @ 10:51]  Urine Urea Nitrogen 519      [01-17-23 @ 10:49]  Urine Potassium 39      [01-17-23 @ 10:51]  Urine Chloride 52      [01-17-23 @ 10:51]  Urine Osmolality 379      [01-17-23 @ 10:52]    TSH 0.10      [01-18-23 @ 06:33]       [facility] Division of Kidney Diseases & Hypertension  FOLLOW UP NOTE  [phone]--------------------------------------------------------------------------------  Chief Complaint:Pneumonia due to infectious organism    87-year-old with PMH of hypertension, diabetes mellitus, dementia, history of breast and colon cancers (now in remission) who is admitted for evaluation of persistent SOB after completing therapy for CAP. Currently being treated for acute decompensated CHF. CT chest with b/l patchy opacities PNA vs plum edema & small b/l pleural effusions. BNP 2K. Nephrology called for hyponatremia. SNa in 2021 was 140. No SNa on [facility] after that.  SNa on arrival 1/14 was 117 which increased to 121 the same day. Now SNa decreased to 117 today. Pt receiving vanco, cefepime & azithro all in D5 since arrival. Also received 250cc NS. Pt on lasix 40 mg IV daily and on salt tabs on day of consult      Interval History:  Spoke to daughter at bedside with translation. Daughter reports patient still appears confused and weak. Sodium has improved to 127 so unlikely that the mental status is from low sodium. It appears patient has baseline dementia as she was on donepezil at home.       PAST HISTORY  --------------------------------------------------------------------------------  No significant changes to PMH, PSH, FHx, SHx, unless otherwise noted    ALLERGIES & MEDICATIONS  --------------------------------------------------------------------------------  Allergies    No Known Allergies    Intolerances      Standing Inpatient Medications  albuterol/ipratropium for Nebulization 3 milliLiter(s) Nebulizer every 6 hours  apixaban 2.5 milliGRAM(s) Oral every 12 hours  aspirin enteric coated 81 milliGRAM(s) Oral daily  cefepime   IVPB 1000 milliGRAM(s) IV Intermittent every 8 hours  dextrose 5%. 1000 milliLiter(s) IV Continuous <Continuous>  dextrose 5%. 1000 milliLiter(s) IV Continuous <Continuous>  dextrose 50% Injectable 25 Gram(s) IV Push once  dextrose 50% Injectable 25 Gram(s) IV Push once  dextrose 50% Injectable 12.5 Gram(s) IV Push once  donepezil 5 milliGRAM(s) Oral at bedtime  folic acid 1 milliGRAM(s) Oral daily  furosemide   Injectable 40 milliGRAM(s) IV Push two times a day  glucagon  Injectable 1 milliGRAM(s) IntraMuscular once  insulin lispro (ADMELOG) corrective regimen sliding scale   SubCutaneous three times a day before meals  insulin lispro (ADMELOG) corrective regimen sliding scale   SubCutaneous at bedtime  metoprolol tartrate 25 milliGRAM(s) Oral three times a day  senna 2 Tablet(s) Oral at bedtime  simvastatin 20 milliGRAM(s) Oral at bedtime  sodium chloride 2% . 120 milliLiter(s) IV Continuous <Continuous>  traZODone 50 milliGRAM(s) Oral at bedtime    PRN Inpatient Medications  acetaminophen     Tablet .. 650 milliGRAM(s) Oral every 6 hours PRN  dextrose Oral Gel 15 Gram(s) Oral once PRN      REVIEW OF SYSTEMS  --------------------------------------------------------------------------------  Unable to be obtained    All other systems were reviewed and are negative, except as noted.    VITALS/PHYSICAL EXAM  --------------------------------------------------------------------------------  T(C): 37.7 (01-19-23 @ 08:55), Max: 37.7 (01-19-23 @ 08:55)  HR: 116 (01-19-23 @ 07:58) (107 - 116)  BP: 115/78 (01-19-23 @ 07:58) (114/75 - 135/81)  RR: 18 (01-19-23 @ 07:58) (18 - 18)  SpO2: 98% (01-19-23 @ 07:58) (97% - 98%)  Wt(kg): --        01-18-23 @ 07:01  -  01-19-23 @ 07:00  --------------------------------------------------------  IN: 600 mL / OUT: 0 mL / NET: 600 mL      Physical Exam:    	Gen: elderly, sleeping with NC  	HEENT: Anicteric, MMM, no JVD  	Pulm: faint bibasilar crackles  	CV: S1S2, no rub  	Abd: Soft, +BS          No presacral edema  	Ext: No LE edema B/L  	Neuro: Awake  	Skin: Warm and dry  	Vascular access:        LABS/STUDIES  --------------------------------------------------------------------------------              11.1   23.48 >-----------<  147      [01-19-23 @ 07:17]              33.0     127  |  88  |  17  ----------------------------<  117      [01-19-23 @ 07:17]  4.0   |  28  |  0.51        Ca     8.5     [01-19-23 @ 07:17]      Mg     2.4     [01-19-23 @ 07:17]      Phos  3.0     [01-19-23 @ 07:17]    TPro  6.0  /  Alb  2.7  /  TBili  0.9  /  DBili  x   /  AST  92  /  ALT  75  /  AlkPhos  932  [01-19-23 @ 07:17]          Creatinine Trend:  SCr 0.51 [01-19 @ 07:17]  SCr 0.64 [01-18 @ 21:08]  SCr 0.56 [01-18 @ 12:40]  SCr 0.57 [01-18 @ 06:33]  SCr 0.58 [01-18 @ 02:48]    Urinalysis - [01-14-23 @ 21:40]      Color Yellow / Appearance Clear / SG 1.023 / pH 6.0      Gluc Negative / Ketone Negative  / Bili Negative / Urobili 3 mg/dL       Blood Moderate / Protein 30 mg/dL / Leuk Est Negative / Nitrite Negative      RBC 21 / WBC 4 / Hyaline 3 / Gran  / Sq Epi  / Non Sq Epi 2 / Bacteria Negative    Urine Creatinine 56      [01-17-23 @ 10:51]  Urine Protein 34      [01-17-23 @ 10:51]  Urine Sodium 31      [01-17-23 @ 10:51]  Urine Urea Nitrogen 519      [01-17-23 @ 10:49]  Urine Potassium 39      [01-17-23 @ 10:51]  Urine Chloride 52      [01-17-23 @ 10:51]  Urine Osmolality 379      [01-17-23 @ 10:52]    TSH 0.10      [01-18-23 @ 06:33]

## 2023-01-19 NOTE — PROGRESS NOTE ADULT - ATTENDING COMMENTS
87F who is followed for hypertension, diabetes mellitus, and dementia and who has a history of breast and colon cancers (now in remission) who is admitted for evaluation and management of persistent shortness of breath, refractory to outpatient antibiotic therapy for community acquired pneumonia, in the context of bilateral pleural effusions, lower extremity edema, and an elevated serum bnp--most consistent with a diagnosis of acute decompensated heart failure of uncertain etiology.     This morning, reports increased dyspnea with chest tightness.     PE: Frail elderly lady in [place], appears more tired than yesterday. Lungs CTA, S1S2. Abd soft nontender. LE: no edema.     : [id number]: Spoke to the daughter at bedside, updated on patient's management. Discussed patient is clinically worsening and it might be attributed to unknown malignancy given the lesions of the skulls, mediastinal lymphadenopathy, high leukocytosis refractory to abx treatment, with increased dyspnea/physical deconditioning. Daughter tearful but understanding, states she would like one more day to confirm patient's code status. Recommended DNR/DNI as patient would be a poor candidate for intubation and there is a low likely she will be extubated. Daughter would also like time to think about workup for malignancy.      Plan:   -F/u CTA chest to r/o PE and added CT Abd/pelvis in setting of possible malignancy  -C/w Cefepime for PNA, broaden antibiotics if patient clinically worsens. Maintain supplemental O2   -Hyponatremia, will give hypertonic saline and monitor BMP closely. Avoid overcorrection >6-8mEQ in 24 hours. Nephro following  -AFib: CHAVASC score of 3, on Eliquis 2.5mg BID   -Will continue with GOC with family   -Re-start diet, PT/OT when stable 87F who is followed for hypertension, diabetes mellitus, and dementia and who has a history of breast and colon cancers (now in remission) who is admitted for evaluation and management of persistent shortness of breath, refractory to outpatient antibiotic therapy for community acquired pneumonia, in the context of bilateral pleural effusions, lower extremity edema, and an elevated serum bnp--most consistent with a diagnosis of acute decompensated heart failure of uncertain etiology.     This morning, reports increased dyspnea with chest tightness.     PE: Frail elderly lady in [place], appears more tired than yesterday. Lungs CTA, S1S2. Abd soft nontender. LE: no edema.     : [id number]: Spoke to the daughter at bedside, updated on patient's management. Discussed patient is clinically worsening and it might be attributed to unknown malignancy given the lesions of the skulls, mediastinal lymphadenopathy, high leukocytosis refractory to abx treatment, with increased dyspnea/physical deconditioning. Daughter tearful but understanding, states she would like one more day to confirm patient's code status. Recommended DNR/DNI as patient would be a poor candidate for intubation and there is a low likely she will be extubated. Daughter would also like time to think about workup for malignancy.      Plan:   -F/u CTA chest to r/o PE and added CT Abd/pelvis in setting of possible malignancy  -C/w Cefepime for PNA, broaden antibiotics if patient clinically worsens. Maintain supplemental O2   -Hyponatremia, will give hypertonic saline and monitor BMP closely. Avoid overcorrection >6-8mEQ in 24 hours. Nephro following  -AFib: CHAVASC score of 3, on Eliquis 2.5mg BID   -Will continue with GOC with family   -Re-start diet, PT/OT when stable 87F who is followed for hypertension, diabetes mellitus, and dementia and who has a history of breast and colon cancers (now in remission) who is admitted for evaluation and management of persistent shortness of breath, refractory to outpatient antibiotic therapy for community acquired pneumonia, in the context of bilateral pleural effusions, lower extremity edema, and an elevated serum bnp--most consistent with a diagnosis of acute decompensated heart failure of uncertain etiology.     This morning, reports increased dyspnea with chest tightness.     PE: Frail elderly lady in [place], appears more tired than yesterday. Lungs CTA, S1S2. Abd soft nontender. LE: no edema.     : [id number]: Spoke to the daughter at bedside, updated on patient's management. Discussed patient is clinically worsening and it might be attributed to unknown malignancy given the lesions of the skulls, mediastinal lymphadenopathy, high leukocytosis refractory to abx treatment, with increased dyspnea/physical deconditioning. Daughter tearful but understanding, states she would like one more day to confirm patient's code status. Recommended DNR/DNI as patient would be a poor candidate for intubation and there is a low likely she will be extubated. Daughter would also like time to think about workup for malignancy.      Plan:   -F/u CTA chest to r/o PE and added CT Abd/pelvis in setting of possible malignancy  -C/w Cefepime for PNA, broaden antibiotics if patient clinically worsens. Maintain supplemental O2   -Hyponatremia, will give hypertonic saline and monitor BMP closely. Avoid overcorrection >6-8mEQ in 24 hours. Nephro following  -AFib: CHAVASC score of 3, on Eliquis 2.5mg BID   -Will continue with GOC with family   -Re-start diet, PT/OT when stable

## 2023-01-19 NOTE — PROVIDER CONTACT NOTE (OTHER) - ASSESSMENT
Patient A&Ox2. [id number]'s on cardiac monitor. /78, RR 18, O2 saturation 98% on room air. Patient complaining of chest discomfort and dizziness Patient A&Ox2. [id number]'[id number]'s on cardiac monitor. /78, RR 18, O2 saturation 98% on room air. Patient complaining of chest discomfort and dizziness Patient A&Ox2. [id number]'[id number]'s on cardiac monitor. /78, RR 18, O2 saturation 98% on room air. Patient complaining of chest discomfort and dizziness

## 2023-01-19 NOTE — PROGRESS NOTE ADULT - PROBLEM SELECTOR PLAN 1
Hypo-osmolar Hyponatremia due to high ADH state from CHF & possible NSAID use.  SNa on arrival 1/14 was 117 which increased to 121 the same day, however on day of consult Na decreased to 117. Pt receiving vanco, cefepime & azithro all in D5 since arrival. S/p 8 hours of 3% HTS without improvement. Juanita 16, UOsm 584, SOsm low at 253.     SNa improved to 127 today. Will give another 4 hours of 2% HTS to try to get patient over 130. C/w lasix 40 IV BID for now. Continue with fluid restriction to <1L/day for now.  BMP q8. Avoid over-correction by >6-8mEQ in 24 hours. Hold celecoxib. Avoid isotonic or hypotonic fluids. Liberalize PO intake of solute and protein    If you have any questions, please feel free to contact me  Jer Tamez  Nephrology Fellow  [phone]; Prefer [employer] TEAMS  (After 5pm or on weekends please page the on-call fellow). Hypo-osmolar Hyponatremia due to high ADH state from CHF & possible NSAID use.  SNa on arrival 1/14 was 117 which increased to 121 the same day, however on day of consult Na decreased to 117. Pt receiving vanco, cefepime & azithro all in D5 since arrival. S/p 8 hours of 3% HTS without improvement. Juanita 16, UOsm 584, SOsm low at 253.     SNa improved to 127 today. Will give another 4 hours of 2% HTS to try to get patient over 130. C/w lasix 40 IV BID for now. Continue with fluid restriction to <1L/day for now.  BMP q8. Avoid over-correction by >6-8mEQ in 24 hours. Hold celecoxib. Avoid isotonic or hypotonic fluids. Liberalize PO intake of solute and protein    If you have any questions, please feel free to contact me  Jer Tamez  Nephrology Fellow  [phone]; Prefer [employer] TEAMS  (After 5pm or on weekends please page the on-call fellow). Hypo-osmolar Hyponatremia due to high ADH state from CHF & possible NSAID use.  SNa on arrival 1/14 was 117 which increased to 121 the same day, however on day of consult Na decreased to 117. Pt receiving vanco, cefepime & azithro all in D5 since arrival. S/p 8 hours of 3% HTS without improvement. Juanita 16, UOsm 584, SOsm low at 253.     SNa improved to 127 today. Will give another 4 hours of 2% HTS to try to get patient over 130. C/w lasix 40 IV BID for now. Continue with fluid restriction to <1L/day for now.  BMP q8. Avoid over-correction by >6-8mEQ in 24 hours. Hold celecoxib. Avoid isotonic or hypotonic fluids. Liberalize PO intake of solute and protein    If you have any questions, please feel free to contact me  Jer Tamez  Nephrology Fellow  [phone]; Prefer [employer] TEAMS  (After 5pm or on weekends please page the on-call fellow).

## 2023-01-19 NOTE — PROGRESS NOTE ADULT - SUBJECTIVE AND OBJECTIVE BOX
Patient is a 87y old  Female who presents with a chief complaint of SOB (15 Usama 2023 00:45)    SUBJECTIVE / OVERNIGHT EVENTS: No acute events ON.  Denies fevers, chills, n/v.    MEDICATIONS  (STANDING):  aspirin enteric coated 81 milliGRAM(s) Oral daily  azithromycin  IVPB 500 milliGRAM(s) IV Intermittent every 24 hours  cefepime   IVPB 1000 milliGRAM(s) IV Intermittent every 8 hours  dextrose 5%. 1000 milliLiter(s) (100 mL/Hr) IV Continuous <Continuous>  dextrose 5%. 1000 milliLiter(s) (50 mL/Hr) IV Continuous <Continuous>  dextrose 50% Injectable 25 Gram(s) IV Push once  dextrose 50% Injectable 12.5 Gram(s) IV Push once  dextrose 50% Injectable 25 Gram(s) IV Push once  donepezil 5 milliGRAM(s) Oral at bedtime  enoxaparin Injectable 40 milliGRAM(s) SubCutaneous every 24 hours  folic acid 1 milliGRAM(s) Oral daily  furosemide   Injectable 40 milliGRAM(s) IV Push two times a day  glucagon  Injectable 1 milliGRAM(s) IntraMuscular once  insulin lispro (ADMELOG) corrective regimen sliding scale   SubCutaneous three times a day before meals  insulin lispro (ADMELOG) corrective regimen sliding scale   SubCutaneous at bedtime  senna 2 Tablet(s) Oral at bedtime  simvastatin 20 milliGRAM(s) Oral at bedtime  traZODone 50 milliGRAM(s) Oral at bedtime    MEDICATIONS  (PRN):  dextrose Oral Gel 15 Gram(s) Oral once PRN Blood Glucose LESS THAN 70 milliGRAM(s)/deciliter      CAPILLARY BLOOD GLUCOSE        I&O's Summary      PHYSICAL EXAM:  Vital Signs Last 24 Hrs  T(C): 36.6 (19 Jan 2023 04:20), Max: 36.6 (18 Jan 2023 11:45)  T(F): 97.8 (19 Jan 2023 04:20), Max: 97.9 (18 Jan 2023 11:45)  HR: 107 (19 Jan 2023 04:20) (107 - 116)  BP: 135/81 (19 Jan 2023 04:20) (114/75 - 135/81)  BP(mean): --  RR: 18 (19 Jan 2023 04:20) (18 - 18)  SpO2: 97% (19 Jan 2023 04:20) (97% - 98%)    Parameters below as of 19 Jan 2023 04:20  Patient On (Oxygen Delivery Method): nasal cannula  O2 Flow (L/min): 3        CONSTITUTIONAL: NAD, lying in bed comfortably  RESPIRATORY: Normal respiratory effort; expiratory wheezing noted on exam in R lung anterior lung field.   CARDIOVASCULAR: Regular rate and rhythm, normal S1 and S2, no murmur/rub/gallop  ABDOMEN: Soft, nondistended, nontender to palpation, normoactive bowel sounds, no rebound/guarding  MUSCULOSKELETAL: no joint swelling or tenderness to palpation, FROM all extremities  NEURO: awake, AAOx1-2   EXTREMITIES: no pedal edema          LABS:                        11.1   23.48 )-----------( 147      ( 19 Jan 2023 07:17 )             33.0     01-18    125<L>  |  86<L>  |  19  ----------------------------<  199<H>  3.6   |  26  |  0.64    Ca    7.5<L>      18 Jan 2023 21:08  Phos  2.3     01-18  Mg     2.3     01-18    TPro  6.0  /  Alb  2.8<L>  /  TBili  0.8  /  DBili  x   /  AST  88<H>  /  ALT  56<H>  /  AlkPhos  1011<H>  01-18                             Patient is a 87y old  Female who presents with a chief complaint of SOB (15 Usama 2023 00:45)    SUBJECTIVE / OVERNIGHT EVENTS: This morning, patient complained of worsening SOB, diaphoresis, and chest tightness since 4am. Per daughter patient more lethargic than usual. Denies F/C/N/V, denies diarrhea, LE edema.     MEDICATIONS  (STANDING):  aspirin enteric coated 81 milliGRAM(s) Oral daily  azithromycin  IVPB 500 milliGRAM(s) IV Intermittent every 24 hours  cefepime   IVPB 1000 milliGRAM(s) IV Intermittent every 8 hours  dextrose 5%. 1000 milliLiter(s) (100 mL/Hr) IV Continuous <Continuous>  dextrose 5%. 1000 milliLiter(s) (50 mL/Hr) IV Continuous <Continuous>  dextrose 50% Injectable 25 Gram(s) IV Push once  dextrose 50% Injectable 12.5 Gram(s) IV Push once  dextrose 50% Injectable 25 Gram(s) IV Push once  donepezil 5 milliGRAM(s) Oral at bedtime  enoxaparin Injectable 40 milliGRAM(s) SubCutaneous every 24 hours  folic acid 1 milliGRAM(s) Oral daily  furosemide   Injectable 40 milliGRAM(s) IV Push two times a day  glucagon  Injectable 1 milliGRAM(s) IntraMuscular once  insulin lispro (ADMELOG) corrective regimen sliding scale   SubCutaneous three times a day before meals  insulin lispro (ADMELOG) corrective regimen sliding scale   SubCutaneous at bedtime  senna 2 Tablet(s) Oral at bedtime  simvastatin 20 milliGRAM(s) Oral at bedtime  traZODone 50 milliGRAM(s) Oral at bedtime    MEDICATIONS  (PRN):  dextrose Oral Gel 15 Gram(s) Oral once PRN Blood Glucose LESS THAN 70 milliGRAM(s)/deciliter      CAPILLARY BLOOD GLUCOSE        I&O's Summary      PHYSICAL EXAM:  Vital Signs Last 24 Hrs  T(C): 36.6 (19 Jan 2023 04:20), Max: 36.6 (18 Jan 2023 11:45)  T(F): 97.8 (19 Jan 2023 04:20), Max: 97.9 (18 Jan 2023 11:45)  HR: 107 (19 Jan 2023 04:20) (107 - 116)  BP: 135/81 (19 Jan 2023 04:20) (114/75 - 135/81)  BP(mean): --  RR: 18 (19 Jan 2023 04:20) (18 - 18)  SpO2: 97% (19 Jan 2023 04:20) (97% - 98%)    Parameters below as of 19 Jan 2023 04:20  Patient On (Oxygen Delivery Method): nasal cannula  O2 Flow (L/min): 3        CONSTITUTIONAL: NAD, lying in bed comfortably  RESPIRATORY: Normal respiratory effort; expiratory wheezing noted on exam in R lung anterior lung field.   CARDIOVASCULAR: Regular rate and rhythm, normal S1 and S2, no murmur/rub/gallop  ABDOMEN: Soft, nondistended, nontender to palpation, normoactive bowel sounds, no rebound/guarding  MUSCULOSKELETAL: no joint swelling or tenderness to palpation, FROM all extremities  NEURO: awake, AAOx1-2   EXTREMITIES: no pedal edema          LABS:                        11.1   23.48 )-----------( 147      ( 19 Jan 2023 07:17 )             33.0     01-18    125<L>  |  86<L>  |  19  ----------------------------<  199<H>  3.6   |  26  |  0.64    Ca    7.5<L>      18 Jan 2023 21:08  Phos  2.3     01-18  Mg     2.3     01-18    TPro  6.0  /  Alb  2.8<L>  /  TBili  0.8  /  DBili  x   /  AST  88<H>  /  ALT  56<H>  /  AlkPhos  1011<H>  01-18

## 2023-01-19 NOTE — PROGRESS NOTE ADULT - PROBLEM SELECTOR PLAN 6
-Elevated serum ast and alkaline phosphatase noted   -Suspect that this is most likely secondary to fluid overload in setting of acute decompensated heart failure   -Will manage with diuretics and will follow serum liver enzymes daily   - RUQ US showing ?7mm calculus in mid common bile duct.   - GGT wnl suggestive that elevated Alk phos not biliary in etiology, possibly from bone.   -  skeletal survey w/ questionable skull lesion  - MRCP w/o stone or evidence of infection  - CTH w/ one lesion in left frontal bone and lesion in right frontal bone

## 2023-01-19 NOTE — PROGRESS NOTE ADULT - PROBLEM SELECTOR PLAN 7
Noted to be in Afib with HR to 130s  - Increased home lopressor 25mg BID to TID with hold parameters  - pt not on AC at home, unclear reason why  - per family, pt w/ hematuria 5 years ago, AC was held, and never restarted. Hematuria self-resolved, no intervention/transfusion was needed.  - Chadsvasc 3  - Will start Eliquis   - TTE w/ EF 75%

## 2023-01-20 LAB
ALBUMIN SERPL ELPH-MCNC: 2.7 G/DL — LOW (ref 3.3–5)
ALP SERPL-CCNC: 919 U/L — HIGH (ref 40–120)
ALT FLD-CCNC: 94 U/L — HIGH (ref 10–45)
ANION GAP SERPL CALC-SCNC: 11 MMOL/L — SIGNIFICANT CHANGE UP (ref 5–17)
ANION GAP SERPL CALC-SCNC: 17 MMOL/L — SIGNIFICANT CHANGE UP (ref 5–17)
AST SERPL-CCNC: 124 U/L — HIGH (ref 10–40)
BASE EXCESS BLDV CALC-SCNC: 9.4 MMOL/L — HIGH (ref -2–3)
BASOPHILS # BLD AUTO: 0 K/UL — SIGNIFICANT CHANGE UP (ref 0–0.2)
BASOPHILS NFR BLD AUTO: 0 % — SIGNIFICANT CHANGE UP (ref 0–2)
BILIRUB SERPL-MCNC: 0.9 MG/DL — SIGNIFICANT CHANGE UP (ref 0.2–1.2)
BUN SERPL-MCNC: 17 MG/DL — SIGNIFICANT CHANGE UP (ref 7–23)
BUN SERPL-MCNC: 19 MG/DL — SIGNIFICANT CHANGE UP (ref 7–23)
BURR CELLS BLD QL SMEAR: PRESENT — SIGNIFICANT CHANGE UP
BURR CELLS BLD QL SMEAR: SLIGHT — SIGNIFICANT CHANGE UP
CA-I SERPL-SCNC: 1.09 MMOL/L — LOW (ref 1.15–1.33)
CALCIUM SERPL-MCNC: 8.1 MG/DL — LOW (ref 8.4–10.5)
CALCIUM SERPL-MCNC: 8.4 MG/DL — SIGNIFICANT CHANGE UP (ref 8.4–10.5)
CHLORIDE BLDV-SCNC: 90 MMOL/L — LOW (ref 96–108)
CHLORIDE SERPL-SCNC: 89 MMOL/L — LOW (ref 96–108)
CHLORIDE SERPL-SCNC: 92 MMOL/L — LOW (ref 96–108)
CO2 BLDV-SCNC: 36 MMOL/L — HIGH (ref 22–26)
CO2 SERPL-SCNC: 21 MMOL/L — LOW (ref 22–31)
CO2 SERPL-SCNC: 29 MMOL/L — SIGNIFICANT CHANGE UP (ref 22–31)
CREAT SERPL-MCNC: 0.61 MG/DL — SIGNIFICANT CHANGE UP (ref 0.5–1.3)
CREAT SERPL-MCNC: 0.83 MG/DL — SIGNIFICANT CHANGE UP (ref 0.5–1.3)
CULTURE RESULTS: SIGNIFICANT CHANGE UP
EGFR: 69 ML/MIN/1.73M2 — SIGNIFICANT CHANGE UP
EGFR: 88 ML/MIN/1.73M2 — SIGNIFICANT CHANGE UP
ELLIPTOCYTES BLD QL SMEAR: SLIGHT — SIGNIFICANT CHANGE UP
EOSINOPHIL # BLD AUTO: 0.31 K/UL — SIGNIFICANT CHANGE UP (ref 0–0.5)
EOSINOPHIL NFR BLD AUTO: 1.7 % — SIGNIFICANT CHANGE UP (ref 0–6)
GAS PNL BLDV: 128 MMOL/L — LOW (ref 136–145)
GAS PNL BLDV: SIGNIFICANT CHANGE UP
GLUCOSE BLDC GLUCOMTR-MCNC: 106 MG/DL — HIGH (ref 70–99)
GLUCOSE BLDC GLUCOMTR-MCNC: 112 MG/DL — HIGH (ref 70–99)
GLUCOSE BLDC GLUCOMTR-MCNC: 113 MG/DL — HIGH (ref 70–99)
GLUCOSE BLDC GLUCOMTR-MCNC: 131 MG/DL — HIGH (ref 70–99)
GLUCOSE BLDV-MCNC: 131 MG/DL — HIGH (ref 70–99)
GLUCOSE SERPL-MCNC: 128 MG/DL — HIGH (ref 70–99)
GLUCOSE SERPL-MCNC: 138 MG/DL — HIGH (ref 70–99)
HCO3 BLDV-SCNC: 35 MMOL/L — HIGH (ref 22–29)
HCT VFR BLD CALC: 34.9 % — SIGNIFICANT CHANGE UP (ref 34.5–45)
HCT VFR BLDA CALC: 36 % — SIGNIFICANT CHANGE UP (ref 34.5–46.5)
HGB BLD CALC-MCNC: 11.9 G/DL — SIGNIFICANT CHANGE UP (ref 11.7–16.1)
HGB BLD-MCNC: 11.5 G/DL — SIGNIFICANT CHANGE UP (ref 11.5–15.5)
HYPOCHROMIA BLD QL: SLIGHT — SIGNIFICANT CHANGE UP
LACTATE BLDV-MCNC: 1.6 MMOL/L — SIGNIFICANT CHANGE UP (ref 0.5–2)
LYMPHOCYTES # BLD AUTO: 0.48 K/UL — LOW (ref 1–3.3)
LYMPHOCYTES # BLD AUTO: 2.6 % — LOW (ref 13–44)
MAGNESIUM SERPL-MCNC: 2.4 MG/DL — SIGNIFICANT CHANGE UP (ref 1.6–2.6)
MANUAL SMEAR VERIFICATION: SIGNIFICANT CHANGE UP
MCHC RBC-ENTMCNC: 27 PG — SIGNIFICANT CHANGE UP (ref 27–34)
MCHC RBC-ENTMCNC: 33 GM/DL — SIGNIFICANT CHANGE UP (ref 32–36)
MCV RBC AUTO: 81.9 FL — SIGNIFICANT CHANGE UP (ref 80–100)
MONOCYTES # BLD AUTO: 3.15 K/UL — HIGH (ref 0–0.9)
MONOCYTES NFR BLD AUTO: 17.1 % — HIGH (ref 2–14)
NEUTROPHILS # BLD AUTO: 14.48 K/UL — HIGH (ref 1.8–7.4)
NEUTROPHILS NFR BLD AUTO: 77.8 % — HIGH (ref 43–77)
NEUTS BAND # BLD: 0.8 % — SIGNIFICANT CHANGE UP (ref 0–8)
PCO2 BLDV: 50 MMHG — HIGH (ref 39–42)
PH BLDV: 7.45 — HIGH (ref 7.32–7.43)
PHOSPHATE SERPL-MCNC: 3.1 MG/DL — SIGNIFICANT CHANGE UP (ref 2.5–4.5)
PLAT MORPH BLD: NORMAL — SIGNIFICANT CHANGE UP
PLATELET # BLD AUTO: 160 K/UL — SIGNIFICANT CHANGE UP (ref 150–400)
PO2 BLDV: 43 MMHG — SIGNIFICANT CHANGE UP (ref 25–45)
POIKILOCYTOSIS BLD QL AUTO: SIGNIFICANT CHANGE UP
POLYCHROMASIA BLD QL SMEAR: SLIGHT — SIGNIFICANT CHANGE UP
POTASSIUM BLDV-SCNC: 4.3 MMOL/L — SIGNIFICANT CHANGE UP (ref 3.5–5.1)
POTASSIUM SERPL-MCNC: 4 MMOL/L — SIGNIFICANT CHANGE UP (ref 3.5–5.3)
POTASSIUM SERPL-MCNC: 4.5 MMOL/L — SIGNIFICANT CHANGE UP (ref 3.5–5.3)
POTASSIUM SERPL-SCNC: 4 MMOL/L — SIGNIFICANT CHANGE UP (ref 3.5–5.3)
POTASSIUM SERPL-SCNC: 4.5 MMOL/L — SIGNIFICANT CHANGE UP (ref 3.5–5.3)
PROCALCITONIN SERPL-MCNC: 0.42 NG/ML — HIGH (ref 0.02–0.1)
PROT SERPL-MCNC: 6.3 G/DL — SIGNIFICANT CHANGE UP (ref 6–8.3)
RBC # BLD: 4.26 M/UL — SIGNIFICANT CHANGE UP (ref 3.8–5.2)
RBC # FLD: 15.4 % — HIGH (ref 10.3–14.5)
RBC BLD AUTO: ABNORMAL
SAO2 % BLDV: 75.3 % — SIGNIFICANT CHANGE UP (ref 67–88)
SCHISTOCYTES BLD QL AUTO: SLIGHT — SIGNIFICANT CHANGE UP
SODIUM SERPL-SCNC: 129 MMOL/L — LOW (ref 135–145)
SODIUM SERPL-SCNC: 130 MMOL/L — LOW (ref 135–145)
SPECIMEN SOURCE: SIGNIFICANT CHANGE UP
WBC # BLD: 18.42 K/UL — HIGH (ref 3.8–10.5)
WBC # FLD AUTO: 18.42 K/UL — HIGH (ref 3.8–10.5)

## 2023-01-20 PROCEDURE — 99233 SBSQ HOSP IP/OBS HIGH 50: CPT | Mod: GC

## 2023-01-20 PROCEDURE — 99233 SBSQ HOSP IP/OBS HIGH 50: CPT

## 2023-01-20 PROCEDURE — 99232 SBSQ HOSP IP/OBS MODERATE 35: CPT

## 2023-01-20 RX ORDER — FUROSEMIDE 40 MG
40 TABLET ORAL DAILY
Refills: 0 | Status: DISCONTINUED | OUTPATIENT
Start: 2023-01-21 | End: 2023-01-25

## 2023-01-20 RX ORDER — POLYETHYLENE GLYCOL 3350 17 G/17G
17 POWDER, FOR SOLUTION ORAL
Refills: 0 | Status: DISCONTINUED | OUTPATIENT
Start: 2023-01-20 | End: 2023-01-27

## 2023-01-20 RX ORDER — ONDANSETRON 8 MG/1
4 TABLET, FILM COATED ORAL EVERY 6 HOURS
Refills: 0 | Status: DISCONTINUED | OUTPATIENT
Start: 2023-01-20 | End: 2023-01-27

## 2023-01-20 RX ORDER — HYDROMORPHONE HYDROCHLORIDE 2 MG/ML
0.25 INJECTION INTRAMUSCULAR; INTRAVENOUS; SUBCUTANEOUS EVERY 4 HOURS
Refills: 0 | Status: DISCONTINUED | OUTPATIENT
Start: 2023-01-20 | End: 2023-01-21

## 2023-01-20 RX ORDER — SODIUM CHLORIDE 5 G/100ML
120 INJECTION, SOLUTION INTRAVENOUS
Refills: 0 | Status: COMPLETED | OUTPATIENT
Start: 2023-01-20 | End: 2023-01-20

## 2023-01-20 RX ORDER — METOPROLOL TARTRATE 50 MG
50 TABLET ORAL THREE TIMES A DAY
Refills: 0 | Status: DISCONTINUED | OUTPATIENT
Start: 2023-01-20 | End: 2023-01-27

## 2023-01-20 RX ORDER — METOPROLOL TARTRATE 50 MG
50 TABLET ORAL THREE TIMES A DAY
Refills: 0 | Status: DISCONTINUED | OUTPATIENT
Start: 2023-01-20 | End: 2023-01-20

## 2023-01-20 RX ORDER — INFLUENZA VIRUS VACCINE 15; 15; 15; 15 UG/.5ML; UG/.5ML; UG/.5ML; UG/.5ML
0.7 SUSPENSION INTRAMUSCULAR ONCE
Refills: 0 | Status: DISCONTINUED | OUTPATIENT
Start: 2023-01-20 | End: 2023-01-20

## 2023-01-20 RX ADMIN — Medication 25 MILLIGRAM(S): at 05:38

## 2023-01-20 RX ADMIN — Medication 50 MILLIGRAM(S): at 14:16

## 2023-01-20 RX ADMIN — POLYETHYLENE GLYCOL 3350 17 GRAM(S): 17 POWDER, FOR SOLUTION ORAL at 18:25

## 2023-01-20 RX ADMIN — Medication 40 MILLIGRAM(S): at 05:38

## 2023-01-20 RX ADMIN — Medication 3 MILLILITER(S): at 00:59

## 2023-01-20 RX ADMIN — SODIUM CHLORIDE 30 MILLILITER(S): 5 INJECTION, SOLUTION INTRAVENOUS at 06:25

## 2023-01-20 RX ADMIN — DONEPEZIL HYDROCHLORIDE 5 MILLIGRAM(S): 10 TABLET, FILM COATED ORAL at 21:27

## 2023-01-20 RX ADMIN — Medication 3 MILLILITER(S): at 12:17

## 2023-01-20 RX ADMIN — CEFEPIME 100 MILLIGRAM(S): 1 INJECTION, POWDER, FOR SOLUTION INTRAMUSCULAR; INTRAVENOUS at 05:38

## 2023-01-20 RX ADMIN — CEFEPIME 100 MILLIGRAM(S): 1 INJECTION, POWDER, FOR SOLUTION INTRAMUSCULAR; INTRAVENOUS at 14:15

## 2023-01-20 RX ADMIN — APIXABAN 2.5 MILLIGRAM(S): 2.5 TABLET, FILM COATED ORAL at 05:38

## 2023-01-20 RX ADMIN — Medication 50 MILLIGRAM(S): at 21:26

## 2023-01-20 RX ADMIN — Medication 40 MILLIGRAM(S): at 14:16

## 2023-01-20 RX ADMIN — SENNA PLUS 2 TABLET(S): 8.6 TABLET ORAL at 21:27

## 2023-01-20 RX ADMIN — SIMVASTATIN 20 MILLIGRAM(S): 20 TABLET, FILM COATED ORAL at 21:27

## 2023-01-20 RX ADMIN — Medication 3 MILLILITER(S): at 18:24

## 2023-01-20 RX ADMIN — Medication 1 MILLIGRAM(S): at 12:15

## 2023-01-20 RX ADMIN — Medication 3 MILLILITER(S): at 05:38

## 2023-01-20 RX ADMIN — APIXABAN 2.5 MILLIGRAM(S): 2.5 TABLET, FILM COATED ORAL at 18:24

## 2023-01-20 NOTE — PROGRESS NOTE ADULT - PROBLEM SELECTOR PLAN 7
Noted to be in Afib with HR to 130s  - Increased home lopressor 25mg BID to TID with hold parameters  - pt not on AC at home, unclear reason why  - per family, pt w/ hematuria 5 years ago, AC was held, and never restarted. Hematuria self-resolved, no intervention/transfusion was needed.  - Chadsvasc 3  - Will start Eliquis   - TTE w/ EF 75% Noted to be in Afib with HR to 130s  - pt not on AC at home, unclear reason why  - per family, pt w/ hematuria 5 years ago, AC was held, and never restarted. Hematuria self-resolved, no intervention/transfusion was needed.  - Chadsvasc 3  - Increased home lopressor 25mg BID to lopressor 50mg TID  - started Eliquis   - TTE w/ EF 75%

## 2023-01-20 NOTE — CHART NOTE - NSCHARTNOTEFT_GEN_A_CORE
D[facility]ed in length the [facility]'s [facility]  [id number] Shari Petit. Discussed that the CT scan results show sclerotic lesions which likely represent metastases. The previously identified two brain lesions are also likely metastases. Discussed that the patient's prognosis is overall guarded. The family would not want to further pursue malignancy workup and would not want any invasive procedures/tests. The family would like for us to stop taking frequent blood draws, as they see the distress this has been causing to the patient. Discussed with family comfort care measures, which include symptom managed care, and family would like to pursue to allow the patient a natural death. The family requested that we continue with antibiotics for a full course.     Per goals of care, the patient will be made comfort care, with a continuation of a course of antibiotics. The patient is DNR/DNI. No blood draws. No finger sticks. Palliative consult placed for further help in hospice discussion. [facility] in length the [facility]  [id number] Shari Petit. Discussed that the CT scan results show sclerotic lesions which likely represent metastases. The previously identified two brain lesions are also likely metastases. Discussed that the patient's prognosis is overall guarded. The family would not want to further pursue malignancy workup and would not want any invasive procedures/tests. The family would like for us to stop taking frequent blood draws, as they see the distress this has been causing to the patient. Discussed with family comfort care measures, which include symptom managed care, and family would like to pursue to allow the patient a natural death. The family requested that we continue with antibiotics for a full course.     Per goals of care, the patient will be made comfort care, with a continuation of a course of antibiotics. The patient is DNR/DNI. No blood draws. No finger sticks. Palliative consult placed for further help in hospice discussion. [facility] in length the [facility]  [id number] Shari Petit. Discussed that the CT scan results show sclerotic lesions which likely represent metastases. The previously identified two brain lesions are also likely metastases. Discussed that the patient's prognosis is overall guarded. The family would not want to further pursue malignancy workup and would not want any invasive procedures/tests. The family would like for us to stop taking frequent blood draws, as they see the distress this has been causing to the patient. Discussed with family comfort care measures, which include symptom managed care, and family would like to pursue to allow the patient a natural death. The family requested that we continue with antibiotics for a full course.     Per goals of care, the patient will be made comfort care, with a continuation of a course of antibiotics. The patient is DNR/DNI. No blood draws. No finger sticks. Palliative consult placed for further help in hospice discussion.

## 2023-01-20 NOTE — PROGRESS NOTE ADULT - PROBLEM SELECTOR PLAN 1
-Serum sodium level noted to measure 121 on admission, downtrended to 114 at lowest ; suspect that this is secondary to hypervolemic hyponatremia in context of acute decompensated heart failure vs SIADH.    - Hyponatremia progressed despite lasix IV BID  - urine lytes w. osmolality in 500s, sodium 9-16  - c/w lasix IV BID  - PO fluid restrict, liberalize solutes   - BMP q4  - c/w hypertonic saline PRN  - f/u renal recs -Serum sodium level noted to measure 121 on admission, downtrended to 114 at lowest ; suspect that this is secondary to hypervolemic hyponatremia in context of acute decompensated heart failure vs SIADH.    - Hyponatremia progressed despite lasix IV BID  - urine lytes w. osmolality in 500s, sodium 9-16  - c/w lasix IV BID  - PO fluid restrict, liberalize solutes   - BMP q6  - c/w hypertonic saline PRN  - improving, now 130

## 2023-01-20 NOTE — PROGRESS NOTE ADULT - PROBLEM SELECTOR PLAN 3
Occupational Magdi Nurse/MA/Tech Visit only.   -Persistent shortness of breath refractory to antibiotic therapy with evident bilateral pleural effusions, jugular venous distension, and lower extremity edema and with an elevated serum bnp level   -Most likely that persistent shortness of breath is not secondary to an infectious etiology but rather to acute decompensated heart failure   -Etiology of heart failure is uncertain at this time; tte as outpatient in 2021 demonstrated impaired diastolic function but normal systolic function; patient has no known history of coronary artery disease; however, patient did receive chemotherapy and radiation for management of breast cancer; unknown if this was adriamycin-containing regimen   - c/w lasix to 40 IV qd  - Strict I/Os, daily weights  - TTE 75%, mild pericardial effusion

## 2023-01-20 NOTE — PROGRESS NOTE ADULT - ATTENDING COMMENTS
Katy Zapata MD  Office : [phone]  Contact me on [employer] Teams. Kayt Zapata MD  Office : [phone]  Contact me on [employer] Teams. Katy Zapata MD  Office : [phone]  Contact me on [employer] Teams.

## 2023-01-20 NOTE — PROGRESS NOTE ADULT - ASSESSMENT
87-year-old female with a past medical history Mo significant for hypertension, diabetes, dementia, breast and colon cancer who was admitted to the hospital due to shortness of breath.    #Sepsis, resolved  #Abnormal lung imaging  #Concern for pneumonia  #Leukocytosis  Concern for infection remains, continues on cefepime  Previous outpatient antibiotics may be causing negative blood cultures however no other evidence for localizing infection at this time  Imaging thus far reveals infiltrate in CT lung  legionella, strep pneumo, MRSA nares negative  U/A negative  RVP negative  Bcx negative from admission  MRCP without evidence for calculus  Patient lethargic, microaspiration?  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    #Sepsis, resolved  #Abnormal lung imaging  #Concern for pneumonia  #Leukocytosis  Less likely to have infection given imaging findings, lack of localizing symptoms  Previous outpatient antibiotics may be causing negative blood cultures however no other evidence for localizing infection at this time  legionella, strep pneumo, MRSA nares negative  U/A negative  RVP negative  Bcx negative from admission  MRCP without evidence for calculus  Patient lethargic, microaspiration? 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

## 2023-01-20 NOTE — PROGRESS NOTE ADULT - ATTENDING COMMENTS
87F who is followed for hypertension, diabetes mellitus, and dementia and who has a history of breast and colon cancers (now in remission) who is admitted for evaluation and management of persistent shortness of breath, refractory to outpatient antibiotic therapy for community acquired pneumonia, in the context of bilateral pleural effusions, lower extremity edema, and an elevated serum bnp-most consistent with a diagnosis of acute decompensated heart failure vs. malignancy.     PE: No new change: Frail elderly lady in [place]. Lungs CTA, S1S2. Abd soft nontender. LE: no edema.       Plan:   -CTA CTA chest negative for PE  -CT Abd/pelvis concerning for sclerotic metastatic lesions in vertebral bodies  -C/w Cefepime for PNA, procalcitonin mildly elevated. Complete course. ID following   -Hyponatremia, improved, 130. Fluid restrict. Nephro following.   -AFib: CHAVASC score of 3, on Eliquis 2.5mg BID   -Will continue with GOC with family, family aware patient's condition is worsening with new findings of metastatic lesions, concerning for malignancy. Ongoing conversation daily  -Supplement O2 as needed for O2 of 92% of higher, wean as tolerated   -Resume diet, PT/OT when stable, VTE ppx. 87F who is followed for hypertension, diabetes mellitus, and dementia and who has a history of breast and colon cancers (now in remission) who is admitted for evaluation and management of persistent shortness of breath, refractory to outpatient antibiotic therapy for community acquired pneumonia, in the context of bilateral pleural effusions, lower extremity edema, and an elevated serum bnp-most consistent with a diagnosis of acute decompensated heart failure vs. malignancy.     PE: No new change: Frail elderly lady in [place]. Lungs CTA, S1S2. Abd soft nontender. LE: no edema.       Plan:   -CTA CTA chest negative for PE. Remains tachycardic with 02 requirement, increased BB. Denies pain.   -CT Abd/pelvis concerning for sclerotic metastatic lesions in vertebral bodies  -C/w Cefepime for PNA, procalcitonin mildly elevated. Complete course. ID following   -Hyponatremia, improved, 130. Fluid restrict. Nephro following.   -AFib: CHAVASC score of 3, on Eliquis 2.5mg BID   -Will continue with GOC with family, family aware patient's condition is worsening with new findings of metastatic lesions, concerning for malignancy. Ongoing conversation daily  -Supplement O2 as needed for O2 of 92% of higher, wean as tolerated   -Resume diet, PT/OT when stable, VTE ppx. 87F who is followed for hypertension, diabetes mellitus, and dementia and who has a history of breast and colon cancers (now in remission) who is admitted for evaluation and management of persistent shortness of breath, refractory to outpatient antibiotic therapy for community acquired pneumonia, in the context of bilateral pleural effusions, lower extremity edema, and an elevated serum bnp-most consistent with a diagnosis of acute decompensated heart failure vs. PNA vs. malignancy.     PE: No new change: Frail elderly lady in [place]. Lungs CTA, S1S2. Abd soft nontender. LE: no edema.       Plan:   -CTA CTA chest negative for PE. Remains tachycardic with 02 requirement, increased BB. Denies pain.   -CT Abd/pelvis concerning for sclerotic metastatic lesions in vertebral bodies  -ECHO EF 75% with no significant valvular abnormalities, trace pericardial effusion   -C/w Cefepime for PNA, procalcitonin mildly elevated. Complete course. ID following   -Hyponatremia, improved, 130. Fluid restrict. Nephro following.   -AFib: CHAVASC score of 3, on Eliquis 2.5mg BID   -Will continue with GOC with family, family aware patient's condition is worsening with new findings of metastatic lesions, concerning for malignancy. Ongoing conversation daily  -Supplement O2 as needed for O2 of 92% of higher, wean as tolerated   -Resume diet, PT/OT when stable, VTE ppx. 87F who is followed for hypertension, diabetes mellitus, and dementia and who has a history of breast and colon cancers (now in remission) who is admitted for evaluation and management of persistent shortness of breath, refractory to outpatient antibiotic therapy for community acquired pneumonia, in the context of bilateral pleural effusions, lower extremity edema, and an elevated serum bnp-most consistent with a diagnosis of acute decompensated heart failure vs. PNA vs. malignancy.     PE: No new change: Frail elderly lady in [place]. Lungs CTA, S1S2. Abd soft nontender. LE: no edema.       Plan:   -CTA CTA chest negative for PE. Remains tachycardic with 02 requirement, increased BB. Denies pain.   -CT Abd/pelvis concerning for sclerotic metastatic lesions in vertebral bodies  -ECHO EF 75% with no significant valvular abnormalities, trace pericardial effusion   -C/w Cefepime for PNA, procalcitonin mildly elevated. Complete course. ID following   -Hyponatremia, improved, 130. Fluid restrict. Nephro following.   -AFib: CHADVASC score of 3, on Eliquis 2.5mg BID   -Will continue with GOC with family, family aware patient's condition is worsening with new findings of metastatic lesions, concerning for malignancy. Ongoing conversation daily. Patient is still FULL code as per family. Scheduled family discussion for this afternoon.   -Supplement O2 as needed for O2 of 92% of higher, wean as tolerated   -Resume diet, PT/OT when stable, VTE ppx.

## 2023-01-20 NOTE — PROGRESS NOTE ADULT - PROBLEM SELECTOR PLAN 1
Hypo-osmolar Hyponatremia due to high ADH state from CHF & possible NSAID use.  SNa on arrival 1/14 was 117 which increased to 121 the same day, however on day of consult Na decreased to 117. Pt receiving vanco, cefepime & azithro all in D5 since arrival. S/p 8 hours of 3% HTS without improvement. Juanita 16, UOsm 584, SOsm low at 253.     SNa improved to 129 today. Continue with fluid restriction to <1L/day for now.  BMP daily. Avoid NSAIDs. Avoid isotonic or hypotonic fluids. Liberalize PO intake of solute and protein    If you have any questions, please feel free to contact me  Jer Tamez  Nephrology Fellow  [phone]; Prefer [employer] TEAMS  (After 5pm or on weekends please page the on-call fellow). Hypo-osmolar Hyponatremia due to high ADH state from CHF & possible NSAID use.  SNa on arrival 1/14 was 117 which increased to 121 the same day, however on day of consult Na decreased to 117. Pt receiving vanco, cefepime & azithro all in D5 since arrival. S/p 8 hours of 3% HTS without improvement. Juanita 16, UOsm 584, SOsm low at 253.     SNa improved to 129 today. Continue with fluid restriction to <1L/day for now.  BMP daily. Avoid NSAIDs. Avoid isotonic or hypotonic fluids. Liberalize PO intake of solute and protein    If you have any questions, please feel free to contact me  Jer Tamez  Nephrology Fellow  [phone]; Prefer [employer] TEAMS  (After 5pm or on weekends please page the on-call fellow). Hypo-osmolar Hyponatremia due to high ADH state from CHF & possible NSAID use.  SNa on arrival 1/14 was 117 which increased to 121 the same day, however on day of consult Na decreased to 117. Pt receiving vanco, cefepime & azithro all in D5 since arrival. S/p 8 hours of 3% HTS without improvement. Juanita 16, UOsm 584, SOsm low at 253.     SNa improved to 129 today. Continue with fluid restriction to <1L/day for now.  BMP daily. Avoid NSAIDs. Avoid isotonic or hypotonic fluids. Liberalize PO intake of solute and protein    If you have any questions, please feel free to contact me  Jer Tamez  Nephrology Fellow  [phone]; Prefer [employer] TEAMS  (After 5pm or on weekends please page the on-call fellow). Hypo-osmolar Hyponatremia due to high ADH state from CHF & possible NSAID use.  SNa on arrival 1/14 was 117 which increased to 121 the same day, however on day of consult Na decreased to 117. Pt receiving vanco, cefepime & azithro all in D5 since arrival. S/p 8 hours of 3% HTS without improvement. Juanita 16, UOsm 584, SOsm low at 253.     SNa improved to 130 today after use of HTS. Continue with fluid restriction to <1L/day for now.  BMP daily. Avoid NSAIDs. Avoid isotonic or hypotonic fluids. Liberalize PO intake of solute and protein. May benefit from salt tabs. Repeat serum osm, urine osm and urine sodium levels. Check serum uric acid.    If you have any questions, please feel free to contact me  Jer Tamez  Nephrology Fellow  [phone]; Prefer [employer] TEAMS  (After 5pm or on weekends please page the on-call fellow). Hypo-osmolar Hyponatremia due to high ADH state from CHF & possible NSAID use.  SNa on arrival 1/14 was 117 which increased to 121 the same day, however on day of consult Na decreased to 117. Pt receiving vanco, cefepime & azithro all in D5 since arrival. S/p 8 hours of 3% HTS without improvement. Juanita 16, UOsm 584, SOsm low at 253.     SNa improved to 130 today after use of HTS. Continue with fluid restriction to <1L/day for now.  BMP daily. Avoid NSAIDs. Avoid isotonic or hypotonic fluids. Liberalize PO intake of solute and protein. May benefit from salt tabs. Repeat serum osm, urine osm and urine sodium levels. Check serum uric acid.    If you have any questions, please feel free to contact me  Jer Tamez  Nephrology Fellow  [phone]; Prefer [employer] TEAMS  (After 5pm or on weekends please page the on-call fellow). Hypo-osmolar Hyponatremia due to high ADH state from CHF & possible NSAID use.  SNa on arrival 1/14 was 117 which increased to 121 the same day, however on day of consult Na decreased to 117. Pt receiving vanco, cefepime & azithro all in D5 since arrival. S/p 8 hours of 3% HTS without improvement. Juanita 16, UOsm 584, SOsm low at 253.     SNa improved to 130 today after use of HTS. Continue with fluid restriction to <1L/day for now.  BMP daily. Avoid NSAIDs. Avoid isotonic or hypotonic fluids. Liberalize PO intake of solute and protein. May benefit from salt tabs. Repeat serum osm, urine osm and urine sodium levels. Check serum uric acid.    If you have any questions, please feel free to contact me  Jer Tamez  Nephrology Fellow  [phone]; Prefer [employer] TEAMS  (After 5pm or on weekends please page the on-call fellow).

## 2023-01-20 NOTE — PROGRESS NOTE ADULT - SUBJECTIVE AND OBJECTIVE BOX
Follow Up:  Leukocytotis    Interval History/ROS:  Overnight: No acute events.  Patient remains afebrile, other vital signs are stable, continues on 3 L nasal cannula.  Latest labs show improved leukocytosis to 18.4, BMP with renal function within normal limits, LFTs remain elevated with , , ALT 94.  CTA thorax did not show any evidence for pulmonary embolism.  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    Patient seen examined at bedside.      Allergies  No Known Allergies        ANTIMICROBIALS:  cefepime   IVPB 1000 every 8 hours      OTHER MEDS:  MEDICATIONS  (STANDING):  acetaminophen     Tablet .. 650 every 6 hours PRN  albuterol/ipratropium for Nebulization 3 every 6 hours  apixaban 2.5 every 12 hours  dextrose 50% Injectable 25 once  dextrose 50% Injectable 12.5 once  dextrose 50% Injectable 25 once  dextrose Oral Gel 15 once PRN  donepezil 5 at bedtime  furosemide   Injectable 40 two times a day  glucagon  Injectable 1 once  influenza  Vaccine (HIGH DOSE) 0.7 once  insulin lispro (ADMELOG) corrective regimen sliding scale  three times a day before meals  insulin lispro (ADMELOG) corrective regimen sliding scale  at bedtime  metoprolol tartrate 50 three times a day  senna 2 at bedtime  simvastatin 20 at bedtime      Vital Signs Last 24 Hrs  T(C): 36.3 (20 Jan 2023 11:43), Max: 36.6 (20 Jan 2023 04:24)  T(F): 97.4 (20 Jan 2023 11:43), Max: 97.8 (20 Jan 2023 04:24)  HR: 112 (20 Jan 2023 11:43) (83 - 118)  BP: 111/74 (20 Jan 2023 11:43) (108/73 - 116/66)  BP(mean): --  RR: 18 (20 Jan 2023 11:43) (18 - 18)  SpO2: 98% (20 Jan 2023 11:43) (97% - 98%)    Parameters below as of 20 Jan 2023 11:43  Patient On (Oxygen Delivery Method): nasal cannula  O2 Flow (L/min): 3      PHYSICAL EXAM:  Constitutional: ill appearing, responsive however unable to clearly state symptoms  HEAD/EYES: anicteric, no conjunctival injection  ENT:  supple, no thrush  Cardiovascular:   normal S1, S2, no murmur, no edema  Respiratory:  mild diffuse rales  GI:  soft, non-tender, normal bowel sounds  :  no elias, no CVA tenderness  Musculoskeletal:  no synovitis, normal ROM  Neurologic: lethargic  Skin:  no rash, hematoma on left arm  Heme/Onc: no lymphadenopathy   Psychiatric: lethargic                                11.5   18.42 )-----------( 160      ( 20 Jan 2023 09:17 )             34.9       01-20    130<L>  |  92<L>  |  17  ----------------------------<  128<H>  4.5   |  21<L>  |  0.61    Ca    8.4      20 Jan 2023 06:57  Phos  3.1     01-20  Mg     2.4     01-20    TPro  6.3  /  Alb  2.7<L>  /  TBili  0.9  /  DBili  x   /  AST  124<H>  /  ALT  94<H>  /  AlkPhos  919<H>  01-20          MICROBIOLOGY:  v            Rapid RVP Result: Rajeshtec (01-15 @ 01:52)        RADIOLOGY:   Follow Up:  Leukocytotis    Interval History/ROS:  Overnight: No acute events.  Patient remains afebrile, other vital signs are stable, continues on 3 L nasal cannula.  Latest labs show improved leukocytosis to 18.4, BMP with renal function within normal limits, LFTs remain elevated with , , ALT 94.  CTA thorax did not show any evidence for pulmonary embolism.  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    Patient seen examined at bedside.Lethargic but arousable      Allergies  No Known Allergies        ANTIMICROBIALS:  cefepime   IVPB 1000 every 8 hours      OTHER MEDS:  MEDICATIONS  (STANDING):  acetaminophen     Tablet .. 650 every 6 hours PRN  albuterol/ipratropium for Nebulization 3 every 6 hours  apixaban 2.5 every 12 hours  dextrose 50% Injectable 25 once  dextrose 50% Injectable 12.5 once  dextrose 50% Injectable 25 once  dextrose Oral Gel 15 once PRN  donepezil 5 at bedtime  furosemide   Injectable 40 two times a day  glucagon  Injectable 1 once  influenza  Vaccine (HIGH DOSE) 0.7 once  insulin lispro (ADMELOG) corrective regimen sliding scale  three times a day before meals  insulin lispro (ADMELOG) corrective regimen sliding scale  at bedtime  metoprolol tartrate 50 three times a day  senna 2 at bedtime  simvastatin 20 at bedtime      Vital Signs Last 24 Hrs  T(C): 36.3 (20 Jan 2023 11:43), Max: 36.6 (20 Jan 2023 04:24)  T(F): 97.4 (20 Jan 2023 11:43), Max: 97.8 (20 Jan 2023 04:24)  HR: 112 (20 Jan 2023 11:43) (83 - 118)  BP: 111/74 (20 Jan 2023 11:43) (108/73 - 116/66)  BP(mean): --  RR: 18 (20 Jan 2023 11:43) (18 - 18)  SpO2: 98% (20 Jan 2023 11:43) (97% - 98%)    Parameters below as of 20 Jan 2023 11:43  Patient On (Oxygen Delivery Method): nasal cannula  O2 Flow (L/min): 3      PHYSICAL EXAM:  Constitutional: ill appearing but comfortable   HEAD/EYES: anicteric, no conjunctival injection  ENT:  supple, no thrush  Cardiovascular:   normal S1, S2, no murmur, no edema  Respiratory:  mild diffuse rales  GI:  soft, non-tender, normal bowel sounds  :  no elias, no CVA tenderness  Musculoskeletal:  no synovitis, normal ROM  Neurologic: lethargic  Skin:  no rash, hematoma on left arm  Heme/Onc: no lymphadenopathy   Psychiatric: lethargic                                11.5   18.42 )-----------( 160      ( 20 Jan 2023 09:17 )             34.9       01-20    130<L>  |  92<L>  |  17  ----------------------------<  128<H>  4.5   |  21<L>  |  0.61    Ca    8.4      20 Jan 2023 06:57  Phos  3.1     01-20  Mg     2.4     01-20    TPro  6.3  /  Alb  2.7<L>  /  TBili  0.9  /  DBili  x   /  AST  124<H>  /  ALT  94<H>  /  AlkPhos  919<H>  01-20          MICROBIOLOGY:  v            Rapid RVP Result: Rajeshtec (01-15 @ 01:52)        RADIOLOGY:

## 2023-01-20 NOTE — PROGRESS NOTE ADULT - SUBJECTIVE AND OBJECTIVE BOX
Patient is a 87y old  Female who presents with a chief complaint of SOB (15 Usama 2023 00:45)    SUBJECTIVE / OVERNIGHT EVENTS: No acute events ON. PT denies F/C/N/V.     MEDICATIONS  (STANDING):  aspirin enteric coated 81 milliGRAM(s) Oral daily  azithromycin  IVPB 500 milliGRAM(s) IV Intermittent every 24 hours  cefepime   IVPB 1000 milliGRAM(s) IV Intermittent every 8 hours  dextrose 5%. 1000 milliLiter(s) (100 mL/Hr) IV Continuous <Continuous>  dextrose 5%. 1000 milliLiter(s) (50 mL/Hr) IV Continuous <Continuous>  dextrose 50% Injectable 25 Gram(s) IV Push once  dextrose 50% Injectable 12.5 Gram(s) IV Push once  dextrose 50% Injectable 25 Gram(s) IV Push once  donepezil 5 milliGRAM(s) Oral at bedtime  enoxaparin Injectable 40 milliGRAM(s) SubCutaneous every 24 hours  folic acid 1 milliGRAM(s) Oral daily  furosemide   Injectable 40 milliGRAM(s) IV Push two times a day  glucagon  Injectable 1 milliGRAM(s) IntraMuscular once  insulin lispro (ADMELOG) corrective regimen sliding scale   SubCutaneous three times a day before meals  insulin lispro (ADMELOG) corrective regimen sliding scale   SubCutaneous at bedtime  senna 2 Tablet(s) Oral at bedtime  simvastatin 20 milliGRAM(s) Oral at bedtime  traZODone 50 milliGRAM(s) Oral at bedtime    MEDICATIONS  (PRN):  dextrose Oral Gel 15 Gram(s) Oral once PRN Blood Glucose LESS THAN 70 milliGRAM(s)/deciliter      CAPILLARY BLOOD GLUCOSE        I&O's Summary      PHYSICAL EXAM:  Vital Signs Last 24 Hrs  T(C): 36.6 (19 Jan 2023 04:20), Max: 36.6 (18 Jan 2023 11:45)  T(F): 97.8 (19 Jan 2023 04:20), Max: 97.9 (18 Jan 2023 11:45)  HR: 107 (19 Jan 2023 04:20) (107 - 116)  BP: 135/81 (19 Jan 2023 04:20) (114/75 - 135/81)  BP(mean): --  RR: 18 (19 Jan 2023 04:20) (18 - 18)  SpO2: 97% (19 Jan 2023 04:20) (97% - 98%)    Parameters below as of 19 Jan 2023 04:20  Patient On (Oxygen Delivery Method): nasal cannula  O2 Flow (L/min): 3        CONSTITUTIONAL: NAD, lying in bed comfortably  RESPIRATORY: Normal respiratory effort; expiratory wheezing noted on exam in R lung anterior lung field.   CARDIOVASCULAR: Regular rate and rhythm, normal S1 and S2, no murmur/rub/gallop  ABDOMEN: Soft, nondistended, nontender to palpation, normoactive bowel sounds, no rebound/guarding  MUSCULOSKELETAL: no joint swelling or tenderness to palpation, FROM all extremities  NEURO: awake, AAOx1-2   EXTREMITIES: no pedal edema          LABS:                        11.1   23.48 )-----------( 147      ( 19 Jan 2023 07:17 )             33.0     01-18    125<L>  |  86<L>  |  19  ----------------------------<  199<H>  3.6   |  26  |  0.64    Ca    7.5<L>      18 Jan 2023 21:08  Phos  2.3     01-18  Mg     2.3     01-18    TPro  6.0  /  Alb  2.8<L>  /  TBili  0.8  /  DBili  x   /  AST  88<H>  /  ALT  56<H>  /  AlkPhos  1011<H>  01-18                             Patient is a 87y old  Female who presents with a chief complaint of SOB (15 Usama 2023 00:45)    SUBJECTIVE / OVERNIGHT EVENTS: No acute events ON. PT denies F/C/N/V. This AM, discussed with family results of the CT scan, particularly likely osseous metastasis. Family leaning to make the patient DNR/DNI, however need more time to discuss.      MEDICATIONS  (STANDING):  aspirin enteric coated 81 milliGRAM(s) Oral daily  azithromycin  IVPB 500 milliGRAM(s) IV Intermittent every 24 hours  cefepime   IVPB 1000 milliGRAM(s) IV Intermittent every 8 hours  dextrose 5%. 1000 milliLiter(s) (100 mL/Hr) IV Continuous <Continuous>  dextrose 5%. 1000 milliLiter(s) (50 mL/Hr) IV Continuous <Continuous>  dextrose 50% Injectable 25 Gram(s) IV Push once  dextrose 50% Injectable 12.5 Gram(s) IV Push once  dextrose 50% Injectable 25 Gram(s) IV Push once  donepezil 5 milliGRAM(s) Oral at bedtime  enoxaparin Injectable 40 milliGRAM(s) SubCutaneous every 24 hours  folic acid 1 milliGRAM(s) Oral daily  furosemide   Injectable 40 milliGRAM(s) IV Push two times a day  glucagon  Injectable 1 milliGRAM(s) IntraMuscular once  insulin lispro (ADMELOG) corrective regimen sliding scale   SubCutaneous three times a day before meals  insulin lispro (ADMELOG) corrective regimen sliding scale   SubCutaneous at bedtime  senna 2 Tablet(s) Oral at bedtime  simvastatin 20 milliGRAM(s) Oral at bedtime  traZODone 50 milliGRAM(s) Oral at bedtime    MEDICATIONS  (PRN):  dextrose Oral Gel 15 Gram(s) Oral once PRN Blood Glucose LESS THAN 70 milliGRAM(s)/deciliter      CAPILLARY BLOOD GLUCOSE        I&O's Summary      PHYSICAL EXAM:  Vital Signs Last 24 Hrs  T(C): 36.6 (20 Jan 2023 04:24), Max: 36.6 (20 Jan 2023 04:24)  T(F): 97.8 (20 Jan 2023 04:24), Max: 97.8 (20 Jan 2023 04:24)  HR: 83 (20 Jan 2023 04:24) (83 - 118)  BP: 108/74 (20 Jan 2023 04:24) (107/75 - 116/66)  BP(mean): --  RR: 18 (20 Jan 2023 04:24) (18 - 18)  SpO2: 98% (20 Jan 2023 04:24) (97% - 100%)    Parameters below as of 20 Jan 2023 04:24  Patient On (Oxygen Delivery Method): nasal cannula          CONSTITUTIONAL: NAD, lying in bed comfortably  RESPIRATORY: Normal respiratory effort; expiratory wheezing noted on exam in R lung anterior lung field.   CARDIOVASCULAR: Regular rate and rhythm, normal S1 and S2, no murmur/rub/gallop  ABDOMEN: Soft, nondistended, nontender to palpation, normoactive bowel sounds, no rebound/guarding  MUSCULOSKELETAL: no joint swelling or tenderness to palpation, FROM all extremities  NEURO: awake, AAOx1-2   EXTREMITIES: no pedal edema          LABS:    LABS:                        11.5   18.42 )-----------( 160      ( 20 Jan 2023 09:17 )             34.9     01-20    130<L>  |  92<L>  |  17  ----------------------------<  128<H>  4.5   |  21<L>  |  0.61    Ca    8.4      20 Jan 2023 06:57  Phos  3.1     01-20  Mg     2.4     01-20    TPro  6.3  /  Alb  2.7<L>  /  TBili  0.9  /  DBili  x   /  AST  124<H>  /  ALT  94<H>  /  AlkPhos  919<H>  01-20

## 2023-01-20 NOTE — PROGRESS NOTE ADULT - SUBJECTIVE AND OBJECTIVE BOX
[facility] Division of Kidney Diseases & Hypertension  FOLLOW UP NOTE  [phone]--------------------------------------------------------------------------------  Chief Complaint:Pneumonia due to infectious organism  87-year-old with PMH of hypertension, diabetes mellitus, dementia, history of breast and colon cancers (now in remission) who is admitted for evaluation of persistent SOB after completing therapy for CAP. Currently being treated for acute decompensated CHF. CT chest with b/l patchy opacities PNA vs plum edema & small b/l pleural effusions. BNP 2K. Nephrology called for hyponatremia. SNa in 2021 was 140. No SNa on [facility] after that.  SNa on arrival 1/14 was 117 which increased to 121 the same day. Now SNa decreased to 117 today. Pt receiving vanco, cefepime & azithro all in D5 since arrival. Also received 250cc NS. Pt on lasix 40 mg IV daily and on salt tabs on day of consult. Patient has recieved 3% and 2% with significant improvement in sodium      Interval History:  Na improved today to 129. Patient appeared less confused and talking to daughter     PAST HISTORY  --------------------------------------------------------------------------------  No significant changes to PMH, PSH, FHx, SHx, unless otherwise noted    ALLERGIES & MEDICATIONS  --------------------------------------------------------------------------------  Allergies    No Known Allergies    Intolerances      Standing Inpatient Medications  albuterol/ipratropium for Nebulization 3 milliLiter(s) Nebulizer every 6 hours  apixaban 2.5 milliGRAM(s) Oral every 12 hours  cefepime   IVPB 1000 milliGRAM(s) IV Intermittent every 8 hours  dextrose 5%. 1000 milliLiter(s) IV Continuous <Continuous>  dextrose 5%. 1000 milliLiter(s) IV Continuous <Continuous>  dextrose 50% Injectable 25 Gram(s) IV Push once  dextrose 50% Injectable 12.5 Gram(s) IV Push once  dextrose 50% Injectable 25 Gram(s) IV Push once  donepezil 5 milliGRAM(s) Oral at bedtime  folic acid 1 milliGRAM(s) Oral daily  furosemide   Injectable 40 milliGRAM(s) IV Push two times a day  glucagon  Injectable 1 milliGRAM(s) IntraMuscular once  insulin lispro (ADMELOG) corrective regimen sliding scale   SubCutaneous three times a day before meals  insulin lispro (ADMELOG) corrective regimen sliding scale   SubCutaneous at bedtime  metoprolol tartrate 50 milliGRAM(s) Oral three times a day  senna 2 Tablet(s) Oral at bedtime  simvastatin 20 milliGRAM(s) Oral at bedtime    PRN Inpatient Medications  acetaminophen     Tablet .. 650 milliGRAM(s) Oral every 6 hours PRN  dextrose Oral Gel 15 Gram(s) Oral once PRN      REVIEW OF SYSTEMS  --------------------------------------------------------------------------------  Unable to be obtained      All other systems were reviewed and are negative, except as noted.    VITALS/PHYSICAL EXAM  --------------------------------------------------------------------------------  T(C): 36.6 (01-20-23 @ 04:24), Max: 36.6 (01-20-23 @ 04:24)  HR: 83 (01-20-23 @ 04:24) (83 - 118)  BP: 108/74 (01-20-23 @ 04:24) (107/75 - 116/66)  RR: 18 (01-20-23 @ 04:24) (18 - 18)  SpO2: 98% (01-20-23 @ 04:24) (97% - 100%)  Wt(kg): --        01-19-23 @ 07:01  -  01-20-23 @ 07:00  --------------------------------------------------------  IN: 360 mL / OUT: 0 mL / NET: 360 mL      Physical Exam:  	Gen: elderly, sleeping with NC  	HEENT: Anicteric, MMM, no JVD  	Pulm: faint bibasilar crackles  	CV: S1S2, no rub  	Abd: Soft, +BS          No presacral edema  	Ext: No LE edema B/L  	Neuro: Awake  	Skin: Warm and dry  	Vascular access:        LABS/STUDIES  --------------------------------------------------------------------------------              11.5   18.42 >-----------<  160      [01-20-23 @ 09:17]              34.9     130  |  92  |  17  ----------------------------<  128      [01-20-23 @ 06:57]  4.5   |  21  |  0.61        Ca     8.4     [01-20-23 @ 06:57]      Mg     2.4     [01-20-23 @ 06:57]      Phos  3.1     [01-20-23 @ 06:57]    TPro  6.3  /  Alb  2.7  /  TBili  0.9  /  DBili  x   /  AST  124  /  ALT  94  /  AlkPhos  919  [01-20-23 @ 06:57]          Creatinine Trend:  SCr 0.61 [01-20 @ 06:57]  SCr 0.83 [01-20 @ 01:25]  SCr 0.86 [01-19 @ 19:01]  SCr 0.58 [01-19 @ 16:27]  SCr 0.51 [01-19 @ 07:17]    Urinalysis - [01-14-23 @ 21:40]      Color Yellow / Appearance Clear / SG 1.023 / pH 6.0      Gluc Negative / Ketone Negative  / Bili Negative / Urobili 3 mg/dL       Blood Moderate / Protein 30 mg/dL / Leuk Est Negative / Nitrite Negative      RBC 21 / WBC 4 / Hyaline 3 / Gran  / Sq Epi  / Non Sq Epi 2 / Bacteria Negative    Urine Creatinine 56      [01-17-23 @ 10:51]  Urine Protein 34      [01-17-23 @ 10:51]  Urine Sodium 49      [01-19-23 @ 16:38]  Urine Urea Nitrogen 519      [01-17-23 @ 10:49]  Urine Potassium 39      [01-17-23 @ 10:51]  Urine Chloride 52      [01-17-23 @ 10:51]  Urine Osmolality 325      [01-19-23 @ 16:38]    TSH 0.10      [01-18-23 @ 06:33]       [facility] Division of Kidney Diseases & Hypertension  FOLLOW UP NOTE  [phone]--------------------------------------------------------------------------------  Chief Complaint:Pneumonia due to infectious organism  87-year-old with PMH of hypertension, diabetes mellitus, dementia, history of breast and colon cancers (now in remission) who is admitted for evaluation of persistent SOB after completing therapy for CAP. Currently being treated for acute decompensated CHF. CT chest with b/l patchy opacities PNA vs plum edema & small b/l pleural effusions. BNP 2K. Nephrology called for hyponatremia. SNa in 2021 was 140. No SNa on [facility] after that.  SNa on arrival 1/14 was 117 which increased to 121 the same day. Now SNa decreased to 117 today. Pt receiving vanco, cefepime & azithro all in D5 since arrival. Also received 250cc NS. Pt on lasix 40 mg IV daily and on salt tabs on day of consult. Patient has recieved 3% and 2% with significant improvement in sodium      Interval History:  Na improved today to 129. Patient appeared less confused and talking to daughter     PAST HISTORY  --------------------------------------------------------------------------------  No significant changes to PMH, PSH, FHx, SHx, unless otherwise noted    ALLERGIES & MEDICATIONS  --------------------------------------------------------------------------------  Allergies    No Known Allergies    Intolerances      Standing Inpatient Medications  albuterol/ipratropium for Nebulization 3 milliLiter(s) Nebulizer every 6 hours  apixaban 2.5 milliGRAM(s) Oral every 12 hours  cefepime   IVPB 1000 milliGRAM(s) IV Intermittent every 8 hours  dextrose 5%. 1000 milliLiter(s) IV Continuous <Continuous>  dextrose 5%. 1000 milliLiter(s) IV Continuous <Continuous>  dextrose 50% Injectable 25 Gram(s) IV Push once  dextrose 50% Injectable 12.5 Gram(s) IV Push once  dextrose 50% Injectable 25 Gram(s) IV Push once  donepezil 5 milliGRAM(s) Oral at bedtime  folic acid 1 milliGRAM(s) Oral daily  furosemide   Injectable 40 milliGRAM(s) IV Push two times a day  glucagon  Injectable 1 milliGRAM(s) IntraMuscular once  insulin lispro (ADMELOG) corrective regimen sliding scale   SubCutaneous three times a day before meals  insulin lispro (ADMELOG) corrective regimen sliding scale   SubCutaneous at bedtime  metoprolol tartrate 50 milliGRAM(s) Oral three times a day  senna 2 Tablet(s) Oral at bedtime  simvastatin 20 milliGRAM(s) Oral at bedtime    PRN Inpatient Medications  acetaminophen     Tablet .. 650 milliGRAM(s) Oral every 6 hours PRN  dextrose Oral Gel 15 Gram(s) Oral once PRN      REVIEW OF SYSTEMS  --------------------------------------------------------------------------------  Unable to be obtained      All other systems were reviewed and are negative, except as noted.    VITALS/PHYSICAL EXAM  --------------------------------------------------------------------------------  T(C): 36.6 (01-20-23 @ 04:24), Max: 36.6 (01-20-23 @ 04:24)  HR: 83 (01-20-23 @ 04:24) (83 - 118)  BP: 108/74 (01-20-23 @ 04:24) (107/75 - 116/66)  RR: 18 (01-20-23 @ 04:24) (18 - 18)  SpO2: 98% (01-20-23 @ 04:24) (97% - 100%)  Wt(kg): --        01-19-23 @ 07:01  -  01-20-23 @ 07:00  --------------------------------------------------------  IN: 360 mL / OUT: 0 mL / NET: 360 mL      Physical Exam:  	Gen: elderly, sleeping with NC  	HEENT: Anicteric, MMM, no JVD  	Pulm: faint bibasilar crackles  	CV: S1S2, no rub  	Abd: Soft, +BS          No presacral edema  	Ext: No LE edema B/L  	Neuro: Awake  	Skin: Warm and dry  	Vascular access:        LABS/STUDIES  --------------------------------------------------------------------------------              11.5   18.42 >-----------<  160      [01-20-23 @ 09:17]              34.9     130  |  92  |  17  ----------------------------<  128      [01-20-23 @ 06:57]  4.5   |  21  |  0.61        Ca     8.4     [01-20-23 @ 06:57]      Mg     2.4     [01-20-23 @ 06:57]      Phos  3.1     [01-20-23 @ 06:57]    TPro  6.3  /  Alb  2.7  /  TBili  0.9  /  DBili  x   /  AST  124  /  ALT  94  /  AlkPhos  919  [01-20-23 @ 06:57]          Creatinine Trend:  SCr 0.61 [01-20 @ 06:57]  SCr 0.83 [01-20 @ 01:25]  SCr 0.86 [01-19 @ 19:01]  SCr 0.58 [01-19 @ 16:27]  SCr 0.51 [01-19 @ 07:17]    Urinalysis - [01-14-23 @ 21:40]      Color Yellow / Appearance Clear / SG 1.023 / pH 6.0      Gluc Negative / Ketone Negative  / Bili Negative / Urobili 3 mg/dL       Blood Moderate / Protein 30 mg/dL / Leuk Est Negative / Nitrite Negative      RBC 21 / WBC 4 / Hyaline 3 / Gran  / Sq Epi  / Non Sq Epi 2 / Bacteria Negative    Urine Creatinine 56      [01-17-23 @ 10:51]  Urine Protein 34      [01-17-23 @ 10:51]  Urine Sodium 49      [01-19-23 @ 16:38]  Urine Urea Nitrogen 519      [01-17-23 @ 10:49]  Urine Potassium 39      [01-17-23 @ 10:51]  Urine Chloride 52      [01-17-23 @ 10:51]  Urine Osmolality 325      [01-19-23 @ 16:38]    TSH 0.10      [01-18-23 @ 06:33]       [facility] Division of Kidney Diseases & Hypertension  FOLLOW UP NOTE  [phone]--------------------------------------------------------------------------------  Chief Complaint:Pneumonia due to infectious organism  87-year-old with PMH of hypertension, diabetes mellitus, dementia, history of breast and colon cancers (now in remission) who is admitted for evaluation of persistent SOB after completing therapy for CAP. Currently being treated for acute decompensated CHF. CT chest with b/l patchy opacities PNA vs plum edema & small b/l pleural effusions. BNP 2K. Nephrology called for hyponatremia. SNa in 2021 was 140. No SNa on [facility] after that.  SNa on arrival 1/14 was 117 which increased to 121 the same day. Now SNa decreased to 117 today. Pt receiving vanco, cefepime & azithro all in D5 since arrival. Also received 250cc NS. Pt on lasix 40 mg IV daily and on salt tabs on day of consult. Patient has recieved 3% and 2% with significant improvement in sodium      Interval History:  Na improved today to 129. Patient appeared less confused and talking to daughter     PAST HISTORY  --------------------------------------------------------------------------------  No significant changes to PMH, PSH, FHx, SHx, unless otherwise noted    ALLERGIES & MEDICATIONS  --------------------------------------------------------------------------------  Allergies    No Known Allergies    Intolerances      Standing Inpatient Medications  albuterol/ipratropium for Nebulization 3 milliLiter(s) Nebulizer every 6 hours  apixaban 2.5 milliGRAM(s) Oral every 12 hours  cefepime   IVPB 1000 milliGRAM(s) IV Intermittent every 8 hours  dextrose 5%. 1000 milliLiter(s) IV Continuous <Continuous>  dextrose 5%. 1000 milliLiter(s) IV Continuous <Continuous>  dextrose 50% Injectable 25 Gram(s) IV Push once  dextrose 50% Injectable 12.5 Gram(s) IV Push once  dextrose 50% Injectable 25 Gram(s) IV Push once  donepezil 5 milliGRAM(s) Oral at bedtime  folic acid 1 milliGRAM(s) Oral daily  furosemide   Injectable 40 milliGRAM(s) IV Push two times a day  glucagon  Injectable 1 milliGRAM(s) IntraMuscular once  insulin lispro (ADMELOG) corrective regimen sliding scale   SubCutaneous three times a day before meals  insulin lispro (ADMELOG) corrective regimen sliding scale   SubCutaneous at bedtime  metoprolol tartrate 50 milliGRAM(s) Oral three times a day  senna 2 Tablet(s) Oral at bedtime  simvastatin 20 milliGRAM(s) Oral at bedtime    PRN Inpatient Medications  acetaminophen     Tablet .. 650 milliGRAM(s) Oral every 6 hours PRN  dextrose Oral Gel 15 Gram(s) Oral once PRN      REVIEW OF SYSTEMS  --------------------------------------------------------------------------------  Unable to be obtained      All other systems were reviewed and are negative, except as noted.    VITALS/PHYSICAL EXAM  --------------------------------------------------------------------------------  T(C): 36.6 (01-20-23 @ 04:24), Max: 36.6 (01-20-23 @ 04:24)  HR: 83 (01-20-23 @ 04:24) (83 - 118)  BP: 108/74 (01-20-23 @ 04:24) (107/75 - 116/66)  RR: 18 (01-20-23 @ 04:24) (18 - 18)  SpO2: 98% (01-20-23 @ 04:24) (97% - 100%)  Wt(kg): --        01-19-23 @ 07:01  -  01-20-23 @ 07:00  --------------------------------------------------------  IN: 360 mL / OUT: 0 mL / NET: 360 mL      Physical Exam:  	Gen: elderly, sleeping with NC  	HEENT: Anicteric, MMM, no JVD  	Pulm: faint bibasilar crackles  	CV: S1S2, no rub  	Abd: Soft, +BS          No presacral edema  	Ext: No LE edema B/L  	Neuro: Awake  	Skin: Warm and dry  	Vascular access:        LABS/STUDIES  --------------------------------------------------------------------------------              11.5   18.42 >-----------<  160      [01-20-23 @ 09:17]              34.9     130  |  92  |  17  ----------------------------<  128      [01-20-23 @ 06:57]  4.5   |  21  |  0.61        Ca     8.4     [01-20-23 @ 06:57]      Mg     2.4     [01-20-23 @ 06:57]      Phos  3.1     [01-20-23 @ 06:57]    TPro  6.3  /  Alb  2.7  /  TBili  0.9  /  DBili  x   /  AST  124  /  ALT  94  /  AlkPhos  919  [01-20-23 @ 06:57]          Creatinine Trend:  SCr 0.61 [01-20 @ 06:57]  SCr 0.83 [01-20 @ 01:25]  SCr 0.86 [01-19 @ 19:01]  SCr 0.58 [01-19 @ 16:27]  SCr 0.51 [01-19 @ 07:17]    Urinalysis - [01-14-23 @ 21:40]      Color Yellow / Appearance Clear / SG 1.023 / pH 6.0      Gluc Negative / Ketone Negative  / Bili Negative / Urobili 3 mg/dL       Blood Moderate / Protein 30 mg/dL / Leuk Est Negative / Nitrite Negative      RBC 21 / WBC 4 / Hyaline 3 / Gran  / Sq Epi  / Non Sq Epi 2 / Bacteria Negative    Urine Creatinine 56      [01-17-23 @ 10:51]  Urine Protein 34      [01-17-23 @ 10:51]  Urine Sodium 49      [01-19-23 @ 16:38]  Urine Urea Nitrogen 519      [01-17-23 @ 10:49]  Urine Potassium 39      [01-17-23 @ 10:51]  Urine Chloride 52      [01-17-23 @ 10:51]  Urine Osmolality 325      [01-19-23 @ 16:38]    TSH 0.10      [01-18-23 @ 06:33]

## 2023-01-20 NOTE — PROVIDER CONTACT NOTE (OTHER) - ASSESSMENT
-160s sustaining, bp 120/70, o2sat 94% on 3LNC.  In no acute distress, no c/o pain, or respiratory distress.

## 2023-01-21 DIAGNOSIS — R52 PAIN, UNSPECIFIED: ICD-10-CM

## 2023-01-21 DIAGNOSIS — R93.89 ABNORMAL FINDINGS ON DIAGNOSTIC IMAGING OF OTHER SPECIFIED BODY STRUCTURES: ICD-10-CM

## 2023-01-21 DIAGNOSIS — Z71.89 OTHER SPECIFIED COUNSELING: ICD-10-CM

## 2023-01-21 DIAGNOSIS — Z51.5 ENCOUNTER FOR PALLIATIVE CARE: ICD-10-CM

## 2023-01-21 DIAGNOSIS — R06.00 DYSPNEA, UNSPECIFIED: ICD-10-CM

## 2023-01-21 PROCEDURE — 99498 ADVNCD CARE PLAN ADDL 30 MIN: CPT | Mod: 25

## 2023-01-21 PROCEDURE — 99223 1ST HOSP IP/OBS HIGH 75: CPT

## 2023-01-21 PROCEDURE — 99497 ADVNCD CARE PLAN 30 MIN: CPT | Mod: 25

## 2023-01-21 PROCEDURE — 99232 SBSQ HOSP IP/OBS MODERATE 35: CPT

## 2023-01-21 RX ORDER — HYDROMORPHONE HYDROCHLORIDE 2 MG/ML
0.3 INJECTION INTRAMUSCULAR; INTRAVENOUS; SUBCUTANEOUS
Refills: 0 | Status: DISCONTINUED | OUTPATIENT
Start: 2023-01-21 | End: 2023-01-23

## 2023-01-21 RX ORDER — HYDROMORPHONE HYDROCHLORIDE 2 MG/ML
0.5 INJECTION INTRAMUSCULAR; INTRAVENOUS; SUBCUTANEOUS
Refills: 0 | Status: DISCONTINUED | OUTPATIENT
Start: 2023-01-21 | End: 2023-01-23

## 2023-01-21 RX ORDER — HYDROMORPHONE HYDROCHLORIDE 2 MG/ML
0.3 INJECTION INTRAMUSCULAR; INTRAVENOUS; SUBCUTANEOUS
Refills: 0 | Status: DISCONTINUED | OUTPATIENT
Start: 2023-01-21 | End: 2023-01-21

## 2023-01-21 RX ORDER — ROBINUL 0.2 MG/ML
0.4 INJECTION INTRAMUSCULAR; INTRAVENOUS EVERY 6 HOURS
Refills: 0 | Status: DISCONTINUED | OUTPATIENT
Start: 2023-01-21 | End: 2023-01-27

## 2023-01-21 RX ADMIN — ONDANSETRON 4 MILLIGRAM(S): 8 TABLET, FILM COATED ORAL at 07:44

## 2023-01-21 RX ADMIN — Medication 50 MILLIGRAM(S): at 14:20

## 2023-01-21 RX ADMIN — HYDROMORPHONE HYDROCHLORIDE 0.5 MILLIGRAM(S): 2 INJECTION INTRAMUSCULAR; INTRAVENOUS; SUBCUTANEOUS at 17:00

## 2023-01-21 RX ADMIN — HYDROMORPHONE HYDROCHLORIDE 0.5 MILLIGRAM(S): 2 INJECTION INTRAMUSCULAR; INTRAVENOUS; SUBCUTANEOUS at 22:40

## 2023-01-21 RX ADMIN — APIXABAN 2.5 MILLIGRAM(S): 2.5 TABLET, FILM COATED ORAL at 17:16

## 2023-01-21 RX ADMIN — DONEPEZIL HYDROCHLORIDE 5 MILLIGRAM(S): 10 TABLET, FILM COATED ORAL at 21:08

## 2023-01-21 RX ADMIN — POLYETHYLENE GLYCOL 3350 17 GRAM(S): 17 POWDER, FOR SOLUTION ORAL at 05:51

## 2023-01-21 RX ADMIN — Medication 40 MILLIGRAM(S): at 05:51

## 2023-01-21 RX ADMIN — Medication 0.25 MILLIGRAM(S): at 21:08

## 2023-01-21 RX ADMIN — Medication 50 MILLIGRAM(S): at 21:08

## 2023-01-21 RX ADMIN — Medication 3 MILLILITER(S): at 05:50

## 2023-01-21 RX ADMIN — Medication 3 MILLILITER(S): at 17:16

## 2023-01-21 RX ADMIN — SENNA PLUS 2 TABLET(S): 8.6 TABLET ORAL at 21:08

## 2023-01-21 RX ADMIN — SIMVASTATIN 20 MILLIGRAM(S): 20 TABLET, FILM COATED ORAL at 21:08

## 2023-01-21 RX ADMIN — HYDROMORPHONE HYDROCHLORIDE 0.25 MILLIGRAM(S): 2 INJECTION INTRAMUSCULAR; INTRAVENOUS; SUBCUTANEOUS at 08:00

## 2023-01-21 RX ADMIN — HYDROMORPHONE HYDROCHLORIDE 0.25 MILLIGRAM(S): 2 INJECTION INTRAMUSCULAR; INTRAVENOUS; SUBCUTANEOUS at 07:44

## 2023-01-21 RX ADMIN — APIXABAN 2.5 MILLIGRAM(S): 2.5 TABLET, FILM COATED ORAL at 05:51

## 2023-01-21 RX ADMIN — Medication 3 MILLILITER(S): at 11:58

## 2023-01-21 RX ADMIN — Medication 1 MILLIGRAM(S): at 11:58

## 2023-01-21 NOTE — PROGRESS NOTE ADULT - SUBJECTIVE AND OBJECTIVE BOX
PROGRESS NOTE:   Written by Alex Damon PGY-3    Patient is a 87y old  Female who presents with a chief complaint of SOB (20 Jan 2023 12:22)      SUBJECTIVE / OVERNIGHT EVENTS:    ADDITIONAL REVIEW OF SYSTEMS:    MEDICATIONS  (STANDING):  albuterol/ipratropium for Nebulization 3 milliLiter(s) Nebulizer every 6 hours  apixaban 2.5 milliGRAM(s) Oral every 12 hours  donepezil 5 milliGRAM(s) Oral at bedtime  folic acid 1 milliGRAM(s) Oral daily  furosemide   Injectable 40 milliGRAM(s) IV Push daily  metoprolol tartrate 50 milliGRAM(s) Oral three times a day  polyethylene glycol 3350 17 Gram(s) Oral two times a day  senna 2 Tablet(s) Oral at bedtime  simvastatin 20 milliGRAM(s) Oral at bedtime    MEDICATIONS  (PRN):  acetaminophen     Tablet .. 650 milliGRAM(s) Oral every 6 hours PRN Temp greater or equal to 38C (100.4F), Mild Pain (1 - 3), Moderate Pain (4 - 6), Severe Pain (7 - 10)  HYDROmorphone  Injectable 0.25 milliGRAM(s) IV Push every 4 hours PRN Severe Pain (7 - 10)  ondansetron Injectable 4 milliGRAM(s) IV Push every 6 hours PRN Nausea and/or Vomiting      CAPILLARY BLOOD GLUCOSE      POCT Blood Glucose.: 112 mg/dL (20 Jan 2023 21:19)  POCT Blood Glucose.: 113 mg/dL (20 Jan 2023 17:16)  POCT Blood Glucose.: 106 mg/dL (20 Jan 2023 12:48)  POCT Blood Glucose.: 131 mg/dL (20 Jan 2023 08:55)    I&O's Summary    20 Jan 2023 07:01  -  21 Jan 2023 07:00  --------------------------------------------------------  IN: 700 mL / OUT: 500 mL / NET: 200 mL        PHYSICAL EXAM:  Vital Signs Last 24 Hrs  T(C): 36.4 (21 Jan 2023 05:22), Max: 36.7 (20 Jan 2023 21:14)  T(F): 97.5 (21 Jan 2023 05:22), Max: 98.1 (20 Jan 2023 21:14)  HR: 110 (21 Jan 2023 05:22) (100 - 112)  BP: 100/53 (21 Jan 2023 05:22) (100/53 - 111/74)  BP(mean): --  RR: 18 (21 Jan 2023 05:22) (18 - 18)  SpO2: 96% (21 Jan 2023 05:22) (96% - 98%)    Parameters below as of 21 Jan 2023 05:22  Patient On (Oxygen Delivery Method): nasal cannula        GENERAL: No acute distress, well-developed  HEAD:  Atraumatic, Normocephalic  EYES: EOMI, , conjunctiva and sclera clear  NECK: Supple, no lymphadenopathy, no JVD  CHEST/LUNG: CTAB; No wheezes, rales, or rhonchi  HEART: Regular rate and rhythm; No murmurs, rubs, or gallops  ABDOMEN: Soft, non-tender, non-distended; normal bowel sounds  EXTREMITIES:  2+ peripheral pulses b/l, No LE edema  NEUROLOGY: A&O x 1  SKIN: No rashes or lesions    LABS:                        11.5   18.42 )-----------( 160      ( 20 Jan 2023 09:17 )             34.9     01-20    130<L>  |  92<L>  |  17  ----------------------------<  128<H>  4.5   |  21<L>  |  0.61    Ca    8.4      20 Jan 2023 06:57  Phos  3.1     01-20  Mg     2.4     01-20    TPro  6.3  /  Alb  2.7<L>  /  TBili  0.9  /  DBili  x   /  AST  124<H>  /  ALT  94<H>  /  AlkPhos  919<H>  01-20                RADIOLOGY & ADDITIONAL TESTS:  Results Reviewed:   Imaging Personally Reviewed:  Electrocardiogram Personally Reviewed:    COORDINATION OF CARE:  Care Discussed with Consultants/Other Providers [Y/N]:  Prior or Outpatient Records Reviewed [Y/N]:   PROGRESS NOTE:   Written by Alex Damon PGY-3    Patient is a 87y old  Female who presents with a chief complaint of SOB (20 Jan 2023 12:22)    SUBJECTIVE / OVERNIGHT EVENTS: No acute events overnight. Patient with some chest discomfort overnight.    ADDITIONAL REVIEW OF SYSTEMS:    MEDICATIONS  (STANDING):  albuterol/ipratropium for Nebulization 3 milliLiter(s) Nebulizer every 6 hours  apixaban 2.5 milliGRAM(s) Oral every 12 hours  donepezil 5 milliGRAM(s) Oral at bedtime  folic acid 1 milliGRAM(s) Oral daily  furosemide   Injectable 40 milliGRAM(s) IV Push daily  metoprolol tartrate 50 milliGRAM(s) Oral three times a day  polyethylene glycol 3350 17 Gram(s) Oral two times a day  senna 2 Tablet(s) Oral at bedtime  simvastatin 20 milliGRAM(s) Oral at bedtime    MEDICATIONS  (PRN):  acetaminophen     Tablet .. 650 milliGRAM(s) Oral every 6 hours PRN Temp greater or equal to 38C (100.4F), Mild Pain (1 - 3), Moderate Pain (4 - 6), Severe Pain (7 - 10)  HYDROmorphone  Injectable 0.25 milliGRAM(s) IV Push every 4 hours PRN Severe Pain (7 - 10)  ondansetron Injectable 4 milliGRAM(s) IV Push every 6 hours PRN Nausea and/or Vomiting      CAPILLARY BLOOD GLUCOSE      POCT Blood Glucose.: 112 mg/dL (20 Jan 2023 21:19)  POCT Blood Glucose.: 113 mg/dL (20 Jan 2023 17:16)  POCT Blood Glucose.: 106 mg/dL (20 Jan 2023 12:48)  POCT Blood Glucose.: 131 mg/dL (20 Jan 2023 08:55)    I&O's Summary    20 Jan 2023 07:01  -  21 Jan 2023 07:00  --------------------------------------------------------  IN: 700 mL / OUT: 500 mL / NET: 200 mL        PHYSICAL EXAM:  Vital Signs Last 24 Hrs  T(C): 36.4 (21 Jan 2023 05:22), Max: 36.7 (20 Jan 2023 21:14)  T(F): 97.5 (21 Jan 2023 05:22), Max: 98.1 (20 Jan 2023 21:14)  HR: 110 (21 Jan 2023 05:22) (100 - 112)  BP: 100/53 (21 Jan 2023 05:22) (100/53 - 111/74)  BP(mean): --  RR: 18 (21 Jan 2023 05:22) (18 - 18)  SpO2: 96% (21 Jan 2023 05:22) (96% - 98%)    Parameters below as of 21 Jan 2023 05:22  Patient On (Oxygen Delivery Method): nasal cannula        GENERAL: No acute distress, well-developed  HEAD:  Atraumatic, Normocephalic  EYES: EOMI, , conjunctiva and sclera clear  NECK: Supple, no lymphadenopathy, no JVD  CHEST/LUNG: CTAB; No wheezes, rales, or rhonchi  HEART: Regular rate and rhythm; No murmurs, rubs, or gallops  ABDOMEN: Soft, non-tender, non-distended; normal bowel sounds  EXTREMITIES:  2+ peripheral pulses b/l, No LE edema  NEUROLOGY: A&O x 1  SKIN: No rashes or lesions    LABS:                        11.5   18.42 )-----------( 160      ( 20 Jan 2023 09:17 )             34.9     01-20    130<L>  |  92<L>  |  17  ----------------------------<  128<H>  4.5   |  21<L>  |  0.61    Ca    8.4      20 Jan 2023 06:57  Phos  3.1     01-20  Mg     2.4     01-20    TPro  6.3  /  Alb  2.7<L>  /  TBili  0.9  /  DBili  x   /  AST  124<H>  /  ALT  94<H>  /  AlkPhos  919<H>  01-20                RADIOLOGY & ADDITIONAL TESTS:  Results Reviewed:   Imaging Personally Reviewed:  Electrocardiogram Personally Reviewed:    COORDINATION OF CARE:  Care Discussed with Consultants/Other Providers [Y/N]:  Prior or Outpatient Records Reviewed [Y/N]:   PROGRESS NOTE:   Written by Alex Damon PGY-3    Patient is a 87y old  Female who presents with a chief complaint of SOB (20 Jan 2023 12:22)    SUBJECTIVE / OVERNIGHT EVENTS: No acute events overnight. Patient with some chest discomfort overnight.    ADDITIONAL REVIEW OF SYSTEMS: Reports some dizziness and chest discomfort.     MEDICATIONS  (STANDING):  albuterol/ipratropium for Nebulization 3 milliLiter(s) Nebulizer every 6 hours  apixaban 2.5 milliGRAM(s) Oral every 12 hours  donepezil 5 milliGRAM(s) Oral at bedtime  folic acid 1 milliGRAM(s) Oral daily  furosemide   Injectable 40 milliGRAM(s) IV Push daily  metoprolol tartrate 50 milliGRAM(s) Oral three times a day  polyethylene glycol 3350 17 Gram(s) Oral two times a day  senna 2 Tablet(s) Oral at bedtime  simvastatin 20 milliGRAM(s) Oral at bedtime    MEDICATIONS  (PRN):  acetaminophen     Tablet .. 650 milliGRAM(s) Oral every 6 hours PRN Temp greater or equal to 38C (100.4F), Mild Pain (1 - 3), Moderate Pain (4 - 6), Severe Pain (7 - 10)  HYDROmorphone  Injectable 0.25 milliGRAM(s) IV Push every 4 hours PRN Severe Pain (7 - 10)  ondansetron Injectable 4 milliGRAM(s) IV Push every 6 hours PRN Nausea and/or Vomiting      CAPILLARY BLOOD GLUCOSE      POCT Blood Glucose.: 112 mg/dL (20 Jan 2023 21:19)  POCT Blood Glucose.: 113 mg/dL (20 Jan 2023 17:16)  POCT Blood Glucose.: 106 mg/dL (20 Jan 2023 12:48)  POCT Blood Glucose.: 131 mg/dL (20 Jan 2023 08:55)    I&O's Summary    20 Jan 2023 07:01  -  21 Jan 2023 07:00  --------------------------------------------------------  IN: 700 mL / OUT: 500 mL / NET: 200 mL        PHYSICAL EXAM:  Vital Signs Last 24 Hrs  T(C): 36.4 (21 Jan 2023 05:22), Max: 36.7 (20 Jan 2023 21:14)  T(F): 97.5 (21 Jan 2023 05:22), Max: 98.1 (20 Jan 2023 21:14)  HR: 110 (21 Jan 2023 05:22) (100 - 112)  BP: 100/53 (21 Jan 2023 05:22) (100/53 - 111/74)  BP(mean): --  RR: 18 (21 Jan 2023 05:22) (18 - 18)  SpO2: 96% (21 Jan 2023 05:22) (96% - 98%)    Parameters below as of 21 Jan 2023 05:22  Patient On (Oxygen Delivery Method): nasal cannula        GENERAL: No acute distress, resting in bed  HEAD:  Atraumatic, Normocephalic  EYES: EOMI, , conjunctiva and sclera clear  NECK: Supple, no lymphadenopathy, no JVD  CHEST/LUNG: CTAB; No wheezes, rales, or rhonchi  HEART: Regular rate and rhythm; No murmurs  ABDOMEN: Soft, non-tender, non-distended; normal bowel sounds  EXTREMITIES:  2+ peripheral pulses b/l, No LE edema  NEUROLOGY: A&O x 1  SKIN: No rashes or lesions    LABS:                        11.5   18.42 )-----------( 160      ( 20 Jan 2023 09:17 )             34.9     01-20    130<L>  |  92<L>  |  17  ----------------------------<  128<H>  4.5   |  21<L>  |  0.61    Ca    8.4      20 Jan 2023 06:57  Phos  3.1     01-20  Mg     2.4     01-20    TPro  6.3  /  Alb  2.7<L>  /  TBili  0.9  /  DBili  x   /  AST  124<H>  /  ALT  94<H>  /  AlkPhos  919<H>  01-20                RADIOLOGY & ADDITIONAL TESTS:  Results Reviewed:   Imaging Personally Reviewed:  Electrocardiogram Personally Reviewed:    COORDINATION OF CARE:  Care Discussed with Consultants/Other Providers [Y/N]:  Prior or Outpatient Records Reviewed [Y/N]:   PROGRESS NOTE:   Written by lAex Damon PGY-3    Patient is a 87y old  Female who presents with a chief complaint of SOB (20 Jan 2023 12:22)    SUBJECTIVE / OVERNIGHT EVENTS: No acute events overnight. Patient with some chest discomfort overnight.    ADDITIONAL REVIEW OF SYSTEMS: Reports some dizziness and chest discomfort.     MEDICATIONS  (STANDING):  albuterol/ipratropium for Nebulization 3 milliLiter(s) Nebulizer every 6 hours  apixaban 2.5 milliGRAM(s) Oral every 12 hours  donepezil 5 milliGRAM(s) Oral at bedtime  folic acid 1 milliGRAM(s) Oral daily  furosemide   Injectable 40 milliGRAM(s) IV Push daily  metoprolol tartrate 50 milliGRAM(s) Oral three times a day  polyethylene glycol 3350 17 Gram(s) Oral two times a day  senna 2 Tablet(s) Oral at bedtime  simvastatin 20 milliGRAM(s) Oral at bedtime    MEDICATIONS  (PRN):  acetaminophen     Tablet .. 650 milliGRAM(s) Oral every 6 hours PRN Temp greater or equal to 38C (100.4F), Mild Pain (1 - 3), Moderate Pain (4 - 6), Severe Pain (7 - 10)  HYDROmorphone  Injectable 0.25 milliGRAM(s) IV Push every 4 hours PRN Severe Pain (7 - 10)  ondansetron Injectable 4 milliGRAM(s) IV Push every 6 hours PRN Nausea and/or Vomiting      CAPILLARY BLOOD GLUCOSE      POCT Blood Glucose.: 112 mg/dL (20 Jan 2023 21:19)  POCT Blood Glucose.: 113 mg/dL (20 Jan 2023 17:16)  POCT Blood Glucose.: 106 mg/dL (20 Jan 2023 12:48)  POCT Blood Glucose.: 131 mg/dL (20 Jan 2023 08:55)    I&O's Summary    20 Jan 2023 07:01  -  21 Jan 2023 07:00  --------------------------------------------------------  IN: 700 mL / OUT: 500 mL / NET: 200 mL        PHYSICAL EXAM:  Vital Signs Last 24 Hrs  T(C): 36.4 (21 Jan 2023 05:22), Max: 36.7 (20 Jan 2023 21:14)  T(F): 97.5 (21 Jan 2023 05:22), Max: 98.1 (20 Jan 2023 21:14)  HR: 110 (21 Jan 2023 05:22) (100 - 112)  BP: 100/53 (21 Jan 2023 05:22) (100/53 - 111/74)  BP(mean): --  RR: 18 (21 Jan 2023 05:22) (18 - 18)  SpO2: 96% (21 Jan 2023 05:22) (96% - 98%)    Parameters below as of 21 Jan 2023 05:22  Patient On (Oxygen Delivery Method): nasal cannula        GENERAL: No acute distress, resting in bed  HEAD:  Atraumatic, Normocephalic  EYES: EOMI, , conjunctiva and sclera clear  NECK: Supple, no lymphadenopathy, no JVD  CHEST/LUNG: CTAB; No wheezes, rales, or rhonchi  HEART: Regular rate and rhythm; No murmurs  ABDOMEN: Soft, non-tender, non-distended; normal bowel sounds  EXTREMITIES:  2+ peripheral pulses b/l, No LE edema  NEUROLOGY: A&O x 1  SKIN: No rashes or lesions    LABS:                        11.5   18.42 )-----------( 160      ( 20 Jan 2023 09:17 )             34.9     01-20    130<L>  |  92<L>  |  17  ----------------------------<  128<H>  4.5   |  21<L>  |  0.61    Ca    8.4      20 Jan 2023 06:57  Phos  3.1     01-20  Mg     2.4     01-20    TPro  6.3  /  Alb  2.7<L>  /  TBili  0.9  /  DBili  x   /  AST  124<H>  /  ALT  94<H>  /  AlkPhos  919<H>  01-20                RADIOLOGY & ADDITIONAL TESTS:  Results Reviewed:   Imaging Personally Reviewed:  Electrocardiogram Personally Reviewed:    COORDINATION OF CARE:  Care Discussed with Consultants/Other Providers [Y/N]:  Prior or Outpatient Records Reviewed [Y/N]:

## 2023-01-21 NOTE — CONSULT NOTE ADULT - PROBLEM SELECTOR RECOMMENDATION 9
Episode of pain overnight, family not sure if in chest or abdomen, unable to describe or quantify  Start dilaudid 0.2mg q1h prn for pain

## 2023-01-21 NOTE — CONSULT NOTE ADULT - PROBLEM SELECTOR RECOMMENDATION 3
[Work for toy] : work for toy [Regards own hand] : regards own hand [Responds to affection] : responds to affection [Social smile] : social smile [Follow 180 degrees] : follow 180 degrees [Puts hands together] : puts hands together [Grasps object] : grasps object [Imitate speech sounds] : imitate speech sounds [Turns to voices] : turns to voices [Turns to rattling sound] : turns to rattling sound [Squeals] : squeals  [Pulls to sit - no head lag] : pulls to sit - no head lag [Roll over] : roll over [Chest up - arm support] : chest up - arm support [Passed] : passed c/w lasix  c/w home meds for now while able to take orally  will discuss discontinuation based on clinical course

## 2023-01-21 NOTE — CONSULT NOTE ADULT - CONVERSATION DETAILS
Met with family today with utilization of Cantonese .  Introduced self and role. Family reiterates conversation held with primary team- concern for possible malignancy and family elected not to do further work up. Family defers decisions to oldest son, India Raya (who signed MOLST on 1/20) for DNR/DNI and comfort measures. We discussed PCU transfer for symptom directed care- they agree to DNR/DNI status and no further blood work but rather management of distressing symptoms. I explained that the PCU is a short term unit ,and likely on Monday, the team would address the next best level of care for patient, depending on how she is doing clinically, whether that be at home or in a facility. Family appreciative of call and amenable to PCU transfer. All questions answered and emotional support provided. MOLST reviewed in chart and complete for DNR/DNI, CMO on 1/20 by primary team.

## 2023-01-21 NOTE — CONSULT NOTE ADULT - PROBLEM SELECTOR RECOMMENDATION 6
transfer to [facility] [phone] transfer to [facility] [phone] transfer to [facility] [phone]

## 2023-01-21 NOTE — CONSULT NOTE ADULT - ASSESSMENT
The patient is an 87-year-old woman who is followed for hypertension, diabetes mellitus, and dementia and who has a history of breast and colon cancers (now in remission as per family) who presented today with shortness of breath. Patient admitted and treated for decompensated heart failure. patient also with abnormal findings on CT scan concerning for malignancy, family decided against further work up and wants to prioritize symptoms. Palliative consulted for PCU evaluation

## 2023-01-21 NOTE — PROGRESS NOTE ADULT - PROBLEM SELECTOR PLAN 12
- Eliquis   -PT consult   -Full code - Eliquis   -PT consult   Code status: DNR/DNI and comfort care measures - see [facility] note 1/20 for details    Palliative care consult for eval for PCU/hospice - Eliquis   -PT consult   Code status: DNR/DNI and comfort care measures - see [facility] note 1/20 for details    Palliative care consult for eval for PCU/hospice - Eliquis   -PT consult   Code status: DNR/DNI and comfort care measures - see [facility] note 1/20 for details    Palliative care consult for eval for PCU/hospice - Eliquis   -PT consult   Code status: DNR/DNI and comfort care measures - see [facility] note 1/20 for details    Palliative care consult for eval for PCU/hospice - will follow up - Eliquis   -PT consult   Code status: DNR/DNI and comfort care measures - see [facility] note 1/20 for details    Palliative care consult for eval for PCU/hospice - will follow up - Eliquis   -PT consult   Code status: DNR/DNI and comfort care measures - see [facility] note 1/20 for details    Palliative care consult for eval for PCU/hospice - will follow up

## 2023-01-21 NOTE — PROGRESS NOTE ADULT - PROBLEM SELECTOR PLAN 7
Noted to be in Afib with HR to 130s  - pt not on AC at home, unclear reason why  - per family, pt w/ hematuria 5 years ago, AC was held, and never restarted. Hematuria self-resolved, no intervention/transfusion was needed.  - Chadsvasc 3  - Increased home lopressor 25mg BID to lopressor 50mg TID  - started Eliquis   - TTE w/ EF 75%

## 2023-01-21 NOTE — PROGRESS NOTE ADULT - PROBLEM SELECTOR PLAN 6
-Elevated serum ast and alkaline phosphatase noted   -Suspect that this is most likely secondary to fluid overload in setting of acute decompensated heart failure   -Will manage with diuretics and will follow serum liver enzymes daily   - RUQ US showing ?7mm calculus in mid common bile duct.   - GGT wnl suggestive that elevated Alk phos not biliary in etiology, possibly from bone.   -  skeletal survey w/ questionable skull lesion  - MRCP w/o stone or evidence of infection  - CT A+P w/ sclerotic bone lesions, consistent with osseous metastatic disease, which likely result in elevated alk phos

## 2023-01-21 NOTE — CONSULT NOTE ADULT - SUBJECTIVE AND OBJECTIVE BOX
HPI:  The patient is an 87-year-old woman who is followed for hypertension, diabetes mellitus, and dementia and who has a history of breast and colon cancers (now in remission as per family) who presented today with shortness of breath. Per discussion with the patient's family, she noted progressive shortness of breath that began about one week ago. At that time, she saw her primary care doctor who administered her a five-day course of oral levofloxacin. Although the patient's symptoms improved subtly for about two days following completion of her medication regimen, her symptoms again worsened progressively--prompting her family to bring her to the emergency department today. The patient was noted to have fevers at home (up to about 102 degrees measured orally), and she had a dry cough. The patient has had some swelling in her bilateral lower extremities. She has some chest discomfort and difficulty catching her breath, but she has not noticed any pleuritic chest pain. She has not fallen, and she has not suffered form any trauma to her chest. The patient has no known history of neuromuscular disease and no known history of anemia. The patient does suffer from diabetes mellitus--now in remission--but she has not suffered from any episodes of hypo- or hyperglycemia.     In the emergency department, the patient was noted to be tachypneic. A chest xray and chest ct were performed, and they demonstrated bilateral pleural effusions with interstitial infiltrates. Her serum bnp was elevated to greater than 2500. The patient was administered a dose each of ceftriaxone, azithromycin, and vancomycin, and the patient was admitted to internal medicine for further management.  (15 Usama 2023 00:45)    PERTINENT PM/SXH:   Hypertension    Diabetes mellitus    Dementia    Breast cancer    Colon cancer      S/P cholecystectomy    H/O left mastectomy    S/P mastectomy, right    H/O hemicolectomy      FAMILY HISTORY:  No pertinent family history in first degree relatives      Family Hx substance abuse [ ]yes [ ]no  ITEMS NOT CHECKED ARE NOT PRESENT    SOCIAL HISTORY:   Significant other/partner[ ]  Children[ ]  Alevism/Spirituality:  Substance hx:  [ ]   Tobacco hx:  [ ]   Alcohol hx: [ ]   Home Opioid hx:  [ ] I-Stop Reference No:  Living Situation: [ ]Home  [ ]Long term care  [ ]Rehab [ ]Other    ADVANCE DIRECTIVES:    DNR/MOLST  [ ]  Living Will  [ ]   DECISION MAKER(s):  [ ] Health Care Proxy(s)  [ ] Surrogate(s)  [ ] Guardian           Name(s): Phone Number(s):    BASELINE (I)ADL(s) (prior to admission):  [place]: [ ]Total  [ ] Moderate [ ]Dependent    Allergies    No Known Allergies    Intolerances    MEDICATIONS  (STANDING):  albuterol/ipratropium for Nebulization 3 milliLiter(s) Nebulizer every 6 hours  apixaban 2.5 milliGRAM(s) Oral every 12 hours  donepezil 5 milliGRAM(s) Oral at bedtime  folic acid 1 milliGRAM(s) Oral daily  furosemide   Injectable 40 milliGRAM(s) IV Push daily  metoprolol tartrate 50 milliGRAM(s) Oral three times a day  polyethylene glycol 3350 17 Gram(s) Oral two times a day  senna 2 Tablet(s) Oral at bedtime  simvastatin 20 milliGRAM(s) Oral at bedtime    MEDICATIONS  (PRN):  acetaminophen     Tablet .. 650 milliGRAM(s) Oral every 6 hours PRN Temp greater or equal to 38C (100.4F), Mild Pain (1 - 3), Moderate Pain (4 - 6), Severe Pain (7 - 10)  HYDROmorphone  Injectable 0.25 milliGRAM(s) IV Push every 4 hours PRN Severe Pain (7 - 10)  ondansetron Injectable 4 milliGRAM(s) IV Push every 6 hours PRN Nausea and/or Vomiting    PRESENT SYMPTOMS: [ ]Unable to self-report  [ ] CPOT [ ] PAINADs [ ] RDOS  Source if other than patient:  [ ]Family   [ ]Team     Pain: [ ]yes [ ]no  QOL impact -   Location -                    Aggravating factors -  Quality -  Radiation -  Timing-  Severity (0-10 scale):  Minimal acceptable level (0-10 scale):     CPOT:    https://www.sccm.org/getattachment/pxr67z74-9m0t-8y4z-2f3l-3736g0839s7t/Critical-Care-Pain-Observation-Tool-(CPOT)    PAIN AD Score:   http://geriatrictoolkit.[facility]/cog/painad.pdf (press ctrl +  left click to view)    Dyspnea:                           [ ]Mild [ ]Moderate [ ]Severe      RDOS:  0 to 2  minimal or no respiratory distress   3  mild distress  4 to 6 moderate distress  >7 severe distress  [email or web address]://[email or web address]/hen/Respiratory_Distress_Observation_Scale.pdf (Ctrl +  left click to view)     Anxiety:                             [ ]Mild [ ]Moderate [ ]Severe  Fatigue:                             [ ]Mild [ ]Moderate [ ]Severe  Nausea:                             [ ]Mild [ ]Moderate [ ]Severe  Loss of appetite:              [ ]Mild [ ]Moderate [ ]Severe  Constipation:                    [ ]Mild [ ]Moderate [ ]Severe    PCSSQ[Palliative Care Spiritual Screening Question]   Severity (0-10):  Score of 4 or > indicate consideration of Chaplaincy referral.  Chaplaincy Referral: [ ] yes [ ] refused [ ] following [ ] Deferred     Caregiver [place]? : [ ] yes [ ] no [ ] Deferred [ ] Declined             Social work referral [ ] Patient & Family Centered Care Referral [ ]     Anticipatory Grief present?:  [ ] yes [ ] no  [ ] Deferred                  Social work referral [ ] Chaplaincy Referral[ ]      Other Symptoms:  [ ]All other review of systems negative     Palliative Performance Status Version 2:         %    http://npcrc.org/files/news/palliative_performance_scale_ppsv2.pdf  PHYSICAL EXAM:  Vital Signs Last 24 Hrs  T(C): 36.4 (21 Jan 2023 05:22), Max: 36.7 (20 Jan 2023 21:14)  T(F): 97.5 (21 Jan 2023 05:22), Max: 98.1 (20 Jan 2023 21:14)  HR: 110 (21 Jan 2023 05:22) (100 - 112)  BP: 100/53 (21 Jan 2023 05:22) (100/53 - 111/74)  BP(mean): --  RR: 18 (21 Jan 2023 05:22) (18 - 18)  SpO2: 96% (21 Jan 2023 05:22) (96% - 98%)    Parameters below as of 21 Jan 2023 05:22  Patient On (Oxygen Delivery Method): nasal cannula     I&O's Summary    20 Jan 2023 07:01  -  21 Jan 2023 07:00  --------------------------------------------------------  IN: 700 mL / OUT: 500 mL / NET: 200 mL      GENERAL: [ ]Cachexia    [ ]Alert  [ ]Oriented x   [ ]Lethargic  [ ]Unarousable  [ ]Verbal  [ ]Non-Verbal  Behavioral:   [ ] Anxiety  [ ] Delirium [ ] Agitation [ ] Other  HEENT:  [ ]Normal   [ ]Dry mouth   [ ]ET Tube/Trach  [ ]Oral lesions  PULMONARY:   [ ]Clear [ ]Tachypnea  [ ]Audible excessive secretions   [ ]Rhonchi        [ ]Right [ ]Left [ ]Bilateral  [ ]Crackles        [ ]Right [ ]Left [ ]Bilateral  [ ]Wheezing     [ ]Right [ ]Left [ ]Bilateral  [ ]Diminished breath sounds [ ]right [ ]left [ ]bilateral  CARDIOVASCULAR:    [ ]Regular [ ]Irregular [ ]Tachy  [ ]Tee [ ]Murmur [ ]Other  GASTROINTESTINAL:  [ ]Soft  [ ]Distended   [ ]+BS  [ ]Non tender [ ]Tender  [ ]Other [ ]PEG [ ]OGT/ NGT  Last BM:  GENITOURINARY:  [ ]Normal [ ] Incontinent   [ ]Oliguria/Anuria   [ ]Pal  MUSCULOSKELETAL:   [ ]Normal   [ ]Weakness  [ ]Bed/Wheelchair bound [ ]Edema  NEUROLOGIC:   [ ]No focal deficits  [ ]Cognitive impairment  [ ]Dysphagia [ ]Dysarthria [ ]Paresis [ ]Other   SKIN:   [ ]Normal  [ ]Rash  [ ]Other  [ ]Pressure ulcer(s)       Present on admission [ ]y [ ]n    CRITICAL CARE:  [ ] Shock Present  [ ]Septic [ ]Cardiogenic [ ]Neurologic [ ]Hypovolemic  [ ]  Vasopressors [ ]  Inotropes   [ ]Respiratory failure present [ ]Mechanical ventilation [ ]Non-invasive ventilatory support [ ]High flow    [ ]Acute  [ ]Chronic [ ]Hypoxic  [ ]Hypercarbic [ ]Other  [ ]Other organ failure     LABS:                        11.5   18.42 )-----------( 160      ( 20 Jan 2023 09:17 )             34.9   01-20    130<L>  |  92<L>  |  17  ----------------------------<  128<H>  4.5   |  21<L>  |  0.61    Ca    8.4      20 Jan 2023 06:57  Phos  3.1     01-20  Mg     2.4     01-20    TPro  6.3  /  Alb  2.7<L>  /  TBili  0.9  /  DBili  x   /  AST  124<H>  /  ALT  94<H>  /  AlkPhos  919<H>  01-20        RADIOLOGY & ADDITIONAL STUDIES:    PROTEIN CALORIE MALNUTRITION PRESENT: [ ]mild [ ]moderate [ ]severe [ ]underweight [ ]morbid obesity  https://www.andeal.org/vault/2440/web/files/ONC/Table_Clinical%20Characteristics%20to%20Document%20Malnutrition-White%20JV%20et%20al%808172.pdf    Height (cm): 154.9 (01-14-23 @ 17:31)  Weight (kg): 56.7 (01-14-23 @ 17:31)  BMI (kg/m2): 23.6 (01-14-23 @ 17:31)    [ ]PPSV2 < or = to 30% [ ]significant weight loss  [ ]poor nutritional intake  [ ]anasarca[ ]Artificial Nutrition      Other REFERRALS:  [ ]Hospice  [ ]Child Life  [ ]Social Work  [ ]Case management [ ]Holistic Therapy     Goals of Care Document:  HPI:  The patient is an 87-year-old woman who is followed for hypertension, diabetes mellitus, and dementia and who has a history of breast and colon cancers (now in remission as per family) who presented today with shortness of breath. Per discussion with the patient's family, she noted progressive shortness of breath that began about one week ago. At that time, she saw her primary care doctor who administered her a five-day course of oral levofloxacin. Although the patient's symptoms improved subtly for about two days following completion of her medication regimen, her symptoms again worsened progressively--prompting her family to bring her to the emergency department today. The patient was noted to have fevers at home (up to about 102 degrees measured orally), and she had a dry cough. The patient has had some swelling in her bilateral lower extremities. She has some chest discomfort and difficulty catching her breath, but she has not noticed any pleuritic chest pain. She has not fallen, and she has not suffered form any trauma to her chest. The patient has no known history of neuromuscular disease and no known history of anemia. The patient does suffer from diabetes mellitus--now in remission--but she has not suffered from any episodes of hypo- or hyperglycemia.     In the emergency department, the patient was noted to be tachypneic. A chest xray and chest ct were performed, and they demonstrated bilateral pleural effusions with interstitial infiltrates. Her serum bnp was elevated to greater than 2500. The patient was administered a dose each of ceftriaxone, azithromycin, and vancomycin, and the patient was admitted to internal medicine for further management.  (15 Usama 2023 00:45)    PERTINENT PM/SXH:   Hypertension    Diabetes mellitus    Dementia    Breast cancer    Colon cancer      S/P cholecystectomy    H/O left mastectomy    S/P mastectomy, right    H/O hemicolectomy      FAMILY HISTORY:  No pertinent family history in first degree relatives      Family Hx substance abuse [ ]yes [ ]no  ITEMS NOT CHECKED ARE NOT PRESENT    SOCIAL HISTORY:   Significant other/partner[ ]  Children[ ]  Uatsdin/Spirituality:  Substance hx:  [ ]   Tobacco hx:  [ ]   Alcohol hx: [ ]   Home Opioid hx:  [ ] I-Stop Reference No:  Living Situation: [ ]Home  [ ]Long term care  [ ]Rehab [ ]Other    ADVANCE DIRECTIVES:    DNR/MOLST  [ ]  Living Will  [ ]   DECISION MAKER(s):  [ ] Health Care Proxy(s)  [ ] Surrogate(s)  [ ] Guardian           Name(s): Phone Number(s):    BASELINE (I)ADL(s) (prior to admission):  [place]: [ ]Total  [ ] Moderate [ ]Dependent    Allergies    No Known Allergies    Intolerances    MEDICATIONS  (STANDING):  albuterol/ipratropium for Nebulization 3 milliLiter(s) Nebulizer every 6 hours  apixaban 2.5 milliGRAM(s) Oral every 12 hours  donepezil 5 milliGRAM(s) Oral at bedtime  folic acid 1 milliGRAM(s) Oral daily  furosemide   Injectable 40 milliGRAM(s) IV Push daily  metoprolol tartrate 50 milliGRAM(s) Oral three times a day  polyethylene glycol 3350 17 Gram(s) Oral two times a day  senna 2 Tablet(s) Oral at bedtime  simvastatin 20 milliGRAM(s) Oral at bedtime    MEDICATIONS  (PRN):  acetaminophen     Tablet .. 650 milliGRAM(s) Oral every 6 hours PRN Temp greater or equal to 38C (100.4F), Mild Pain (1 - 3), Moderate Pain (4 - 6), Severe Pain (7 - 10)  HYDROmorphone  Injectable 0.25 milliGRAM(s) IV Push every 4 hours PRN Severe Pain (7 - 10)  ondansetron Injectable 4 milliGRAM(s) IV Push every 6 hours PRN Nausea and/or Vomiting    PRESENT SYMPTOMS: [ ]Unable to self-report  [ ] CPOT [ ] PAINADs [ ] RDOS  Source if other than patient:  [ ]Family   [ ]Team     Pain: [ ]yes [ ]no  QOL impact -   Location -                    Aggravating factors -  Quality -  Radiation -  Timing-  Severity (0-10 scale):  Minimal acceptable level (0-10 scale):     CPOT:    https://www.sccm.org/getattachment/rme73x37-3h9d-5a3g-9k9a-5154i4493u0u/Critical-Care-Pain-Observation-Tool-(CPOT)    PAIN AD Score:   http://geriatrictoolkit.[facility]/cog/painad.pdf (press ctrl +  left click to view)    Dyspnea:                           [ ]Mild [ ]Moderate [ ]Severe      RDOS:  0 to 2  minimal or no respiratory distress   3  mild distress  4 to 6 moderate distress  >7 severe distress  [email or web address]://[email or web address]/hen/Respiratory_Distress_Observation_Scale.pdf (Ctrl +  left click to view)     Anxiety:                             [ ]Mild [ ]Moderate [ ]Severe  Fatigue:                             [ ]Mild [ ]Moderate [ ]Severe  Nausea:                             [ ]Mild [ ]Moderate [ ]Severe  Loss of appetite:              [ ]Mild [ ]Moderate [ ]Severe  Constipation:                    [ ]Mild [ ]Moderate [ ]Severe    PCSSQ[Palliative Care Spiritual Screening Question]   Severity (0-10):  Score of 4 or > indicate consideration of Chaplaincy referral.  Chaplaincy Referral: [ ] yes [ ] refused [ ] following [ ] Deferred     Caregiver [place]? : [ ] yes [ ] no [ ] Deferred [ ] Declined             Social work referral [ ] Patient & Family Centered Care Referral [ ]     Anticipatory Grief present?:  [ ] yes [ ] no  [ ] Deferred                  Social work referral [ ] Chaplaincy Referral[ ]      Other Symptoms:  [ ]All other review of systems negative     Palliative Performance Status Version 2:         %    http://npcrc.org/files/news/palliative_performance_scale_ppsv2.pdf  PHYSICAL EXAM:  Vital Signs Last 24 Hrs  T(C): 36.4 (21 Jan 2023 05:22), Max: 36.7 (20 Jan 2023 21:14)  T(F): 97.5 (21 Jan 2023 05:22), Max: 98.1 (20 Jan 2023 21:14)  HR: 110 (21 Jan 2023 05:22) (100 - 112)  BP: 100/53 (21 Jan 2023 05:22) (100/53 - 111/74)  BP(mean): --  RR: 18 (21 Jan 2023 05:22) (18 - 18)  SpO2: 96% (21 Jan 2023 05:22) (96% - 98%)    Parameters below as of 21 Jan 2023 05:22  Patient On (Oxygen Delivery Method): nasal cannula     I&O's Summary    20 Jan 2023 07:01  -  21 Jan 2023 07:00  --------------------------------------------------------  IN: 700 mL / OUT: 500 mL / NET: 200 mL      GENERAL: [ ]Cachexia    [ ]Alert  [ ]Oriented x   [ ]Lethargic  [ ]Unarousable  [ ]Verbal  [ ]Non-Verbal  Behavioral:   [ ] Anxiety  [ ] Delirium [ ] Agitation [ ] Other  HEENT:  [ ]Normal   [ ]Dry mouth   [ ]ET Tube/Trach  [ ]Oral lesions  PULMONARY:   [ ]Clear [ ]Tachypnea  [ ]Audible excessive secretions   [ ]Rhonchi        [ ]Right [ ]Left [ ]Bilateral  [ ]Crackles        [ ]Right [ ]Left [ ]Bilateral  [ ]Wheezing     [ ]Right [ ]Left [ ]Bilateral  [ ]Diminished breath sounds [ ]right [ ]left [ ]bilateral  CARDIOVASCULAR:    [ ]Regular [ ]Irregular [ ]Tachy  [ ]Tee [ ]Murmur [ ]Other  GASTROINTESTINAL:  [ ]Soft  [ ]Distended   [ ]+BS  [ ]Non tender [ ]Tender  [ ]Other [ ]PEG [ ]OGT/ NGT  Last BM:  GENITOURINARY:  [ ]Normal [ ] Incontinent   [ ]Oliguria/Anuria   [ ]Pal  MUSCULOSKELETAL:   [ ]Normal   [ ]Weakness  [ ]Bed/Wheelchair bound [ ]Edema  NEUROLOGIC:   [ ]No focal deficits  [ ]Cognitive impairment  [ ]Dysphagia [ ]Dysarthria [ ]Paresis [ ]Other   SKIN:   [ ]Normal  [ ]Rash  [ ]Other  [ ]Pressure ulcer(s)       Present on admission [ ]y [ ]n    CRITICAL CARE:  [ ] Shock Present  [ ]Septic [ ]Cardiogenic [ ]Neurologic [ ]Hypovolemic  [ ]  Vasopressors [ ]  Inotropes   [ ]Respiratory failure present [ ]Mechanical ventilation [ ]Non-invasive ventilatory support [ ]High flow    [ ]Acute  [ ]Chronic [ ]Hypoxic  [ ]Hypercarbic [ ]Other  [ ]Other organ failure     LABS:                        11.5   18.42 )-----------( 160      ( 20 Jan 2023 09:17 )             34.9   01-20    130<L>  |  92<L>  |  17  ----------------------------<  128<H>  4.5   |  21<L>  |  0.61    Ca    8.4      20 Jan 2023 06:57  Phos  3.1     01-20  Mg     2.4     01-20    TPro  6.3  /  Alb  2.7<L>  /  TBili  0.9  /  DBili  x   /  AST  124<H>  /  ALT  94<H>  /  AlkPhos  919<H>  01-20        RADIOLOGY & ADDITIONAL STUDIES:    PROTEIN CALORIE MALNUTRITION PRESENT: [ ]mild [ ]moderate [ ]severe [ ]underweight [ ]morbid obesity  https://www.andeal.org/vault/2440/web/files/ONC/Table_Clinical%20Characteristics%20to%20Document%20Malnutrition-White%20JV%20et%20al%315575.pdf    Height (cm): 154.9 (01-14-23 @ 17:31)  Weight (kg): 56.7 (01-14-23 @ 17:31)  BMI (kg/m2): 23.6 (01-14-23 @ 17:31)    [ ]PPSV2 < or = to 30% [ ]significant weight loss  [ ]poor nutritional intake  [ ]anasarca[ ]Artificial Nutrition      Other REFERRALS:  [ ]Hospice  [ ]Child Life  [ ]Social Work  [ ]Case management [ ]Holistic Therapy     Goals of Care Document:  HPI:  The patient is an 87-year-old woman who is followed for hypertension, diabetes mellitus, and dementia and who has a history of breast and colon cancers (now in remission as per family) who presented today with shortness of breath. Per discussion with the patient's family, she noted progressive shortness of breath that began about one week ago. At that time, she saw her primary care doctor who administered her a five-day course of oral levofloxacin. Although the patient's symptoms improved subtly for about two days following completion of her medication regimen, her symptoms again worsened progressively--prompting her family to bring her to the emergency department today. The patient was noted to have fevers at home (up to about 102 degrees measured orally), and she had a dry cough. The patient has had some swelling in her bilateral lower extremities. She has some chest discomfort and difficulty catching her breath, but she has not noticed any pleuritic chest pain. She has not fallen, and she has not suffered form any trauma to her chest. The patient has no known history of neuromuscular disease and no known history of anemia. The patient does suffer from diabetes mellitus--now in remission--but she has not suffered from any episodes of hypo- or hyperglycemia.     In the emergency department, the patient was noted to be tachypneic. A chest xray and chest ct were performed, and they demonstrated bilateral pleural effusions with interstitial infiltrates. Her serum bnp was elevated to greater than 2500. The patient was administered a dose each of ceftriaxone, azithromycin, and vancomycin, and the patient was admitted to internal medicine for further management.  (15 Usama 2023 00:45)    PERTINENT PM/SXH:   Hypertension    Diabetes mellitus    Dementia    Breast cancer    Colon cancer      S/P cholecystectomy    H/O left mastectomy    S/P mastectomy, right    H/O hemicolectomy      FAMILY HISTORY:  No pertinent family history in first degree relatives      Family Hx substance abuse [ ]yes [ ]no  ITEMS NOT CHECKED ARE NOT PRESENT    SOCIAL HISTORY:   Significant other/partner[ ]  Children[ ]  Lutheran/Spirituality:  Substance hx:  [ ]   Tobacco hx:  [ ]   Alcohol hx: [ ]   Home Opioid hx:  [ ] I-Stop Reference No:  Living Situation: [ ]Home  [ ]Long term care  [ ]Rehab [ ]Other    ADVANCE DIRECTIVES:    DNR/MOLST  [ ]  Living Will  [ ]   DECISION MAKER(s):  [ ] Health Care Proxy(s)  [ ] Surrogate(s)  [ ] Guardian           Name(s): Phone Number(s):    BASELINE (I)ADL(s) (prior to admission):  [place]: [ ]Total  [ ] Moderate [ ]Dependent    Allergies    No Known Allergies    Intolerances    MEDICATIONS  (STANDING):  albuterol/ipratropium for Nebulization 3 milliLiter(s) Nebulizer every 6 hours  apixaban 2.5 milliGRAM(s) Oral every 12 hours  donepezil 5 milliGRAM(s) Oral at bedtime  folic acid 1 milliGRAM(s) Oral daily  furosemide   Injectable 40 milliGRAM(s) IV Push daily  metoprolol tartrate 50 milliGRAM(s) Oral three times a day  polyethylene glycol 3350 17 Gram(s) Oral two times a day  senna 2 Tablet(s) Oral at bedtime  simvastatin 20 milliGRAM(s) Oral at bedtime    MEDICATIONS  (PRN):  acetaminophen     Tablet .. 650 milliGRAM(s) Oral every 6 hours PRN Temp greater or equal to 38C (100.4F), Mild Pain (1 - 3), Moderate Pain (4 - 6), Severe Pain (7 - 10)  HYDROmorphone  Injectable 0.25 milliGRAM(s) IV Push every 4 hours PRN Severe Pain (7 - 10)  ondansetron Injectable 4 milliGRAM(s) IV Push every 6 hours PRN Nausea and/or Vomiting    PRESENT SYMPTOMS: [ ]Unable to self-report  [ ] CPOT [ ] PAINADs [ ] RDOS  Source if other than patient:  [ ]Family   [ ]Team     Pain: [ ]yes [ ]no  QOL impact -   Location -                    Aggravating factors -  Quality -  Radiation -  Timing-  Severity (0-10 scale):  Minimal acceptable level (0-10 scale):     CPOT:    https://www.sccm.org/getattachment/vih79s70-9t1b-9h9l-9w5j-2486h9181p5d/Critical-Care-Pain-Observation-Tool-(CPOT)    PAIN AD Score:   http://geriatrictoolkit.[facility]/cog/painad.pdf (press ctrl +  left click to view)    Dyspnea:                           [ ]Mild [ ]Moderate [ ]Severe      RDOS:  0 to 2  minimal or no respiratory distress   3  mild distress  4 to 6 moderate distress  >7 severe distress  [email or web address]://[email or web address]/handouts/hen/Respiratory_Distress_Observation_Scale.pdf (Ctrl +  left click to view)     Anxiety:                             [ ]Mild [ ]Moderate [ ]Severe  Fatigue:                             [ ]Mild [ ]Moderate [ ]Severe  Nausea:                             [ ]Mild [ ]Moderate [ ]Severe  Loss of appetite:              [ ]Mild [ ]Moderate [ ]Severe  Constipation:                    [ ]Mild [ ]Moderate [ ]Severe    PCSSQ[Palliative Care Spiritual Screening Question]   Severity (0-10):  Score of 4 or > indicate consideration of Chaplaincy referral.  Chaplaincy Referral: [ ] yes [ ] refused [ ] following [ ] Deferred     Caregiver [place]? : [ ] yes [ ] no [ ] Deferred [ ] Declined             Social work referral [ ] Patient & Family Centered Care Referral [ ]     Anticipatory Grief present?:  [ ] yes [ ] no  [ ] Deferred                  Social work referral [ ] Chaplaincy Referral[ ]      Other Symptoms:  [ ]All other review of systems negative     Palliative Performance Status Version 2:         %    http://npcrc.org/files/news/palliative_performance_scale_ppsv2.pdf  PHYSICAL EXAM:  Vital Signs Last 24 Hrs  T(C): 36.4 (21 Jan 2023 05:22), Max: 36.7 (20 Jan 2023 21:14)  T(F): 97.5 (21 Jan 2023 05:22), Max: 98.1 (20 Jan 2023 21:14)  HR: 110 (21 Jan 2023 05:22) (100 - 112)  BP: 100/53 (21 Jan 2023 05:22) (100/53 - 111/74)  BP(mean): --  RR: 18 (21 Jan 2023 05:22) (18 - 18)  SpO2: 96% (21 Jan 2023 05:22) (96% - 98%)    Parameters below as of 21 Jan 2023 05:22  Patient On (Oxygen Delivery Method): nasal cannula     I&O's Summary    20 Jan 2023 07:01  -  21 Jan 2023 07:00  --------------------------------------------------------  IN: 700 mL / OUT: 500 mL / NET: 200 mL      GENERAL: [ ]Cachexia    [ ]Alert  [ ]Oriented x   [ ]Lethargic  [ ]Unarousable  [ ]Verbal  [ ]Non-Verbal  Behavioral:   [ ] Anxiety  [ ] Delirium [ ] Agitation [ ] Other  HEENT:  [ ]Normal   [ ]Dry mouth   [ ]ET Tube/Trach  [ ]Oral lesions  PULMONARY:   [ ]Clear [ ]Tachypnea  [ ]Audible excessive secretions   [ ]Rhonchi        [ ]Right [ ]Left [ ]Bilateral  [ ]Crackles        [ ]Right [ ]Left [ ]Bilateral  [ ]Wheezing     [ ]Right [ ]Left [ ]Bilateral  [ ]Diminished breath sounds [ ]right [ ]left [ ]bilateral  CARDIOVASCULAR:    [ ]Regular [ ]Irregular [ ]Tachy  [ ]Tee [ ]Murmur [ ]Other  GASTROINTESTINAL:  [ ]Soft  [ ]Distended   [ ]+BS  [ ]Non tender [ ]Tender  [ ]Other [ ]PEG [ ]OGT/ NGT  Last BM:  GENITOURINARY:  [ ]Normal [ ] Incontinent   [ ]Oliguria/Anuria   [ ]Pal  MUSCULOSKELETAL:   [ ]Normal   [ ]Weakness  [ ]Bed/Wheelchair bound [ ]Edema  NEUROLOGIC:   [ ]No focal deficits  [ ]Cognitive impairment  [ ]Dysphagia [ ]Dysarthria [ ]Paresis [ ]Other   SKIN:   [ ]Normal  [ ]Rash  [ ]Other  [ ]Pressure ulcer(s)       Present on admission [ ]y [ ]n    CRITICAL CARE:  [ ] Shock Present  [ ]Septic [ ]Cardiogenic [ ]Neurologic [ ]Hypovolemic  [ ]  Vasopressors [ ]  Inotropes   [ ]Respiratory failure present [ ]Mechanical ventilation [ ]Non-invasive ventilatory support [ ]High flow    [ ]Acute  [ ]Chronic [ ]Hypoxic  [ ]Hypercarbic [ ]Other  [ ]Other organ failure     LABS:                        11.5   18.42 )-----------( 160      ( 20 Jan 2023 09:17 )             34.9   01-20    130<L>  |  92<L>  |  17  ----------------------------<  128<H>  4.5   |  21<L>  |  0.61    Ca    8.4      20 Jan 2023 06:57  Phos  3.1     01-20  Mg     2.4     01-20    TPro  6.3  /  Alb  2.7<L>  /  TBili  0.9  /  DBili  x   /  AST  124<H>  /  ALT  94<H>  /  AlkPhos  919<H>  01-20        RADIOLOGY & ADDITIONAL STUDIES:    PROTEIN CALORIE MALNUTRITION PRESENT: [ ]mild [ ]moderate [ ]severe [ ]underweight [ ]morbid obesity  https://www.andeal.org/vault/2440/web/files/ONC/Table_Clinical%20Characteristics%20to%20Document%20Malnutrition-White%20JV%20et%20al%196479.pdf    Height (cm): 154.9 (01-14-23 @ 17:31)  Weight (kg): 56.7 (01-14-23 @ 17:31)  BMI (kg/m2): 23.6 (01-14-23 @ 17:31)    [ ]PPSV2 < or = to 30% [ ]significant weight loss  [ ]poor nutritional intake  [ ]anasarca[ ]Artificial Nutrition      Other REFERRALS:  [ ]Hospice  [ ]Child Life  [ ]Social Work  [ ]Case management [ ]Holistic Therapy     Goals of Care Document:  HPI:  The patient is an 87-year-old woman who is followed for hypertension, diabetes mellitus, and dementia and who has a history of breast and colon cancers (now in remission as per family) who presented today with shortness of breath. Per discussion with the patient's family, she noted progressive shortness of breath that began about one week ago. At that time, she saw her primary care doctor who administered her a five-day course of oral levofloxacin. Although the patient's symptoms improved subtly for about two days following completion of her medication regimen, her symptoms again worsened progressively--prompting her family to bring her to the emergency department today. The patient was noted to have fevers at home (up to about 102 degrees measured orally), and she had a dry cough. The patient has had some swelling in her bilateral lower extremities. She has some chest discomfort and difficulty catching her breath, but she has not noticed any pleuritic chest pain. She has not fallen, and she has not suffered form any trauma to her chest. The patient has no known history of neuromuscular disease and no known history of anemia. The patient does suffer from diabetes mellitus--now in remission--but she has not suffered from any episodes of hypo- or hyperglycemia.     In the emergency department, the patient was noted to be tachypneic. A chest xray and chest ct were performed, and they demonstrated bilateral pleural effusions with interstitial infiltrates. Her serum bnp was elevated to greater than 2500. The patient was administered a dose each of ceftriaxone, azithromycin, and vancomycin, and the patient was admitted to internal medicine for further management.  (15 Usama 2023 00:45)    PERTINENT PM/SXH:   Hypertension    Diabetes mellitus    Dementia    Breast cancer    Colon cancer      S/P cholecystectomy    H/O left mastectomy    S/P mastectomy, right    H/O hemicolectomy      FAMILY HISTORY:  No pertinent family history in first degree relatives      Family Hx substance abuse [ ]yes [ ]no  ITEMS NOT CHECKED ARE NOT PRESENT    SOCIAL HISTORY:   Significant other/partner[ ]  Children[x ]  Islam/Spirituality:  Substance hx:  [ ]   Tobacco hx:  [ ]   Alcohol hx: [ ]   Home Opioid hx:  [ ] I-Stop Reference No:  Living Situation: [ x]Home  [ ]Long term care  [ ]Rehab [ ]Other    ADVANCE DIRECTIVES:    DNR/MOLST  [ ]  Living Will  [ ]   DECISION MAKER(s):  [ ] Health Care Proxy(s)  [x ] Surrogate(s)  [ ] Guardian           Name(s): Phone Number(s):  son India Raya [phone]  daughter India Johns [phone]    BASELINE (I)ADL(s) (prior to admission):  [place]: [ ]Total  [x ] Moderate [ ]Dependent    Allergies    No Known Allergies    Intolerances    MEDICATIONS  (STANDING):  albuterol/ipratropium for Nebulization 3 milliLiter(s) Nebulizer every 6 hours  apixaban 2.5 milliGRAM(s) Oral every 12 hours  donepezil 5 milliGRAM(s) Oral at bedtime  folic acid 1 milliGRAM(s) Oral daily  furosemide   Injectable 40 milliGRAM(s) IV Push daily  metoprolol tartrate 50 milliGRAM(s) Oral three times a day  polyethylene glycol 3350 17 Gram(s) Oral two times a day  senna 2 Tablet(s) Oral at bedtime  simvastatin 20 milliGRAM(s) Oral at bedtime    MEDICATIONS  (PRN):  acetaminophen     Tablet .. 650 milliGRAM(s) Oral every 6 hours PRN Temp greater or equal to 38C (100.4F), Mild Pain (1 - 3), Moderate Pain (4 - 6), Severe Pain (7 - 10)  HYDROmorphone  Injectable 0.25 milliGRAM(s) IV Push every 4 hours PRN Severe Pain (7 - 10)  ondansetron Injectable 4 milliGRAM(s) IV Push every 6 hours PRN Nausea and/or Vomiting    PRESENT SYMPTOMS: [x ]Unable to self-report  [ ] CPOT [x ] PAINADs [ x] RDOS  Source if other than patient:  [ ]Family   [ ]Team     Pain: [ ]yes [ ]no  QOL impact -   Location -                    Aggravating factors -  Quality -  Radiation -  Timing-  Severity (0-10 scale):  Minimal acceptable level (0-10 scale):     CPOT:    https://www.sccm.org/getattachment/hrn94u01-1g8q-7t7p-1e1e-1589m8346e3m/Critical-Care-Pain-Observation-Tool-(CPOT)    PAIN AD Score:   http://geriatrictoolkit.[place].[email or web address]/cog/painad.pdf (press ctrl +  left click to view)    Dyspnea:                           [ ]Mild [ ]Moderate [ ]Severe      RDOS:  0 to 2  minimal or no respiratory distress   3  mild distress  4 to 6 moderate distress  >7 severe distress  https://homecareinformation.net/handouts/hen/Respiratory_Distress_Observation_Scale.pdf (Ctrl +  left click to view)     Anxiety:                             [ ]Mild [ ]Moderate [ ]Severe  Fatigue:                             [ ]Mild [ ]Moderate [ ]Severe  Nausea:                             [ ]Mild [ ]Moderate [ ]Severe  Loss of appetite:              [ ]Mild [ ]Moderate [ ]Severe  Constipation:                    [ ]Mild [ ]Moderate [ ]Severe    PCSSQ[Palliative Care Spiritual Screening Question]   Severity (0-10):  Score of 4 or > indicate consideration of Chaplaincy referral.  Chaplaincy Referral: [ ] yes [ ] refused [ ] following [x ] Deferred     Caregiver [place]? : [ ] yes [ ] no [x ] Deferred [ ] Declined             Social work referral [ ] Patient & Family Centered Care Referral [ ]     Anticipatory Grief present?:  [ ] yes [ ] no  [x ] Deferred                  Social work referral [ ] Chaplaincy Referral[ ]      Other Symptoms:  [ x]All other review of systems negative     Palliative Performance Status Version 2:     20-30    %    http://npcrc.org/files/news/palliative_performance_scale_ppsv2.pdf  PHYSICAL EXAM:  Vital Signs Last 24 Hrs  T(C): 36.4 (21 Jan 2023 05:22), Max: 36.7 (20 Jan 2023 21:14)  T(F): 97.5 (21 Jan 2023 05:22), Max: 98.1 (20 Jan 2023 21:14)  HR: 110 (21 Jan 2023 05:22) (100 - 112)  BP: 100/53 (21 Jan 2023 05:22) (100/53 - 111/74)  BP(mean): --  RR: 18 (21 Jan 2023 05:22) (18 - 18)  SpO2: 96% (21 Jan 2023 05:22) (96% - 98%)    Parameters below as of 21 Jan 2023 05:22  Patient On (Oxygen Delivery Method): nasal cannula     I&O's Summary    20 Jan 2023 07:01  -  21 Jan 2023 07:00  --------------------------------------------------------  IN: 700 mL / OUT: 500 mL / NET: 200 mL      GENERAL: [ ]Cachexia    [ ]Alert  [ ]Oriented x   [x ]Lethargic  [ ]Unarousable  [ ]Verbal  [ ]Non-Verbal  Behavioral:   [ ] Anxiety  [ ] Delirium [ ] Agitation [ ] Other  HEENT:  [ x]Normal   [ ]Dry mouth   [ ]ET Tube/Trach  [ ]Oral lesions  PULMONARY:   [ ]Clear [x ]Tachypnea  [ ]Audible excessive secretions   [ ]Rhonchi        [ ]Right [ ]Left [ ]Bilateral  [ ]Crackles        [ ]Right [ ]Left [ ]Bilateral  [ ]Wheezing     [ ]Right [ ]Left [ ]Bilateral  [x ]Diminished breath sounds [ ]right [ ]left [ ]bilateral  CARDIOVASCULAR:    [ ]Regular [ ]Irregular [x ]Tachy  [ ]Tee [ ]Murmur [ ]Other  GASTROINTESTINAL:  [ x]Soft  [ ]Distended   [x ]+BS  [ ]Non tender [ ]Tender  [ ]Other [ ]PEG [ ]OGT/ NGT  Last BM:  GENITOURINARY:  [ ]Normal [x ] Incontinent   [ ]Oliguria/Anuria   [ ]Pal  MUSCULOSKELETAL:   [ ]Normal   [ ]Weakness  [x ]Bed/Wheelchair bound [ ]Edema  NEUROLOGIC:   [x ]No focal deficits  [ ]Cognitive impairment  [ ]Dysphagia [ ]Dysarthria [ ]Paresis [ ]Other   SKIN:   [ ]Normal  [ ]Rash  [ ]Other  [ ]Pressure ulcer(s)       Present on admission [ ]y [ ]n    CRITICAL CARE:  [ ] Shock Present  [ ]Septic [ ]Cardiogenic [ ]Neurologic [ ]Hypovolemic  [ ]  Vasopressors [ ]  Inotropes   [ ]Respiratory failure present [ ]Mechanical ventilation [ ]Non-invasive ventilatory support [ ]High flow    [ ]Acute  [ ]Chronic [ ]Hypoxic  [ ]Hypercarbic [ ]Other  [ ]Other organ failure     LABS:                        11.5   18.42 )-----------( 160      ( 20 Jan 2023 09:17 )             34.9   01-20    130<L>  |  92<L>  |  17  ----------------------------<  128<H>  4.5   |  21<L>  |  0.61    Ca    8.4      20 Jan 2023 06:57  Phos  3.1     01-20  Mg     2.4     01-20    TPro  6.3  /  Alb  2.7<L>  /  TBili  0.9  /  DBili  x   /  AST  124<H>  /  ALT  94<H>  /  AlkPhos  919<H>  01-20        RADIOLOGY & ADDITIONAL STUDIES:  < from: CT Angio Chest PE Protocol w/ IV Cont (01.19.23 @ 21:13) >    ACC: [id number] EXAM:  CT ABDOMEN AND PELVIS IC   ORDERED BY: JULI CENTENO     ACC: [id number] EXAM:  CT ANGIO CHEST PULM ART WAWIC   ORDERED BY:   JULI CENTENO     PROCEDURE DATE:  01/19/2023          INTERPRETATION:  CLINICAL INFORMATION: Pneumonia and shortness of breath.   Metastatic disease workup. History of breast and colon cancer.    COMPARISON: CT chest 1/14/2023. MR abdomen 1/17/2023.    CONTRAST/COMPLICATIONS:  IV Contrast: Omnipaque 350  90 cc administered   10 cc discarded  Oral Contrast: NONE  Complications: None reported at time of study completion    PROCEDURE:  CT Angiography of the Chest was performed followed by portal venous phase   imaging of the Abdomen and Pelvis.  Sagittal and coronal reformats were performed as well as 3D (MIP)   reconstructions.    FINDINGS:  CHEST:  LUNGS AND LARGE AIRWAYS:  Similar diffuse bilateral nodular interlobular   septal thickening. Similar bilateral peribronchial thickening.  PLEURA: Small bilateral pleural effusions.  VESSELS: No main, lobar, or segmental pulmonary embolism. Evaluation of   many subsegmental pulmonary arteries is limited secondary to respiratory   motion artifact and surrounding lung disease. Aortic and coronary artery   calcifications.  HEART: Heart size is normal. No pericardial effusion.  MEDIASTINUM AND KARSON: Similar mediastinal and right hilar   lymphadenopathy, for reference a conglomerate right paratracheal node   measures 3 x 3 cm (3:63).  CHEST WALL AND LOWER NECK: Unchanged low attenuation within the right   thyroid lobe. Right mastectomy.    ABDOMEN AND PELVIS:  LIVER: Within normal limits.  BILE DUCTS: No intrahepatic biliary ductal dilatation. Common bile duct   measures 1.1 cm at the jasmyn hepatis with distal tapering, likely   expected change in post cholecystectomy state.  GALLBLADDER: Cholecystectomy.  SPLEEN: Within normal limits.  PANCREAS: Within normal limits.  ADRENALS: Within normal limits.  KIDNEYS/URETERS: Within normal limits.    BLADDER: Within normal limits.  REPRODUCTIVE ORGANS: Uterus and adnexa within normal limits.    BOWEL: No bowel obstruction. Appendix is normal.  PERITONEUM: No ascites.  VESSELS: Atherosclerotic changes.  RETROPERITONEUM/LYMPH NODES: Confluent increased attenuation surrounds  the aorta and IVC to the level of the iliac bifurcation.  ABDOMINAL WALL: Within normal limits.  BONES: Indeterminant multiple faintly sclerotic lesions within multiple   vertebral bodies and the pelvis.    IMPRESSION:    No main, lobar, or segmental pulmonary embolism. Evaluation of many   subsegmental pulmonary arteries is limited secondary to respiratory   motion artifact and surrounding lung disease.    Diffuse nodular interlobular septal thickening for which differential   includes lymphangitic spread of tumor, concurrent or isolated pulmonary   edema is an additional consideration.    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    Multiple faint sclerotic lesions within multiplevertebral bodies in the   pelvis, indeterminate at CT technique, nuclear medicine bone scan is   recommended for further characterization as these can represent blastic   metastatic disease.            --- End of Report ---           THONG DAUGEHRTY MD; Resident Radiology  This document has been electronically signed.  SALMA GIANG MD; Attending Radiologist  This document has been electronically signed. Jan 20 2023 11:29AM    < end of copied text >      PROTEIN CALORIE MALNUTRITION PRESENT: [ ]mild [ ]moderate [ ]severe [ ]underweight [ ]morbid obesity  https://www.andeal.org/vault/2440/web/files/ONC/Table_Clinical%20Characteristics%20to%20Document%20Malnutrition-White%20JV%20et%20al%202012.pdf    Height (cm): 154.9 (01-14-23 @ 17:31)  Weight (kg): 56.7 (01-14-23 @ 17:31)  BMI (kg/m2): 23.6 (01-14-23 @ 17:31)    [x ]PPSV2 < or = to 30% [ ]significant weight loss  [ x]poor nutritional intake  [ ]anasarca[ ]Artificial Nutrition      Other REFERRALS:  [ ]Hospice  [ ]Child Life  [ ]Social Work  [ x]Case management [ ]Holistic Therapy     Goals of Care Document:  HPI:  The patient is an 87-year-old woman who is followed for hypertension, diabetes mellitus, and dementia and who has a history of breast and colon cancers (now in remission as per family) who presented today with shortness of breath. Per discussion with the patient's family, she noted progressive shortness of breath that began about one week ago. At that time, she saw her primary care doctor who administered her a five-day course of oral levofloxacin. Although the patient's symptoms improved subtly for about two days following completion of her medication regimen, her symptoms again worsened progressively--prompting her family to bring her to the emergency department today. The patient was noted to have fevers at home (up to about 102 degrees measured orally), and she had a dry cough. The patient has had some swelling in her bilateral lower extremities. She has some chest discomfort and difficulty catching her breath, but she has not noticed any pleuritic chest pain. She has not fallen, and she has not suffered form any trauma to her chest. The patient has no known history of neuromuscular disease and no known history of anemia. The patient does suffer from diabetes mellitus--now in remission--but she has not suffered from any episodes of hypo- or hyperglycemia.     In the emergency department, the patient was noted to be tachypneic. A chest xray and chest ct were performed, and they demonstrated bilateral pleural effusions with interstitial infiltrates. Her serum bnp was elevated to greater than 2500. The patient was administered a dose each of ceftriaxone, azithromycin, and vancomycin, and the patient was admitted to internal medicine for further management.  (15 Usama 2023 00:45)    PERTINENT PM/SXH:   Hypertension    Diabetes mellitus    Dementia    Breast cancer    Colon cancer      S/P cholecystectomy    H/O left mastectomy    S/P mastectomy, right    H/O hemicolectomy      FAMILY HISTORY:  No pertinent family history in first degree relatives      Family Hx substance abuse [ ]yes [ ]no  ITEMS NOT CHECKED ARE NOT PRESENT    SOCIAL HISTORY:   Significant other/partner[ ]  Children[x ]  Scientology/Spirituality:  Substance hx:  [ ]   Tobacco hx:  [ ]   Alcohol hx: [ ]   Home Opioid hx:  [ ] I-Stop Reference No:  Living Situation: [ x]Home  [ ]Long term care  [ ]Rehab [ ]Other    ADVANCE DIRECTIVES:    DNR/MOLST  [ ]  Living Will  [ ]   DECISION MAKER(s):  [ ] Health Care Proxy(s)  [x ] Surrogate(s)  [ ] Guardian           Name(s): Phone Number(s):  son India Raya [phone]  daughter India Johns [phone]    BASELINE (I)ADL(s) (prior to admission):  [place]: [ ]Total  [x ] Moderate [ ]Dependent    Allergies    No Known Allergies    Intolerances    MEDICATIONS  (STANDING):  albuterol/ipratropium for Nebulization 3 milliLiter(s) Nebulizer every 6 hours  apixaban 2.5 milliGRAM(s) Oral every 12 hours  donepezil 5 milliGRAM(s) Oral at bedtime  folic acid 1 milliGRAM(s) Oral daily  furosemide   Injectable 40 milliGRAM(s) IV Push daily  metoprolol tartrate 50 milliGRAM(s) Oral three times a day  polyethylene glycol 3350 17 Gram(s) Oral two times a day  senna 2 Tablet(s) Oral at bedtime  simvastatin 20 milliGRAM(s) Oral at bedtime    MEDICATIONS  (PRN):  acetaminophen     Tablet .. 650 milliGRAM(s) Oral every 6 hours PRN Temp greater or equal to 38C (100.4F), Mild Pain (1 - 3), Moderate Pain (4 - 6), Severe Pain (7 - 10)  HYDROmorphone  Injectable 0.25 milliGRAM(s) IV Push every 4 hours PRN Severe Pain (7 - 10)  ondansetron Injectable 4 milliGRAM(s) IV Push every 6 hours PRN Nausea and/or Vomiting    PRESENT SYMPTOMS: [x ]Unable to self-report  [ ] CPOT [x ] PAINADs [ x] RDOS  Source if other than patient:  [ ]Family   [ ]Team     Pain: [ ]yes [ ]no  QOL impact -   Location -                    Aggravating factors -  Quality -  Radiation -  Timing-  Severity (0-10 scale):  Minimal acceptable level (0-10 scale):     CPOT:    https://www.sccm.org/getattachment/put32n85-8l8i-9e7n-2d2x-6816e9330t1p/Critical-Care-Pain-Observation-Tool-(CPOT)    PAIN AD Score:   http://geriatrictoolkit.[place].[facility]/cog/painad.pdf (press ctrl +  left click to view)    Dyspnea:                           [ ]Mild [ ]Moderate [ ]Severe      RDOS:  0 to 2  minimal or no respiratory distress   3  mild distress  4 to 6 moderate distress  >7 severe distress  https://homecareinformation.net/handouts/hen/Respiratory_Distress_Observation_Scale.pdf (Ctrl +  left click to view)     Anxiety:                             [ ]Mild [ ]Moderate [ ]Severe  Fatigue:                             [ ]Mild [ ]Moderate [ ]Severe  Nausea:                             [ ]Mild [ ]Moderate [ ]Severe  Loss of appetite:              [ ]Mild [ ]Moderate [ ]Severe  Constipation:                    [ ]Mild [ ]Moderate [ ]Severe    PCSSQ[Palliative Care Spiritual Screening Question]   Severity (0-10):  Score of 4 or > indicate consideration of Chaplaincy referral.  Chaplaincy Referral: [ ] yes [ ] refused [ ] following [x ] Deferred     Caregiver Story? : [ ] yes [ ] no [x ] Deferred [ ] Declined             Social work referral [ ] Patient & Family Centered Care Referral [ ]     Anticipatory Grief present?:  [ ] yes [ ] no  [x ] Deferred                  Social work referral [ ] Chaplaincy Referral[ ]      Other Symptoms:  [ x]All other review of systems negative     Palliative Performance Status Version 2:     20-30    %    http://npcrc.org/files/news/palliative_performance_scale_ppsv2.pdf  PHYSICAL EXAM:  Vital Signs Last 24 Hrs  T(C): 36.4 (21 Jan 2023 05:22), Max: 36.7 (20 Jan 2023 21:14)  T(F): 97.5 (21 Jan 2023 05:22), Max: 98.1 (20 Jan 2023 21:14)  HR: 110 (21 Jan 2023 05:22) (100 - 112)  BP: 100/53 (21 Jan 2023 05:22) (100/53 - 111/74)  BP(mean): --  RR: 18 (21 Jan 2023 05:22) (18 - 18)  SpO2: 96% (21 Jan 2023 05:22) (96% - 98%)    Parameters below as of 21 Jan 2023 05:22  Patient On (Oxygen Delivery Method): nasal cannula     I&O's Summary    20 Jan 2023 07:01  -  21 Jan 2023 07:00  --------------------------------------------------------  IN: 700 mL / OUT: 500 mL / NET: 200 mL      GENERAL: [ ]Cachexia    [ ]Alert  [ ]Oriented x   [x ]Lethargic  [ ]Unarousable  [ ]Verbal  [ ]Non-Verbal  Behavioral:   [ ] Anxiety  [ ] Delirium [ ] Agitation [ ] Other  HEENT:  [ x]Normal   [ ]Dry mouth   [ ]ET Tube/Trach  [ ]Oral lesions  PULMONARY:   [ ]Clear [x ]Tachypnea  [ ]Audible excessive secretions   [ ]Rhonchi        [ ]Right [ ]Left [ ]Bilateral  [ ]Crackles        [ ]Right [ ]Left [ ]Bilateral  [ ]Wheezing     [ ]Right [ ]Left [ ]Bilateral  [x ]Diminished breath sounds [ ]right [ ]left [ ]bilateral  CARDIOVASCULAR:    [ ]Regular [ ]Irregular [x ]Tachy  [ ]Tee [ ]Murmur [ ]Other  GASTROINTESTINAL:  [ x]Soft  [ ]Distended   [x ]+BS  [ ]Non tender [ ]Tender  [ ]Other [ ]PEG [ ]OGT/ NGT  Last BM:  GENITOURINARY:  [ ]Normal [x ] Incontinent   [ ]Oliguria/Anuria   [ ]Pal  MUSCULOSKELETAL:   [ ]Normal   [ ]Weakness  [x ]Bed/Wheelchair bound [ ]Edema  NEUROLOGIC:   [x ]No focal deficits  [ ]Cognitive impairment  [ ]Dysphagia [ ]Dysarthria [ ]Paresis [ ]Other   SKIN:   [ ]Normal  [ ]Rash  [ ]Other  [ ]Pressure ulcer(s)       Present on admission [ ]y [ ]n    CRITICAL CARE:  [ ] Shock Present  [ ]Septic [ ]Cardiogenic [ ]Neurologic [ ]Hypovolemic  [ ]  Vasopressors [ ]  Inotropes   [ ]Respiratory failure present [ ]Mechanical ventilation [ ]Non-invasive ventilatory support [ ]High flow    [ ]Acute  [ ]Chronic [ ]Hypoxic  [ ]Hypercarbic [ ]Other  [ ]Other organ failure     LABS:                        11.5   18.42 )-----------( 160      ( 20 Jan 2023 09:17 )             34.9   01-20    130<L>  |  92<L>  |  17  ----------------------------<  128<H>  4.5   |  21<L>  |  0.61    Ca    8.4      20 Jan 2023 06:57  Phos  3.1     01-20  Mg     2.4     01-20    TPro  6.3  /  Alb  2.7<L>  /  TBili  0.9  /  DBili  x   /  AST  124<H>  /  ALT  94<H>  /  AlkPhos  919<H>  01-20        RADIOLOGY & ADDITIONAL STUDIES:  < from: CT Angio Chest PE Protocol w/ IV Cont (01.19.23 @ 21:13) >    ACC: [id number] EXAM:  CT ABDOMEN AND PELVIS IC   ORDERED BY: JULI CENTENO     ACC: [id number] EXAM:  CT ANGIO CHEST PULM ART WAWIC   ORDERED BY:   JULI CENTENO     PROCEDURE DATE:  01/19/2023          INTERPRETATION:  CLINICAL INFORMATION: Pneumonia and shortness of breath.   Metastatic disease workup. History of breast and colon cancer.    COMPARISON: CT chest 1/14/2023. MR abdomen 1/17/2023.    CONTRAST/COMPLICATIONS:  IV Contrast: Omnipaque 350  90 cc administered   10 cc discarded  Oral Contrast: NONE  Complications: None reported at time of study completion    PROCEDURE:  CT Angiography of the Chest was performed followed by portal venous phase   imaging of the Abdomen and Pelvis.  Sagittal and coronal reformats were performed as well as 3D (MIP)   reconstructions.    FINDINGS:  CHEST:  LUNGS AND LARGE AIRWAYS:  Similar diffuse bilateral nodular interlobular   septal thickening. Similar bilateral peribronchial thickening.  PLEURA: Small bilateral pleural effusions.  VESSELS: No main, lobar, or segmental pulmonary embolism. Evaluation of   many subsegmental pulmonary arteries is limited secondary to respiratory   motion artifact and surrounding lung disease. Aortic and coronary artery   calcifications.  HEART: Heart size is normal. No pericardial effusion.  MEDIASTINUM AND KARSON: Similar mediastinal and right hilar   lymphadenopathy, for reference a conglomerate right paratracheal node   measures 3 x 3 cm (3:63).  CHEST WALL AND LOWER NECK: Unchanged low attenuation within the right   thyroid lobe. Right mastectomy.    ABDOMEN AND PELVIS:  LIVER: Within normal limits.  BILE DUCTS: No intrahepatic biliary ductal dilatation. Common bile duct   measures 1.1 cm at the jasmyn hepatis with distal tapering, likely   expected change in post cholecystectomy state.  GALLBLADDER: Cholecystectomy.  SPLEEN: Within normal limits.  PANCREAS: Within normal limits.  ADRENALS: Within normal limits.  KIDNEYS/URETERS: Within normal limits.    BLADDER: Within normal limits.  REPRODUCTIVE ORGANS: Uterus and adnexa within normal limits.    BOWEL: No bowel obstruction. Appendix is normal.  PERITONEUM: No ascites.  VESSELS: Atherosclerotic changes.  RETROPERITONEUM/LYMPH NODES: Confluent increased attenuation surrounds  the aorta and IVC to the level of the iliac bifurcation.  ABDOMINAL WALL: Within normal limits.  BONES: Indeterminant multiple faintly sclerotic lesions within multiple   vertebral bodies and the pelvis.    IMPRESSION:    No main, lobar, or segmental pulmonary embolism. Evaluation of many   subsegmental pulmonary arteries is limited secondary to respiratory   motion artifact and surrounding lung disease.    Diffuse nodular interlobular septal thickening for which differential   includes lymphangitic spread of tumor, concurrent or isolated pulmonary   edema is an additional consideration.    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    Multiple faint sclerotic lesions within multiplevertebral bodies in the   pelvis, indeterminate at CT technique, nuclear medicine bone scan is   recommended for further characterization as these can represent blastic   metastatic disease.            --- End of Report ---           THONG DAUGHERTY MD; Resident Radiology  This document has been electronically signed.  SALMA GIANG MD; Attending Radiologist  This document has been electronically signed. Jan 20 2023 11:29AM    < end of copied text >      PROTEIN CALORIE MALNUTRITION PRESENT: [ ]mild [ ]moderate [ ]severe [ ]underweight [ ]morbid obesity  https://www.andeal.org/vault/2440/web/files/ONC/Table_Clinical%20Characteristics%20to%20Document%20Malnutrition-White%20JV%20et%20al%202012.pdf    Height (cm): 154.9 (01-14-23 @ 17:31)  Weight (kg): 56.7 (01-14-23 @ 17:31)  BMI (kg/m2): 23.6 (01-14-23 @ 17:31)    [x ]PPSV2 < or = to 30% [ ]significant weight loss  [ x]poor nutritional intake  [ ]anasarca[ ]Artificial Nutrition      Other REFERRALS:  [ ]Hospice  [ ]Child Life  [ ]Social Work  [ x]Case management [ ]Holistic Therapy     Goals of Care Document:  HPI:  The patient is an 87-year-old woman who is followed for hypertension, diabetes mellitus, and dementia and who has a history of breast and colon cancers (now in remission as per family) who presented today with shortness of breath. Per discussion with the patient's family, she noted progressive shortness of breath that began about one week ago. At that time, she saw her primary care doctor who administered her a five-day course of oral levofloxacin. Although the patient's symptoms improved subtly for about two days following completion of her medication regimen, her symptoms again worsened progressively--prompting her family to bring her to the emergency department today. The patient was noted to have fevers at home (up to about 102 degrees measured orally), and she had a dry cough. The patient has had some swelling in her bilateral lower extremities. She has some chest discomfort and difficulty catching her breath, but she has not noticed any pleuritic chest pain. She has not fallen, and she has not suffered form any trauma to her chest. The patient has no known history of neuromuscular disease and no known history of anemia. The patient does suffer from diabetes mellitus--now in remission--but she has not suffered from any episodes of hypo- or hyperglycemia.     In the emergency department, the patient was noted to be tachypneic. A chest xray and chest ct were performed, and they demonstrated bilateral pleural effusions with interstitial infiltrates. Her serum bnp was elevated to greater than 2500. The patient was administered a dose each of ceftriaxone, azithromycin, and vancomycin, and the patient was admitted to internal medicine for further management.  (15 Usama 2023 00:45)    PERTINENT PM/SXH:   Hypertension    Diabetes mellitus    Dementia    Breast cancer    Colon cancer      S/P cholecystectomy    H/O left mastectomy    S/P mastectomy, right    H/O hemicolectomy      FAMILY HISTORY:  No pertinent family history in first degree relatives      Family Hx substance abuse [ ]yes [ ]no  ITEMS NOT CHECKED ARE NOT PRESENT    SOCIAL HISTORY:   Significant other/partner[ ]  Children[x ]  Rastafari/Spirituality:  Substance hx:  [ ]   Tobacco hx:  [ ]   Alcohol hx: [ ]   Home Opioid hx:  [ ] I-Stop Reference No:  Living Situation: [ x]Home  [ ]Long term care  [ ]Rehab [ ]Other    ADVANCE DIRECTIVES:    DNR/MOLST  [ ]  Living Will  [ ]   DECISION MAKER(s):  [ ] Health Care Proxy(s)  [x ] Surrogate(s)  [ ] Guardian           Name(s): Phone Number(s):  son India Raya [phone]  daughter India Johns [phone]    BASELINE (I)ADL(s) (prior to admission):  [place]: [ ]Total  [x ] Moderate [ ]Dependent    Allergies    No Known Allergies    Intolerances    MEDICATIONS  (STANDING):  albuterol/ipratropium for Nebulization 3 milliLiter(s) Nebulizer every 6 hours  apixaban 2.5 milliGRAM(s) Oral every 12 hours  donepezil 5 milliGRAM(s) Oral at bedtime  folic acid 1 milliGRAM(s) Oral daily  furosemide   Injectable 40 milliGRAM(s) IV Push daily  metoprolol tartrate 50 milliGRAM(s) Oral three times a day  polyethylene glycol 3350 17 Gram(s) Oral two times a day  senna 2 Tablet(s) Oral at bedtime  simvastatin 20 milliGRAM(s) Oral at bedtime    MEDICATIONS  (PRN):  acetaminophen     Tablet .. 650 milliGRAM(s) Oral every 6 hours PRN Temp greater or equal to 38C (100.4F), Mild Pain (1 - 3), Moderate Pain (4 - 6), Severe Pain (7 - 10)  HYDROmorphone  Injectable 0.25 milliGRAM(s) IV Push every 4 hours PRN Severe Pain (7 - 10)  ondansetron Injectable 4 milliGRAM(s) IV Push every 6 hours PRN Nausea and/or Vomiting    PRESENT SYMPTOMS: [x ]Unable to self-report  [ ] CPOT [x ] PAINADs [ x] RDOS  Source if other than patient:  [ ]Family   [ ]Team     Pain: [ ]yes [ ]no  QOL impact -   Location -                    Aggravating factors -  Quality -  Radiation -  Timing-  Severity (0-10 scale):  Minimal acceptable level (0-10 scale):     CPOT:    https://www.sccm.org/getattachment/geq49q90-5r5u-0a8d-8t6h-1590c2822o7o/Critical-Care-Pain-Observation-Tool-(CPOT)    PAIN AD Score:   http://geriatrictoolkit.[place].[facility]/cog/painad.pdf (press ctrl +  left click to view)    Dyspnea:                           [ ]Mild [ ]Moderate [ ]Severe      RDOS:  0 to 2  minimal or no respiratory distress   3  mild distress  4 to 6 moderate distress  >7 severe distress  https://homecareinformation.net/handouts/hen/Respiratory_Distress_Observation_Scale.pdf (Ctrl +  left click to view)     Anxiety:                             [ ]Mild [ ]Moderate [ ]Severe  Fatigue:                             [ ]Mild [ ]Moderate [ ]Severe  Nausea:                             [ ]Mild [ ]Moderate [ ]Severe  Loss of appetite:              [ ]Mild [ ]Moderate [ ]Severe  Constipation:                    [ ]Mild [ ]Moderate [ ]Severe    PCSSQ[Palliative Care Spiritual Screening Question]   Severity (0-10):  Score of 4 or > indicate consideration of Chaplaincy referral.  Chaplaincy Referral: [ ] yes [ ] refused [ ] following [x ] Deferred     Caregiver [place]? : [ ] yes [ ] no [x ] Deferred [ ] Declined             Social work referral [ ] Patient & Family Centered Care Referral [ ]     Anticipatory Grief present?:  [ ] yes [ ] no  [x ] Deferred                  Social work referral [ ] Chaplaincy Referral[ ]      Other Symptoms:  [ x]All other review of systems negative     Palliative Performance Status Version 2:     20-30    %    http://npcrc.org/files/news/palliative_performance_scale_ppsv2.pdf  PHYSICAL EXAM:  Vital Signs Last 24 Hrs  T(C): 36.4 (21 Jan 2023 05:22), Max: 36.7 (20 Jan 2023 21:14)  T(F): 97.5 (21 Jan 2023 05:22), Max: 98.1 (20 Jan 2023 21:14)  HR: 110 (21 Jan 2023 05:22) (100 - 112)  BP: 100/53 (21 Jan 2023 05:22) (100/53 - 111/74)  BP(mean): --  RR: 18 (21 Jan 2023 05:22) (18 - 18)  SpO2: 96% (21 Jan 2023 05:22) (96% - 98%)    Parameters below as of 21 Jan 2023 05:22  Patient On (Oxygen Delivery Method): nasal cannula     I&O's Summary    20 Jan 2023 07:01  -  21 Jan 2023 07:00  --------------------------------------------------------  IN: 700 mL / OUT: 500 mL / NET: 200 mL      GENERAL: [ ]Cachexia    [ ]Alert  [ ]Oriented x   [x ]Lethargic  [ ]Unarousable  [ ]Verbal  [ ]Non-Verbal  Behavioral:   [ ] Anxiety  [ ] Delirium [ ] Agitation [ ] Other  HEENT:  [ x]Normal   [ ]Dry mouth   [ ]ET Tube/Trach  [ ]Oral lesions  PULMONARY:   [ ]Clear [x ]Tachypnea  [ ]Audible excessive secretions   [ ]Rhonchi        [ ]Right [ ]Left [ ]Bilateral  [ ]Crackles        [ ]Right [ ]Left [ ]Bilateral  [ ]Wheezing     [ ]Right [ ]Left [ ]Bilateral  [x ]Diminished breath sounds [ ]right [ ]left [ ]bilateral  CARDIOVASCULAR:    [ ]Regular [ ]Irregular [x ]Tachy  [ ]Tee [ ]Murmur [ ]Other  GASTROINTESTINAL:  [ x]Soft  [ ]Distended   [x ]+BS  [ ]Non tender [ ]Tender  [ ]Other [ ]PEG [ ]OGT/ NGT  Last BM:  GENITOURINARY:  [ ]Normal [x ] Incontinent   [ ]Oliguria/Anuria   [ ]Pal  MUSCULOSKELETAL:   [ ]Normal   [ ]Weakness  [x ]Bed/Wheelchair bound [ ]Edema  NEUROLOGIC:   [x ]No focal deficits  [ ]Cognitive impairment  [ ]Dysphagia [ ]Dysarthria [ ]Paresis [ ]Other   SKIN:   [ ]Normal  [ ]Rash  [ ]Other  [ ]Pressure ulcer(s)       Present on admission [ ]y [ ]n    CRITICAL CARE:  [ ] Shock Present  [ ]Septic [ ]Cardiogenic [ ]Neurologic [ ]Hypovolemic  [ ]  Vasopressors [ ]  Inotropes   [ ]Respiratory failure present [ ]Mechanical ventilation [ ]Non-invasive ventilatory support [ ]High flow    [ ]Acute  [ ]Chronic [ ]Hypoxic  [ ]Hypercarbic [ ]Other  [ ]Other organ failure     LABS:                        11.5   18.42 )-----------( 160      ( 20 Jan 2023 09:17 )             34.9   01-20    130<L>  |  92<L>  |  17  ----------------------------<  128<H>  4.5   |  21<L>  |  0.61    Ca    8.4      20 Jan 2023 06:57  Phos  3.1     01-20  Mg     2.4     01-20    TPro  6.3  /  Alb  2.7<L>  /  TBili  0.9  /  DBili  x   /  AST  124<H>  /  ALT  94<H>  /  AlkPhos  919<H>  01-20        RADIOLOGY & ADDITIONAL STUDIES:  < from: CT Angio Chest PE Protocol w/ IV Cont (01.19.23 @ 21:13) >    ACC: [id number] EXAM:  CT ABDOMEN AND PELVIS IC   ORDERED BY: JULI CENTENO     ACC: [id number] EXAM:  CT ANGIO CHEST PULM ART WAWIC   ORDERED BY:   JULI CENTENO     PROCEDURE DATE:  01/19/2023          INTERPRETATION:  CLINICAL INFORMATION: Pneumonia and shortness of breath.   Metastatic disease workup. History of breast and colon cancer.    COMPARISON: CT chest 1/14/2023. MR abdomen 1/17/2023.    CONTRAST/COMPLICATIONS:  IV Contrast: Omnipaque 350  90 cc administered   10 cc discarded  Oral Contrast: NONE  Complications: None reported at time of study completion    PROCEDURE:  CT Angiography of the Chest was performed followed by portal venous phase   imaging of the Abdomen and Pelvis.  Sagittal and coronal reformats were performed as well as 3D (MIP)   reconstructions.    FINDINGS:  CHEST:  LUNGS AND LARGE AIRWAYS:  Similar diffuse bilateral nodular interlobular   septal thickening. Similar bilateral peribronchial thickening.  PLEURA: Small bilateral pleural effusions.  VESSELS: No main, lobar, or segmental pulmonary embolism. Evaluation of   many subsegmental pulmonary arteries is limited secondary to respiratory   motion artifact and surrounding lung disease. Aortic and coronary artery   calcifications.  HEART: Heart size is normal. No pericardial effusion.  MEDIASTINUM AND KARSON: Similar mediastinal and right hilar   lymphadenopathy, for reference a conglomerate right paratracheal node   measures 3 x 3 cm (3:63).  CHEST WALL AND LOWER NECK: Unchanged low attenuation within the right   thyroid lobe. Right mastectomy.    ABDOMEN AND PELVIS:  LIVER: Within normal limits.  BILE DUCTS: No intrahepatic biliary ductal dilatation. Common bile duct   measures 1.1 cm at the jasmyn hepatis with distal tapering, likely   expected change in post cholecystectomy state.  GALLBLADDER: Cholecystectomy.  SPLEEN: Within normal limits.  PANCREAS: Within normal limits.  ADRENALS: Within normal limits.  KIDNEYS/URETERS: Within normal limits.    BLADDER: Within normal limits.  REPRODUCTIVE ORGANS: Uterus and adnexa within normal limits.    BOWEL: No bowel obstruction. Appendix is normal.  PERITONEUM: No ascites.  VESSELS: Atherosclerotic changes.  RETROPERITONEUM/LYMPH NODES: Confluent increased attenuation surrounds  the aorta and IVC to the level of the iliac bifurcation.  ABDOMINAL WALL: Within normal limits.  BONES: Indeterminant multiple faintly sclerotic lesions within multiple   vertebral bodies and the pelvis.    IMPRESSION:    No main, lobar, or segmental pulmonary embolism. Evaluation of many   subsegmental pulmonary arteries is limited secondary to respiratory   motion artifact and surrounding lung disease.    Diffuse nodular interlobular septal thickening for which differential   includes lymphangitic spread of tumor, concurrent or isolated pulmonary   edema is an additional consideration.    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    Multiple faint sclerotic lesions within multiplevertebral bodies in the   pelvis, indeterminate at CT technique, nuclear medicine bone scan is   recommended for further characterization as these can represent blastic   metastatic disease.            --- End of Report ---           THONG DAUGHERTY MD; Resident Radiology  This document has been electronically signed.  SALMA GIANG MD; Attending Radiologist  This document has been electronically signed. Jan 20 2023 11:29AM    < end of copied text >      PROTEIN CALORIE MALNUTRITION PRESENT: [ ]mild [ ]moderate [ ]severe [ ]underweight [ ]morbid obesity  https://www.andeal.org/vault/2440/web/files/ONC/Table_Clinical%20Characteristics%20to%20Document%20Malnutrition-White%20JV%20et%20al%202012.pdf    Height (cm): 154.9 (01-14-23 @ 17:31)  Weight (kg): 56.7 (01-14-23 @ 17:31)  BMI (kg/m2): 23.6 (01-14-23 @ 17:31)    [x ]PPSV2 < or = to 30% [ ]significant weight loss  [ x]poor nutritional intake  [ ]anasarca[ ]Artificial Nutrition      Other REFERRALS:  [ ]Hospice  [ ]Child Life  [ ]Social Work  [ x]Case management [ ]Holistic Therapy     Goals of Care Document:

## 2023-01-21 NOTE — CONSULT NOTE ADULT - PROBLEM SELECTOR RECOMMENDATION 5
DNR/DNI established, comfort measures only, MOLST in chart  GOC narrative above  transfer to PCU when bed available  dispo planning pending clinical course

## 2023-01-21 NOTE — CONSULT NOTE ADULT - PROBLEM SELECTOR RECOMMENDATION 2
c/w lasix daily, for comfort  start dilaudid 0.2mg q1h prn for dyspnea  ativan 0.2mg q1h pnr for refractory

## 2023-01-21 NOTE — PROGRESS NOTE ADULT - ATTENDING COMMENTS
87F who is followed for hypertension, diabetes mellitus, and dementia and who has a history of breast and colon cancers (now in remission) who is admitted for evaluation and management of persistent shortness of breath, refractory to outpatient antibiotic therapy for community acquired pneumonia. In the context of bilateral pleural effusions, lower extremity edema, and an elevated serum bnp-most consistent with a diagnosis of acute decompensated heart failure vs. PNA vs. malignancy.     Family has opted for DNR/DNI with comfort care measures    Patient to be transferred to PCU

## 2023-01-21 NOTE — CONSULT NOTE ADULT - TREATMENT GUIDELINE COMMENT
transfer to [facility] transfer to [facility] transfer to [facility]

## 2023-01-21 NOTE — PROGRESS NOTE ADULT - PROBLEM SELECTOR PLAN 1
-Serum sodium level noted to measure 121 on admission, downtrended to 114 at lowest ; suspect that this is secondary to hypervolemic hyponatremia in context of acute decompensated heart failure vs SIADH.    - Hyponatremia progressed despite lasix IV BID  - urine lytes w. osmolality in 500s, sodium 9-16  - c/w lasix IV BID  - PO fluid restrict, liberalize solutes   - BMP q6  - c/w hypertonic saline PRN  - improving, now 130

## 2023-01-22 PROCEDURE — 99232 SBSQ HOSP IP/OBS MODERATE 35: CPT

## 2023-01-22 RX ADMIN — Medication 3 MILLILITER(S): at 12:47

## 2023-01-22 RX ADMIN — Medication 1 MILLIGRAM(S): at 12:48

## 2023-01-22 RX ADMIN — Medication 50 MILLIGRAM(S): at 22:11

## 2023-01-22 RX ADMIN — Medication 3 MILLILITER(S): at 17:26

## 2023-01-22 RX ADMIN — SENNA PLUS 2 TABLET(S): 8.6 TABLET ORAL at 22:12

## 2023-01-22 RX ADMIN — SIMVASTATIN 20 MILLIGRAM(S): 20 TABLET, FILM COATED ORAL at 22:12

## 2023-01-22 RX ADMIN — Medication 50 MILLIGRAM(S): at 14:10

## 2023-01-22 RX ADMIN — HYDROMORPHONE HYDROCHLORIDE 0.5 MILLIGRAM(S): 2 INJECTION INTRAMUSCULAR; INTRAVENOUS; SUBCUTANEOUS at 17:33

## 2023-01-22 RX ADMIN — Medication 40 MILLIGRAM(S): at 05:37

## 2023-01-22 RX ADMIN — Medication 1 ENEMA: at 22:13

## 2023-01-22 RX ADMIN — APIXABAN 2.5 MILLIGRAM(S): 2.5 TABLET, FILM COATED ORAL at 17:33

## 2023-01-22 RX ADMIN — Medication 3 MILLILITER(S): at 23:37

## 2023-01-22 RX ADMIN — Medication 50 MILLIGRAM(S): at 05:32

## 2023-01-22 RX ADMIN — POLYETHYLENE GLYCOL 3350 17 GRAM(S): 17 POWDER, FOR SOLUTION ORAL at 05:32

## 2023-01-22 RX ADMIN — POLYETHYLENE GLYCOL 3350 17 GRAM(S): 17 POWDER, FOR SOLUTION ORAL at 17:25

## 2023-01-22 RX ADMIN — APIXABAN 2.5 MILLIGRAM(S): 2.5 TABLET, FILM COATED ORAL at 05:33

## 2023-01-22 RX ADMIN — Medication 3 MILLILITER(S): at 05:37

## 2023-01-22 RX ADMIN — Medication 10 MILLIGRAM(S): at 14:22

## 2023-01-22 RX ADMIN — DONEPEZIL HYDROCHLORIDE 5 MILLIGRAM(S): 10 TABLET, FILM COATED ORAL at 22:11

## 2023-01-22 NOTE — PROGRESS NOTE ADULT - PROBLEM SELECTOR PLAN 5
-DNR/DNI established, in PCU for comfort measures only  -GOC narrative above  -family speak cantonese  -dispo planning pending clinical course, SW will see tomorrow   -provided support to family at bedside -DNR/DNI established, in PCU for comfort measures only  -family speak cantonese  -dispo planning pending clinical course, SW will see tomorrow   -provided support to family at bedside

## 2023-01-22 NOTE — PROGRESS NOTE ADULT - PROBLEM SELECTOR PLAN 2
-used 2 x dialudid 0.5mg q1hr PRN  and used 1 x ativan 0.25mg q1hr PRN   -c/w lasix daily, for comfort  -c/w dialudid 0.5mg q1hr PRN   c/w ativan 0.2mg q1h pnr for refractory dyspnea

## 2023-01-22 NOTE — PROGRESS NOTE ADULT - ATTENDING COMMENTS
Patient in PCU for symptom directed care and disposition planning in setting of known heart failure and now likely new malignancy. Robbie  # [id number]- no new complaints, no pain, dyspnea improved with IV dilaudid. Family aware plan to discuss disposition options with team, likely Monday, depending on family and team availability. Patient with BM overnight but family concerned patient still constipated. Will give dulcolax suppository today. Rest as above, continue care in PCU.  follow up 1/23. plan discussed with IDT. Patient in PCU for symptom directed care and disposition planning in setting of known heart failure and now likely new malignancy. Robbie  # [id number]- no new complaints, no pain, dyspnea improved with IV dilaudid. Family aware plan to discuss disposition options with team, likely Monday, depending on family and team availability. Patient with BM overnight but family concerned patient still constipated. Will give dulcolax suppository today. Rest as above, continue care in PCU.  follow up 1/23. plan discussed with IDT. Patient in PCU for symptom directed care and disposition planning in setting of known heart failure and now likely new malignancy. Robbie  # [id number]- no new complaints, no pain, dyspnea improved with IV dilaudid. Family aware plan to discuss disposition options with team, likely Monday, depending on family and team availability. Patient with BM overnight but family concerned patient still constipated. Will give dulcolax suppository today. Rest as above, continue care in PCU.  follow up 1/23. plan discussed with IDT.

## 2023-01-22 NOTE — PROGRESS NOTE ADULT - SUBJECTIVE AND OBJECTIVE BOX
GAP TEAM PALLIATIVE CARE UNIT PROGRESS NOTE:      [  ] Patient on hospice program.    INDICATION FOR PALLIATIVE CARE UNIT SERVICES/Interval HPI: Patient is a 87y old  Female with  decompensated heart failure and new metastatic malignancy in PCU for symptomatic focused care.     SUBJECTIVE / OVERNIGHT EVENTS: pt had dyspnea overnight. she required  2 doses of Dilaudid 0.5mg IV q1hr and 1 x dose of ativan 0.25mg in last 24 hrs.     This morning Pt was seen and examined. pt was calm in bed, showing no signs of distress, had diaper on.     DNR on chart: Yes  Yes      Allergies    No Known Allergies    Intolerances    MEDICATIONS  (STANDING):  albuterol/ipratropium for Nebulization 3 milliLiter(s) Nebulizer every 6 hours  apixaban 2.5 milliGRAM(s) Oral every 12 hours  donepezil 5 milliGRAM(s) Oral at bedtime  folic acid 1 milliGRAM(s) Oral daily  furosemide   Injectable 40 milliGRAM(s) IV Push daily  metoprolol tartrate 50 milliGRAM(s) Oral three times a day  polyethylene glycol 3350 17 Gram(s) Oral two times a day  senna 2 Tablet(s) Oral at bedtime  simvastatin 20 milliGRAM(s) Oral at bedtime    MEDICATIONS  (PRN):  acetaminophen     Tablet .. 650 milliGRAM(s) Oral every 6 hours PRN Temp greater or equal to 38C (100.4F), Mild Pain (1 - 3), Moderate Pain (4 - 6), Severe Pain (7 - 10)  bisacodyl Suppository 10 milliGRAM(s) Rectal daily PRN Constipation  glycopyrrolate Injectable 0.4 milliGRAM(s) IV Push every 6 hours PRN secretions  HYDROmorphone  Injectable 0.3 milliGRAM(s) IV Push every 1 hour PRN Moderate Pain (4 - 6)  HYDROmorphone  Injectable 0.5 milliGRAM(s) IV Push every 1 hour PRN Severe Pain (7 - 10)  HYDROmorphone  Injectable 0.5 milliGRAM(s) IV Push every 1 hour PRN dyspnea  LORazepam   Injectable 0.25 milliGRAM(s) IV Push every 2 hours PRN anxiety/agitation/refractory dyspnea  ondansetron Injectable 4 milliGRAM(s) IV Push every 6 hours PRN Nausea and/or Vomiting    ITEMS UNCHECKED ARE NOT PRESENT    PRESENT SYMPTOMS: [ x]Unable to self-report see PAINAD, RDOS below  Source if other than patient:  [ ]Family   [x ]Team     Pain: [ ] yes [ ] no  QOL impact -   Location -                    Aggravating factors -  Quality -  Radiation -  Timing-  Severity (0-10 scale):  Minimal acceptable level (0-10 scale):     Dyspnea:                           [ ]Mild [ ]Moderate [ ]Severe  Anxiety:                             [ ]Mild [ ]Moderate [ ]Severe  Fatigue:                             [ ]Mild [ ]Moderate [ ]Severe  Nausea:                             [ ]Mild [ ]Moderate [ ]Severe  Loss of appetite:              [ ]Mild [ ]Moderate [ ]Severe  Constipation:                    [ ]Mild [ ]Moderate [ ]Severe    PCSSQ [Palliative Care Spiritual Screening Question]   Severity (0-10):  Score of 4 or > indicate consideration of Chaplaincy referral.  Chaplaincy Referral: [ ] yes [ ] refused [ ] following [x ] deferred until tomorrow     Caregiver [place]? : [x ] yes [ ] no [ ] deferred:  Social work referral [ ] Patient & Family Centered Care Referral [ ]     Anticipatory Grief present?:  [ x] yes [ ] no [ ] deferred:  Social work referral [ ] Patient & Family Centered Care Referral [ ]  	  Other Symptoms:  [x ]All other review of systems negative     PHYSICAL EXAM:   Vital Signs Last 24 Hrs  T(C): 36.3 (22 Jan 2023 08:29), Max: 36.7 (21 Jan 2023 12:23)  T(F): 97.3 (22 Jan 2023 08:29), Max: 98 (21 Jan 2023 12:23)  HR: 89 (22 Jan 2023 08:29) (89 - 106)  BP: 96/69 (22 Jan 2023 08:29) (96/69 - 143/81)  BP(mean): --  RR: 18 (22 Jan 2023 08:29) (18 - 18)  SpO2: 97% (22 Jan 2023 08:29) (94% - 97%)    Parameters below as of 22 Jan 2023 08:29  Patient On (Oxygen Delivery Method): nasal cannula     I&O's Summary    21 Jan 2023 07:01  -  22 Jan 2023 07:00  --------------------------------------------------------  IN: 750 mL / OUT: 0 mL / NET: 750 mL      GENERAL: [ ] Cachexia  [ ]Alert  [ ]Oriented x   [ ]Lethargic  [ ]Unarousable  [ ]Verbal  [ ]Non-Verbal  Behavioral:   [ ] Anxiety  [ ] Delirium [ ] Agitation [ ] Other  HEENT:  [ ]Normal   [ ]Dry mouth   [ ]ET Tube/Trach  [ ]Oral lesions  PULMONARY:   [ ]Clear [ ]Tachypnea  [ ]Audible excessive secretions   [ ]Rhonchi        [ ]Right [ ]Left [ ]Bilateral  [ ]Crackles        [ ]Right [ ]Left [ ]Bilateral  [ ]Wheezing     [ ]Right [ ]Left [ ]Bilateral  [ ]Diminished BS [ ]Right [ ]Left [ ]Bilateral    CARDIOVASCULAR:    [ ]Regular [ ]Irregular [ ]Tachy  [ ]Tee [ ]Murmur [ ]Other  GASTROINTESTINAL:  [ ]Soft  [ ]Distended   [ ]+BS  [ ]Non tender [ ]Tender  [ ]Other [ ]PEG [ ]OGT/ NGT   Last BM:    GENITOURINARY:  [ ]Normal [ ] Incontinent   [ ]Oliguria/Anuria   [ ]Pal  MUSCULOSKELETAL:   [ ]Normal   [ ]Weakness  [ ]Bed/Wheelchair bound [ ]Edema  NEUROLOGIC:   [ ]No focal deficits  [ ] Cognitive impairment  [ ] Dysphagia [ ]Dysarthria [ ] Paresis [ ]Other   SKIN:   [ ]Normal  [ ]Rash  [ ]Other  [ ]Pressure ulcer(s)  [ ]y [ ]n  Present on admission      CRITICAL CARE:  [ ] Shock Present  [ ]Septic [ ]Cardiogenic [ ]Neurologic [ ]Hypovolemic  [ ]  Vasopressors [ ]  Inotropes   [ ] Respiratory failure present [ ] Mechanical Ventilation [ ] Non-invasive ventilatory support [ ] High-Flow  [ ] Acute  [ ] Chronic [ ] Hypoxic  [ ] Hypercarbic [ ] Other  [ ] Other organ failure     LABS:            RADIOLOGY & ADDITIONAL STUDIES:    PROTEIN CALORIE MALNUTRITION: [ ] mild [ ] moderate [ ] severe  [ ] underweight [ ] morbid obesity    https://www.andeal.org/vault/8870/web/files/ONC/Table_Clinical%20Characteristics%20to%20Document%20Malnutrition-White%20JV%20et%20al%202012.pdf    Height (cm): 154.9 (01-14-23 @ 17:31)  Weight (kg): 56.7 (01-14-23 @ 17:31)  BMI (kg/m2): 23.6 (01-14-23 @ 17:31)    [x ] PPSV2 < or = 30% [ ] significant weight loss [ ] poor nutritional intake [ ] anasarca   Artificial Nutrition [ ]     Other REFERRALS:    [ ] Hospice  [ ]Child Life  [x ]Social Work  [ ]Case management [ ]Holistic Therapy [ ] Physical Therapy [ ] Dietary     Progress Notes - Care Coordination [C. Provider] (01-20-23 @ 18:00)      Palliative Performance Scale:  http://npcrc.org/files/news/palliative_performance_scale_ppsv2.pdf  (Ctrl +  left click to view)  Respiratory Distress Observation Tool:  https://homecareinformation.net/handouts/hen/Respiratory_Distress_Observation_Scale.pdf (Ctrl +  left click to view)  PAINAD Score:  http://[email or web address].[facility]/cog/painad.pdf (Ctrl +  left click to view)   GAP TEAM PALLIATIVE CARE UNIT PROGRESS NOTE:      [  ] Patient on hospice program.    INDICATION FOR PALLIATIVE CARE UNIT SERVICES/Interval HPI: Patient is a 87y old  Female with  decompensated heart failure and new metastatic malignancy in PCU for symptomatic focused care.     SUBJECTIVE / OVERNIGHT EVENTS: pt had dyspnea overnight. she required  2 doses of Dilaudid 0.5mg IV q1hr and 1 x dose of ativan 0.25mg in last 24 hrs.     This morning Pt was seen and examined. pt was calm in bed, showing no signs of distress, had diaper on.     DNR on chart: Yes  Yes      Allergies    No Known Allergies    Intolerances    MEDICATIONS  (STANDING):  albuterol/ipratropium for Nebulization 3 milliLiter(s) Nebulizer every 6 hours  apixaban 2.5 milliGRAM(s) Oral every 12 hours  donepezil 5 milliGRAM(s) Oral at bedtime  folic acid 1 milliGRAM(s) Oral daily  furosemide   Injectable 40 milliGRAM(s) IV Push daily  metoprolol tartrate 50 milliGRAM(s) Oral three times a day  polyethylene glycol 3350 17 Gram(s) Oral two times a day  senna 2 Tablet(s) Oral at bedtime  simvastatin 20 milliGRAM(s) Oral at bedtime    MEDICATIONS  (PRN):  acetaminophen     Tablet .. 650 milliGRAM(s) Oral every 6 hours PRN Temp greater or equal to 38C (100.4F), Mild Pain (1 - 3), Moderate Pain (4 - 6), Severe Pain (7 - 10)  bisacodyl Suppository 10 milliGRAM(s) Rectal daily PRN Constipation  glycopyrrolate Injectable 0.4 milliGRAM(s) IV Push every 6 hours PRN secretions  HYDROmorphone  Injectable 0.3 milliGRAM(s) IV Push every 1 hour PRN Moderate Pain (4 - 6)  HYDROmorphone  Injectable 0.5 milliGRAM(s) IV Push every 1 hour PRN Severe Pain (7 - 10)  HYDROmorphone  Injectable 0.5 milliGRAM(s) IV Push every 1 hour PRN dyspnea  LORazepam   Injectable 0.25 milliGRAM(s) IV Push every 2 hours PRN anxiety/agitation/refractory dyspnea  ondansetron Injectable 4 milliGRAM(s) IV Push every 6 hours PRN Nausea and/or Vomiting    ITEMS UNCHECKED ARE NOT PRESENT    PRESENT SYMPTOMS: [ x]Unable to self-report see PAINAD, RDOS below  Source if other than patient:  [ ]Family   [x ]Team     Pain: [ ] yes [ ] no  QOL impact -   Location -                    Aggravating factors -  Quality -  Radiation -  Timing-  Severity (0-10 scale):  Minimal acceptable level (0-10 scale):     Dyspnea:                           [ ]Mild [ ]Moderate [ ]Severe  Anxiety:                             [ ]Mild [ ]Moderate [ ]Severe  Fatigue:                             [ ]Mild [ ]Moderate [ ]Severe  Nausea:                             [ ]Mild [ ]Moderate [ ]Severe  Loss of appetite:              [ ]Mild [ ]Moderate [ ]Severe  Constipation:                    [ ]Mild [ ]Moderate [ ]Severe    PCSSQ [Palliative Care Spiritual Screening Question]   Severity (0-10):  Score of 4 or > indicate consideration of Chaplaincy referral.  Chaplaincy Referral: [ ] yes [ ] refused [ ] following [x ] deferred until tomorrow     Caregiver [place]? : [x ] yes [ ] no [ ] deferred:  Social work referral [ ] Patient & Family Centered Care Referral [ ]     Anticipatory Grief present?:  [ x] yes [ ] no [ ] deferred:  Social work referral [ ] Patient & Family Centered Care Referral [ ]  	  Other Symptoms:  [x ]All other review of systems negative     PHYSICAL EXAM:   Vital Signs Last 24 Hrs  T(C): 36.3 (22 Jan 2023 08:29), Max: 36.7 (21 Jan 2023 12:23)  T(F): 97.3 (22 Jan 2023 08:29), Max: 98 (21 Jan 2023 12:23)  HR: 89 (22 Jan 2023 08:29) (89 - 106)  BP: 96/69 (22 Jan 2023 08:29) (96/69 - 143/81)  BP(mean): --  RR: 18 (22 Jan 2023 08:29) (18 - 18)  SpO2: 97% (22 Jan 2023 08:29) (94% - 97%)    Parameters below as of 22 Jan 2023 08:29  Patient On (Oxygen Delivery Method): nasal cannula     I&O's Summary    21 Jan 2023 07:01  -  22 Jan 2023 07:00  --------------------------------------------------------  IN: 750 mL / OUT: 0 mL / NET: 750 mL      GENERAL: [ ] Cachexia  [ ]Alert  [ ]Oriented x   [ ]Lethargic  [ ]Unarousable  [ ]Verbal  [ ]Non-Verbal  Behavioral:   [ ] Anxiety  [ ] Delirium [ ] Agitation [ ] Other  HEENT:  [ ]Normal   [ ]Dry mouth   [ ]ET Tube/Trach  [ ]Oral lesions  PULMONARY:   [ ]Clear [ ]Tachypnea  [ ]Audible excessive secretions   [ ]Rhonchi        [ ]Right [ ]Left [ ]Bilateral  [ ]Crackles        [ ]Right [ ]Left [ ]Bilateral  [ ]Wheezing     [ ]Right [ ]Left [ ]Bilateral  [ ]Diminished BS [ ]Right [ ]Left [ ]Bilateral    CARDIOVASCULAR:    [ ]Regular [ ]Irregular [ ]Tachy  [ ]Tee [ ]Murmur [ ]Other  GASTROINTESTINAL:  [ ]Soft  [ ]Distended   [ ]+BS  [ ]Non tender [ ]Tender  [ ]Other [ ]PEG [ ]OGT/ NGT   Last BM:    GENITOURINARY:  [ ]Normal [ ] Incontinent   [ ]Oliguria/Anuria   [ ]Pal  MUSCULOSKELETAL:   [ ]Normal   [ ]Weakness  [ ]Bed/Wheelchair bound [ ]Edema  NEUROLOGIC:   [ ]No focal deficits  [ ] Cognitive impairment  [ ] Dysphagia [ ]Dysarthria [ ] Paresis [ ]Other   SKIN:   [ ]Normal  [ ]Rash  [ ]Other  [ ]Pressure ulcer(s)  [ ]y [ ]n  Present on admission      CRITICAL CARE:  [ ] Shock Present  [ ]Septic [ ]Cardiogenic [ ]Neurologic [ ]Hypovolemic  [ ]  Vasopressors [ ]  Inotropes   [ ] Respiratory failure present [ ] Mechanical Ventilation [ ] Non-invasive ventilatory support [ ] High-Flow  [ ] Acute  [ ] Chronic [ ] Hypoxic  [ ] Hypercarbic [ ] Other  [ ] Other organ failure     LABS:            RADIOLOGY & ADDITIONAL STUDIES:    PROTEIN CALORIE MALNUTRITION: [ ] mild [ ] moderate [ ] severe  [ ] underweight [ ] morbid obesity    https://www.andeal.org/vault/6610/web/files/ONC/Table_Clinical%20Characteristics%20to%20Document%20Malnutrition-White%20JV%20et%20al%202012.pdf    Height (cm): 154.9 (01-14-23 @ 17:31)  Weight (kg): 56.7 (01-14-23 @ 17:31)  BMI (kg/m2): 23.6 (01-14-23 @ 17:31)    [x ] PPSV2 < or = 30% [ ] significant weight loss [ ] poor nutritional intake [ ] anasarca   Artificial Nutrition [ ]     Other REFERRALS:    [ ] Hospice  [ ]Child Life  [x ]Social Work  [ ]Case management [ ]Holistic Therapy [ ] Physical Therapy [ ] Dietary     Progress Notes - Care Coordination [C. Provider] (01-20-23 @ 18:00)      Palliative Performance Scale:  http://npcrc.org/files/news/palliative_performance_scale_ppsv2.pdf  (Ctrl +  left click to view)  Respiratory Distress Observation Tool:  https://[email or web address].net/handouts/hen/Respiratory_Distress_Observation_Scale.pdf (Ctrl +  left click to view)  PAINAD Score:  http://geriatrictoolkit.[place].[facility]/cog/painad.pdf (Ctrl +  left click to view)   GAP TEAM PALLIATIVE CARE UNIT PROGRESS NOTE:      [  ] Patient on hospice program.    INDICATION FOR PALLIATIVE CARE UNIT SERVICES/Interval HPI: Patient is a 87y old  Female with  decompensated heart failure and new metastatic malignancy in PCU for symptomatic focused care.     SUBJECTIVE / OVERNIGHT EVENTS: pt had dyspnea overnight. she required  2 doses of Dilaudid 0.5mg IV q1hr and 1 x dose of ativan 0.25mg in last 24 hrs.     This morning Pt was seen and examined. pt was calm in bed, showing no signs of distress, had diaper on.     DNR on chart: Yes  Yes      Allergies    No Known Allergies    Intolerances    MEDICATIONS  (STANDING):  albuterol/ipratropium for Nebulization 3 milliLiter(s) Nebulizer every 6 hours  apixaban 2.5 milliGRAM(s) Oral every 12 hours  donepezil 5 milliGRAM(s) Oral at bedtime  folic acid 1 milliGRAM(s) Oral daily  furosemide   Injectable 40 milliGRAM(s) IV Push daily  metoprolol tartrate 50 milliGRAM(s) Oral three times a day  polyethylene glycol 3350 17 Gram(s) Oral two times a day  senna 2 Tablet(s) Oral at bedtime  simvastatin 20 milliGRAM(s) Oral at bedtime    MEDICATIONS  (PRN):  acetaminophen     Tablet .. 650 milliGRAM(s) Oral every 6 hours PRN Temp greater or equal to 38C (100.4F), Mild Pain (1 - 3), Moderate Pain (4 - 6), Severe Pain (7 - 10)  bisacodyl Suppository 10 milliGRAM(s) Rectal daily PRN Constipation  glycopyrrolate Injectable 0.4 milliGRAM(s) IV Push every 6 hours PRN secretions  HYDROmorphone  Injectable 0.3 milliGRAM(s) IV Push every 1 hour PRN Moderate Pain (4 - 6)  HYDROmorphone  Injectable 0.5 milliGRAM(s) IV Push every 1 hour PRN Severe Pain (7 - 10)  HYDROmorphone  Injectable 0.5 milliGRAM(s) IV Push every 1 hour PRN dyspnea  LORazepam   Injectable 0.25 milliGRAM(s) IV Push every 2 hours PRN anxiety/agitation/refractory dyspnea  ondansetron Injectable 4 milliGRAM(s) IV Push every 6 hours PRN Nausea and/or Vomiting    ITEMS UNCHECKED ARE NOT PRESENT    PRESENT SYMPTOMS: [ x]Unable to self-report see PAINAD, RDOS below  Source if other than patient:  [ ]Family   [x ]Team     Pain: [ ] yes [ ] no  QOL impact -   Location -                    Aggravating factors -  Quality -  Radiation -  Timing-  Severity (0-10 scale):  Minimal acceptable level (0-10 scale):     Dyspnea:                           [ ]Mild [ ]Moderate [ ]Severe  Anxiety:                             [ ]Mild [ ]Moderate [ ]Severe  Fatigue:                             [ ]Mild [ ]Moderate [ ]Severe  Nausea:                             [ ]Mild [ ]Moderate [ ]Severe  Loss of appetite:              [ ]Mild [ ]Moderate [ ]Severe  Constipation:                    [ ]Mild [ ]Moderate [ ]Severe    PCSSQ [Palliative Care Spiritual Screening Question]   Severity (0-10):  Score of 4 or > indicate consideration of Chaplaincy referral.  Chaplaincy Referral: [ ] yes [ ] refused [ ] following [x ] deferred until tomorrow     Caregiver [place]? : [x ] yes [ ] no [ ] deferred:  Social work referral [ ] Patient & Family Centered Care Referral [ ]     Anticipatory Grief present?:  [ x] yes [ ] no [ ] deferred:  Social work referral [ ] Patient & Family Centered Care Referral [ ]  	  Other Symptoms:  [x ]All other review of systems negative     PHYSICAL EXAM:   Vital Signs Last 24 Hrs  T(C): 36.3 (22 Jan 2023 08:29), Max: 36.7 (21 Jan 2023 12:23)  T(F): 97.3 (22 Jan 2023 08:29), Max: 98 (21 Jan 2023 12:23)  HR: 89 (22 Jan 2023 08:29) (89 - 106)  BP: 96/69 (22 Jan 2023 08:29) (96/69 - 143/81)  BP(mean): --  RR: 18 (22 Jan 2023 08:29) (18 - 18)  SpO2: 97% (22 Jan 2023 08:29) (94% - 97%)    Parameters below as of 22 Jan 2023 08:29  Patient On (Oxygen Delivery Method): nasal cannula     I&O's Summary    21 Jan 2023 07:01  -  22 Jan 2023 07:00  --------------------------------------------------------  IN: 750 mL / OUT: 0 mL / NET: 750 mL      GENERAL: [ ] Cachexia  [ ]Alert  [ ]Oriented x   [ ]Lethargic  [ ]Unarousable  [ ]Verbal  [ ]Non-Verbal  Behavioral:   [ ] Anxiety  [ ] Delirium [ ] Agitation [ ] Other  HEENT:  [ ]Normal   [ ]Dry mouth   [ ]ET Tube/Trach  [ ]Oral lesions  PULMONARY:   [ ]Clear [ ]Tachypnea  [ ]Audible excessive secretions   [ ]Rhonchi        [ ]Right [ ]Left [ ]Bilateral  [ ]Crackles        [ ]Right [ ]Left [ ]Bilateral  [ ]Wheezing     [ ]Right [ ]Left [ ]Bilateral  [ ]Diminished BS [ ]Right [ ]Left [ ]Bilateral    CARDIOVASCULAR:    [ ]Regular [ ]Irregular [ ]Tachy  [ ]Tee [ ]Murmur [ ]Other  GASTROINTESTINAL:  [ ]Soft  [ ]Distended   [ ]+BS  [ ]Non tender [ ]Tender  [ ]Other [ ]PEG [ ]OGT/ NGT   Last BM:    GENITOURINARY:  [ ]Normal [ ] Incontinent   [ ]Oliguria/Anuria   [ ]Pal  MUSCULOSKELETAL:   [ ]Normal   [ ]Weakness  [ ]Bed/Wheelchair bound [ ]Edema  NEUROLOGIC:   [ ]No focal deficits  [ ] Cognitive impairment  [ ] Dysphagia [ ]Dysarthria [ ] Paresis [ ]Other   SKIN:   [ ]Normal  [ ]Rash  [ ]Other  [ ]Pressure ulcer(s)  [ ]y [ ]n  Present on admission      CRITICAL CARE:  [ ] Shock Present  [ ]Septic [ ]Cardiogenic [ ]Neurologic [ ]Hypovolemic  [ ]  Vasopressors [ ]  Inotropes   [ ] Respiratory failure present [ ] Mechanical Ventilation [ ] Non-invasive ventilatory support [ ] High-Flow  [ ] Acute  [ ] Chronic [ ] Hypoxic  [ ] Hypercarbic [ ] Other  [ ] Other organ failure     LABS:            RADIOLOGY & ADDITIONAL STUDIES:    PROTEIN CALORIE MALNUTRITION: [ ] mild [ ] moderate [ ] severe  [ ] underweight [ ] morbid obesity    https://www.andeal.org/vault/9980/web/files/ONC/Table_Clinical%20Characteristics%20to%20Document%20Malnutrition-White%20JV%20et%20al%202012.pdf    Height (cm): 154.9 (01-14-23 @ 17:31)  Weight (kg): 56.7 (01-14-23 @ 17:31)  BMI (kg/m2): 23.6 (01-14-23 @ 17:31)    [x ] PPSV2 < or = 30% [ ] significant weight loss [ ] poor nutritional intake [ ] anasarca   Artificial Nutrition [ ]     Other REFERRALS:    [ ] Hospice  [ ]Child Life  [x ]Social Work  [ ]Case management [ ]Holistic Therapy [ ] Physical Therapy [ ] Dietary     Progress Notes - Care Coordination [C. Provider] (01-20-23 @ 18:00)      Palliative Performance Scale:  http://npcrc.org/files/news/palliative_performance_scale_ppsv2.pdf  (Ctrl +  left click to view)  Respiratory Distress Observation Tool:  https://[email or web address].net/handouts/hen/Respiratory_Distress_Observation_Scale.pdf (Ctrl +  left click to view)  PAINAD Score:  http://geriatrictoolkit.[email or web address].[facility]/cog/painad.pdf (Ctrl +  left click to view)   GAP TEAM PALLIATIVE CARE UNIT PROGRESS NOTE:      [  ] Patient on hospice program.    INDICATION FOR PALLIATIVE CARE UNIT SERVICES/Interval HPI: Patient is a 87y old  Female with  decompensated heart failure and new metastatic malignancy in PCU for symptomatic focused care.     SUBJECTIVE / OVERNIGHT EVENTS: pt had dyspnea overnight. she required  2 doses of Dilaudid 0.5mg IV q1hr and 1 x dose of ativan 0.25mg in last 24 hrs.     This morning Pt was seen and examined. pt was calm in bed, showing no signs of distress, had diaper on.     DNR on chart: Yes  Yes      Allergies    No Known Allergies    Intolerances    MEDICATIONS  (STANDING):  albuterol/ipratropium for Nebulization 3 milliLiter(s) Nebulizer every 6 hours  apixaban 2.5 milliGRAM(s) Oral every 12 hours  donepezil 5 milliGRAM(s) Oral at bedtime  folic acid 1 milliGRAM(s) Oral daily  furosemide   Injectable 40 milliGRAM(s) IV Push daily  metoprolol tartrate 50 milliGRAM(s) Oral three times a day  polyethylene glycol 3350 17 Gram(s) Oral two times a day  senna 2 Tablet(s) Oral at bedtime  simvastatin 20 milliGRAM(s) Oral at bedtime    MEDICATIONS  (PRN):  acetaminophen     Tablet .. 650 milliGRAM(s) Oral every 6 hours PRN Temp greater or equal to 38C (100.4F), Mild Pain (1 - 3), Moderate Pain (4 - 6), Severe Pain (7 - 10)  bisacodyl Suppository 10 milliGRAM(s) Rectal daily PRN Constipation  glycopyrrolate Injectable 0.4 milliGRAM(s) IV Push every 6 hours PRN secretions  HYDROmorphone  Injectable 0.3 milliGRAM(s) IV Push every 1 hour PRN Moderate Pain (4 - 6)  HYDROmorphone  Injectable 0.5 milliGRAM(s) IV Push every 1 hour PRN Severe Pain (7 - 10)  HYDROmorphone  Injectable 0.5 milliGRAM(s) IV Push every 1 hour PRN dyspnea  LORazepam   Injectable 0.25 milliGRAM(s) IV Push every 2 hours PRN anxiety/agitation/refractory dyspnea  ondansetron Injectable 4 milliGRAM(s) IV Push every 6 hours PRN Nausea and/or Vomiting    ITEMS UNCHECKED ARE NOT PRESENT    PRESENT SYMPTOMS: [ x]Unable to self-report see PAINAD, RDOS below  Source if other than patient:  [ ]Family   [x ]Team     Pain: [ ] yes [ ] no  QOL impact -   Location -                    Aggravating factors -  Quality -  Radiation -  Timing-  Severity (0-10 scale):  Minimal acceptable level (0-10 scale):     Dyspnea:                           [ ]Mild [ ]Moderate [ ]Severe  Anxiety:                             [ ]Mild [ ]Moderate [ ]Severe  Fatigue:                             [ ]Mild [ ]Moderate [ ]Severe  Nausea:                             [ ]Mild [ ]Moderate [ ]Severe  Loss of appetite:              [ ]Mild [ ]Moderate [ ]Severe  Constipation:                    [ ]Mild [ ]Moderate [ ]Severe    PCSSQ [Palliative Care Spiritual Screening Question]   Severity (0-10):  Score of 4 or > indicate consideration of Chaplaincy referral.  Chaplaincy Referral: [ ] yes [ ] refused [ ] following [x ] deferred until tomorrow     Caregiver [place]? : [x ] yes [ ] no [ ] deferred:  Social work referral [ ] Patient & Family Centered Care Referral [ ]     Anticipatory Grief present?:  [ x] yes [ ] no [ ] deferred:  Social work referral [ ] Patient & Family Centered Care Referral [ ]  	  Other Symptoms:  [x ]All other review of systems negative     PHYSICAL EXAM:   Vital Signs Last 24 Hrs  T(C): 36.3 (22 Jan 2023 08:29), Max: 36.7 (21 Jan 2023 12:23)  T(F): 97.3 (22 Jan 2023 08:29), Max: 98 (21 Jan 2023 12:23)  HR: 89 (22 Jan 2023 08:29) (89 - 106)  BP: 96/69 (22 Jan 2023 08:29) (96/69 - 143/81)  BP(mean): --  RR: 18 (22 Jan 2023 08:29) (18 - 18)  SpO2: 97% (22 Jan 2023 08:29) (94% - 97%)    Parameters below as of 22 Jan 2023 08:29  Patient On (Oxygen Delivery Method): nasal cannula     I&O's Summary    21 Jan 2023 07:01  -  22 Jan 2023 07:00  --------------------------------------------------------  IN: 750 mL / OUT: 0 mL / NET: 750 mL      GENERAL: [ ] Cachexia  [x ]Alert  [ ]Oriented x   [ ]Lethargic  [ ]Unarousable  [ ]Verbal  [ ]Non-Verbal  Behavioral:   [ ] Anxiety  [ ] Delirium [ ] Agitation [ ] Other  HEENT:  [ ]Normal   [ x]Dry mouth   [ ]ET Tube/Trach  [ ]Oral lesions  PULMONARY:   [ x]Clear [ ]Tachypnea  [ ]Audible excessive secretions   [ ]Rhonchi        [ ]Right [ ]Left [ ]Bilateral  [ ]Crackles        [ ]Right [ ]Left [ ]Bilateral  [ ]Wheezing     [ ]Right [ ]Left [ ]Bilateral  [ ]Diminished BS [ ]Right [ ]Left [ ]Bilateral    CARDIOVASCULAR:    [x ]Regular [ ]Irregular [ ]Tachy  [ ]Tee [ ]Murmur [ ]Other  GASTROINTESTINAL:  [x ]Soft  [ ]Distended   [ ]+BS  [ x]Non tender [ ]Tender  [ ]Other [ ]PEG [ ]OGT/ NGT   Last BM:  1/22  GENITOURINARY:  [ ]Normal [x ] Incontinent   [ ]Oliguria/Anuria   [ ]Pal  MUSCULOSKELETAL:   [ ]Normal   [ x]Weakness  [ ]Bed/Wheelchair bound [ ]Edema  NEUROLOGIC:   [ ]No focal deficits  [ ] Cognitive impairment  [ ] Dysphagia [ ]Dysarthria [ ] Paresis [ ]Other   SKIN: ecchymoses  [ ]Normal  [ ]Rash  [ ]Other  [ ]Pressure ulcer(s)  [ ]y [ ]n  Present on admission      CRITICAL CARE:  [ ] Shock Present  [ ]Septic [ ]Cardiogenic [ ]Neurologic [ ]Hypovolemic  [ ]  Vasopressors [ ]  Inotropes   [ ] Respiratory failure present [ ] Mechanical Ventilation [ ] Non-invasive ventilatory support [ ] High-Flow  [ ] Acute  [ ] Chronic [ ] Hypoxic  [ ] Hypercarbic [ ] Other  [ ] Other organ failure     LABS: no new labs      RADIOLOGY & ADDITIONAL STUDIES: no new imaging    PROTEIN CALORIE MALNUTRITION: [ ] mild [ ] moderate [ ] severe  [ ] underweight [ ] morbid obesity    https://www.andeal.org/vault/9260/web/files/ONC/Table_Clinical%20Characteristics%20to%20Document%20Malnutrition-White%20JV%20et%20al%202012.pdf    Height (cm): 154.9 (01-14-23 @ 17:31)  Weight (kg): 56.7 (01-14-23 @ 17:31)  BMI (kg/m2): 23.6 (01-14-23 @ 17:31)    [x ] PPSV2 < or = 30% [ ] significant weight loss [ ] poor nutritional intake [ ] anasarca   Artificial Nutrition [ ]     Other REFERRALS:    [ ] Hospice  [ ]Child Life  [x ]Social Work  [ ]Case management [ ]Holistic Therapy [ ] Physical Therapy [ ] Dietary     Progress Notes - Care Coordination [C. Provider] (01-20-23 @ 18:00)      Palliative Performance Scale:  http://npcrc.org/files/news/palliative_performance_scale_ppsv2.pdf  (Ctrl +  left click to view)  Respiratory Distress Observation Tool:  https://homecareinformation.net/handouts/hen/Respiratory_Distress_Observation_Scale.pdf (Ctrl +  left click to view)  PAINAD Score:  http://geriatrictoolkit.[place].[facility]/cog/painad.pdf (Ctrl +  left click to view)   GAP TEAM PALLIATIVE CARE UNIT PROGRESS NOTE:      [  ] Patient on hospice program.    INDICATION FOR PALLIATIVE CARE UNIT SERVICES/Interval HPI: Patient is a 87y old  Female with  decompensated heart failure and new metastatic malignancy in PCU for symptomatic focused care.     SUBJECTIVE / OVERNIGHT EVENTS: pt had dyspnea overnight. she required  2 doses of Dilaudid 0.5mg IV q1hr and 1 x dose of ativan 0.25mg in last 24 hrs.     This morning Pt was seen and examined. pt was calm in bed, showing no signs of distress, had diaper on.     DNR on chart: Yes  Yes      Allergies    No Known Allergies    Intolerances    MEDICATIONS  (STANDING):  albuterol/ipratropium for Nebulization 3 milliLiter(s) Nebulizer every 6 hours  apixaban 2.5 milliGRAM(s) Oral every 12 hours  donepezil 5 milliGRAM(s) Oral at bedtime  folic acid 1 milliGRAM(s) Oral daily  furosemide   Injectable 40 milliGRAM(s) IV Push daily  metoprolol tartrate 50 milliGRAM(s) Oral three times a day  polyethylene glycol 3350 17 Gram(s) Oral two times a day  senna 2 Tablet(s) Oral at bedtime  simvastatin 20 milliGRAM(s) Oral at bedtime    MEDICATIONS  (PRN):  acetaminophen     Tablet .. 650 milliGRAM(s) Oral every 6 hours PRN Temp greater or equal to 38C (100.4F), Mild Pain (1 - 3), Moderate Pain (4 - 6), Severe Pain (7 - 10)  bisacodyl Suppository 10 milliGRAM(s) Rectal daily PRN Constipation  glycopyrrolate Injectable 0.4 milliGRAM(s) IV Push every 6 hours PRN secretions  HYDROmorphone  Injectable 0.3 milliGRAM(s) IV Push every 1 hour PRN Moderate Pain (4 - 6)  HYDROmorphone  Injectable 0.5 milliGRAM(s) IV Push every 1 hour PRN Severe Pain (7 - 10)  HYDROmorphone  Injectable 0.5 milliGRAM(s) IV Push every 1 hour PRN dyspnea  LORazepam   Injectable 0.25 milliGRAM(s) IV Push every 2 hours PRN anxiety/agitation/refractory dyspnea  ondansetron Injectable 4 milliGRAM(s) IV Push every 6 hours PRN Nausea and/or Vomiting    ITEMS UNCHECKED ARE NOT PRESENT    PRESENT SYMPTOMS: [ x]Unable to self-report see PAINAD, RDOS below  Source if other than patient:  [ ]Family   [x ]Team     Pain: [ ] yes [ ] no  QOL impact -   Location -                    Aggravating factors -  Quality -  Radiation -  Timing-  Severity (0-10 scale):  Minimal acceptable level (0-10 scale):     Dyspnea:                           [ ]Mild [ ]Moderate [ ]Severe  Anxiety:                             [ ]Mild [ ]Moderate [ ]Severe  Fatigue:                             [ ]Mild [ ]Moderate [ ]Severe  Nausea:                             [ ]Mild [ ]Moderate [ ]Severe  Loss of appetite:              [ ]Mild [ ]Moderate [ ]Severe  Constipation:                    [ ]Mild [ ]Moderate [ ]Severe    PCSSQ [Palliative Care Spiritual Screening Question]   Severity (0-10):  Score of 4 or > indicate consideration of Chaplaincy referral.  Chaplaincy Referral: [ ] yes [ ] refused [ ] following [x ] deferred until tomorrow     Caregiver [place]? : [x ] yes [ ] no [ ] deferred:  Social work referral [ ] Patient & Family Centered Care Referral [ ]     Anticipatory Grief present?:  [ x] yes [ ] no [ ] deferred:  Social work referral [ ] Patient & Family Centered Care Referral [ ]  	  Other Symptoms:  [x ]All other review of systems negative     PHYSICAL EXAM:   Vital Signs Last 24 Hrs  T(C): 36.3 (22 Jan 2023 08:29), Max: 36.7 (21 Jan 2023 12:23)  T(F): 97.3 (22 Jan 2023 08:29), Max: 98 (21 Jan 2023 12:23)  HR: 89 (22 Jan 2023 08:29) (89 - 106)  BP: 96/69 (22 Jan 2023 08:29) (96/69 - 143/81)  BP(mean): --  RR: 18 (22 Jan 2023 08:29) (18 - 18)  SpO2: 97% (22 Jan 2023 08:29) (94% - 97%)    Parameters below as of 22 Jan 2023 08:29  Patient On (Oxygen Delivery Method): nasal cannula     I&O's Summary    21 Jan 2023 07:01  -  22 Jan 2023 07:00  --------------------------------------------------------  IN: 750 mL / OUT: 0 mL / NET: 750 mL      GENERAL: [ ] Cachexia  [x ]Alert  [ ]Oriented x   [ ]Lethargic  [ ]Unarousable  [ ]Verbal  [ ]Non-Verbal  Behavioral:   [ ] Anxiety  [ ] Delirium [ ] Agitation [ ] Other  HEENT:  [ ]Normal   [ x]Dry mouth   [ ]ET Tube/Trach  [ ]Oral lesions  PULMONARY:   [ x]Clear [ ]Tachypnea  [ ]Audible excessive secretions   [ ]Rhonchi        [ ]Right [ ]Left [ ]Bilateral  [ ]Crackles        [ ]Right [ ]Left [ ]Bilateral  [ ]Wheezing     [ ]Right [ ]Left [ ]Bilateral  [ ]Diminished BS [ ]Right [ ]Left [ ]Bilateral    CARDIOVASCULAR:    [x ]Regular [ ]Irregular [ ]Tachy  [ ]Tee [ ]Murmur [ ]Other  GASTROINTESTINAL:  [x ]Soft  [ ]Distended   [ ]+BS  [ x]Non tender [ ]Tender  [ ]Other [ ]PEG [ ]OGT/ NGT   Last BM:  1/22  GENITOURINARY:  [ ]Normal [x ] Incontinent   [ ]Oliguria/Anuria   [ ]Pal  MUSCULOSKELETAL:   [ ]Normal   [ x]Weakness  [ ]Bed/Wheelchair bound [ ]Edema  NEUROLOGIC:   [ ]No focal deficits  [ ] Cognitive impairment  [ ] Dysphagia [ ]Dysarthria [ ] Paresis [ ]Other   SKIN: ecchymoses  [ ]Normal  [ ]Rash  [ ]Other  [ ]Pressure ulcer(s)  [ ]y [ ]n  Present on admission      CRITICAL CARE:  [ ] Shock Present  [ ]Septic [ ]Cardiogenic [ ]Neurologic [ ]Hypovolemic  [ ]  Vasopressors [ ]  Inotropes   [ ] Respiratory failure present [ ] Mechanical Ventilation [ ] Non-invasive ventilatory support [ ] High-Flow  [ ] Acute  [ ] Chronic [ ] Hypoxic  [ ] Hypercarbic [ ] Other  [ ] Other organ failure     LABS: no new labs      RADIOLOGY & ADDITIONAL STUDIES: no new imaging    PROTEIN CALORIE MALNUTRITION: [ ] mild [ ] moderate [ ] severe  [ ] underweight [ ] morbid obesity    https://www.andeal.org/vault/5160/web/files/ONC/Table_Clinical%20Characteristics%20to%20Document%20Malnutrition-White%20JV%20et%20al%202012.pdf    Height (cm): 154.9 (01-14-23 @ 17:31)  Weight (kg): 56.7 (01-14-23 @ 17:31)  BMI (kg/m2): 23.6 (01-14-23 @ 17:31)    [x ] PPSV2 < or = 30% [ ] significant weight loss [ ] poor nutritional intake [ ] anasarca   Artificial Nutrition [ ]     Other REFERRALS:    [ ] Hospice  [ ]Child Life  [x ]Social Work  [ ]Case management [ ]Holistic Therapy [ ] Physical Therapy [ ] Dietary     Progress Notes - Care Coordination [C. Provider] (01-20-23 @ 18:00)      Palliative Performance Scale:  http://npcrc.org/files/news/palliative_performance_scale_ppsv2.pdf  (Ctrl +  left click to view)  Respiratory Distress Observation Tool:  https://homecareinformation.net/handouts/hen/Respiratory_Distress_Observation_Scale.pdf (Ctrl +  left click to view)  PAINAD Score:  http://geriatrictoolkit.[place].[facility]/cog/painad.pdf (Ctrl +  left click to view)   GAP TEAM PALLIATIVE CARE UNIT PROGRESS NOTE:      [  ] Patient on hospice program.    INDICATION FOR PALLIATIVE CARE UNIT SERVICES/Interval HPI: Patient is a 87y old  Female with  decompensated heart failure and new metastatic malignancy in PCU for symptomatic focused care.     SUBJECTIVE / OVERNIGHT EVENTS: pt had dyspnea overnight. she required  2 doses of Dilaudid 0.5mg IV q1hr and 1 x dose of ativan 0.25mg in last 24 hrs.     This morning Pt was seen and examined. pt was calm in bed, showing no signs of distress, had diaper on.     DNR on chart: Yes  Yes      Allergies    No Known Allergies    Intolerances    MEDICATIONS  (STANDING):  albuterol/ipratropium for Nebulization 3 milliLiter(s) Nebulizer every 6 hours  apixaban 2.5 milliGRAM(s) Oral every 12 hours  donepezil 5 milliGRAM(s) Oral at bedtime  folic acid 1 milliGRAM(s) Oral daily  furosemide   Injectable 40 milliGRAM(s) IV Push daily  metoprolol tartrate 50 milliGRAM(s) Oral three times a day  polyethylene glycol 3350 17 Gram(s) Oral two times a day  senna 2 Tablet(s) Oral at bedtime  simvastatin 20 milliGRAM(s) Oral at bedtime    MEDICATIONS  (PRN):  acetaminophen     Tablet .. 650 milliGRAM(s) Oral every 6 hours PRN Temp greater or equal to 38C (100.4F), Mild Pain (1 - 3), Moderate Pain (4 - 6), Severe Pain (7 - 10)  bisacodyl Suppository 10 milliGRAM(s) Rectal daily PRN Constipation  glycopyrrolate Injectable 0.4 milliGRAM(s) IV Push every 6 hours PRN secretions  HYDROmorphone  Injectable 0.3 milliGRAM(s) IV Push every 1 hour PRN Moderate Pain (4 - 6)  HYDROmorphone  Injectable 0.5 milliGRAM(s) IV Push every 1 hour PRN Severe Pain (7 - 10)  HYDROmorphone  Injectable 0.5 milliGRAM(s) IV Push every 1 hour PRN dyspnea  LORazepam   Injectable 0.25 milliGRAM(s) IV Push every 2 hours PRN anxiety/agitation/refractory dyspnea  ondansetron Injectable 4 milliGRAM(s) IV Push every 6 hours PRN Nausea and/or Vomiting    ITEMS UNCHECKED ARE NOT PRESENT    PRESENT SYMPTOMS: [ x]Unable to self-report see PAINAD, RDOS below  Source if other than patient:  [ ]Family   [x ]Team     Pain: [ ] yes [ ] no  QOL impact -   Location -                    Aggravating factors -  Quality -  Radiation -  Timing-  Severity (0-10 scale):  Minimal acceptable level (0-10 scale):     Dyspnea:                           [ ]Mild [ ]Moderate [ ]Severe  Anxiety:                             [ ]Mild [ ]Moderate [ ]Severe  Fatigue:                             [ ]Mild [ ]Moderate [ ]Severe  Nausea:                             [ ]Mild [ ]Moderate [ ]Severe  Loss of appetite:              [ ]Mild [ ]Moderate [ ]Severe  Constipation:                    [ ]Mild [ ]Moderate [ ]Severe    PCSSQ [Palliative Care Spiritual Screening Question]   Severity (0-10):  Score of 4 or > indicate consideration of Chaplaincy referral.  Chaplaincy Referral: [ ] yes [ ] refused [ ] following [x ] deferred until tomorrow     Caregiver [place]? : [x ] yes [ ] no [ ] deferred:  Social work referral [ ] Patient & Family Centered Care Referral [ ]     Anticipatory Grief present?:  [ x] yes [ ] no [ ] deferred:  Social work referral [ ] Patient & Family Centered Care Referral [ ]  	  Other Symptoms:  [x ]All other review of systems negative     PHYSICAL EXAM:   Vital Signs Last 24 Hrs  T(C): 36.3 (22 Jan 2023 08:29), Max: 36.7 (21 Jan 2023 12:23)  T(F): 97.3 (22 Jan 2023 08:29), Max: 98 (21 Jan 2023 12:23)  HR: 89 (22 Jan 2023 08:29) (89 - 106)  BP: 96/69 (22 Jan 2023 08:29) (96/69 - 143/81)  BP(mean): --  RR: 18 (22 Jan 2023 08:29) (18 - 18)  SpO2: 97% (22 Jan 2023 08:29) (94% - 97%)    Parameters below as of 22 Jan 2023 08:29  Patient On (Oxygen Delivery Method): nasal cannula     I&O's Summary    21 Jan 2023 07:01  -  22 Jan 2023 07:00  --------------------------------------------------------  IN: 750 mL / OUT: 0 mL / NET: 750 mL      GENERAL: [ ] Cachexia  [x ]Alert  [ ]Oriented x   [ ]Lethargic  [ ]Unarousable  [ ]Verbal  [ ]Non-Verbal  Behavioral:   [ ] Anxiety  [ ] Delirium [ ] Agitation [ ] Other  HEENT:  [ ]Normal   [ x]Dry mouth   [ ]ET Tube/Trach  [ ]Oral lesions  PULMONARY:   [ x]Clear [ ]Tachypnea  [ ]Audible excessive secretions   [ ]Rhonchi        [ ]Right [ ]Left [ ]Bilateral  [ ]Crackles        [ ]Right [ ]Left [ ]Bilateral  [ ]Wheezing     [ ]Right [ ]Left [ ]Bilateral  [ ]Diminished BS [ ]Right [ ]Left [ ]Bilateral    CARDIOVASCULAR:    [x ]Regular [ ]Irregular [ ]Tachy  [ ]Tee [ ]Murmur [ ]Other  GASTROINTESTINAL:  [x ]Soft  [ ]Distended   [ ]+BS  [ x]Non tender [ ]Tender  [ ]Other [ ]PEG [ ]OGT/ NGT   Last BM:  1/22  GENITOURINARY:  [ ]Normal [x ] Incontinent   [ ]Oliguria/Anuria   [ ]Pal  MUSCULOSKELETAL:   [ ]Normal   [ x]Weakness  [ ]Bed/Wheelchair bound [ ]Edema  NEUROLOGIC:   [ ]No focal deficits  [ ] Cognitive impairment  [ ] Dysphagia [ ]Dysarthria [ ] Paresis [ ]Other   SKIN: ecchymoses  [ ]Normal  [ ]Rash  [ ]Other  [ ]Pressure ulcer(s)  [ ]y [ ]n  Present on admission      CRITICAL CARE:  [ ] Shock Present  [ ]Septic [ ]Cardiogenic [ ]Neurologic [ ]Hypovolemic  [ ]  Vasopressors [ ]  Inotropes   [ ] Respiratory failure present [ ] Mechanical Ventilation [ ] Non-invasive ventilatory support [ ] High-Flow  [ ] Acute  [ ] Chronic [ ] Hypoxic  [ ] Hypercarbic [ ] Other  [ ] Other organ failure     LABS: no new labs      RADIOLOGY & ADDITIONAL STUDIES: no new imaging    PROTEIN CALORIE MALNUTRITION: [ ] mild [ ] moderate [ ] severe  [ ] underweight [ ] morbid obesity    https://www.andeal.org/vault/5570/web/files/ONC/Table_Clinical%20Characteristics%20to%20Document%20Malnutrition-White%20JV%20et%20al%202012.pdf    Height (cm): 154.9 (01-14-23 @ 17:31)  Weight (kg): 56.7 (01-14-23 @ 17:31)  BMI (kg/m2): 23.6 (01-14-23 @ 17:31)    [x ] PPSV2 < or = 30% [ ] significant weight loss [ ] poor nutritional intake [ ] anasarca   Artificial Nutrition [ ]     Other REFERRALS:    [ ] Hospice  [ ]Child Life  [x ]Social Work  [ ]Case management [ ]Holistic Therapy [ ] Physical Therapy [ ] Dietary     Progress Notes - Care Coordination [C. Provider] (01-20-23 @ 18:00)      Palliative Performance Scale:  http://npcrc.org/files/news/palliative_performance_scale_ppsv2.pdf  (Ctrl +  left click to view)  Respiratory Distress Observation Tool:  https://homecareinformation.net/handouts/hen/Respiratory_Distress_Observation_Scale.pdf (Ctrl +  left click to view)  PAINAD Score:  http://geriatrictoolkit.[place].[facility]/cog/painad.pdf (Ctrl +  left click to view)

## 2023-01-22 NOTE — PROGRESS NOTE ADULT - PROBLEM SELECTOR PLAN 1
-used on PRNs in last 24hrs  -c/w dialudid 0.3 and 0.5mg q1hr PRN  for moderate - severe pain   -c/w bowel regimen -used no PRNs in last 24hrs  -c/w dialudid 0.3 and 0.5mg q1hr PRN  for moderate - severe pain   -c/w bowel regimen

## 2023-01-22 NOTE — PROGRESS NOTE ADULT - PROBLEM SELECTOR PLAN 3
c/w lasix  c/w home meds for now while able to take orally  will discuss discontinuation based on clinical course.

## 2023-01-23 DIAGNOSIS — R53.2 FUNCTIONAL QUADRIPLEGIA: ICD-10-CM

## 2023-01-23 PROCEDURE — 99232 SBSQ HOSP IP/OBS MODERATE 35: CPT

## 2023-01-23 RX ORDER — HYDROMORPHONE HYDROCHLORIDE 2 MG/ML
2.5 INJECTION INTRAMUSCULAR; INTRAVENOUS; SUBCUTANEOUS
Refills: 0 | Status: DISCONTINUED | OUTPATIENT
Start: 2023-01-23 | End: 2023-01-26

## 2023-01-23 RX ORDER — HYDROMORPHONE HYDROCHLORIDE 2 MG/ML
0.5 INJECTION INTRAMUSCULAR; INTRAVENOUS; SUBCUTANEOUS
Refills: 0 | Status: DISCONTINUED | OUTPATIENT
Start: 2023-01-23 | End: 2023-01-25

## 2023-01-23 RX ORDER — HYDROMORPHONE HYDROCHLORIDE 2 MG/ML
2.5 INJECTION INTRAMUSCULAR; INTRAVENOUS; SUBCUTANEOUS
Refills: 0 | Status: DISCONTINUED | OUTPATIENT
Start: 2023-01-23 | End: 2023-01-23

## 2023-01-23 RX ORDER — HYDROMORPHONE HYDROCHLORIDE 2 MG/ML
1.5 INJECTION INTRAMUSCULAR; INTRAVENOUS; SUBCUTANEOUS
Refills: 0 | Status: DISCONTINUED | OUTPATIENT
Start: 2023-01-23 | End: 2023-01-23

## 2023-01-23 RX ORDER — HYDROMORPHONE HYDROCHLORIDE 2 MG/ML
0.3 INJECTION INTRAMUSCULAR; INTRAVENOUS; SUBCUTANEOUS
Refills: 0 | Status: DISCONTINUED | OUTPATIENT
Start: 2023-01-23 | End: 2023-01-25

## 2023-01-23 RX ORDER — HYDROMORPHONE HYDROCHLORIDE 2 MG/ML
1.5 INJECTION INTRAMUSCULAR; INTRAVENOUS; SUBCUTANEOUS
Refills: 0 | Status: DISCONTINUED | OUTPATIENT
Start: 2023-01-23 | End: 2023-01-26

## 2023-01-23 RX ADMIN — Medication 40 MILLIGRAM(S): at 05:06

## 2023-01-23 RX ADMIN — Medication 50 MILLIGRAM(S): at 21:52

## 2023-01-23 RX ADMIN — Medication 3 MILLILITER(S): at 17:43

## 2023-01-23 RX ADMIN — Medication 3 MILLILITER(S): at 05:06

## 2023-01-23 RX ADMIN — Medication 1 MILLIGRAM(S): at 11:20

## 2023-01-23 RX ADMIN — HYDROMORPHONE HYDROCHLORIDE 0.5 MILLIGRAM(S): 2 INJECTION INTRAMUSCULAR; INTRAVENOUS; SUBCUTANEOUS at 08:07

## 2023-01-23 RX ADMIN — APIXABAN 2.5 MILLIGRAM(S): 2.5 TABLET, FILM COATED ORAL at 17:43

## 2023-01-23 RX ADMIN — POLYETHYLENE GLYCOL 3350 17 GRAM(S): 17 POWDER, FOR SOLUTION ORAL at 05:06

## 2023-01-23 RX ADMIN — SIMVASTATIN 20 MILLIGRAM(S): 20 TABLET, FILM COATED ORAL at 21:52

## 2023-01-23 RX ADMIN — HYDROMORPHONE HYDROCHLORIDE 2.5 MILLIGRAM(S): 2 INJECTION INTRAMUSCULAR; INTRAVENOUS; SUBCUTANEOUS at 18:26

## 2023-01-23 RX ADMIN — Medication 50 MILLIGRAM(S): at 05:06

## 2023-01-23 RX ADMIN — DONEPEZIL HYDROCHLORIDE 5 MILLIGRAM(S): 10 TABLET, FILM COATED ORAL at 21:52

## 2023-01-23 RX ADMIN — Medication 3 MILLILITER(S): at 11:20

## 2023-01-23 RX ADMIN — APIXABAN 2.5 MILLIGRAM(S): 2.5 TABLET, FILM COATED ORAL at 05:06

## 2023-01-23 NOTE — PROGRESS NOTE ADULT - NS ATTEND AMEND GEN_ALL_CORE FT
Patient in PCU for symptom directed care 2/2 heart failure exacerbation and new likely widespread malignancy with no plans for further work up. Some dyspnea overnight responding to PRN dilaudid. Options for care discussed with family including home hospice vs. SNF with hospice in the future. Family to consider SNF as unable to care for patient at home. We will trial oral meds to see if patient can tolerate and if symptoms adequately managed to help support disposition planning. BM 1/23. Rest of plan as above. discussed with IDT.

## 2023-01-23 NOTE — PROGRESS NOTE ADULT - PROBLEM SELECTOR PLAN 1
- Continue with Dilaudid 0.3mg IV q1hr PRN for moderate pain  - Continue with  Dilaudid 0.5mg IV q1hr PRN  for moderate - severe pain   -c/w bowel regimen - No PRN meds required for pain within a 24hr period 8am-8am.  - Patient with good oral route. In preparation for discharge, will start a trail of oral medications.  - Dilaudid 1.5mg solution q2hr PRN for moderate pain.  - Dilaudid 2.5mg  solution q2hr PRN for severe pain.  - Continue Dilaudid 0.3 and 0.5mg IV q1hr PRN  for moderate - severe pain if patient is unable to tolerate PO or no relief from PO meds.

## 2023-01-23 NOTE — PROGRESS NOTE ADULT - PROBLEM SELECTOR PLAN 3
c/w lasix  c/w home meds for now while able to take orally  will discuss discontinuation based on clinical course. - PPSV- 20-30%  - The patient requires nursing assistance with ADLs  - Supportive care.  - Continue with good skin care as per hospital protocol.   - Turn and position.

## 2023-01-23 NOTE — PROGRESS NOTE ADULT - PROBLEM SELECTOR PLAN 2
-used 2 x dialudid 0.5mg q1hr PRN  and used 1 x ativan 0.25mg q1hr PRN   -c/w lasix daily, for comfort  -c/w dialudid 0.5mg q1hr PRN   c/w ativan 0.2mg q1h pnr for refractory dyspnea - The patient required PRN Dilaudid 0.5mg IV X1 for dyspnea within a 24hr period 8am-8am.  - Continue with Lasix daily, for comfort  - Dilaudid 2.5mg solution q2hr PRN dyspnea.  - Continue with Dilaudid 0.5mg IV q1hr PRN (if patient is unable to tolerate PO or no relief from PO meds)  - Continue with ativan 0.2mg q1h PRN for refractory dyspnea

## 2023-01-23 NOTE — CHART NOTE - NSCHARTNOTEFT_GEN_A_CORE
Patient is now in PCU on comfort care measures. Nephrology will be signing off at this time.   Thank you for allowing us to participate in the care for this patient.     If you have any questions, please feel free to contact me  Jer Tamez  Nephrology Fellow  [phone]; Prefer [employer] TEAMS  (After 5pm or on weekends please page the on-call fellow). Patient is now in PCU on comfort care measures. Nephrology will be signing off at this time.   Thank you for allowing us to participate in the care for this patient.     If you have any questions, please feel free to contact me  Jer Tamez  Nephrology Fellow  [phone]; Prefer [employer] TEAMS  (After 5pm or on weekends please page the on-call fellow). Patient is now in PCU on comfort care measures. Nephrology will be signing off at this time.   Thank you for allowing us to participate in the care for this patient.     If you have any questions, please feel free to contact me  Jer Tamez  Nephrology Fellow  [phone]; Prefer [employer] TEAMS  (After 5pm or on weekends please page the on-call fellow).

## 2023-01-23 NOTE — PROGRESS NOTE ADULT - ASSESSMENT
The patient is an 87-year-old woman who is followed for hypertension, diabetes mellitus, and dementia and who has a history of breast and colon cancers (now in remission as per family) who presented today with shortness of breath. Patient admitted and treated for decompensated heart failure. patient also with abnormal findings on CT scan concerning for malignancy, family decided against further work up and wants to prioritize symptoms. Palliative consulted for PCU evaluation. The patient is now on the PCU for symptom management and a safe disposition plan.

## 2023-01-23 NOTE — PROGRESS NOTE ADULT - NS ATTEST RISK PROBLEM GEN_ALL_CORE FT
safety/toxicity monitoring of intravenous opiates and/or  benzodiazepines in end stage disease process
safety/toxicity monitoring of intravenous opiates and/or  benzodiazepines in end stage disease process

## 2023-01-23 NOTE — PROGRESS NOTE ADULT - SUBJECTIVE AND OBJECTIVE BOX
GAP TEAM PALLIATIVE CARE UNIT PROGRESS NOTE:      [  ] Patient on hospice program.    INDICATION FOR PALLIATIVE CARE UNIT SERVICES/Interval HPI: Symptom management in the setting of a 87y old female with  decompensated heart failure and new metastatic malignancy.    OVERNIGHT EVENTS: Chart reviewed. The patient is seen and examined at the bedside. The patient's daughter is at the bedside. Patient and family are Cantonese speaking. Cantonese  used. 's name: Yannick.  ID #[id number].  She required PRN Dilaudid 0.5mg IV X1 for dyspnea within a 24hr period 8am-8am.    DNR on chart: Yes  Yes      Allergies    No Known Allergies    Intolerances    MEDICATIONS  (STANDING):  albuterol/ipratropium for Nebulization 3 milliLiter(s) Nebulizer every 6 hours  apixaban 2.5 milliGRAM(s) Oral every 12 hours  donepezil 5 milliGRAM(s) Oral at bedtime  folic acid 1 milliGRAM(s) Oral daily  furosemide   Injectable 40 milliGRAM(s) IV Push daily  metoprolol tartrate 50 milliGRAM(s) Oral three times a day  polyethylene glycol 3350 17 Gram(s) Oral two times a day  senna 2 Tablet(s) Oral at bedtime  simvastatin 20 milliGRAM(s) Oral at bedtime    MEDICATIONS  (PRN):  acetaminophen     Tablet .. 650 milliGRAM(s) Oral every 6 hours PRN Temp greater or equal to 38C (100.4F), Mild Pain (1 - 3), Moderate Pain (4 - 6), Severe Pain (7 - 10)  bisacodyl Suppository 10 milliGRAM(s) Rectal daily PRN Constipation  glycopyrrolate Injectable 0.4 milliGRAM(s) IV Push every 6 hours PRN secretions  HYDROmorphone  Injectable 0.3 milliGRAM(s) IV Push every 1 hour PRN Moderate Pain (4 - 6)  HYDROmorphone  Injectable 0.5 milliGRAM(s) IV Push every 1 hour PRN Severe Pain (7 - 10)  HYDROmorphone  Injectable 0.5 milliGRAM(s) IV Push every 1 hour PRN dyspnea  LORazepam   Injectable 0.25 milliGRAM(s) IV Push every 2 hours PRN anxiety/agitation/refractory dyspnea  ondansetron Injectable 4 milliGRAM(s) IV Push every 6 hours PRN Nausea and/or Vomiting    ITEMS UNCHECKED ARE NOT PRESENT    PRESENT SYMPTOMS: [ X]Unable to self-report see PAINAD, RDOS below  Source if other than patient:  [ ]Family   [ X]Team     Pain: [ ] yes [ ] no  QOL impact -   Location -                    Aggravating factors -  Quality -  Radiation -  Timing-  Severity (0-10 scale):  Minimal acceptable level (0-10 scale):     Dyspnea:                           [ ]Mild [ ]Moderate [ ]Severe  Anxiety:                             [ ]Mild [ ]Moderate [ ]Severe  Fatigue:                             [ ]Mild [ ]Moderate [ ]Severe  Nausea:                             [ ]Mild [ ]Moderate [ ]Severe  Loss of appetite:              [ ]Mild [ ]Moderate [ ]Severe  Constipation:                    [ ]Mild [ ]Moderate [ ]Severe    PCSSQ [Palliative Care Spiritual Screening Question]   Severity (0-10):  Score of 4 or > indicate consideration of Chaplaincy referral.  Chaplaincy Referral: [ ] yes [X] refused [ ] following [ ] deferred    Caregiver [place]? : [X ] yes [ ] no [ ] deferred:  Social work referral [ X] Patient & Family Centered Care Referral [ ]     Anticipatory Grief present?:  [X ] yes [ ] no [ ] deferred:  Social work referral [X ] Patient & Family Centered Care Referral [ ]  	  Other Symptoms:  [ ]All other review of systems negative - Unable to assess due to poor mentation.  PHYSICAL EXAM:   Vital Signs Last 24 Hrs  T(C): 36.4 (23 Jan 2023 05:00), Max: 36.4 (23 Jan 2023 05:00)  T(F): 97.6 (23 Jan 2023 05:00), Max: 97.6 (23 Jan 2023 05:00)  HR: 99 (23 Jan 2023 05:00) (99 - 105)  BP: 126/83 (23 Jan 2023 05:00) (119/75 - 126/83)  BP(mean): --  RR: 18 (23 Jan 2023 05:00) (18 - 18)  SpO2: 100% (23 Jan 2023 05:00) (100% - 100%)    Parameters below as of 23 Jan 2023 05:00  Patient On (Oxygen Delivery Method): nasal cannula  O2 Flow (L/min): 3   I&O's Summary    GENERAL: [ ] Cachexia  [ X]Alert  [ ]Oriented x   [ X]Lethargic  [ ]Unarousable  [ ]Verbal  [ ]Non-Verbal  Behavioral:   [ ] Anxiety  [ ] Delirium [ ] Agitation [ ] Other  HEENT:  [X ]Normal   [ ]Dry mouth   [ ]ET Tube/Trach  [ ]Oral lesions  PULMONARY:   [ X]Clear [ ]Tachypnea  [ ]Audible excessive secretions   [ ]Rhonchi        [ ]Right [ ]Left [ ]Bilateral  [ ]Crackles        [ ]Right [ ]Left [ ]Bilateral  [ ]Wheezing     [ ]Right [ ]Left [ ]Bilateral  [ ]Diminished BS [ ]Right [ ]Left [ ]Bilateral    CARDIOVASCULAR:    [X ]Regular [ ]Irregular [ ]Tachy  [ ]Tee [ ]Murmur [ ]Other  GASTROINTESTINAL:  [X]Soft  [ ]Distended   [ X]+BS  [X ]Non tender [ ]Tender  [ ]Other [ ]PEG [ ]OGT/ NGT   Last BM: 1/23/23  GENITOURINARY:  [ ]Normal [ X] Incontinent   [ ]Oliguria/Anuria   [ ]Pal  MUSCULOSKELETAL:   [ ]Normal   [X ]Weakness  [ ]Bed/Wheelchair bound [ ]Edema  NEUROLOGIC:   [ ]No focal deficits  [ ] Cognitive impairment  [ ] Dysphagia [ ]Dysarthria [ ] Paresis [ ]Other   SKIN: Ecchymosis   [ ]Normal  [ ]Rash  [ ]Other  [ ]Pressure ulcer(s)  [ ]y [ ]n  Present on admission      CRITICAL CARE:  [ ] Shock Present  [ ]Septic [ ]Cardiogenic [ ]Neurologic [ ]Hypovolemic  [ ]  Vasopressors [ ]  Inotropes   [ ] Respiratory failure present [ ] Mechanical Ventilation [ ] Non-invasive ventilatory support [ ] High-Flow  [ ] Acute  [ ] Chronic [ ] Hypoxic  [ ] Hypercarbic [ ] Other  [ ] Other organ failure     LABS: Reviewed.    RADIOLOGY & ADDITIONAL STUDIES: Reviewed.    PROTEIN CALORIE MALNUTRITION: [ ] mild [ ] moderate [ ] severe  [ ] underweight [ ] morbid obesity    https://www.andeal.org/vault/2440/web/files/ONC/Table_Clinical%20Characteristics%20to%20Document%20Malnutrition-White%20JV%20et%20al%202012.pdf    Height (cm): 154.9 (01-14-23 @ 17:31)  Weight (kg): 56.7 (01-14-23 @ 17:31)  BMI (kg/m2): 23.6 (01-14-23 @ 17:31)    [X ] PPSV2 < or = 30% [ ] significant weight loss [ ] poor nutritional intake [ ] anasarca   Artificial Nutrition [ ]     Other REFERRALS:    [ ] Hospice  [ ]Child Life  [ X]Social Work  [ ]Case management [ ]Holistic Therapy [ ] Physical Therapy [ ] Dietary     Progress Notes - Care Coordination [C. Provider] (01-20-23 @ 18:00)      Palliative Performance Scale:  http://npcrc.org/files/news/palliative_performance_scale_ppsv2.pdf  (Ctrl +  left click to view)  Respiratory Distress Observation Tool:  https://homecareinformation.net/handouts/hen/Respiratory_Distress_Observation_Scale.pdf (Ctrl +  left click to view)  PAINAD Score:  http://geriatrictoolkit.[place].[facility]/cog/painad.pdf (Ctrl +  left click to view)   GAP TEAM PALLIATIVE CARE UNIT PROGRESS NOTE:      [  ] Patient on hospice program.    INDICATION FOR PALLIATIVE CARE UNIT SERVICES/Interval HPI: Symptom management in the setting of a 87y old female with  decompensated heart failure and new metastatic malignancy.    OVERNIGHT EVENTS: Chart reviewed. The patient is seen and examined at the bedside. The patient's daughter is at the bedside. Patient and family are Cantonese speaking. Cantonese  used. 's name: Yannick.  ID #[id number].  She required PRN Dilaudid 0.5mg IV X1 for dyspnea within a 24hr period 8am-8am.    DNR on chart: Yes  Yes      Allergies    No Known Allergies    Intolerances    MEDICATIONS  (STANDING):  albuterol/ipratropium for Nebulization 3 milliLiter(s) Nebulizer every 6 hours  apixaban 2.5 milliGRAM(s) Oral every 12 hours  donepezil 5 milliGRAM(s) Oral at bedtime  folic acid 1 milliGRAM(s) Oral daily  furosemide   Injectable 40 milliGRAM(s) IV Push daily  metoprolol tartrate 50 milliGRAM(s) Oral three times a day  polyethylene glycol 3350 17 Gram(s) Oral two times a day  senna 2 Tablet(s) Oral at bedtime  simvastatin 20 milliGRAM(s) Oral at bedtime    MEDICATIONS  (PRN):  acetaminophen     Tablet .. 650 milliGRAM(s) Oral every 6 hours PRN Temp greater or equal to 38C (100.4F), Mild Pain (1 - 3), Moderate Pain (4 - 6), Severe Pain (7 - 10)  bisacodyl Suppository 10 milliGRAM(s) Rectal daily PRN Constipation  glycopyrrolate Injectable 0.4 milliGRAM(s) IV Push every 6 hours PRN secretions  HYDROmorphone  Injectable 0.3 milliGRAM(s) IV Push every 1 hour PRN Moderate Pain (4 - 6)  HYDROmorphone  Injectable 0.5 milliGRAM(s) IV Push every 1 hour PRN Severe Pain (7 - 10)  HYDROmorphone  Injectable 0.5 milliGRAM(s) IV Push every 1 hour PRN dyspnea  LORazepam   Injectable 0.25 milliGRAM(s) IV Push every 2 hours PRN anxiety/agitation/refractory dyspnea  ondansetron Injectable 4 milliGRAM(s) IV Push every 6 hours PRN Nausea and/or Vomiting    ITEMS UNCHECKED ARE NOT PRESENT    PRESENT SYMPTOMS: [ X]Unable to self-report see PAINAD, RDOS below  Source if other than patient:  [ ]Family   [ X]Team     Pain: [ ] yes [ ] no  QOL impact -   Location -                    Aggravating factors -  Quality -  Radiation -  Timing-  Severity (0-10 scale):  Minimal acceptable level (0-10 scale):     Dyspnea:                           [ ]Mild [ ]Moderate [ ]Severe  Anxiety:                             [ ]Mild [ ]Moderate [ ]Severe  Fatigue:                             [ ]Mild [ ]Moderate [ ]Severe  Nausea:                             [ ]Mild [ ]Moderate [ ]Severe  Loss of appetite:              [ ]Mild [ ]Moderate [ ]Severe  Constipation:                    [ ]Mild [ ]Moderate [ ]Severe    PCSSQ [Palliative Care Spiritual Screening Question]   Severity (0-10):  Score of 4 or > indicate consideration of Chaplaincy referral.  Chaplaincy Referral: [ ] yes [X] refused [ ] following [ ] deferred    Caregiver [place]? : [X ] yes [ ] no [ ] deferred:  Social work referral [ X] Patient & Family Centered Care Referral [ ]     Anticipatory Grief present?:  [X ] yes [ ] no [ ] deferred:  Social work referral [X ] Patient & Family Centered Care Referral [ ]  	  Other Symptoms:  [ ]All other review of systems negative - Unable to assess due to poor mentation.  PHYSICAL EXAM:   Vital Signs Last 24 Hrs  T(C): 36.4 (23 Jan 2023 05:00), Max: 36.4 (23 Jan 2023 05:00)  T(F): 97.6 (23 Jan 2023 05:00), Max: 97.6 (23 Jan 2023 05:00)  HR: 99 (23 Jan 2023 05:00) (99 - 105)  BP: 126/83 (23 Jan 2023 05:00) (119/75 - 126/83)  BP(mean): --  RR: 18 (23 Jan 2023 05:00) (18 - 18)  SpO2: 100% (23 Jan 2023 05:00) (100% - 100%)    Parameters below as of 23 Jan 2023 05:00  Patient On (Oxygen Delivery Method): nasal cannula  O2 Flow (L/min): 3   I&O's Summary    GENERAL: [ ] Cachexia  [ X]Alert  [ ]Oriented x   [ X]Lethargic  [ ]Unarousable  [ ]Verbal  [ ]Non-Verbal  Behavioral:   [ ] Anxiety  [ ] Delirium [ ] Agitation [ ] Other  HEENT:  [X ]Normal   [ ]Dry mouth   [ ]ET Tube/Trach  [ ]Oral lesions  PULMONARY:   [ X]Clear [ ]Tachypnea  [ ]Audible excessive secretions   [ ]Rhonchi        [ ]Right [ ]Left [ ]Bilateral  [ ]Crackles        [ ]Right [ ]Left [ ]Bilateral  [ ]Wheezing     [ ]Right [ ]Left [ ]Bilateral  [ ]Diminished BS [ ]Right [ ]Left [ ]Bilateral    CARDIOVASCULAR:    [X ]Regular [ ]Irregular [ ]Tachy  [ ]Tee [ ]Murmur [ ]Other  GASTROINTESTINAL:  [X]Soft  [ ]Distended   [ X]+BS  [X ]Non tender [ ]Tender  [ ]Other [ ]PEG [ ]OGT/ NGT   Last BM: 1/23/23  GENITOURINARY:  [ ]Normal [ X] Incontinent   [ ]Oliguria/Anuria   [ ]Pal  MUSCULOSKELETAL:   [ ]Normal   [X ]Weakness  [ ]Bed/Wheelchair bound [ ]Edema  NEUROLOGIC:   [ ]No focal deficits  [ ] Cognitive impairment  [ ] Dysphagia [ ]Dysarthria [ ] Paresis [ ]Other   SKIN: Ecchymosis   [ ]Normal  [ ]Rash  [ ]Other  [ ]Pressure ulcer(s)  [ ]y [ ]n  Present on admission      CRITICAL CARE:  [ ] Shock Present  [ ]Septic [ ]Cardiogenic [ ]Neurologic [ ]Hypovolemic  [ ]  Vasopressors [ ]  Inotropes   [ ] Respiratory failure present [ ] Mechanical Ventilation [ ] Non-invasive ventilatory support [ ] High-Flow  [ ] Acute  [ ] Chronic [ ] Hypoxic  [ ] Hypercarbic [ ] Other  [ ] Other organ failure     LABS: Reviewed.    RADIOLOGY & ADDITIONAL STUDIES: Reviewed.    PROTEIN CALORIE MALNUTRITION: [ ] mild [ ] moderate [ ] severe  [ ] underweight [ ] morbid obesity    https://www.andeal.org/vault/2440/web/files/ONC/Table_Clinical%20Characteristics%20to%20Document%20Malnutrition-White%20JV%20et%20al%202012.pdf    Height (cm): 154.9 (01-14-23 @ 17:31)  Weight (kg): 56.7 (01-14-23 @ 17:31)  BMI (kg/m2): 23.6 (01-14-23 @ 17:31)    [X ] PPSV2 < or = 30% [ ] significant weight loss [ ] poor nutritional intake [ ] anasarca   Artificial Nutrition [ ]     Other REFERRALS:    [ ] Hospice  [ ]Child Life  [ X]Social Work  [ ]Case management [ ]Holistic Therapy [ ] Physical Therapy [ ] Dietary     Progress Notes - Care Coordination [C. Provider] (01-20-23 @ 18:00)      Palliative Performance Scale:  http://npcrc.org/files/news/palliative_performance_scale_ppsv2.pdf  (Ctrl +  left click to view)  Respiratory Distress Observation Tool:  https://homecareinformation.net/handouts/hen/Respiratory_Distress_Observation_Scale.pdf (Ctrl +  left click to view)  PAINAD Score:  http://geriatrictoolkit.[place].[facility]/cog/painad.pdf (Ctrl +  left click to view)   GAP TEAM PALLIATIVE CARE UNIT PROGRESS NOTE:      [  ] Patient on hospice program.    INDICATION FOR PALLIATIVE CARE UNIT SERVICES/Interval HPI: Symptom management in the setting of a 87y old female with  decompensated heart failure and new metastatic malignancy.    OVERNIGHT EVENTS: Chart reviewed. The patient is seen and examined at the bedside. The patient's daughter is at the bedside. Patient and family are Cantonese speaking. Cantonese  used. 's name: Yannick.  ID #[id number].  She required PRN Dilaudid 0.5mg IV X1 for dyspnea within a 24hr period 8am-8am.    DNR on chart: Yes  Yes      Allergies    No Known Allergies    Intolerances    MEDICATIONS  (STANDING):  albuterol/ipratropium for Nebulization 3 milliLiter(s) Nebulizer every 6 hours  apixaban 2.5 milliGRAM(s) Oral every 12 hours  donepezil 5 milliGRAM(s) Oral at bedtime  folic acid 1 milliGRAM(s) Oral daily  furosemide   Injectable 40 milliGRAM(s) IV Push daily  metoprolol tartrate 50 milliGRAM(s) Oral three times a day  polyethylene glycol 3350 17 Gram(s) Oral two times a day  senna 2 Tablet(s) Oral at bedtime  simvastatin 20 milliGRAM(s) Oral at bedtime    MEDICATIONS  (PRN):  acetaminophen     Tablet .. 650 milliGRAM(s) Oral every 6 hours PRN Temp greater or equal to 38C (100.4F), Mild Pain (1 - 3), Moderate Pain (4 - 6), Severe Pain (7 - 10)  bisacodyl Suppository 10 milliGRAM(s) Rectal daily PRN Constipation  glycopyrrolate Injectable 0.4 milliGRAM(s) IV Push every 6 hours PRN secretions  HYDROmorphone  Injectable 0.3 milliGRAM(s) IV Push every 1 hour PRN Moderate Pain (4 - 6)  HYDROmorphone  Injectable 0.5 milliGRAM(s) IV Push every 1 hour PRN Severe Pain (7 - 10)  HYDROmorphone  Injectable 0.5 milliGRAM(s) IV Push every 1 hour PRN dyspnea  LORazepam   Injectable 0.25 milliGRAM(s) IV Push every 2 hours PRN anxiety/agitation/refractory dyspnea  ondansetron Injectable 4 milliGRAM(s) IV Push every 6 hours PRN Nausea and/or Vomiting    ITEMS UNCHECKED ARE NOT PRESENT    PRESENT SYMPTOMS: [ X]Unable to self-report see PAINAD, RDOS below  Source if other than patient:  [ ]Family   [ X]Team     Pain: [ ] yes [ ] no  QOL impact -   Location -                    Aggravating factors -  Quality -  Radiation -  Timing-  Severity (0-10 scale):  Minimal acceptable level (0-10 scale):     Dyspnea:                           [ ]Mild [ ]Moderate [ ]Severe  Anxiety:                             [ ]Mild [ ]Moderate [ ]Severe  Fatigue:                             [ ]Mild [ ]Moderate [ ]Severe  Nausea:                             [ ]Mild [ ]Moderate [ ]Severe  Loss of appetite:              [ ]Mild [ ]Moderate [ ]Severe  Constipation:                    [ ]Mild [ ]Moderate [ ]Severe    PCSSQ [Palliative Care Spiritual Screening Question]   Severity (0-10):  Score of 4 or > indicate consideration of Chaplaincy referral.  Chaplaincy Referral: [ ] yes [X] refused [ ] following [ ] deferred    Caregiver [place]? : [X ] yes [ ] no [ ] deferred:  Social work referral [ X] Patient & Family Centered Care Referral [ ]     Anticipatory Grief present?:  [X ] yes [ ] no [ ] deferred:  Social work referral [X ] Patient & Family Centered Care Referral [ ]  	  Other Symptoms:  [ ]All other review of systems negative - Unable to assess due to poor mentation.  PHYSICAL EXAM:   Vital Signs Last 24 Hrs  T(C): 36.4 (23 Jan 2023 05:00), Max: 36.4 (23 Jan 2023 05:00)  T(F): 97.6 (23 Jan 2023 05:00), Max: 97.6 (23 Jan 2023 05:00)  HR: 99 (23 Jan 2023 05:00) (99 - 105)  BP: 126/83 (23 Jan 2023 05:00) (119/75 - 126/83)  BP(mean): --  RR: 18 (23 Jan 2023 05:00) (18 - 18)  SpO2: 100% (23 Jan 2023 05:00) (100% - 100%)    Parameters below as of 23 Jan 2023 05:00  Patient On (Oxygen Delivery Method): nasal cannula  O2 Flow (L/min): 3   I&O's Summary    GENERAL: [ ] Cachexia  [ X]Alert  [ ]Oriented x   [ X]Lethargic  [ ]Unarousable  [ ]Verbal  [ ]Non-Verbal  Behavioral:   [ ] Anxiety  [ ] Delirium [ ] Agitation [ ] Other  HEENT:  [X ]Normal   [ ]Dry mouth   [ ]ET Tube/Trach  [ ]Oral lesions  PULMONARY:   [ X]Clear [ ]Tachypnea  [ ]Audible excessive secretions   [ ]Rhonchi        [ ]Right [ ]Left [ ]Bilateral  [ ]Crackles        [ ]Right [ ]Left [ ]Bilateral  [ ]Wheezing     [ ]Right [ ]Left [ ]Bilateral  [ ]Diminished BS [ ]Right [ ]Left [ ]Bilateral    CARDIOVASCULAR:    [X ]Regular [ ]Irregular [ ]Tachy  [ ]Tee [ ]Murmur [ ]Other  GASTROINTESTINAL:  [X]Soft  [ ]Distended   [ X]+BS  [X ]Non tender [ ]Tender  [ ]Other [ ]PEG [ ]OGT/ NGT   Last BM: 1/23/23  GENITOURINARY:  [ ]Normal [ X] Incontinent   [ ]Oliguria/Anuria   [ ]Pal  MUSCULOSKELETAL:   [ ]Normal   [X ]Weakness  [ ]Bed/Wheelchair bound [ ]Edema  NEUROLOGIC:   [ ]No focal deficits  [ ] Cognitive impairment  [ ] Dysphagia [ ]Dysarthria [ ] Paresis [ ]Other   SKIN: Ecchymosis   [ ]Normal  [ ]Rash  [ ]Other  [ ]Pressure ulcer(s)  [ ]y [ ]n  Present on admission      CRITICAL CARE:  [ ] Shock Present  [ ]Septic [ ]Cardiogenic [ ]Neurologic [ ]Hypovolemic  [ ]  Vasopressors [ ]  Inotropes   [ ] Respiratory failure present [ ] Mechanical Ventilation [ ] Non-invasive ventilatory support [ ] High-Flow  [ ] Acute  [ ] Chronic [ ] Hypoxic  [ ] Hypercarbic [ ] Other  [ ] Other organ failure     LABS: Reviewed.    RADIOLOGY & ADDITIONAL STUDIES: Reviewed.    PROTEIN CALORIE MALNUTRITION: [ ] mild [ ] moderate [ ] severe  [ ] underweight [ ] morbid obesity    https://www.andeal.org/vault/2440/web/files/ONC/Table_Clinical%20Characteristics%20to%20Document%20Malnutrition-White%20JV%20et%20al%202012.pdf    Height (cm): 154.9 (01-14-23 @ 17:31)  Weight (kg): 56.7 (01-14-23 @ 17:31)  BMI (kg/m2): 23.6 (01-14-23 @ 17:31)    [X ] PPSV2 < or = 30% [ ] significant weight loss [ ] poor nutritional intake [ ] anasarca   Artificial Nutrition [ ]     Other REFERRALS:    [ ] Hospice  [ ]Child Life  [ X]Social Work  [ ]Case management [ ]Holistic Therapy [ ] Physical Therapy [ ] Dietary     Progress Notes - Care Coordination [C. Provider] (01-20-23 @ 18:00)      Palliative Performance Scale:  http://npcrc.org/files/news/palliative_performance_scale_ppsv2.pdf  (Ctrl +  left click to view)  Respiratory Distress Observation Tool:  https://homecareinformation.net/handouts/hen/Respiratory_Distress_Observation_Scale.pdf (Ctrl +  left click to view)  PAINAD Score:  http://geriatrictoolkit.[place].[facility]/cog/painad.pdf (Ctrl +  left click to view)

## 2023-01-23 NOTE — PROGRESS NOTE ADULT - PROBLEM SELECTOR PLAN 6
- The patient's daughter is at the bedside. Patient and family are Cantonese speaking. Cantonese  used.  's name: Yannick.  ID #[id number].  She required PRN Dilaudid 0.5mg IV X1 for dyspnea within a 24hr period 8am-8am.  - Will discuss home with hospice today with the patient's oldest son today with the .   - Will continue with symptom management on the PCU. - The patient's daughter is at the bedside. Patient and family are Cantonese speaking. Cantonese  used.  's name: Yannick.  ID #[id number].  She required PRN Dilaudid 0.5mg IV X1 for dyspnea within a 24hr period 8am-8am.  - Will discuss home with hospice today with the patient's oldest son today with the .   - Will continue with symptom management on the PCU. - The patient's daughter is at the bedside. Patient and family are Cantonese speaking. Cantonese  used.  's name: Yannick.  ID #[id number].  She required PRN Dilaudid 0.5mg IV X1 for dyspnea within a 24hr period 8am-8am.  - Will discuss home with hospice today with the patient's oldest son today with the .   - Will continue with symptom management on the PCU. - The patient's daughter is at the bedside. Patient and family are Cantonese speaking. Cantonese  used.  's name: Yannick.  ID #[id number].  She required PRN Dilaudid 0.5mg IV X1 for dyspnea within a 24hr period 8am-8am.  - Will discuss home with hospice today with the patient's oldest son today with the .   - Will continue with symptom management on the PCU.  Addendum: The  and I spoke to the patient's son, India Raya and daughter. 's name: Ghanshyam  ID #[id number]. Family reports that they are unable to care for the patient at home. Currently, they have a HHA for 5 hours a day/7 days a week. The family is interested in a nursing home. They would like a place that is close to [facility].  to provide them with a list of facilities. The patient would benefit from a nursing home that provides hospice. - The patient's daughter is at the bedside. Patient and family are Cantonese speaking. Cantonese  used.  's name: Yannick.  ID #[id number].  She required PRN Dilaudid 0.5mg IV X1 for dyspnea within a 24hr period 8am-8am.  - Will discuss home with hospice today with the patient's oldest son today with the .   - Will continue with symptom management on the PCU.  Addendum: The  and I spoke to the patient's son, India Raya and daughter. 's name: Ghanshyam  ID #[id number]. Family reports that they are unable to care for the patient at home. Currently, they have a HHA for 5 hours a day/7 days a week. The family is interested in a nursing home. They would like a place that is close to [facility].  to provide them with a list of facilities. The patient would benefit from a nursing home that provides hospice. - The patient's daughter is at the bedside. Patient and family are Cantonese speaking. Cantonese  used.  's name: Yannick.  ID #[id number].  She required PRN Dilaudid 0.5mg IV X1 for dyspnea within a 24hr period 8am-8am.  - Will discuss home with hospice today with the patient's oldest son today with the .   - Will continue with symptom management on the PCU.  Addendum: The  and I spoke to the patient's son, India Raya and daughter. 's name: Ghanshyam  ID #[id number]. Family reports that they are unable to care for the patient at home. Currently, they have a HHA for 5 hours a day/7 days a week. The family is interested in a nursing home. They would like a place that is close to [facility].  to provide them with a list of facilities. The patient would benefit from a nursing home that provides hospice. - The patient's daughter is at the bedside. Patient and family are Cantonese speaking. Cantonese  used.  's name: Yannick.  ID #[id number].  She required PRN Dilaudid 0.5mg IV X1 for dyspnea within a 24hr period 8am-8am.  - Will discuss home with hospice today with the patient's oldest son today with the .   - Will continue with symptom management on the PCU.  Addendum: The  and I spoke to the patient's son, India Raya and daughter later this morning. 's name: Ghanshyam  ID #[id number]. Family reports that they are unable to care for the patient at home. Currently, they have a HHA for 5 hours a day/7 days a week. The family is interested in a nursing home. They would like a place that is close to [facility].  to provide them with a list of facilities. The patient would benefit from a nursing home that provides hospice. - The patient's daughter is at the bedside. Patient and family are Cantonese speaking. Cantonese  used.  's name: Yannick.  ID #[id number].  She required PRN Dilaudid 0.5mg IV X1 for dyspnea within a 24hr period 8am-8am.  - Will discuss home with hospice today with the patient's oldest son today with the .   - Will continue with symptom management on the PCU.  Addendum: The  and I spoke to the patient's son, Inida Raya and daughter later this morning. 's name: Ghanshyam  ID #[id number]. Family reports that they are unable to care for the patient at home. Currently, they have a HHA for 5 hours a day/7 days a week. The family is interested in a nursing home. They would like a place that is close to [facility].  to provide them with a list of facilities. The patient would benefit from a nursing home that provides hospice. - The patient's daughter is at the bedside. Patient and family are Cantonese speaking. Cantonese  used.  's name: Yannick.  ID #[id number].  She required PRN Dilaudid 0.5mg IV X1 for dyspnea within a 24hr period 8am-8am.  - Will discuss home with hospice today with the patient's oldest son today with the .   - Will continue with symptom management on the PCU.  Addendum: The  and I spoke to the patient's son, India Raya and daughter later this morning. 's name: Ghanshyam  ID #[id number]. Family reports that they are unable to care for the patient at home. Currently, they have a HHA for 5 hours a day/7 days a week. The family is interested in a nursing home. They would like a place that is close to [facility].  to provide them with a list of facilities. The patient would benefit from a nursing home that provides hospice.

## 2023-01-23 NOTE — PROGRESS NOTE ADULT - PROBLEM SELECTOR PLAN 5
-DNR/DNI established, in PCU for comfort measures only  -family speak cantonese  -dispo planning pending clinical course, SW will see tomorrow   -provided support to family at bedside ?new malignancy  family declines wanting to do further work up.

## 2023-01-23 NOTE — PROGRESS NOTE ADULT - PROBLEM SELECTOR PLAN 4
?new malignancy  family declines wanting to do further work up. - Continue with Lasix daily  - Continue with home meds for now while able to take orally  - Will discuss discontinuation based on clinical course.

## 2023-01-24 LAB
SARS-COV-2 RNA SPEC QL NAA+PROBE: SIGNIFICANT CHANGE UP

## 2023-01-24 PROCEDURE — 99232 SBSQ HOSP IP/OBS MODERATE 35: CPT | Mod: GC

## 2023-01-24 RX ADMIN — APIXABAN 2.5 MILLIGRAM(S): 2.5 TABLET, FILM COATED ORAL at 05:45

## 2023-01-24 RX ADMIN — Medication 1 MILLIGRAM(S): at 13:42

## 2023-01-24 RX ADMIN — APIXABAN 2.5 MILLIGRAM(S): 2.5 TABLET, FILM COATED ORAL at 17:48

## 2023-01-24 RX ADMIN — Medication 3 MILLILITER(S): at 05:44

## 2023-01-24 RX ADMIN — HYDROMORPHONE HYDROCHLORIDE 0.5 MILLIGRAM(S): 2 INJECTION INTRAMUSCULAR; INTRAVENOUS; SUBCUTANEOUS at 01:06

## 2023-01-24 RX ADMIN — Medication 50 MILLIGRAM(S): at 22:14

## 2023-01-24 RX ADMIN — SIMVASTATIN 20 MILLIGRAM(S): 20 TABLET, FILM COATED ORAL at 22:14

## 2023-01-24 RX ADMIN — HYDROMORPHONE HYDROCHLORIDE 0.5 MILLIGRAM(S): 2 INJECTION INTRAMUSCULAR; INTRAVENOUS; SUBCUTANEOUS at 00:51

## 2023-01-24 RX ADMIN — Medication 50 MILLIGRAM(S): at 13:42

## 2023-01-24 RX ADMIN — DONEPEZIL HYDROCHLORIDE 5 MILLIGRAM(S): 10 TABLET, FILM COATED ORAL at 22:14

## 2023-01-24 RX ADMIN — HYDROMORPHONE HYDROCHLORIDE 2.5 MILLIGRAM(S): 2 INJECTION INTRAMUSCULAR; INTRAVENOUS; SUBCUTANEOUS at 12:00

## 2023-01-24 RX ADMIN — HYDROMORPHONE HYDROCHLORIDE 2.5 MILLIGRAM(S): 2 INJECTION INTRAMUSCULAR; INTRAVENOUS; SUBCUTANEOUS at 22:43

## 2023-01-24 RX ADMIN — HYDROMORPHONE HYDROCHLORIDE 2.5 MILLIGRAM(S): 2 INJECTION INTRAMUSCULAR; INTRAVENOUS; SUBCUTANEOUS at 22:13

## 2023-01-24 RX ADMIN — Medication 50 MILLIGRAM(S): at 05:45

## 2023-01-24 RX ADMIN — Medication 3 MILLILITER(S): at 00:51

## 2023-01-24 RX ADMIN — Medication 40 MILLIGRAM(S): at 05:44

## 2023-01-24 NOTE — PROGRESS NOTE ADULT - SUBJECTIVE AND OBJECTIVE BOX
GAP TEAM PALLIATIVE CARE UNIT PROGRESS NOTE:      [  ] Patient on hospice program.    INDICATION FOR PALLIATIVE CARE UNIT SERVICES/Interval HPI: Symptom management in the setting of a 87y old female with  decompensated heart failure and new metastatic malignancy.    Overnight EVENTS: Chart reviewed. She required PRN Dilaudid 2.5mg solution X1 for dyspnea within a 24hr period 8am-8am.    The palliative team, nurse and SW met at bedside with the patient  and family which included the patient's two daughters and two sons. The Patient and family are Cantonese speaking.  Cantonese  used. 's name: Mildred.  ID #[id number]. Family explained that they are looking to place their mother in a nursing home permanently, as they can no longer take care of her at home. SW asked if they had looked at list provided and family agreed to look over list, they are interested in [facility] in [facility] which has already accepted the pt.  family will discuss among themselves and inform team tomorrow.   The pt was examined at the bedside.  Her pain was controlled with PRns    DNR on chart: Yes  Yes      Allergies    No Known Allergies    Intolerances    MEDICATIONS  (STANDING):  albuterol/ipratropium for Nebulization 3 milliLiter(s) Nebulizer every 6 hours  apixaban 2.5 milliGRAM(s) Oral every 12 hours  donepezil 5 milliGRAM(s) Oral at bedtime  folic acid 1 milliGRAM(s) Oral daily  furosemide   Injectable 40 milliGRAM(s) IV Push daily  metoprolol tartrate 50 milliGRAM(s) Oral three times a day  polyethylene glycol 3350 17 Gram(s) Oral two times a day  senna 2 Tablet(s) Oral at bedtime  simvastatin 20 milliGRAM(s) Oral at bedtime    MEDICATIONS  (PRN):  acetaminophen     Tablet .. 650 milliGRAM(s) Oral every 6 hours PRN Temp greater or equal to 38C (100.4F), Mild Pain (1 - 3), Moderate Pain (4 - 6), Severe Pain (7 - 10)  bisacodyl Suppository 10 milliGRAM(s) Rectal daily PRN Constipation  glycopyrrolate Injectable 0.4 milliGRAM(s) IV Push every 6 hours PRN secretions  HYDROmorphone   Solution 1.5 milliGRAM(s) Oral every 2 hours PRN Moderate Pain (4 - 6)  HYDROmorphone   Solution 2.5 milliGRAM(s) Oral every 2 hours PRN Severe Pain (7 - 10)  HYDROmorphone   Solution 2.5 milliGRAM(s) Oral every 2 hours PRN dyspnea  HYDROmorphone  Injectable 0.5 milliGRAM(s) IV Push every 1 hour PRN Severe Pain (7 - 10)  HYDROmorphone  Injectable 0.5 milliGRAM(s) IV Push every 1 hour PRN dyspnea  HYDROmorphone  Injectable 0.3 milliGRAM(s) IV Push every 1 hour PRN Moderate Pain (4 - 6)  HYDROmorphone  Injectable 0.5 milliGRAM(s) IV Push every 1 hour PRN Severe Pain (7 - 10)  LORazepam   Injectable 0.25 milliGRAM(s) IV Push every 2 hours PRN anxiety/agitation/refractory dyspnea  ondansetron Injectable 4 milliGRAM(s) IV Push every 6 hours PRN Nausea and/or Vomiting    ITEMS UNCHECKED ARE NOT PRESENT    PRESENT SYMPTOMS: [ ]Unable to self-report see PAINAD, RDOS below  Source if other than patient:  [ ]Family   [ ]Team     Pain: [ ] yes [ ] no  QOL impact -   Location -                    Aggravating factors -  Quality -  Radiation -  Timing-  Severity (0-10 scale):  Minimal acceptable level (0-10 scale):     Dyspnea:                           [ ]Mild [ ]Moderate [ ]Severe  Anxiety:                             [ ]Mild [ ]Moderate [ ]Severe  Fatigue:                             [ ]Mild [ ]Moderate [ ]Severe  Nausea:                             [ ]Mild [ ]Moderate [ ]Severe  Loss of appetite:              [ ]Mild [ ]Moderate [ ]Severe  Constipation:                    [ ]Mild [ ]Moderate [ ]Severe    PCSSQ [Palliative Care Spiritual Screening Question]   Severity (0-10):  Score of 4 or > indicate consideration of Chaplaincy referral.  Chaplaincy Referral: [ ] yes [ ] refused [ ] following [ ] deferred    Caregiver [place]? : [ ] yes [ ] no [ ] deferred:  Social work referral [ ] Patient & Family Centered Care Referral [ ]     Anticipatory Grief present?:  [ ] yes [ ] no [ ] deferred:  Social work referral [ ] Patient & Family Centered Care Referral [ ]  	  Other Symptoms:  [ ]All other review of systems negative     PHYSICAL EXAM:   Vital Signs Last 24 Hrs  T(C): 36.4 (24 Jan 2023 08:50), Max: 36.4 (24 Jan 2023 08:50)  T(F): 97.6 (24 Jan 2023 08:50), Max: 97.6 (24 Jan 2023 08:50)  HR: 102 (24 Jan 2023 14:00) (94 - 102)  BP: 126/78 (24 Jan 2023 14:00) (109/75 - 141/64)  BP(mean): --  RR: 18 (24 Jan 2023 08:50) (18 - 18)  SpO2: 100% (24 Jan 2023 08:50) (100% - 100%)    Parameters below as of 24 Jan 2023 08:50  Patient On (Oxygen Delivery Method): nasal cannula     I&O's Summary    GENERAL: [ ] Cachexia  [ ]Alert  [ ]Oriented x   [ ]Lethargic  [ ]Unarousable  [ ]Verbal  [ ]Non-Verbal  Behavioral:   [ ] Anxiety  [ ] Delirium [ ] Agitation [ ] Other  HEENT:  [ ]Normal   [ ]Dry mouth   [ ]ET Tube/Trach  [ ]Oral lesions  PULMONARY:   [ ]Clear [ ]Tachypnea  [ ]Audible excessive secretions   [ ]Rhonchi        [ ]Right [ ]Left [ ]Bilateral  [ ]Crackles        [ ]Right [ ]Left [ ]Bilateral  [ ]Wheezing     [ ]Right [ ]Left [ ]Bilateral  [ ]Diminished BS [ ]Right [ ]Left [ ]Bilateral    CARDIOVASCULAR:    [ ]Regular [ ]Irregular [ ]Tachy  [ ]Tee [ ]Murmur [ ]Other  GASTROINTESTINAL:  [ ]Soft  [ ]Distended   [ ]+BS  [ ]Non tender [ ]Tender  [ ]Other [ ]PEG [ ]OGT/ NGT   Last BM:    GENITOURINARY:  [ ]Normal [ ] Incontinent   [ ]Oliguria/Anuria   [ ]Pal  MUSCULOSKELETAL:   [ ]Normal   [ ]Weakness  [ ]Bed/Wheelchair bound [ ]Edema  NEUROLOGIC:   [ ]No focal deficits  [ ] Cognitive impairment  [ ] Dysphagia [ ]Dysarthria [ ] Paresis [ ]Other   SKIN:   [ ]Normal  [ ]Rash  [ ]Other  [ ]Pressure ulcer(s)  [ ]y [ ]n  Present on admission      CRITICAL CARE:  [ ] Shock Present  [ ]Septic [ ]Cardiogenic [ ]Neurologic [ ]Hypovolemic  [ ]  Vasopressors [ ]  Inotropes   [ ] Respiratory failure present [ ] Mechanical Ventilation [ ] Non-invasive ventilatory support [ ] High-Flow  [ ] Acute  [ ] Chronic [ ] Hypoxic  [ ] Hypercarbic [ ] Other  [ ] Other organ failure     LABS:            RADIOLOGY & ADDITIONAL STUDIES:    PROTEIN CALORIE MALNUTRITION: [ ] mild [ ] moderate [ ] severe  [ ] underweight [ ] morbid obesity    https://www.andeal.org/vault/2440/web/files/ONC/Table_Clinical%20Characteristics%20to%20Document%20Malnutrition-White%20JV%20et%20al%202012.pdf    Height (cm): 154.9 (01-14-23 @ 17:31)  Weight (kg): 56.7 (01-14-23 @ 17:31)  BMI (kg/m2): 23.6 (01-14-23 @ 17:31)    [ ] PPSV2 < or = 30% [ ] significant weight loss [ ] poor nutritional intake [ ] anasarca   Artificial Nutrition [ ]     Other REFERRALS:    [ ] Hospice  [ ]Child Life  [ ]Social Work  [ ]Case management [ ]Holistic Therapy [ ] Physical Therapy [ ] Dietary     Progress Notes - Care Coordination [C. Provider] (01-23-23 @ 14:44)      Palliative Performance Scale:  http://npcrc.org/files/news/palliative_performance_scale_ppsv2.pdf  (Ctrl +  left click to view)  Respiratory Distress Observation Tool:  https://[email or web address].net/handouts/hen/Respiratory_Distress_Observation_Scale.pdf (Ctrl +  left click to view)  PAINAD Score:  http://[email or web address].[facility]/cog/painad.pdf (Ctrl +  left click to view)   GAP TEAM PALLIATIVE CARE UNIT PROGRESS NOTE:      [  ] Patient on hospice program.    INDICATION FOR PALLIATIVE CARE UNIT SERVICES/Interval HPI: Symptom management in the setting of a 87y old female with  decompensated heart failure and new metastatic malignancy.    Overnight EVENTS: Chart reviewed. She required PRN Dilaudid 2.5mg solution X1 for dyspnea within a 24hr period 8am-8am.    The palliative team, nurse and SW met at bedside with the patient  and family which included the patient's two daughters and two sons. The Patient and family are Cantonese speaking.  Cantonese  used. 's name: Mildred.  ID #[id number]. Family explained that they are looking to place their mother in a nursing home permanently, as they can no longer take care of her at home. SW asked if they had looked at list provided and family agreed to look over list, they are interested in [facility] in [facility] which has already accepted the pt.  family will discuss among themselves and inform team tomorrow.   The pt was examined at the bedside.  Her pain was controlled with PRns    DNR on chart: Yes  Yes      Allergies    No Known Allergies    Intolerances    MEDICATIONS  (STANDING):  albuterol/ipratropium for Nebulization 3 milliLiter(s) Nebulizer every 6 hours  apixaban 2.5 milliGRAM(s) Oral every 12 hours  donepezil 5 milliGRAM(s) Oral at bedtime  folic acid 1 milliGRAM(s) Oral daily  furosemide   Injectable 40 milliGRAM(s) IV Push daily  metoprolol tartrate 50 milliGRAM(s) Oral three times a day  polyethylene glycol 3350 17 Gram(s) Oral two times a day  senna 2 Tablet(s) Oral at bedtime  simvastatin 20 milliGRAM(s) Oral at bedtime    MEDICATIONS  (PRN):  acetaminophen     Tablet .. 650 milliGRAM(s) Oral every 6 hours PRN Temp greater or equal to 38C (100.4F), Mild Pain (1 - 3), Moderate Pain (4 - 6), Severe Pain (7 - 10)  bisacodyl Suppository 10 milliGRAM(s) Rectal daily PRN Constipation  glycopyrrolate Injectable 0.4 milliGRAM(s) IV Push every 6 hours PRN secretions  HYDROmorphone   Solution 1.5 milliGRAM(s) Oral every 2 hours PRN Moderate Pain (4 - 6)  HYDROmorphone   Solution 2.5 milliGRAM(s) Oral every 2 hours PRN Severe Pain (7 - 10)  HYDROmorphone   Solution 2.5 milliGRAM(s) Oral every 2 hours PRN dyspnea  HYDROmorphone  Injectable 0.5 milliGRAM(s) IV Push every 1 hour PRN Severe Pain (7 - 10)  HYDROmorphone  Injectable 0.5 milliGRAM(s) IV Push every 1 hour PRN dyspnea  HYDROmorphone  Injectable 0.3 milliGRAM(s) IV Push every 1 hour PRN Moderate Pain (4 - 6)  HYDROmorphone  Injectable 0.5 milliGRAM(s) IV Push every 1 hour PRN Severe Pain (7 - 10)  LORazepam   Injectable 0.25 milliGRAM(s) IV Push every 2 hours PRN anxiety/agitation/refractory dyspnea  ondansetron Injectable 4 milliGRAM(s) IV Push every 6 hours PRN Nausea and/or Vomiting    ITEMS UNCHECKED ARE NOT PRESENT    PRESENT SYMPTOMS: [ ]Unable to self-report see PAINAD, RDOS below  Source if other than patient:  [ ]Family   [ ]Team     Pain: [ ] yes [ ] no  QOL impact -   Location -                    Aggravating factors -  Quality -  Radiation -  Timing-  Severity (0-10 scale):  Minimal acceptable level (0-10 scale):     Dyspnea:                           [ ]Mild [ ]Moderate [ ]Severe  Anxiety:                             [ ]Mild [ ]Moderate [ ]Severe  Fatigue:                             [ ]Mild [ ]Moderate [ ]Severe  Nausea:                             [ ]Mild [ ]Moderate [ ]Severe  Loss of appetite:              [ ]Mild [ ]Moderate [ ]Severe  Constipation:                    [ ]Mild [ ]Moderate [ ]Severe    PCSSQ [Palliative Care Spiritual Screening Question]   Severity (0-10):  Score of 4 or > indicate consideration of Chaplaincy referral.  Chaplaincy Referral: [ ] yes [ ] refused [ ] following [ ] deferred    Caregiver [place]? : [ ] yes [ ] no [ ] deferred:  Social work referral [ ] Patient & Family Centered Care Referral [ ]     Anticipatory Grief present?:  [ ] yes [ ] no [ ] deferred:  Social work referral [ ] Patient & Family Centered Care Referral [ ]  	  Other Symptoms:  [ ]All other review of systems negative     PHYSICAL EXAM:   Vital Signs Last 24 Hrs  T(C): 36.4 (24 Jan 2023 08:50), Max: 36.4 (24 Jan 2023 08:50)  T(F): 97.6 (24 Jan 2023 08:50), Max: 97.6 (24 Jan 2023 08:50)  HR: 102 (24 Jan 2023 14:00) (94 - 102)  BP: 126/78 (24 Jan 2023 14:00) (109/75 - 141/64)  BP(mean): --  RR: 18 (24 Jan 2023 08:50) (18 - 18)  SpO2: 100% (24 Jan 2023 08:50) (100% - 100%)    Parameters below as of 24 Jan 2023 08:50  Patient On (Oxygen Delivery Method): nasal cannula     I&O's Summary    GENERAL: [ ] Cachexia  [ ]Alert  [ ]Oriented x   [ ]Lethargic  [ ]Unarousable  [ ]Verbal  [ ]Non-Verbal  Behavioral:   [ ] Anxiety  [ ] Delirium [ ] Agitation [ ] Other  HEENT:  [ ]Normal   [ ]Dry mouth   [ ]ET Tube/Trach  [ ]Oral lesions  PULMONARY:   [ ]Clear [ ]Tachypnea  [ ]Audible excessive secretions   [ ]Rhonchi        [ ]Right [ ]Left [ ]Bilateral  [ ]Crackles        [ ]Right [ ]Left [ ]Bilateral  [ ]Wheezing     [ ]Right [ ]Left [ ]Bilateral  [ ]Diminished BS [ ]Right [ ]Left [ ]Bilateral    CARDIOVASCULAR:    [ ]Regular [ ]Irregular [ ]Tachy  [ ]Tee [ ]Murmur [ ]Other  GASTROINTESTINAL:  [ ]Soft  [ ]Distended   [ ]+BS  [ ]Non tender [ ]Tender  [ ]Other [ ]PEG [ ]OGT/ NGT   Last BM:    GENITOURINARY:  [ ]Normal [ ] Incontinent   [ ]Oliguria/Anuria   [ ]Pal  MUSCULOSKELETAL:   [ ]Normal   [ ]Weakness  [ ]Bed/Wheelchair bound [ ]Edema  NEUROLOGIC:   [ ]No focal deficits  [ ] Cognitive impairment  [ ] Dysphagia [ ]Dysarthria [ ] Paresis [ ]Other   SKIN:   [ ]Normal  [ ]Rash  [ ]Other  [ ]Pressure ulcer(s)  [ ]y [ ]n  Present on admission      CRITICAL CARE:  [ ] Shock Present  [ ]Septic [ ]Cardiogenic [ ]Neurologic [ ]Hypovolemic  [ ]  Vasopressors [ ]  Inotropes   [ ] Respiratory failure present [ ] Mechanical Ventilation [ ] Non-invasive ventilatory support [ ] High-Flow  [ ] Acute  [ ] Chronic [ ] Hypoxic  [ ] Hypercarbic [ ] Other  [ ] Other organ failure     LABS:            RADIOLOGY & ADDITIONAL STUDIES:    PROTEIN CALORIE MALNUTRITION: [ ] mild [ ] moderate [ ] severe  [ ] underweight [ ] morbid obesity    https://www.andeal.org/vault/2440/web/files/ONC/Table_Clinical%20Characteristics%20to%20Document%20Malnutrition-White%20JV%20et%20al%202012.pdf    Height (cm): 154.9 (01-14-23 @ 17:31)  Weight (kg): 56.7 (01-14-23 @ 17:31)  BMI (kg/m2): 23.6 (01-14-23 @ 17:31)    [ ] PPSV2 < or = 30% [ ] significant weight loss [ ] poor nutritional intake [ ] anasarca   Artificial Nutrition [ ]     Other REFERRALS:    [ ] Hospice  [ ]Child Life  [ ]Social Work  [ ]Case management [ ]Holistic Therapy [ ] Physical Therapy [ ] Dietary     Progress Notes - Care Coordination [C. Provider] (01-23-23 @ 14:44)      Palliative Performance Scale:  http://npcrc.org/files/news/palliative_performance_scale_ppsv2.pdf  (Ctrl +  left click to view)  Respiratory Distress Observation Tool:  https://homecareinformation.net/handouts/hen/Respiratory_Distress_Observation_Scale.pdf (Ctrl +  left click to view)  PAINAD Score:  http://geriatrictoolkit.[email or web address].[facility]/cog/painad.pdf (Ctrl +  left click to view)   GAP TEAM PALLIATIVE CARE UNIT PROGRESS NOTE:      [  ] Patient on hospice program.    INDICATION FOR PALLIATIVE CARE UNIT SERVICES/Interval HPI: Symptom management in the setting of a 87y old female with  decompensated heart failure and new metastatic malignancy.    Overnight EVENTS: Chart reviewed. She required PRN Dilaudid 2.5mg solution X1 for dyspnea within a 24hr period 8am-8am.    The palliative team, nurse and SW met at bedside with the patient  and family which included the patient's two daughters and two sons. The Patient and family are Cantonese speaking.  Cantonese  used. 's name: Mildred.  ID #[id number]. Family explained that they are looking to place their mother in a nursing home permanently, as they can no longer take care of her at home. SW asked if they had looked at list provided and family agreed to look over list, they are interested in [facility] in [facility] which has already accepted the pt.  family will discuss among themselves and inform team tomorrow.   The pt was examined at the bedside.  Her pain was controlled with PRns    DNR on chart: Yes  Yes      Allergies    No Known Allergies    Intolerances    MEDICATIONS  (STANDING):  albuterol/ipratropium for Nebulization 3 milliLiter(s) Nebulizer every 6 hours  apixaban 2.5 milliGRAM(s) Oral every 12 hours  donepezil 5 milliGRAM(s) Oral at bedtime  folic acid 1 milliGRAM(s) Oral daily  furosemide   Injectable 40 milliGRAM(s) IV Push daily  metoprolol tartrate 50 milliGRAM(s) Oral three times a day  polyethylene glycol 3350 17 Gram(s) Oral two times a day  senna 2 Tablet(s) Oral at bedtime  simvastatin 20 milliGRAM(s) Oral at bedtime    MEDICATIONS  (PRN):  acetaminophen     Tablet .. 650 milliGRAM(s) Oral every 6 hours PRN Temp greater or equal to 38C (100.4F), Mild Pain (1 - 3), Moderate Pain (4 - 6), Severe Pain (7 - 10)  bisacodyl Suppository 10 milliGRAM(s) Rectal daily PRN Constipation  glycopyrrolate Injectable 0.4 milliGRAM(s) IV Push every 6 hours PRN secretions  HYDROmorphone   Solution 1.5 milliGRAM(s) Oral every 2 hours PRN Moderate Pain (4 - 6)  HYDROmorphone   Solution 2.5 milliGRAM(s) Oral every 2 hours PRN Severe Pain (7 - 10)  HYDROmorphone   Solution 2.5 milliGRAM(s) Oral every 2 hours PRN dyspnea  HYDROmorphone  Injectable 0.5 milliGRAM(s) IV Push every 1 hour PRN Severe Pain (7 - 10)  HYDROmorphone  Injectable 0.5 milliGRAM(s) IV Push every 1 hour PRN dyspnea  HYDROmorphone  Injectable 0.3 milliGRAM(s) IV Push every 1 hour PRN Moderate Pain (4 - 6)  HYDROmorphone  Injectable 0.5 milliGRAM(s) IV Push every 1 hour PRN Severe Pain (7 - 10)  LORazepam   Injectable 0.25 milliGRAM(s) IV Push every 2 hours PRN anxiety/agitation/refractory dyspnea  ondansetron Injectable 4 milliGRAM(s) IV Push every 6 hours PRN Nausea and/or Vomiting    ITEMS UNCHECKED ARE NOT PRESENT    PRESENT SYMPTOMS: [ ]Unable to self-report see PAINAD, RDOS below  Source if other than patient:  [ ]Family   [ ]Team     Pain: [ ] yes [ ] no  QOL impact -   Location -                    Aggravating factors -  Quality -  Radiation -  Timing-  Severity (0-10 scale):  Minimal acceptable level (0-10 scale):     Dyspnea:                           [ ]Mild [ ]Moderate [ ]Severe  Anxiety:                             [ ]Mild [ ]Moderate [ ]Severe  Fatigue:                             [ ]Mild [ ]Moderate [ ]Severe  Nausea:                             [ ]Mild [ ]Moderate [ ]Severe  Loss of appetite:              [ ]Mild [ ]Moderate [ ]Severe  Constipation:                    [ ]Mild [ ]Moderate [ ]Severe    PCSSQ [Palliative Care Spiritual Screening Question]   Severity (0-10):  Score of 4 or > indicate consideration of Chaplaincy referral.  Chaplaincy Referral: [ ] yes [ ] refused [ ] following [ ] deferred    Caregiver [place]? : [ ] yes [ ] no [ ] deferred:  Social work referral [ ] Patient & Family Centered Care Referral [ ]     Anticipatory Grief present?:  [ ] yes [ ] no [ ] deferred:  Social work referral [ ] Patient & Family Centered Care Referral [ ]  	  Other Symptoms:  [ ]All other review of systems negative     PHYSICAL EXAM:   Vital Signs Last 24 Hrs  T(C): 36.4 (24 Jan 2023 08:50), Max: 36.4 (24 Jan 2023 08:50)  T(F): 97.6 (24 Jan 2023 08:50), Max: 97.6 (24 Jan 2023 08:50)  HR: 102 (24 Jan 2023 14:00) (94 - 102)  BP: 126/78 (24 Jan 2023 14:00) (109/75 - 141/64)  BP(mean): --  RR: 18 (24 Jan 2023 08:50) (18 - 18)  SpO2: 100% (24 Jan 2023 08:50) (100% - 100%)    Parameters below as of 24 Jan 2023 08:50  Patient On (Oxygen Delivery Method): nasal cannula     I&O's Summary    GENERAL: [ ] Cachexia  [ ]Alert  [ ]Oriented x   [ ]Lethargic  [ ]Unarousable  [ ]Verbal  [ ]Non-Verbal  Behavioral:   [ ] Anxiety  [ ] Delirium [ ] Agitation [ ] Other  HEENT:  [ ]Normal   [ ]Dry mouth   [ ]ET Tube/Trach  [ ]Oral lesions  PULMONARY:   [ ]Clear [ ]Tachypnea  [ ]Audible excessive secretions   [ ]Rhonchi        [ ]Right [ ]Left [ ]Bilateral  [ ]Crackles        [ ]Right [ ]Left [ ]Bilateral  [ ]Wheezing     [ ]Right [ ]Left [ ]Bilateral  [ ]Diminished BS [ ]Right [ ]Left [ ]Bilateral    CARDIOVASCULAR:    [ ]Regular [ ]Irregular [ ]Tachy  [ ]Tee [ ]Murmur [ ]Other  GASTROINTESTINAL:  [ ]Soft  [ ]Distended   [ ]+BS  [ ]Non tender [ ]Tender  [ ]Other [ ]PEG [ ]OGT/ NGT   Last BM:    GENITOURINARY:  [ ]Normal [ ] Incontinent   [ ]Oliguria/Anuria   [ ]Pal  MUSCULOSKELETAL:   [ ]Normal   [ ]Weakness  [ ]Bed/Wheelchair bound [ ]Edema  NEUROLOGIC:   [ ]No focal deficits  [ ] Cognitive impairment  [ ] Dysphagia [ ]Dysarthria [ ] Paresis [ ]Other   SKIN:   [ ]Normal  [ ]Rash  [ ]Other  [ ]Pressure ulcer(s)  [ ]y [ ]n  Present on admission      CRITICAL CARE:  [ ] Shock Present  [ ]Septic [ ]Cardiogenic [ ]Neurologic [ ]Hypovolemic  [ ]  Vasopressors [ ]  Inotropes   [ ] Respiratory failure present [ ] Mechanical Ventilation [ ] Non-invasive ventilatory support [ ] High-Flow  [ ] Acute  [ ] Chronic [ ] Hypoxic  [ ] Hypercarbic [ ] Other  [ ] Other organ failure     LABS:            RADIOLOGY & ADDITIONAL STUDIES:    PROTEIN CALORIE MALNUTRITION: [ ] mild [ ] moderate [ ] severe  [ ] underweight [ ] morbid obesity    https://www.andeal.org/vault/2440/web/files/ONC/Table_Clinical%20Characteristics%20to%20Document%20Malnutrition-White%20JV%20et%20al%202012.pdf    Height (cm): 154.9 (01-14-23 @ 17:31)  Weight (kg): 56.7 (01-14-23 @ 17:31)  BMI (kg/m2): 23.6 (01-14-23 @ 17:31)    [ ] PPSV2 < or = 30% [ ] significant weight loss [ ] poor nutritional intake [ ] anasarca   Artificial Nutrition [ ]     Other REFERRALS:    [ ] Hospice  [ ]Child Life  [ ]Social Work  [ ]Case management [ ]Holistic Therapy [ ] Physical Therapy [ ] Dietary     Progress Notes - Care Coordination [C. Provider] (01-23-23 @ 14:44)      Palliative Performance Scale:  http://npcrc.org/files/news/palliative_performance_scale_ppsv2.pdf  (Ctrl +  left click to view)  Respiratory Distress Observation Tool:  https://homecareinformation.net/handouts/hen/Respiratory_Distress_Observation_Scale.pdf (Ctrl +  left click to view)  PAINAD Score:  http://geriatrictoolkit.[email or web address].[facility]/cog/painad.pdf (Ctrl +  left click to view)   GAP TEAM PALLIATIVE CARE UNIT PROGRESS NOTE:      [  ] Patient on hospice program.    INDICATION FOR PALLIATIVE CARE UNIT SERVICES/Interval HPI: Symptom management in the setting of a 87y old female with  decompensated heart failure and new metastatic malignancy.    Overnight EVENTS: Chart reviewed. She required PRN Dilaudid 2.5mg solution X1 for dyspnea within a 24hr period 8am-8am.    The palliative team, nurse and SW met at bedside with the patient  and family which included the patient's two daughters and two sons. The Patient and family are Cantonese speaking.  Cantonese  used. 's name: Mildred.  ID #[id number]. Family explained that they are looking to place their mother in a nursing home permanently, as they can no longer take care of her at home. SW asked if they had looked at list provided and family agreed to look over list, they are interested in [facility] in [facility] which has already accepted the pt.  family will discuss among themselves and inform team tomorrow.   The pt was examined at the bedside. family tried to ask pt questions and pt was not able to anser.  Per the family, pt has been giving signs that pain is controlled with PRNs.     DNR on chart: Yes  Yes      Allergies    No Known Allergies    Intolerances    MEDICATIONS  (STANDING):  albuterol/ipratropium for Nebulization 3 milliLiter(s) Nebulizer every 6 hours  apixaban 2.5 milliGRAM(s) Oral every 12 hours  donepezil 5 milliGRAM(s) Oral at bedtime  folic acid 1 milliGRAM(s) Oral daily  furosemide   Injectable 40 milliGRAM(s) IV Push daily  metoprolol tartrate 50 milliGRAM(s) Oral three times a day  polyethylene glycol 3350 17 Gram(s) Oral two times a day  senna 2 Tablet(s) Oral at bedtime  simvastatin 20 milliGRAM(s) Oral at bedtime    MEDICATIONS  (PRN):  acetaminophen     Tablet .. 650 milliGRAM(s) Oral every 6 hours PRN Temp greater or equal to 38C (100.4F), Mild Pain (1 - 3), Moderate Pain (4 - 6), Severe Pain (7 - 10)  bisacodyl Suppository 10 milliGRAM(s) Rectal daily PRN Constipation  glycopyrrolate Injectable 0.4 milliGRAM(s) IV Push every 6 hours PRN secretions  HYDROmorphone   Solution 1.5 milliGRAM(s) Oral every 2 hours PRN Moderate Pain (4 - 6)  HYDROmorphone   Solution 2.5 milliGRAM(s) Oral every 2 hours PRN Severe Pain (7 - 10)  HYDROmorphone   Solution 2.5 milliGRAM(s) Oral every 2 hours PRN dyspnea  HYDROmorphone  Injectable 0.5 milliGRAM(s) IV Push every 1 hour PRN Severe Pain (7 - 10)  HYDROmorphone  Injectable 0.5 milliGRAM(s) IV Push every 1 hour PRN dyspnea  HYDROmorphone  Injectable 0.3 milliGRAM(s) IV Push every 1 hour PRN Moderate Pain (4 - 6)  HYDROmorphone  Injectable 0.5 milliGRAM(s) IV Push every 1 hour PRN Severe Pain (7 - 10)  LORazepam   Injectable 0.25 milliGRAM(s) IV Push every 2 hours PRN anxiety/agitation/refractory dyspnea  ondansetron Injectable 4 milliGRAM(s) IV Push every 6 hours PRN Nausea and/or Vomiting    ITEMS UNCHECKED ARE NOT PRESENT    PRESENT SYMPTOMS: [x ]Unable to self-report see PAINAD, RDOS below  Source if other than patient:  [ ]Family   [x ]Team     Pain: [ ] yes [ ] no  QOL impact -   Location -                    Aggravating factors -  Quality -  Radiation -  Timing-  Severity (0-10 scale):  Minimal acceptable level (0-10 scale):     Dyspnea:                           [ ]Mild [ ]Moderate [ ]Severe  Anxiety:                             [ ]Mild [ ]Moderate [ ]Severe  Fatigue:                             [ ]Mild [ ]Moderate [ ]Severe  Nausea:                             [ ]Mild [ ]Moderate [ ]Severe  Loss of appetite:              [ ]Mild [ ]Moderate [ ]Severe  Constipation:                    [ ]Mild [ ]Moderate [ ]Severe    PCSSQ [Palliative Care Spiritual Screening Question]   Severity (0-10):  Score of 4 or > indicate consideration of Chaplaincy referral.  Chaplaincy Referral: [ ] yes [ ] refused [ ] following [x ] family deferred     Caregiver [place]?: [ x] yes - children no longer able to care for mother at home   [ ] no [ ] deferred:  Social work referral [ x] Patient & Family Centered Care Referral [ ]     Anticipatory Grief present?:  [x ] yes [ ] no [ ] deferred:  Social work referral [ ] Patient & Family Centered Care Referral [ ]  	  Other Symptoms:  [x ]All other review of systems negative     PHYSICAL EXAM:   Vital Signs Last 24 Hrs  T(C): 36.4 (24 Jan 2023 08:50), Max: 36.4 (24 Jan 2023 08:50)  T(F): 97.6 (24 Jan 2023 08:50), Max: 97.6 (24 Jan 2023 08:50)  HR: 102 (24 Jan 2023 14:00) (94 - 102)  BP: 126/78 (24 Jan 2023 14:00) (109/75 - 141/64)  BP(mean): --  RR: 18 (24 Jan 2023 08:50) (18 - 18)  SpO2: 100% (24 Jan 2023 08:50) (100% - 100%)    Parameters below as of 24 Jan 2023 08:50  Patient On (Oxygen Delivery Method): nasal cannula     I&O's Summary      GENERAL: [ ] Cachexia  [ X]Alert  [ ]Oriented x   [ X]confused  [ ]Unarousable  [x ]Verbal  [ ]Non-Verbal  Behavioral:   [ ] Anxiety  [ ] Delirium [ ] Agitation [ ] Other  HEENT:  [X ]Normal   [ ]Dry mouth   [ ]ET Tube/Trach  [ ]Oral lesions  PULMONARY:   [ X]Clear [ ]Tachypnea  [ ]Audible excessive secretions   [ ]Rhonchi        [ ]Right [ ]Left [ ]Bilateral  [ ]Crackles        [ ]Right [ ]Left [ ]Bilateral  [ ]Wheezing     [ ]Right [ ]Left [ ]Bilateral  [ ]Diminished BS [ ]Right [ ]Left [ ]Bilateral    CARDIOVASCULAR:    [X ]Regular [ ]Irregular [ ]Tachy  [ ]Tee [ ]Murmur [ ]Other  GASTROINTESTINAL:  [X]Soft  [ ]Distended   [ X]+BS  [X ]Non tender [ ]Tender  [ ]Other [ ]PEG [ ]OGT/ NGT   Last BM: 1/24/23 (4 BMs)  GENITOURINARY:  [ ]Normal [ X] Incontinent wears diaper  [ ]Oliguria/Anuria   [ ]Pal  MUSCULOSKELETAL:   [ ]Normal   [X ]Weakness  [ ]Bed/Wheelchair bound [ ]Edema  NEUROLOGIC:   [ ]No focal deficits  [ ] Cognitive impairment  [ ] Dysphagia [ ]Dysarthria [ ] Paresis [ ]Other   SKIN: Ecchymosis   [ ]Normal  [ ]Rash  [ ]Other  [ ]Pressure ulcer(s)  [ ]y [ ]n  Present on admission          CRITICAL CARE:  [ ] Shock Present  [ ]Septic [ ]Cardiogenic [ ]Neurologic [ ]Hypovolemic  [ ]  Vasopressors [ ]  Inotropes   [ ] Respiratory failure present [ ] Mechanical Ventilation [ ] Non-invasive ventilatory support [ ] High-Flow  [ ] Acute  [ ] Chronic [ ] Hypoxic  [ ] Hypercarbic [ ] Other  [ ] Other organ failure     LABS:  no new    RADIOLOGY & ADDITIONAL STUDIES: No new    PROTEIN CALORIE MALNUTRITION: [ ] mild [ ] moderate [ ] severe  [ ] underweight [ ] morbid obesity    https://www.andeal.org/vault/2440/web/files/ONC/Table_Clinical%20Characteristics%20to%20Document%20Malnutrition-White%20JV%20et%20al%202012.pdf    Height (cm): 154.9 (01-14-23 @ 17:31)  Weight (kg): 56.7 (01-14-23 @ 17:31)  BMI (kg/m2): 23.6 (01-14-23 @ 17:31)    [ x] PPSV2 < or = 30% [ ] significant weight loss [ ] poor nutritional intake [ ] anasarca   Artificial Nutrition [ ]     Other REFERRALS:    [ ] Hospice  [ ]Child Life  [ x]Social Work  [ ]Case management [ ]Holistic Therapy [ ] Physical Therapy [ ] Dietary     Progress Notes - Care Coordination [C. Provider] (01-23-23 @ 14:44)    Palliative Performance Scale:  http://npcrc.org/files/news/palliative_performance_scale_ppsv2.pdf  (Ctrl +  left click to view)  Respiratory Distress Observation Tool:  https://[email or web address].net/handouts/hen/Respiratory_Distress_Observation_Scale.pdf (Ctrl +  left click to view)  PAINAD Score:  [email or web address]://[email or web address]/cog/painad.pdf (Ctrl +  left click to view)   GAP TEAM PALLIATIVE CARE UNIT PROGRESS NOTE:      [  ] Patient on hospice program.    INDICATION FOR PALLIATIVE CARE UNIT SERVICES/Interval HPI: Symptom management in the setting of a 87y old female with  decompensated heart failure and new metastatic malignancy.    Overnight EVENTS: Chart reviewed. She required PRN Dilaudid 2.5mg solution X1 for dyspnea within a 24hr period 8am-8am.    The palliative team, nurse and SW met at bedside with the patient  and family which included the patient's two daughters and two sons. The Patient and family are Cantonese speaking.  Cantonese  used. 's name: Mildred.  ID #[id number]. Family explained that they are looking to place their mother in a nursing home permanently, as they can no longer take care of her at home. SW asked if they had looked at list provided and family agreed to look over list, they are interested in [facility] in [facility] which has already accepted the pt.  family will discuss among themselves and inform team tomorrow.   The pt was examined at the bedside. family tried to ask pt questions and pt was not able to anser.  Per the family, pt has been giving signs that pain is controlled with PRNs.     DNR on chart: Yes  Yes      Allergies    No Known Allergies    Intolerances    MEDICATIONS  (STANDING):  albuterol/ipratropium for Nebulization 3 milliLiter(s) Nebulizer every 6 hours  apixaban 2.5 milliGRAM(s) Oral every 12 hours  donepezil 5 milliGRAM(s) Oral at bedtime  folic acid 1 milliGRAM(s) Oral daily  furosemide   Injectable 40 milliGRAM(s) IV Push daily  metoprolol tartrate 50 milliGRAM(s) Oral three times a day  polyethylene glycol 3350 17 Gram(s) Oral two times a day  senna 2 Tablet(s) Oral at bedtime  simvastatin 20 milliGRAM(s) Oral at bedtime    MEDICATIONS  (PRN):  acetaminophen     Tablet .. 650 milliGRAM(s) Oral every 6 hours PRN Temp greater or equal to 38C (100.4F), Mild Pain (1 - 3), Moderate Pain (4 - 6), Severe Pain (7 - 10)  bisacodyl Suppository 10 milliGRAM(s) Rectal daily PRN Constipation  glycopyrrolate Injectable 0.4 milliGRAM(s) IV Push every 6 hours PRN secretions  HYDROmorphone   Solution 1.5 milliGRAM(s) Oral every 2 hours PRN Moderate Pain (4 - 6)  HYDROmorphone   Solution 2.5 milliGRAM(s) Oral every 2 hours PRN Severe Pain (7 - 10)  HYDROmorphone   Solution 2.5 milliGRAM(s) Oral every 2 hours PRN dyspnea  HYDROmorphone  Injectable 0.5 milliGRAM(s) IV Push every 1 hour PRN Severe Pain (7 - 10)  HYDROmorphone  Injectable 0.5 milliGRAM(s) IV Push every 1 hour PRN dyspnea  HYDROmorphone  Injectable 0.3 milliGRAM(s) IV Push every 1 hour PRN Moderate Pain (4 - 6)  HYDROmorphone  Injectable 0.5 milliGRAM(s) IV Push every 1 hour PRN Severe Pain (7 - 10)  LORazepam   Injectable 0.25 milliGRAM(s) IV Push every 2 hours PRN anxiety/agitation/refractory dyspnea  ondansetron Injectable 4 milliGRAM(s) IV Push every 6 hours PRN Nausea and/or Vomiting    ITEMS UNCHECKED ARE NOT PRESENT    PRESENT SYMPTOMS: [x ]Unable to self-report see PAINAD, RDOS below  Source if other than patient:  [ ]Family   [x ]Team     Pain: [ ] yes [ ] no  QOL impact -   Location -                    Aggravating factors -  Quality -  Radiation -  Timing-  Severity (0-10 scale):  Minimal acceptable level (0-10 scale):     Dyspnea:                           [ ]Mild [ ]Moderate [ ]Severe  Anxiety:                             [ ]Mild [ ]Moderate [ ]Severe  Fatigue:                             [ ]Mild [ ]Moderate [ ]Severe  Nausea:                             [ ]Mild [ ]Moderate [ ]Severe  Loss of appetite:              [ ]Mild [ ]Moderate [ ]Severe  Constipation:                    [ ]Mild [ ]Moderate [ ]Severe    PCSSQ [Palliative Care Spiritual Screening Question]   Severity (0-10):  Score of 4 or > indicate consideration of Chaplaincy referral.  Chaplaincy Referral: [ ] yes [ ] refused [ ] following [x ] family deferred     Caregiver [place]?: [ x] yes - children no longer able to care for mother at home   [ ] no [ ] deferred:  Social work referral [ x] Patient & Family Centered Care Referral [ ]     Anticipatory Grief present?:  [x ] yes [ ] no [ ] deferred:  Social work referral [ ] Patient & Family Centered Care Referral [ ]  	  Other Symptoms:  [x ]All other review of systems negative     PHYSICAL EXAM:   Vital Signs Last 24 Hrs  T(C): 36.4 (24 Jan 2023 08:50), Max: 36.4 (24 Jan 2023 08:50)  T(F): 97.6 (24 Jan 2023 08:50), Max: 97.6 (24 Jan 2023 08:50)  HR: 102 (24 Jan 2023 14:00) (94 - 102)  BP: 126/78 (24 Jan 2023 14:00) (109/75 - 141/64)  BP(mean): --  RR: 18 (24 Jan 2023 08:50) (18 - 18)  SpO2: 100% (24 Jan 2023 08:50) (100% - 100%)    Parameters below as of 24 Jan 2023 08:50  Patient On (Oxygen Delivery Method): nasal cannula     I&O's Summary      GENERAL: [ ] Cachexia  [ X]Alert  [ ]Oriented x   [ X]confused  [ ]Unarousable  [x ]Verbal  [ ]Non-Verbal  Behavioral:   [ ] Anxiety  [ ] Delirium [ ] Agitation [ ] Other  HEENT:  [X ]Normal   [ ]Dry mouth   [ ]ET Tube/Trach  [ ]Oral lesions  PULMONARY:   [ X]Clear [ ]Tachypnea  [ ]Audible excessive secretions   [ ]Rhonchi        [ ]Right [ ]Left [ ]Bilateral  [ ]Crackles        [ ]Right [ ]Left [ ]Bilateral  [ ]Wheezing     [ ]Right [ ]Left [ ]Bilateral  [ ]Diminished BS [ ]Right [ ]Left [ ]Bilateral    CARDIOVASCULAR:    [X ]Regular [ ]Irregular [ ]Tachy  [ ]Tee [ ]Murmur [ ]Other  GASTROINTESTINAL:  [X]Soft  [ ]Distended   [ X]+BS  [X ]Non tender [ ]Tender  [ ]Other [ ]PEG [ ]OGT/ NGT   Last BM: 1/24/23 (4 BMs)  GENITOURINARY:  [ ]Normal [ X] Incontinent wears diaper  [ ]Oliguria/Anuria   [ ]Pal  MUSCULOSKELETAL:   [ ]Normal   [X ]Weakness  [ ]Bed/Wheelchair bound [ ]Edema  NEUROLOGIC:   [ ]No focal deficits  [ ] Cognitive impairment  [ ] Dysphagia [ ]Dysarthria [ ] Paresis [ ]Other   SKIN: Ecchymosis   [ ]Normal  [ ]Rash  [ ]Other  [ ]Pressure ulcer(s)  [ ]y [ ]n  Present on admission          CRITICAL CARE:  [ ] Shock Present  [ ]Septic [ ]Cardiogenic [ ]Neurologic [ ]Hypovolemic  [ ]  Vasopressors [ ]  Inotropes   [ ] Respiratory failure present [ ] Mechanical Ventilation [ ] Non-invasive ventilatory support [ ] High-Flow  [ ] Acute  [ ] Chronic [ ] Hypoxic  [ ] Hypercarbic [ ] Other  [ ] Other organ failure     LABS:  no new    RADIOLOGY & ADDITIONAL STUDIES: No new    PROTEIN CALORIE MALNUTRITION: [ ] mild [ ] moderate [ ] severe  [ ] underweight [ ] morbid obesity    https://www.andeal.org/vault/2440/web/files/ONC/Table_Clinical%20Characteristics%20to%20Document%20Malnutrition-White%20JV%20et%20al%202012.pdf    Height (cm): 154.9 (01-14-23 @ 17:31)  Weight (kg): 56.7 (01-14-23 @ 17:31)  BMI (kg/m2): 23.6 (01-14-23 @ 17:31)    [ x] PPSV2 < or = 30% [ ] significant weight loss [ ] poor nutritional intake [ ] anasarca   Artificial Nutrition [ ]     Other REFERRALS:    [ ] Hospice  [ ]Child Life  [ x]Social Work  [ ]Case management [ ]Holistic Therapy [ ] Physical Therapy [ ] Dietary     Progress Notes - Care Coordination [C. Provider] (01-23-23 @ 14:44)    Palliative Performance Scale:  http://npcrc.org/files/news/palliative_performance_scale_ppsv2.pdf  (Ctrl +  left click to view)  Respiratory Distress Observation Tool:  https://[email or web address]/handouts/hen/Respiratory_Distress_Observation_Scale.pdf (Ctrl +  left click to view)  PAINAD Score:  http://[email or web address]/cog/painad.pdf (Ctrl +  left click to view)   GAP TEAM PALLIATIVE CARE UNIT PROGRESS NOTE:      [  ] Patient on hospice program.    INDICATION FOR PALLIATIVE CARE UNIT SERVICES/Interval HPI: Symptom management in the setting of a 87y old female with  decompensated heart failure and new metastatic malignancy.    Overnight EVENTS: Chart reviewed. She required PRN Dilaudid 2.5mg solution X1 for dyspnea within a 24hr period 8am-8am.    The palliative team, nurse and SW met at bedside with the patient  and family which included the patient's two daughters and two sons. The Patient and family are Cantonese speaking.  Cantonese  used. 's name: Mildred.  ID #[id number]. Family explained that they are looking to place their mother in a nursing home permanently, as they can no longer take care of her at home. SW asked if they had looked at list provided and family agreed to look over list, they are interested in [facility] in [facility] which has already accepted the pt.  family will discuss among themselves and inform team tomorrow.   The pt was examined at the bedside. family tried to ask pt questions and pt was not able to anser.  Per the family, pt has been giving signs that pain is controlled with PRNs.     DNR on chart: Yes  Yes      Allergies    No Known Allergies    Intolerances    MEDICATIONS  (STANDING):  albuterol/ipratropium for Nebulization 3 milliLiter(s) Nebulizer every 6 hours  apixaban 2.5 milliGRAM(s) Oral every 12 hours  donepezil 5 milliGRAM(s) Oral at bedtime  folic acid 1 milliGRAM(s) Oral daily  furosemide   Injectable 40 milliGRAM(s) IV Push daily  metoprolol tartrate 50 milliGRAM(s) Oral three times a day  polyethylene glycol 3350 17 Gram(s) Oral two times a day  senna 2 Tablet(s) Oral at bedtime  simvastatin 20 milliGRAM(s) Oral at bedtime    MEDICATIONS  (PRN):  acetaminophen     Tablet .. 650 milliGRAM(s) Oral every 6 hours PRN Temp greater or equal to 38C (100.4F), Mild Pain (1 - 3), Moderate Pain (4 - 6), Severe Pain (7 - 10)  bisacodyl Suppository 10 milliGRAM(s) Rectal daily PRN Constipation  glycopyrrolate Injectable 0.4 milliGRAM(s) IV Push every 6 hours PRN secretions  HYDROmorphone   Solution 1.5 milliGRAM(s) Oral every 2 hours PRN Moderate Pain (4 - 6)  HYDROmorphone   Solution 2.5 milliGRAM(s) Oral every 2 hours PRN Severe Pain (7 - 10)  HYDROmorphone   Solution 2.5 milliGRAM(s) Oral every 2 hours PRN dyspnea  HYDROmorphone  Injectable 0.5 milliGRAM(s) IV Push every 1 hour PRN Severe Pain (7 - 10)  HYDROmorphone  Injectable 0.5 milliGRAM(s) IV Push every 1 hour PRN dyspnea  HYDROmorphone  Injectable 0.3 milliGRAM(s) IV Push every 1 hour PRN Moderate Pain (4 - 6)  HYDROmorphone  Injectable 0.5 milliGRAM(s) IV Push every 1 hour PRN Severe Pain (7 - 10)  LORazepam   Injectable 0.25 milliGRAM(s) IV Push every 2 hours PRN anxiety/agitation/refractory dyspnea  ondansetron Injectable 4 milliGRAM(s) IV Push every 6 hours PRN Nausea and/or Vomiting    ITEMS UNCHECKED ARE NOT PRESENT    PRESENT SYMPTOMS: [x ]Unable to self-report see PAINAD, RDOS below  Source if other than patient:  [ ]Family   [x ]Team     Pain: [ ] yes [ ] no  QOL impact -   Location -                    Aggravating factors -  Quality -  Radiation -  Timing-  Severity (0-10 scale):  Minimal acceptable level (0-10 scale):     Dyspnea:                           [ ]Mild [ ]Moderate [ ]Severe  Anxiety:                             [ ]Mild [ ]Moderate [ ]Severe  Fatigue:                             [ ]Mild [ ]Moderate [ ]Severe  Nausea:                             [ ]Mild [ ]Moderate [ ]Severe  Loss of appetite:              [ ]Mild [ ]Moderate [ ]Severe  Constipation:                    [ ]Mild [ ]Moderate [ ]Severe    PCSSQ [Palliative Care Spiritual Screening Question]   Severity (0-10):  Score of 4 or > indicate consideration of Chaplaincy referral.  Chaplaincy Referral: [ ] yes [ ] refused [ ] following [x ] family deferred     Caregiver [place]?: [ x] yes - children no longer able to care for mother at home   [ ] no [ ] deferred:  Social work referral [ x] Patient & Family Centered Care Referral [ ]     Anticipatory Grief present?:  [x ] yes [ ] no [ ] deferred:  Social work referral [ ] Patient & Family Centered Care Referral [ ]  	  Other Symptoms:  [x ]All other review of systems negative     PHYSICAL EXAM:   Vital Signs Last 24 Hrs  T(C): 36.4 (24 Jan 2023 08:50), Max: 36.4 (24 Jan 2023 08:50)  T(F): 97.6 (24 Jan 2023 08:50), Max: 97.6 (24 Jan 2023 08:50)  HR: 102 (24 Jan 2023 14:00) (94 - 102)  BP: 126/78 (24 Jan 2023 14:00) (109/75 - 141/64)  BP(mean): --  RR: 18 (24 Jan 2023 08:50) (18 - 18)  SpO2: 100% (24 Jan 2023 08:50) (100% - 100%)    Parameters below as of 24 Jan 2023 08:50  Patient On (Oxygen Delivery Method): nasal cannula     I&O's Summary      GENERAL: [ ] Cachexia  [ X]Alert  [ ]Oriented x   [ X]confused  [ ]Unarousable  [x ]Verbal  [ ]Non-Verbal  Behavioral:   [ ] Anxiety  [ ] Delirium [ ] Agitation [ ] Other  HEENT:  [X ]Normal   [ ]Dry mouth   [ ]ET Tube/Trach  [ ]Oral lesions  PULMONARY:   [ X]Clear [ ]Tachypnea  [ ]Audible excessive secretions   [ ]Rhonchi        [ ]Right [ ]Left [ ]Bilateral  [ ]Crackles        [ ]Right [ ]Left [ ]Bilateral  [ ]Wheezing     [ ]Right [ ]Left [ ]Bilateral  [ ]Diminished BS [ ]Right [ ]Left [ ]Bilateral    CARDIOVASCULAR:    [X ]Regular [ ]Irregular [ ]Tachy  [ ]Tee [ ]Murmur [ ]Other  GASTROINTESTINAL:  [X]Soft  [ ]Distended   [ X]+BS  [X ]Non tender [ ]Tender  [ ]Other [ ]PEG [ ]OGT/ NGT   Last BM: 1/24/23 (4 BMs)  GENITOURINARY:  [ ]Normal [ X] Incontinent wears diaper  [ ]Oliguria/Anuria   [ ]Pal  MUSCULOSKELETAL:   [ ]Normal   [X ]Weakness  [ ]Bed/Wheelchair bound [ ]Edema  NEUROLOGIC:   [ ]No focal deficits  [ ] Cognitive impairment  [ ] Dysphagia [ ]Dysarthria [ ] Paresis [ ]Other   SKIN: Ecchymosis   [ ]Normal  [ ]Rash  [ ]Other  [ ]Pressure ulcer(s)  [ ]y [ ]n  Present on admission          CRITICAL CARE:  [ ] Shock Present  [ ]Septic [ ]Cardiogenic [ ]Neurologic [ ]Hypovolemic  [ ]  Vasopressors [ ]  Inotropes   [ ] Respiratory failure present [ ] Mechanical Ventilation [ ] Non-invasive ventilatory support [ ] High-Flow  [ ] Acute  [ ] Chronic [ ] Hypoxic  [ ] Hypercarbic [ ] Other  [ ] Other organ failure     LABS:  no new    RADIOLOGY & ADDITIONAL STUDIES: No new    PROTEIN CALORIE MALNUTRITION: [ ] mild [ ] moderate [ ] severe  [ ] underweight [ ] morbid obesity    https://www.andeal.org/vault/2440/web/files/ONC/Table_Clinical%20Characteristics%20to%20Document%20Malnutrition-White%20JV%20et%20al%202012.pdf    Height (cm): 154.9 (01-14-23 @ 17:31)  Weight (kg): 56.7 (01-14-23 @ 17:31)  BMI (kg/m2): 23.6 (01-14-23 @ 17:31)    [ x] PPSV2 < or = 30% [ ] significant weight loss [ ] poor nutritional intake [ ] anasarca   Artificial Nutrition [ ]     Other REFERRALS:    [ ] Hospice  [ ]Child Life  [ x]Social Work  [ ]Case management [ ]Holistic Therapy [ ] Physical Therapy [ ] Dietary     Progress Notes - Care Coordination [C. Provider] (01-23-23 @ 14:44)    Palliative Performance Scale:  http://npcrc.org/files/news/palliative_performance_scale_ppsv2.pdf  (Ctrl +  left click to view)  Respiratory Distress Observation Tool:  https://[email or web address]/handouts/hen/Respiratory_Distress_Observation_Scale.pdf (Ctrl +  left click to view)  PAINAD Score:  http://[email or web address]/cog/painad.pdf (Ctrl +  left click to view)   GAP TEAM PALLIATIVE CARE UNIT PROGRESS NOTE:      [  ] Patient on hospice program.    INDICATION FOR PALLIATIVE CARE UNIT SERVICES/Interval HPI: Symptom management in the setting of a 87y old female with  decompensated heart failure and new metastatic malignancy.    Overnight EVENTS: Chart reviewed. She required PRN Dilaudid 2.5mg solution X1 for dyspnea within a 24hr period 8am-8am.    The palliative team, nurse and SW met at bedside with the patient  and family which included the patient's two daughters and two sons. The Patient and family are Cantonese speaking.  Cantonese  used. 's name: Mildred.  ID #[id number]. Family explained that they are looking to place their mother in a nursing home permanently, as they can no longer take care of her at home. SW asked if they had looked at list provided and family agreed to look over list, they are interested in [facility] in [facility] which has already accepted the pt.  family will discuss among themselves and inform team tomorrow.   The pt was examined at the bedside. family tried to ask pt questions and pt was not able to answer.  Per the family, pt has been giving signs that pain is controlled with PRNs.     DNR on chart: Yes  Yes      Allergies    No Known Allergies    Intolerances    MEDICATIONS  (STANDING):  albuterol/ipratropium for Nebulization 3 milliLiter(s) Nebulizer every 6 hours  apixaban 2.5 milliGRAM(s) Oral every 12 hours  donepezil 5 milliGRAM(s) Oral at bedtime  folic acid 1 milliGRAM(s) Oral daily  furosemide   Injectable 40 milliGRAM(s) IV Push daily  metoprolol tartrate 50 milliGRAM(s) Oral three times a day  polyethylene glycol 3350 17 Gram(s) Oral two times a day  senna 2 Tablet(s) Oral at bedtime  simvastatin 20 milliGRAM(s) Oral at bedtime    MEDICATIONS  (PRN):  acetaminophen     Tablet .. 650 milliGRAM(s) Oral every 6 hours PRN Temp greater or equal to 38C (100.4F), Mild Pain (1 - 3), Moderate Pain (4 - 6), Severe Pain (7 - 10)  bisacodyl Suppository 10 milliGRAM(s) Rectal daily PRN Constipation  glycopyrrolate Injectable 0.4 milliGRAM(s) IV Push every 6 hours PRN secretions  HYDROmorphone   Solution 1.5 milliGRAM(s) Oral every 2 hours PRN Moderate Pain (4 - 6)  HYDROmorphone   Solution 2.5 milliGRAM(s) Oral every 2 hours PRN Severe Pain (7 - 10)  HYDROmorphone   Solution 2.5 milliGRAM(s) Oral every 2 hours PRN dyspnea  HYDROmorphone  Injectable 0.5 milliGRAM(s) IV Push every 1 hour PRN Severe Pain (7 - 10)  HYDROmorphone  Injectable 0.5 milliGRAM(s) IV Push every 1 hour PRN dyspnea  HYDROmorphone  Injectable 0.3 milliGRAM(s) IV Push every 1 hour PRN Moderate Pain (4 - 6)  HYDROmorphone  Injectable 0.5 milliGRAM(s) IV Push every 1 hour PRN Severe Pain (7 - 10)  LORazepam   Injectable 0.25 milliGRAM(s) IV Push every 2 hours PRN anxiety/agitation/refractory dyspnea  ondansetron Injectable 4 milliGRAM(s) IV Push every 6 hours PRN Nausea and/or Vomiting    ITEMS UNCHECKED ARE NOT PRESENT    PRESENT SYMPTOMS: [x ]Unable to self-report see PAINAD, RDOS below  Source if other than patient:  [ ]Family   [x ]Team     Pain: [ ] yes [ ] no  QOL impact -   Location -                    Aggravating factors -  Quality -  Radiation -  Timing-  Severity (0-10 scale):  Minimal acceptable level (0-10 scale):     Dyspnea:                           [ ]Mild [ ]Moderate [ ]Severe  Anxiety:                             [ ]Mild [ ]Moderate [ ]Severe  Fatigue:                             [ ]Mild [ ]Moderate [ ]Severe  Nausea:                             [ ]Mild [ ]Moderate [ ]Severe  Loss of appetite:              [ ]Mild [ ]Moderate [ ]Severe  Constipation:                    [ ]Mild [ ]Moderate [ ]Severe    PCSSQ [Palliative Care Spiritual Screening Question]   Severity (0-10):  Score of 4 or > indicate consideration of Chaplaincy referral.  Chaplaincy Referral: [ ] yes [ ] refused [ ] following [x ] family deferred     Caregiver [place]?: [ x] yes - children no longer able to care for mother at home   [ ] no [ ] deferred:  Social work referral [ x] Patient & Family Centered Care Referral [ ]     Anticipatory Grief present?:  [x ] yes [ ] no [ ] deferred:  Social work referral [ ] Patient & Family Centered Care Referral [ ]  	  Other Symptoms:  [x ]All other review of systems negative     PHYSICAL EXAM:   Vital Signs Last 24 Hrs  T(C): 36.4 (24 Jan 2023 08:50), Max: 36.4 (24 Jan 2023 08:50)  T(F): 97.6 (24 Jan 2023 08:50), Max: 97.6 (24 Jan 2023 08:50)  HR: 102 (24 Jan 2023 14:00) (94 - 102)  BP: 126/78 (24 Jan 2023 14:00) (109/75 - 141/64)  BP(mean): --  RR: 18 (24 Jan 2023 08:50) (18 - 18)  SpO2: 100% (24 Jan 2023 08:50) (100% - 100%)    Parameters below as of 24 Jan 2023 08:50  Patient On (Oxygen Delivery Method): nasal cannula     I&O's Summary      GENERAL: [ ] Cachexia  [ X]Alert  [ ]Oriented x   [ X]confused  [ ]Unarousable  [x ]Verbal  [ ]Non-Verbal  Behavioral:   [ ] Anxiety  [ ] Delirium [ ] Agitation [ ] Other  HEENT:  [X ]Normal   [ ]Dry mouth   [ ]ET Tube/Trach  [ ]Oral lesions  PULMONARY:   [ X]Clear [ ]Tachypnea  [ ]Audible excessive secretions   [ ]Rhonchi        [ ]Right [ ]Left [ ]Bilateral  [ ]Crackles        [ ]Right [ ]Left [ ]Bilateral  [ ]Wheezing     [ ]Right [ ]Left [ ]Bilateral  [ ]Diminished BS [ ]Right [ ]Left [ ]Bilateral    CARDIOVASCULAR:    [X ]Regular [ ]Irregular [x ]Tachy  [ ]Tee [ ]Murmur [ ]Other  GASTROINTESTINAL:  [X]Soft  [ ]Distended   [ X]+BS  [X ]Non tender [ ]Tender  [ ]Other [ ]PEG [ ]OGT/ NGT   Last BM: 1/24/23 (4 BMs)  GENITOURINARY:  [x ]Normal [ X] Incontinent wears diaper  [ ]Oliguria/Anuria   [ ]Pal  MUSCULOSKELETAL:   [ ]Normal   [X ]Weakness  [ ]Bed/Wheelchair bound [ ]Edema  NEUROLOGIC:   [ ]No focal deficits  [ ] Cognitive impairment  [ ] Dysphagia [ ]Dysarthria [ ] Paresis [ ]Other   SKIN: Ecchymosis   [ ]Normal  [ ]Rash  [ ]Other  [ ]Pressure ulcer(s)  [ ]y [ ]n  Present on admission          CRITICAL CARE:  [ ] Shock Present  [ ]Septic [ ]Cardiogenic [ ]Neurologic [ ]Hypovolemic  [ ]  Vasopressors [ ]  Inotropes   [ ] Respiratory failure present [ ] Mechanical Ventilation [ ] Non-invasive ventilatory support [ ] High-Flow  [ ] Acute  [ ] Chronic [ ] Hypoxic  [ ] Hypercarbic [ ] Other  [ ] Other organ failure     LABS:  none new    RADIOLOGY & ADDITIONAL STUDIES: I have reviewed all pertinent imaging, laboratory and microbiology studies at this visit as this patient is new to me.    PROTEIN CALORIE MALNUTRITION: [ ] mild [ ] moderate [ ] severe  [ ] underweight [ ] morbid obesity    https://www.andeal.org/vault/2440/web/files/ONC/Table_Clinical%20Characteristics%20to%20Document%20Malnutrition-White%20JV%20et%20al%298342.pdf    Height (cm): 154.9 (01-14-23 @ 17:31)  Weight (kg): 56.7 (01-14-23 @ 17:31)  BMI (kg/m2): 23.6 (01-14-23 @ 17:31)    [ x] PPSV2 < or = 30% [ ] significant weight loss [ ] poor nutritional intake [ ] anasarca   Artificial Nutrition [ ]     Other REFERRALS:    [ ] Hospice  [ ]Child Life  [ x]Social Work  [ ]Case management [ ]Holistic Therapy [ ] Physical Therapy [ ] Dietary     Progress Notes - Care Coordination [C. Provider] (01-23-23 @ 14:44)    Palliative Performance Scale:  http://npcrc.org/files/news/palliative_performance_scale_ppsv2.pdf  (Ctrl +  left click to view)  Respiratory Distress Observation Tool:  https://homecareinformation.net/handouts/hen/Respiratory_Distress_Observation_Scale.pdf (Ctrl +  left click to view)  PAINAD Score:  http://geriatrictoolkit.[email or web address].[email or web address]/cog/painad.pdf (Ctrl +  left click to view)   GAP TEAM PALLIATIVE CARE UNIT PROGRESS NOTE:      [  ] Patient on hospice program.    INDICATION FOR PALLIATIVE CARE UNIT SERVICES/Interval HPI: Symptom management in the setting of a 87y old female with  decompensated heart failure and new metastatic malignancy.    Overnight EVENTS: Chart reviewed. She required PRN Dilaudid 2.5mg solution X1 for dyspnea within a 24hr period 8am-8am.    The palliative team, nurse and SW met at bedside with the patient  and family which included the patient's two daughters and two sons. The Patient and family are Cantonese speaking.  Cantonese  used. 's name: Mildred.  ID #[id number]. Family explained that they are looking to place their mother in a nursing home permanently, as they can no longer take care of her at home. SW asked if they had looked at list provided and family agreed to look over list, they are interested in [facility] in [facility] which has already accepted the pt.  family will discuss among themselves and inform team tomorrow.   The pt was examined at the bedside. family tried to ask pt questions and pt was not able to answer.  Per the family, pt has been giving signs that pain is controlled with PRNs.     DNR on chart: Yes  Yes      Allergies    No Known Allergies    Intolerances    MEDICATIONS  (STANDING):  albuterol/ipratropium for Nebulization 3 milliLiter(s) Nebulizer every 6 hours  apixaban 2.5 milliGRAM(s) Oral every 12 hours  donepezil 5 milliGRAM(s) Oral at bedtime  folic acid 1 milliGRAM(s) Oral daily  furosemide   Injectable 40 milliGRAM(s) IV Push daily  metoprolol tartrate 50 milliGRAM(s) Oral three times a day  polyethylene glycol 3350 17 Gram(s) Oral two times a day  senna 2 Tablet(s) Oral at bedtime  simvastatin 20 milliGRAM(s) Oral at bedtime    MEDICATIONS  (PRN):  acetaminophen     Tablet .. 650 milliGRAM(s) Oral every 6 hours PRN Temp greater or equal to 38C (100.4F), Mild Pain (1 - 3), Moderate Pain (4 - 6), Severe Pain (7 - 10)  bisacodyl Suppository 10 milliGRAM(s) Rectal daily PRN Constipation  glycopyrrolate Injectable 0.4 milliGRAM(s) IV Push every 6 hours PRN secretions  HYDROmorphone   Solution 1.5 milliGRAM(s) Oral every 2 hours PRN Moderate Pain (4 - 6)  HYDROmorphone   Solution 2.5 milliGRAM(s) Oral every 2 hours PRN Severe Pain (7 - 10)  HYDROmorphone   Solution 2.5 milliGRAM(s) Oral every 2 hours PRN dyspnea  HYDROmorphone  Injectable 0.5 milliGRAM(s) IV Push every 1 hour PRN Severe Pain (7 - 10)  HYDROmorphone  Injectable 0.5 milliGRAM(s) IV Push every 1 hour PRN dyspnea  HYDROmorphone  Injectable 0.3 milliGRAM(s) IV Push every 1 hour PRN Moderate Pain (4 - 6)  HYDROmorphone  Injectable 0.5 milliGRAM(s) IV Push every 1 hour PRN Severe Pain (7 - 10)  LORazepam   Injectable 0.25 milliGRAM(s) IV Push every 2 hours PRN anxiety/agitation/refractory dyspnea  ondansetron Injectable 4 milliGRAM(s) IV Push every 6 hours PRN Nausea and/or Vomiting    ITEMS UNCHECKED ARE NOT PRESENT    PRESENT SYMPTOMS: [x ]Unable to self-report see PAINAD, RDOS below  Source if other than patient:  [ ]Family   [x ]Team     Pain: [ ] yes [ ] no  QOL impact -   Location -                    Aggravating factors -  Quality -  Radiation -  Timing-  Severity (0-10 scale):  Minimal acceptable level (0-10 scale):     Dyspnea:                           [ ]Mild [ ]Moderate [ ]Severe  Anxiety:                             [ ]Mild [ ]Moderate [ ]Severe  Fatigue:                             [ ]Mild [ ]Moderate [ ]Severe  Nausea:                             [ ]Mild [ ]Moderate [ ]Severe  Loss of appetite:              [ ]Mild [ ]Moderate [ ]Severe  Constipation:                    [ ]Mild [ ]Moderate [ ]Severe    PCSSQ [Palliative Care Spiritual Screening Question]   Severity (0-10):  Score of 4 or > indicate consideration of Chaplaincy referral.  Chaplaincy Referral: [ ] yes [ ] refused [ ] following [x ] family deferred     Caregiver [place]?: [ x] yes - children no longer able to care for mother at home   [ ] no [ ] deferred:  Social work referral [ x] Patient & Family Centered Care Referral [ ]     Anticipatory Grief present?:  [x ] yes [ ] no [ ] deferred:  Social work referral [ ] Patient & Family Centered Care Referral [ ]  	  Other Symptoms:  [x ]All other review of systems negative     PHYSICAL EXAM:   Vital Signs Last 24 Hrs  T(C): 36.4 (24 Jan 2023 08:50), Max: 36.4 (24 Jan 2023 08:50)  T(F): 97.6 (24 Jan 2023 08:50), Max: 97.6 (24 Jan 2023 08:50)  HR: 102 (24 Jan 2023 14:00) (94 - 102)  BP: 126/78 (24 Jan 2023 14:00) (109/75 - 141/64)  BP(mean): --  RR: 18 (24 Jan 2023 08:50) (18 - 18)  SpO2: 100% (24 Jan 2023 08:50) (100% - 100%)    Parameters below as of 24 Jan 2023 08:50  Patient On (Oxygen Delivery Method): nasal cannula     I&O's Summary      GENERAL: [ ] Cachexia  [ X]Alert  [ ]Oriented x   [ X]confused  [ ]Unarousable  [x ]Verbal  [ ]Non-Verbal  Behavioral:   [ ] Anxiety  [ ] Delirium [ ] Agitation [ ] Other  HEENT:  [X ]Normal   [ ]Dry mouth   [ ]ET Tube/Trach  [ ]Oral lesions  PULMONARY:   [ X]Clear [ ]Tachypnea  [ ]Audible excessive secretions   [ ]Rhonchi        [ ]Right [ ]Left [ ]Bilateral  [ ]Crackles        [ ]Right [ ]Left [ ]Bilateral  [ ]Wheezing     [ ]Right [ ]Left [ ]Bilateral  [ ]Diminished BS [ ]Right [ ]Left [ ]Bilateral    CARDIOVASCULAR:    [X ]Regular [ ]Irregular [x ]Tachy  [ ]Tee [ ]Murmur [ ]Other  GASTROINTESTINAL:  [X]Soft  [ ]Distended   [ X]+BS  [X ]Non tender [ ]Tender  [ ]Other [ ]PEG [ ]OGT/ NGT   Last BM: 1/24/23 (4 BMs)  GENITOURINARY:  [x ]Normal [ X] Incontinent wears diaper  [ ]Oliguria/Anuria   [ ]Pal  MUSCULOSKELETAL:   [ ]Normal   [X ]Weakness  [ ]Bed/Wheelchair bound [ ]Edema  NEUROLOGIC:   [ ]No focal deficits  [ ] Cognitive impairment  [ ] Dysphagia [ ]Dysarthria [ ] Paresis [ ]Other   SKIN: Ecchymosis   [ ]Normal  [ ]Rash  [ ]Other  [ ]Pressure ulcer(s)  [ ]y [ ]n  Present on admission          CRITICAL CARE:  [ ] Shock Present  [ ]Septic [ ]Cardiogenic [ ]Neurologic [ ]Hypovolemic  [ ]  Vasopressors [ ]  Inotropes   [ ] Respiratory failure present [ ] Mechanical Ventilation [ ] Non-invasive ventilatory support [ ] High-Flow  [ ] Acute  [ ] Chronic [ ] Hypoxic  [ ] Hypercarbic [ ] Other  [ ] Other organ failure     LABS:  none new    RADIOLOGY & ADDITIONAL STUDIES: I have reviewed all pertinent imaging, laboratory and microbiology studies at this visit as this patient is new to me.    PROTEIN CALORIE MALNUTRITION: [ ] mild [ ] moderate [ ] severe  [ ] underweight [ ] morbid obesity    https://www.andeal.org/vault/2440/web/files/ONC/Table_Clinical%20Characteristics%20to%20Document%20Malnutrition-White%20JV%20et%20al%605303.pdf    Height (cm): 154.9 (01-14-23 @ 17:31)  Weight (kg): 56.7 (01-14-23 @ 17:31)  BMI (kg/m2): 23.6 (01-14-23 @ 17:31)    [ x] PPSV2 < or = 30% [ ] significant weight loss [ ] poor nutritional intake [ ] anasarca   Artificial Nutrition [ ]     Other REFERRALS:    [ ] Hospice  [ ]Child Life  [ x]Social Work  [ ]Case management [ ]Holistic Therapy [ ] Physical Therapy [ ] Dietary     Progress Notes - Care Coordination [C. Provider] (01-23-23 @ 14:44)    Palliative Performance Scale:  http://npcrc.org/files/news/palliative_performance_scale_ppsv2.pdf  (Ctrl +  left click to view)  Respiratory Distress Observation Tool:  https://homecareinformation.net/handouts/hen/Respiratory_Distress_Observation_Scale.pdf (Ctrl +  left click to view)  PAINAD Score:  http://[email or web address].[email or web address].[email or web address]/cog/painad.pdf (Ctrl +  left click to view)   GAP TEAM PALLIATIVE CARE UNIT PROGRESS NOTE:      [  ] Patient on hospice program.    INDICATION FOR PALLIATIVE CARE UNIT SERVICES/Interval HPI: Symptom management in the setting of a 87y old female with  decompensated heart failure and new metastatic malignancy.    Overnight EVENTS: Chart reviewed. She required PRN Dilaudid 2.5mg solution X1 for dyspnea within a 24hr period 8am-8am.    The palliative team, nurse and SW met at bedside with the patient  and family which included the patient's two daughters and two sons. The Patient and family are Cantonese speaking.  Cantonese  used. 's name: Mildred.  ID #[id number]. Family explained that they are looking to place their mother in a nursing home permanently, as they can no longer take care of her at home. SW asked if they had looked at list provided and family agreed to look over list, they are interested in [facility] in [facility] which has already accepted the pt.  family will discuss among themselves and inform team tomorrow.   The pt was examined at the bedside. family tried to ask pt questions and pt was not able to answer.  Per the family, pt has been giving signs that pain is controlled with PRNs.     DNR on chart: Yes  Yes      Allergies    No Known Allergies    Intolerances    MEDICATIONS  (STANDING):  albuterol/ipratropium for Nebulization 3 milliLiter(s) Nebulizer every 6 hours  apixaban 2.5 milliGRAM(s) Oral every 12 hours  donepezil 5 milliGRAM(s) Oral at bedtime  folic acid 1 milliGRAM(s) Oral daily  furosemide   Injectable 40 milliGRAM(s) IV Push daily  metoprolol tartrate 50 milliGRAM(s) Oral three times a day  polyethylene glycol 3350 17 Gram(s) Oral two times a day  senna 2 Tablet(s) Oral at bedtime  simvastatin 20 milliGRAM(s) Oral at bedtime    MEDICATIONS  (PRN):  acetaminophen     Tablet .. 650 milliGRAM(s) Oral every 6 hours PRN Temp greater or equal to 38C (100.4F), Mild Pain (1 - 3), Moderate Pain (4 - 6), Severe Pain (7 - 10)  bisacodyl Suppository 10 milliGRAM(s) Rectal daily PRN Constipation  glycopyrrolate Injectable 0.4 milliGRAM(s) IV Push every 6 hours PRN secretions  HYDROmorphone   Solution 1.5 milliGRAM(s) Oral every 2 hours PRN Moderate Pain (4 - 6)  HYDROmorphone   Solution 2.5 milliGRAM(s) Oral every 2 hours PRN Severe Pain (7 - 10)  HYDROmorphone   Solution 2.5 milliGRAM(s) Oral every 2 hours PRN dyspnea  HYDROmorphone  Injectable 0.5 milliGRAM(s) IV Push every 1 hour PRN Severe Pain (7 - 10)  HYDROmorphone  Injectable 0.5 milliGRAM(s) IV Push every 1 hour PRN dyspnea  HYDROmorphone  Injectable 0.3 milliGRAM(s) IV Push every 1 hour PRN Moderate Pain (4 - 6)  HYDROmorphone  Injectable 0.5 milliGRAM(s) IV Push every 1 hour PRN Severe Pain (7 - 10)  LORazepam   Injectable 0.25 milliGRAM(s) IV Push every 2 hours PRN anxiety/agitation/refractory dyspnea  ondansetron Injectable 4 milliGRAM(s) IV Push every 6 hours PRN Nausea and/or Vomiting    ITEMS UNCHECKED ARE NOT PRESENT    PRESENT SYMPTOMS: [x ]Unable to self-report see PAINAD, RDOS below  Source if other than patient:  [ ]Family   [x ]Team     Pain: [ ] yes [ ] no  QOL impact -   Location -                    Aggravating factors -  Quality -  Radiation -  Timing-  Severity (0-10 scale):  Minimal acceptable level (0-10 scale):     Dyspnea:                           [ ]Mild [ ]Moderate [ ]Severe  Anxiety:                             [ ]Mild [ ]Moderate [ ]Severe  Fatigue:                             [ ]Mild [ ]Moderate [ ]Severe  Nausea:                             [ ]Mild [ ]Moderate [ ]Severe  Loss of appetite:              [ ]Mild [ ]Moderate [ ]Severe  Constipation:                    [ ]Mild [ ]Moderate [ ]Severe    PCSSQ [Palliative Care Spiritual Screening Question]   Severity (0-10):  Score of 4 or > indicate consideration of Chaplaincy referral.  Chaplaincy Referral: [ ] yes [ ] refused [ ] following [x ] family deferred     Caregiver [place]?: [ x] yes - children no longer able to care for mother at home   [ ] no [ ] deferred:  Social work referral [ x] Patient & Family Centered Care Referral [ ]     Anticipatory Grief present?:  [x ] yes [ ] no [ ] deferred:  Social work referral [ ] Patient & Family Centered Care Referral [ ]  	  Other Symptoms:  [x ]All other review of systems negative     PHYSICAL EXAM:   Vital Signs Last 24 Hrs  T(C): 36.4 (24 Jan 2023 08:50), Max: 36.4 (24 Jan 2023 08:50)  T(F): 97.6 (24 Jan 2023 08:50), Max: 97.6 (24 Jan 2023 08:50)  HR: 102 (24 Jan 2023 14:00) (94 - 102)  BP: 126/78 (24 Jan 2023 14:00) (109/75 - 141/64)  BP(mean): --  RR: 18 (24 Jan 2023 08:50) (18 - 18)  SpO2: 100% (24 Jan 2023 08:50) (100% - 100%)    Parameters below as of 24 Jan 2023 08:50  Patient On (Oxygen Delivery Method): nasal cannula     I&O's Summary      GENERAL: [ ] Cachexia  [ X]Alert  [ ]Oriented x   [ X]confused  [ ]Unarousable  [x ]Verbal  [ ]Non-Verbal  Behavioral:   [ ] Anxiety  [ ] Delirium [ ] Agitation [ ] Other  HEENT:  [X ]Normal   [ ]Dry mouth   [ ]ET Tube/Trach  [ ]Oral lesions  PULMONARY:   [ X]Clear [ ]Tachypnea  [ ]Audible excessive secretions   [ ]Rhonchi        [ ]Right [ ]Left [ ]Bilateral  [ ]Crackles        [ ]Right [ ]Left [ ]Bilateral  [ ]Wheezing     [ ]Right [ ]Left [ ]Bilateral  [ ]Diminished BS [ ]Right [ ]Left [ ]Bilateral    CARDIOVASCULAR:    [X ]Regular [ ]Irregular [x ]Tachy  [ ]Tee [ ]Murmur [ ]Other  GASTROINTESTINAL:  [X]Soft  [ ]Distended   [ X]+BS  [X ]Non tender [ ]Tender  [ ]Other [ ]PEG [ ]OGT/ NGT   Last BM: 1/24/23 (4 BMs)  GENITOURINARY:  [x ]Normal [ X] Incontinent wears diaper  [ ]Oliguria/Anuria   [ ]Pal  MUSCULOSKELETAL:   [ ]Normal   [X ]Weakness  [ ]Bed/Wheelchair bound [ ]Edema  NEUROLOGIC:   [ ]No focal deficits  [ ] Cognitive impairment  [ ] Dysphagia [ ]Dysarthria [ ] Paresis [ ]Other   SKIN: Ecchymosis   [ ]Normal  [ ]Rash  [ ]Other  [ ]Pressure ulcer(s)  [ ]y [ ]n  Present on admission          CRITICAL CARE:  [ ] Shock Present  [ ]Septic [ ]Cardiogenic [ ]Neurologic [ ]Hypovolemic  [ ]  Vasopressors [ ]  Inotropes   [ ] Respiratory failure present [ ] Mechanical Ventilation [ ] Non-invasive ventilatory support [ ] High-Flow  [ ] Acute  [ ] Chronic [ ] Hypoxic  [ ] Hypercarbic [ ] Other  [ ] Other organ failure     LABS:  none new    RADIOLOGY & ADDITIONAL STUDIES: I have reviewed all pertinent imaging, laboratory and microbiology studies at this visit as this patient is new to me.    PROTEIN CALORIE MALNUTRITION: [ ] mild [ ] moderate [ ] severe  [ ] underweight [ ] morbid obesity    https://www.andeal.org/vault/2440/web/files/ONC/Table_Clinical%20Characteristics%20to%20Document%20Malnutrition-White%20JV%20et%20al%152705.pdf    Height (cm): 154.9 (01-14-23 @ 17:31)  Weight (kg): 56.7 (01-14-23 @ 17:31)  BMI (kg/m2): 23.6 (01-14-23 @ 17:31)    [ x] PPSV2 < or = 30% [ ] significant weight loss [ ] poor nutritional intake [ ] anasarca   Artificial Nutrition [ ]     Other REFERRALS:    [ ] Hospice  [ ]Child Life  [ x]Social Work  [ ]Case management [ ]Holistic Therapy [ ] Physical Therapy [ ] Dietary     Progress Notes - Care Coordination [C. Provider] (01-23-23 @ 14:44)    Palliative Performance Scale:  http://npcrc.org/files/news/palliative_performance_scale_ppsv2.pdf  (Ctrl +  left click to view)  Respiratory Distress Observation Tool:  https://homecareinformation.net/handouts/hen/Respiratory_Distress_Observation_Scale.pdf (Ctrl +  left click to view)  PAINAD Score:  http://[email or web address].[email or web address].[email or web address]/cog/painad.pdf (Ctrl +  left click to view)

## 2023-01-24 NOTE — PROGRESS NOTE ADULT - ATTENDING COMMENTS
The patient is an 87-year-old woman who is followed for hypertension, diabetes mellitus, and dementia and who has a history of breast and colon cancers (now in remission as per family) who presented today with shortness of breath. Patient admitted and treated for decompensated heart failure. Found  abnormal findings on CT scan concerning for malignancy, investigation of this declined by family.  Pt tx to PCU for symptom management and disposition planning,  Family is looking at nursing homes in the [facility] and are close to deciding.  Ideally hospice enrollment should take place once transferred.  Chart, notes, imaging and lab data reviewed by me as this patient is new to me.      Patient assessment and plan discussed on interdisciplinary team rounds today. The patient is an 87-year-old woman who is followed for hypertension, diabetes mellitus, and dementia and who has a history of breast and colon cancers (now in remission as per family) who presented today with shortness of breath. Patient admitted and treated for decompensated heart failure. Found  abnormal findings on CT scan concerning for malignancy, investigation of this declined by family.  Pt tx to PCU for symptom management and disposition planning,  Family is looking at nursing homes in the [facility] and are close to deciding.  Ideally hospice enrollment should take place once transferred.  Chart, notes, imaging and lab data reviewed by me as this patient is new to me.      Patient assessment and plan discussed on interdisciplinary team rounds today. The patient is an 87-year-old woman who is followed for hypertension, diabetes mellitus, and dementia and who has a history of breast and colon cancers (now in remission as per family) who presented today with shortness of breath. Patient admitted and treated for decompensated heart failure. Found  abnormal findings on CT scan concerning for malignancy, investigation of this declined by family.  Pt tx to PCU for symptom management and disposition planning,  Family is looking at nursing homes in the [facility] and are close to deciding.  Ideally hospice enrollment should take place once transferred.  Chart, notes, imaging and lab data reviewed by me as this patient is new to me.      Patient assessment and plan discussed on interdisciplinary team rounds today.

## 2023-01-24 NOTE — PROGRESS NOTE ADULT - PROBLEM SELECTOR PLAN 2
PRN used:  1 x dialudid 2.5mg soln q2hr PRN  for dyspnea  -c/w lasix daily, for comfort  -cw dialudid 2.5mg soln q2hr PRN    -c/w dialudid 0.5mg q1hr PRN   c/w ativan 0.2mg q1h pnr for refractory dyspnea.

## 2023-01-24 NOTE — PROGRESS NOTE ADULT - PROBLEM SELECTOR PLAN 5
-DNR/DNI established, in PCU for comfort measures only  -family speak cantonese  -dispo planning: family want NH in [place] but need to discuss among all siblings  -Palliative team provided support to family

## 2023-01-24 NOTE — PROGRESS NOTE ADULT - ATTENDING SUPERVISION STATEMENT
Fellow
Resident
Resident
Fellow
Resident

## 2023-01-24 NOTE — PROGRESS NOTE ADULT - PROBLEM SELECTOR PLAN 1
-used no PRNs in last 24hrs  -c/w dialudid 0.3 and 0.5mg q1hr PRN  for moderate - severe pain   -c/w bowel regimen.

## 2023-01-25 LAB
CULTURE RESULTS: SIGNIFICANT CHANGE UP
SPECIMEN SOURCE: SIGNIFICANT CHANGE UP

## 2023-01-25 PROCEDURE — 99232 SBSQ HOSP IP/OBS MODERATE 35: CPT

## 2023-01-25 RX ORDER — FUROSEMIDE 40 MG
40 TABLET ORAL DAILY
Refills: 0 | Status: DISCONTINUED | OUTPATIENT
Start: 2023-01-25 | End: 2023-01-27

## 2023-01-25 RX ORDER — HYDROMORPHONE HYDROCHLORIDE 2 MG/ML
0.5 INJECTION INTRAMUSCULAR; INTRAVENOUS; SUBCUTANEOUS ONCE
Refills: 0 | Status: DISCONTINUED | OUTPATIENT
Start: 2023-01-25 | End: 2023-01-25

## 2023-01-25 RX ADMIN — Medication 50 MILLIGRAM(S): at 13:29

## 2023-01-25 RX ADMIN — Medication 50 MILLIGRAM(S): at 06:19

## 2023-01-25 RX ADMIN — Medication 40 MILLIGRAM(S): at 06:19

## 2023-01-25 RX ADMIN — Medication 50 MILLIGRAM(S): at 22:23

## 2023-01-25 RX ADMIN — Medication 1 MILLIGRAM(S): at 13:28

## 2023-01-25 RX ADMIN — HYDROMORPHONE HYDROCHLORIDE 0.5 MILLIGRAM(S): 2 INJECTION INTRAMUSCULAR; INTRAVENOUS; SUBCUTANEOUS at 11:00

## 2023-01-25 RX ADMIN — SENNA PLUS 2 TABLET(S): 8.6 TABLET ORAL at 22:23

## 2023-01-25 RX ADMIN — DONEPEZIL HYDROCHLORIDE 5 MILLIGRAM(S): 10 TABLET, FILM COATED ORAL at 22:24

## 2023-01-25 RX ADMIN — APIXABAN 2.5 MILLIGRAM(S): 2.5 TABLET, FILM COATED ORAL at 06:19

## 2023-01-25 RX ADMIN — SIMVASTATIN 20 MILLIGRAM(S): 20 TABLET, FILM COATED ORAL at 22:23

## 2023-01-25 RX ADMIN — APIXABAN 2.5 MILLIGRAM(S): 2.5 TABLET, FILM COATED ORAL at 17:37

## 2023-01-25 RX ADMIN — HYDROMORPHONE HYDROCHLORIDE 2.5 MILLIGRAM(S): 2 INJECTION INTRAMUSCULAR; INTRAVENOUS; SUBCUTANEOUS at 20:50

## 2023-01-25 RX ADMIN — HYDROMORPHONE HYDROCHLORIDE 2.5 MILLIGRAM(S): 2 INJECTION INTRAMUSCULAR; INTRAVENOUS; SUBCUTANEOUS at 21:20

## 2023-01-25 NOTE — PROGRESS NOTE ADULT - PROBLEM SELECTOR PLAN 6
- Dispo planning: family want NH in [place] but need to discuss among all siblings.  to follow up.  - Will continue with care in the PCU

## 2023-01-25 NOTE — PROGRESS NOTE ADULT - PROBLEM SELECTOR PLAN 2
- Continue with Dilaudid 2.5mg soln q2hr PRN ( 0 required within a 24hr period 8am-8am)   - Continue with Dilaudid 0.5mg IV q1hr PRN for dyspnea- if patient is unable to tolerate PO  - Continue with Lasix daily, for comfort  - Continue with ativan 0.2mg q1h PRN for refractory dyspnea. - Continue with Dilaudid 2.5mg soln q2hr PRN ( 0 required within a 24hr period 8am-8am)   - Continue with Lasix daily, for comfort  - Continue with Ativan 0.2mg q1h PRN for refractory dyspnea.

## 2023-01-25 NOTE — PROGRESS NOTE ADULT - PROBLEM SELECTOR PLAN 5
-DNR/DNI established, in PCU for comfort measures only  -family speak cantonese  -dispo planning: family want NH in [place] but need to discuss among all siblings  -Palliative team provided support to family ?new malignancy  - Family declines wanting to do further work up.

## 2023-01-25 NOTE — PROGRESS NOTE ADULT - PROBLEM SELECTOR PLAN 1
- Continue with Dilaudid 1.5mg solution q2hr PRN for moderate pain ( 0 required within a 24hr period 8am-8am)  - Continue with Dilaudid 2.5mg solution q2hr PRN for severe pain ( 0 required within a 24hr period 8am-8am)  - Continue with Dilaudid 0.3 and 0.5mg IV q1hr PRN  for moderate - severe pain if patient is unable to tolerate PO  - Continue with bowel regimen. - Continue with Dilaudid 1.5mg solution q2hr PRN for moderate pain ( 0 required within a 24hr period 8am-8am)  - Continue with Dilaudid 2.5mg solution q2hr PRN for severe pain ( 0 required within a 24hr period 8am-8am)  - Continue with bowel regimen.

## 2023-01-25 NOTE — PROGRESS NOTE ADULT - SUBJECTIVE AND OBJECTIVE BOX
GAP TEAM PALLIATIVE CARE UNIT PROGRESS NOTE:      [  ] Patient on hospice program.    INDICATION FOR PALLIATIVE CARE UNIT SERVICES/Interval HPI:  Symptom management in the setting of a 87y old female with  decompensated heart failure and new metastatic malignancy.    OVERNIGHT EVENTS: Chart reviewed. The patient is seen and examined at the bedside. Patient is Cantonese speaking. Cantonese  used. 's name: Marky.  ID #[id number].  She required PRN Dilaudid 2.5mg PO X2 for dyspnea within a 24hr period 8am-8am.    DNR on chart: Yes  Yes      Allergies    No Known Allergies    Intolerances    MEDICATIONS  (STANDING):  apixaban 2.5 milliGRAM(s) Oral every 12 hours  donepezil 5 milliGRAM(s) Oral at bedtime  folic acid 1 milliGRAM(s) Oral daily  furosemide   Injectable 40 milliGRAM(s) IV Push daily  metoprolol tartrate 50 milliGRAM(s) Oral three times a day  polyethylene glycol 3350 17 Gram(s) Oral two times a day  senna 2 Tablet(s) Oral at bedtime  simvastatin 20 milliGRAM(s) Oral at bedtime    MEDICATIONS  (PRN):  acetaminophen     Tablet .. 650 milliGRAM(s) Oral every 6 hours PRN Temp greater or equal to 38C (100.4F), Mild Pain (1 - 3), Moderate Pain (4 - 6), Severe Pain (7 - 10)  bisacodyl Suppository 10 milliGRAM(s) Rectal daily PRN Constipation  glycopyrrolate Injectable 0.4 milliGRAM(s) IV Push every 6 hours PRN secretions  HYDROmorphone   Solution 1.5 milliGRAM(s) Oral every 2 hours PRN Moderate Pain (4 - 6)  HYDROmorphone   Solution 2.5 milliGRAM(s) Oral every 2 hours PRN Severe Pain (7 - 10)  HYDROmorphone   Solution 2.5 milliGRAM(s) Oral every 2 hours PRN dyspnea  HYDROmorphone  Injectable 0.5 milliGRAM(s) IV Push every 1 hour PRN Severe Pain (7 - 10)  HYDROmorphone  Injectable 0.5 milliGRAM(s) IV Push every 1 hour PRN dyspnea  HYDROmorphone  Injectable 0.3 milliGRAM(s) IV Push every 1 hour PRN Moderate Pain (4 - 6)  HYDROmorphone  Injectable 0.5 milliGRAM(s) IV Push every 1 hour PRN Severe Pain (7 - 10)  LORazepam   Injectable 0.25 milliGRAM(s) IV Push every 2 hours PRN anxiety/agitation/refractory dyspnea  ondansetron Injectable 4 milliGRAM(s) IV Push every 6 hours PRN Nausea and/or Vomiting    ITEMS UNCHECKED ARE NOT PRESENT    PRESENT SYMPTOMS: [X ]Unable to self-report see PAINAD, RDOS below  Source if other than patient:  [ ]Family   [X ]Team     Pain: [ ] yes [ ] no  QOL impact -   Location -                    Aggravating factors -  Quality -  Radiation -  Timing-  Severity (0-10 scale):  Minimal acceptable level (0-10 scale):     Dyspnea:                           [ ]Mild [ ]Moderate [ ]Severe  Anxiety:                             [ ]Mild [ ]Moderate [ ]Severe  Fatigue:                             [ ]Mild [ ]Moderate [ ]Severe  Nausea:                             [ ]Mild [ ]Moderate [ ]Severe  Loss of appetite:              [ ]Mild [ ]Moderate [ ]Severe  Constipation:                    [ ]Mild [ ]Moderate [ ]Severe    PCSSQ [Palliative Care Spiritual Screening Question]   Severity (0-10):  Score of 4 or > indicate consideration of Chaplaincy referral.  Chaplaincy Referral: [ ] yes [ ] refused [ X] following [ ] deferred    Caregiver [place]? : [X ] yes [ ] no [ ] deferred:  Social work referral [X ] Patient & Family Centered Care Referral [ ]     Anticipatory Grief present?:  [ X] yes [ ] no [ ] deferred:  Social work referral [X ] Patient & Family Centered Care Referral [ ]  	  Other Symptoms:  [ ]All other review of systems negative: Unable to assess due to poor mentation.    PHYSICAL EXAM:   Vital Signs Last 24 Hrs  T(C): 36.4 (25 Jan 2023 08:06), Max: 36.4 (25 Jan 2023 08:06)  T(F): 97.5 (25 Jan 2023 08:06), Max: 97.5 (25 Jan 2023 08:06)  HR: 85 (25 Jan 2023 08:06) (85 - 102)  BP: 126/76 (25 Jan 2023 08:06) (126/76 - 126/78)  BP(mean): --  RR: 18 (25 Jan 2023 08:06) (18 - 18)  SpO2: 94% (25 Jan 2023 08:06) (94% - 94%)    Parameters below as of 25 Jan 2023 08:06  Patient On (Oxygen Delivery Method): nasal cannula    I&O's Summary    GENERAL: [ ] Cachexia  [ X]Alert  [ ]Oriented x   [ ]Lethargic  [ ]Unarousable  [ X]Verbal  [ ]Non-Verbal  Behavioral:   [ ] Anxiety  [ ] Delirium [ ] Agitation [ ] Other  HEENT:  [X ]Normal   [ ]Dry mouth   [ ]ET Tube/Trach  [ ]Oral lesions  PULMONARY:   [ X]Clear [ ]Tachypnea  [ ]Audible excessive secretions   [ ]Rhonchi        [ ]Right [ ]Left [ ]Bilateral  [ ]Crackles        [ ]Right [ ]Left [ ]Bilateral  [ ]Wheezing     [ ]Right [ ]Left [ ]Bilateral  [ ]Diminished BS [ ]Right [ ]Left [ ]Bilateral    CARDIOVASCULAR:    [X ]Regular [ ]Irregular [ ]Tachy  [ ]Tee [ ]Murmur [ ]Other  GASTROINTESTINAL:  [X]Soft  [ ]Distended   [ X]+BS  [X ]Non tender [ ]Tender  [ ]Other [ ]PEG [ ]OGT/ NGT   Last BM: 1/24/23  GENITOURINARY:  [ ]Normal [ X] Incontinent   [ ]Oliguria/Anuria   [ ]Pal  MUSCULOSKELETAL:   [ ]Normal   [X ]Weakness  [ ]Bed/Wheelchair bound [ ]Edema  NEUROLOGIC:   [ ]No focal deficits  [ ] Cognitive impairment  [ ] Dysphagia [ ]Dysarthria [ ] Paresis [ ]Other   SKIN: Ecchymosis   [ ]Normal  [ ]Rash  [ ]Other  [ ]Pressure ulcer(s)  [ ]y [ ]n  Present on admission      CRITICAL CARE:  [ ] Shock Present  [ ]Septic [ ]Cardiogenic [ ]Neurologic [ ]Hypovolemic  [ ]  Vasopressors [ ]  Inotropes   [ ] Respiratory failure present [ ] Mechanical Ventilation [ ] Non-invasive ventilatory support [ ] High-Flow  [ ] Acute  [ ] Chronic [ ] Hypoxic  [ ] Hypercarbic [ ] Other  [ ] Other organ failure     LABS: None noted.    RADIOLOGY & ADDITIONAL STUDIES: None noted.      PROTEIN CALORIE MALNUTRITION: [ ] mild [ ] moderate [ ] severe  [ ] underweight [ ] morbid obesity    https://www.andeal.org/vault/1812/web/files/ONC/Table_Clinical%20Characteristics%20to%20Document%20Malnutrition-White%20JV%20et%20al%202012.pdf    Height (cm): 154.9 (01-14-23 @ 17:31)  Weight (kg): 56.7 (01-14-23 @ 17:31)  BMI (kg/m2): 23.6 (01-14-23 @ 17:31)    [ X] PPSV2 < or = 30% [ ] significant weight loss [ ] poor nutritional intake [ ] anasarca   Artificial Nutrition [ ]     Other REFERRALS:    [ ] Hospice  [ ]Child Life  [X ]Social Work  [ ]Case management [ ]Holistic Therapy [ ] Physical Therapy [ ] Dietary     Progress Notes - Care Coordination [C. Provider] (01-24-23 @ 15:42)      Palliative Performance Scale:  http://npcrc.org/files/news/palliative_performance_scale_ppsv2.pdf  (Ctrl +  left click to view)  Respiratory Distress Observation Tool:  https://homecareinformation.net/handouts/hen/Respiratory_Distress_Observation_Scale.pdf (Ctrl +  left click to view)  PAINAD Score:  http://geriatrictoolkit.[place].[facility]/cog/painad.pdf (Ctrl +  left click to view)   GAP TEAM PALLIATIVE CARE UNIT PROGRESS NOTE:      [  ] Patient on hospice program.    INDICATION FOR PALLIATIVE CARE UNIT SERVICES/Interval HPI:  Symptom management in the setting of a 87y old female with  decompensated heart failure and new metastatic malignancy.    OVERNIGHT EVENTS: Chart reviewed. The patient is seen and examined at the bedside. Patient is Cantonese speaking. Cantonese  used. 's name: Marky.  ID #[id number].  She required PRN Dilaudid 2.5mg PO X2 for dyspnea within a 24hr period 8am-8am.    DNR on chart: Yes  Yes      Allergies    No Known Allergies    Intolerances    MEDICATIONS  (STANDING):  apixaban 2.5 milliGRAM(s) Oral every 12 hours  donepezil 5 milliGRAM(s) Oral at bedtime  folic acid 1 milliGRAM(s) Oral daily  furosemide   Injectable 40 milliGRAM(s) IV Push daily  metoprolol tartrate 50 milliGRAM(s) Oral three times a day  polyethylene glycol 3350 17 Gram(s) Oral two times a day  senna 2 Tablet(s) Oral at bedtime  simvastatin 20 milliGRAM(s) Oral at bedtime    MEDICATIONS  (PRN):  acetaminophen     Tablet .. 650 milliGRAM(s) Oral every 6 hours PRN Temp greater or equal to 38C (100.4F), Mild Pain (1 - 3), Moderate Pain (4 - 6), Severe Pain (7 - 10)  bisacodyl Suppository 10 milliGRAM(s) Rectal daily PRN Constipation  glycopyrrolate Injectable 0.4 milliGRAM(s) IV Push every 6 hours PRN secretions  HYDROmorphone   Solution 1.5 milliGRAM(s) Oral every 2 hours PRN Moderate Pain (4 - 6)  HYDROmorphone   Solution 2.5 milliGRAM(s) Oral every 2 hours PRN Severe Pain (7 - 10)  HYDROmorphone   Solution 2.5 milliGRAM(s) Oral every 2 hours PRN dyspnea  HYDROmorphone  Injectable 0.5 milliGRAM(s) IV Push every 1 hour PRN Severe Pain (7 - 10)  HYDROmorphone  Injectable 0.5 milliGRAM(s) IV Push every 1 hour PRN dyspnea  HYDROmorphone  Injectable 0.3 milliGRAM(s) IV Push every 1 hour PRN Moderate Pain (4 - 6)  HYDROmorphone  Injectable 0.5 milliGRAM(s) IV Push every 1 hour PRN Severe Pain (7 - 10)  LORazepam   Injectable 0.25 milliGRAM(s) IV Push every 2 hours PRN anxiety/agitation/refractory dyspnea  ondansetron Injectable 4 milliGRAM(s) IV Push every 6 hours PRN Nausea and/or Vomiting    ITEMS UNCHECKED ARE NOT PRESENT    PRESENT SYMPTOMS: [X ]Unable to self-report see PAINAD, RDOS below  Source if other than patient:  [ ]Family   [X ]Team     Pain: [ ] yes [ ] no  QOL impact -   Location -                    Aggravating factors -  Quality -  Radiation -  Timing-  Severity (0-10 scale):  Minimal acceptable level (0-10 scale):     Dyspnea:                           [ ]Mild [ ]Moderate [ ]Severe  Anxiety:                             [ ]Mild [ ]Moderate [ ]Severe  Fatigue:                             [ ]Mild [ ]Moderate [ ]Severe  Nausea:                             [ ]Mild [ ]Moderate [ ]Severe  Loss of appetite:              [ ]Mild [ ]Moderate [ ]Severe  Constipation:                    [ ]Mild [ ]Moderate [ ]Severe    PCSSQ [Palliative Care Spiritual Screening Question]   Severity (0-10):  Score of 4 or > indicate consideration of Chaplaincy referral.  Chaplaincy Referral: [ ] yes [ ] refused [ X] following [ ] deferred    Caregiver [place]? : [X ] yes [ ] no [ ] deferred:  Social work referral [X ] Patient & Family Centered Care Referral [ ]     Anticipatory Grief present?:  [ X] yes [ ] no [ ] deferred:  Social work referral [X ] Patient & Family Centered Care Referral [ ]  	  Other Symptoms:  [ ]All other review of systems negative: Unable to assess due to poor mentation.    PHYSICAL EXAM:   Vital Signs Last 24 Hrs  T(C): 36.4 (25 Jan 2023 08:06), Max: 36.4 (25 Jan 2023 08:06)  T(F): 97.5 (25 Jan 2023 08:06), Max: 97.5 (25 Jan 2023 08:06)  HR: 85 (25 Jan 2023 08:06) (85 - 102)  BP: 126/76 (25 Jan 2023 08:06) (126/76 - 126/78)  BP(mean): --  RR: 18 (25 Jan 2023 08:06) (18 - 18)  SpO2: 94% (25 Jan 2023 08:06) (94% - 94%)    Parameters below as of 25 Jan 2023 08:06  Patient On (Oxygen Delivery Method): nasal cannula    I&O's Summary    GENERAL: [ ] Cachexia  [ X]Alert  [ ]Oriented x   [ ]Lethargic  [ ]Unarousable  [ X]Verbal  [ ]Non-Verbal  Behavioral:   [ ] Anxiety  [ ] Delirium [ ] Agitation [ ] Other  HEENT:  [X ]Normal   [ ]Dry mouth   [ ]ET Tube/Trach  [ ]Oral lesions  PULMONARY:   [ X]Clear [ ]Tachypnea  [ ]Audible excessive secretions   [ ]Rhonchi        [ ]Right [ ]Left [ ]Bilateral  [ ]Crackles        [ ]Right [ ]Left [ ]Bilateral  [ ]Wheezing     [ ]Right [ ]Left [ ]Bilateral  [ ]Diminished BS [ ]Right [ ]Left [ ]Bilateral    CARDIOVASCULAR:    [X ]Regular [ ]Irregular [ ]Tachy  [ ]Tee [ ]Murmur [ ]Other  GASTROINTESTINAL:  [X]Soft  [ ]Distended   [ X]+BS  [X ]Non tender [ ]Tender  [ ]Other [ ]PEG [ ]OGT/ NGT   Last BM: 1/24/23  GENITOURINARY:  [ ]Normal [ X] Incontinent   [ ]Oliguria/Anuria   [ ]Pal  MUSCULOSKELETAL:   [ ]Normal   [X ]Weakness  [ ]Bed/Wheelchair bound [ ]Edema  NEUROLOGIC:   [ ]No focal deficits  [ ] Cognitive impairment  [ ] Dysphagia [ ]Dysarthria [ ] Paresis [ ]Other   SKIN: Ecchymosis   [ ]Normal  [ ]Rash  [ ]Other  [ ]Pressure ulcer(s)  [ ]y [ ]n  Present on admission      CRITICAL CARE:  [ ] Shock Present  [ ]Septic [ ]Cardiogenic [ ]Neurologic [ ]Hypovolemic  [ ]  Vasopressors [ ]  Inotropes   [ ] Respiratory failure present [ ] Mechanical Ventilation [ ] Non-invasive ventilatory support [ ] High-Flow  [ ] Acute  [ ] Chronic [ ] Hypoxic  [ ] Hypercarbic [ ] Other  [ ] Other organ failure     LABS: None noted.    RADIOLOGY & ADDITIONAL STUDIES: None noted.      PROTEIN CALORIE MALNUTRITION: [ ] mild [ ] moderate [ ] severe  [ ] underweight [ ] morbid obesity    https://www.andeal.org/vault/5751/web/files/ONC/Table_Clinical%20Characteristics%20to%20Document%20Malnutrition-White%20JV%20et%20al%202012.pdf    Height (cm): 154.9 (01-14-23 @ 17:31)  Weight (kg): 56.7 (01-14-23 @ 17:31)  BMI (kg/m2): 23.6 (01-14-23 @ 17:31)    [ X] PPSV2 < or = 30% [ ] significant weight loss [ ] poor nutritional intake [ ] anasarca   Artificial Nutrition [ ]     Other REFERRALS:    [ ] Hospice  [ ]Child Life  [X ]Social Work  [ ]Case management [ ]Holistic Therapy [ ] Physical Therapy [ ] Dietary     Progress Notes - Care Coordination [C. Provider] (01-24-23 @ 15:42)      Palliative Performance Scale:  http://npcrc.org/files/news/palliative_performance_scale_ppsv2.pdf  (Ctrl +  left click to view)  Respiratory Distress Observation Tool:  https://homecareinformation.net/handouts/hen/Respiratory_Distress_Observation_Scale.pdf (Ctrl +  left click to view)  PAINAD Score:  http://geriatrictoolkit.[place].[facility]/cog/painad.pdf (Ctrl +  left click to view)   GAP TEAM PALLIATIVE CARE UNIT PROGRESS NOTE:      [  ] Patient on hospice program.    INDICATION FOR PALLIATIVE CARE UNIT SERVICES/Interval HPI:  Symptom management in the setting of a 87y old female with  decompensated heart failure and new metastatic malignancy.    OVERNIGHT EVENTS: Chart reviewed. The patient is seen and examined at the bedside. Patient is Cantonese speaking. Cantonese  used. 's name: Marky.  ID #[id number].  She required PRN Dilaudid 2.5mg PO X2 for dyspnea within a 24hr period 8am-8am.    DNR on chart: Yes  Yes      Allergies    No Known Allergies    Intolerances    MEDICATIONS  (STANDING):  apixaban 2.5 milliGRAM(s) Oral every 12 hours  donepezil 5 milliGRAM(s) Oral at bedtime  folic acid 1 milliGRAM(s) Oral daily  furosemide   Injectable 40 milliGRAM(s) IV Push daily  metoprolol tartrate 50 milliGRAM(s) Oral three times a day  polyethylene glycol 3350 17 Gram(s) Oral two times a day  senna 2 Tablet(s) Oral at bedtime  simvastatin 20 milliGRAM(s) Oral at bedtime    MEDICATIONS  (PRN):  acetaminophen     Tablet .. 650 milliGRAM(s) Oral every 6 hours PRN Temp greater or equal to 38C (100.4F), Mild Pain (1 - 3), Moderate Pain (4 - 6), Severe Pain (7 - 10)  bisacodyl Suppository 10 milliGRAM(s) Rectal daily PRN Constipation  glycopyrrolate Injectable 0.4 milliGRAM(s) IV Push every 6 hours PRN secretions  HYDROmorphone   Solution 1.5 milliGRAM(s) Oral every 2 hours PRN Moderate Pain (4 - 6)  HYDROmorphone   Solution 2.5 milliGRAM(s) Oral every 2 hours PRN Severe Pain (7 - 10)  HYDROmorphone   Solution 2.5 milliGRAM(s) Oral every 2 hours PRN dyspnea  HYDROmorphone  Injectable 0.5 milliGRAM(s) IV Push every 1 hour PRN Severe Pain (7 - 10)  HYDROmorphone  Injectable 0.5 milliGRAM(s) IV Push every 1 hour PRN dyspnea  HYDROmorphone  Injectable 0.3 milliGRAM(s) IV Push every 1 hour PRN Moderate Pain (4 - 6)  HYDROmorphone  Injectable 0.5 milliGRAM(s) IV Push every 1 hour PRN Severe Pain (7 - 10)  LORazepam   Injectable 0.25 milliGRAM(s) IV Push every 2 hours PRN anxiety/agitation/refractory dyspnea  ondansetron Injectable 4 milliGRAM(s) IV Push every 6 hours PRN Nausea and/or Vomiting    ITEMS UNCHECKED ARE NOT PRESENT    PRESENT SYMPTOMS: [X ]Unable to self-report see PAINAD, RDOS below  Source if other than patient:  [ ]Family   [X ]Team     Pain: [ ] yes [ ] no  QOL impact -   Location -                    Aggravating factors -  Quality -  Radiation -  Timing-  Severity (0-10 scale):  Minimal acceptable level (0-10 scale):     Dyspnea:                           [ ]Mild [ ]Moderate [ ]Severe  Anxiety:                             [ ]Mild [ ]Moderate [ ]Severe  Fatigue:                             [ ]Mild [ ]Moderate [ ]Severe  Nausea:                             [ ]Mild [ ]Moderate [ ]Severe  Loss of appetite:              [ ]Mild [ ]Moderate [ ]Severe  Constipation:                    [ ]Mild [ ]Moderate [ ]Severe    PCSSQ [Palliative Care Spiritual Screening Question]   Severity (0-10):  Score of 4 or > indicate consideration of Chaplaincy referral.  Chaplaincy Referral: [ ] yes [ ] refused [ X] following [ ] deferred    Caregiver [place]? : [X ] yes [ ] no [ ] deferred:  Social work referral [X ] Patient & Family Centered Care Referral [ ]     Anticipatory Grief present?:  [ X] yes [ ] no [ ] deferred:  Social work referral [X ] Patient & Family Centered Care Referral [ ]  	  Other Symptoms:  [ ]All other review of systems negative: Unable to assess due to poor mentation.    PHYSICAL EXAM:   Vital Signs Last 24 Hrs  T(C): 36.4 (25 Jan 2023 08:06), Max: 36.4 (25 Jan 2023 08:06)  T(F): 97.5 (25 Jan 2023 08:06), Max: 97.5 (25 Jan 2023 08:06)  HR: 85 (25 Jan 2023 08:06) (85 - 102)  BP: 126/76 (25 Jan 2023 08:06) (126/76 - 126/78)  BP(mean): --  RR: 18 (25 Jan 2023 08:06) (18 - 18)  SpO2: 94% (25 Jan 2023 08:06) (94% - 94%)    Parameters below as of 25 Jan 2023 08:06  Patient On (Oxygen Delivery Method): nasal cannula    I&O's Summary    GENERAL: [ ] Cachexia  [ X]Alert  [ ]Oriented x   [ ]Lethargic  [ ]Unarousable  [ X]Verbal  [ ]Non-Verbal  Behavioral:   [ ] Anxiety  [ ] Delirium [ ] Agitation [ ] Other  HEENT:  [X ]Normal   [ ]Dry mouth   [ ]ET Tube/Trach  [ ]Oral lesions  PULMONARY:   [ X]Clear [ ]Tachypnea  [ ]Audible excessive secretions   [ ]Rhonchi        [ ]Right [ ]Left [ ]Bilateral  [ ]Crackles        [ ]Right [ ]Left [ ]Bilateral  [ ]Wheezing     [ ]Right [ ]Left [ ]Bilateral  [ ]Diminished BS [ ]Right [ ]Left [ ]Bilateral    CARDIOVASCULAR:    [X ]Regular [ ]Irregular [ ]Tachy  [ ]Tee [ ]Murmur [ ]Other  GASTROINTESTINAL:  [X]Soft  [ ]Distended   [ X]+BS  [X ]Non tender [ ]Tender  [ ]Other [ ]PEG [ ]OGT/ NGT   Last BM: 1/24/23  GENITOURINARY:  [ ]Normal [ X] Incontinent   [ ]Oliguria/Anuria   [ ]Pal  MUSCULOSKELETAL:   [ ]Normal   [X ]Weakness  [ ]Bed/Wheelchair bound [ ]Edema  NEUROLOGIC:   [ ]No focal deficits  [ ] Cognitive impairment  [ ] Dysphagia [ ]Dysarthria [ ] Paresis [ ]Other   SKIN: Ecchymosis   [ ]Normal  [ ]Rash  [ ]Other  [ ]Pressure ulcer(s)  [ ]y [ ]n  Present on admission      CRITICAL CARE:  [ ] Shock Present  [ ]Septic [ ]Cardiogenic [ ]Neurologic [ ]Hypovolemic  [ ]  Vasopressors [ ]  Inotropes   [ ] Respiratory failure present [ ] Mechanical Ventilation [ ] Non-invasive ventilatory support [ ] High-Flow  [ ] Acute  [ ] Chronic [ ] Hypoxic  [ ] Hypercarbic [ ] Other  [ ] Other organ failure     LABS: None noted.    RADIOLOGY & ADDITIONAL STUDIES: None noted.      PROTEIN CALORIE MALNUTRITION: [ ] mild [ ] moderate [ ] severe  [ ] underweight [ ] morbid obesity    https://www.andeal.org/vault/9573/web/files/ONC/Table_Clinical%20Characteristics%20to%20Document%20Malnutrition-White%20JV%20et%20al%202012.pdf    Height (cm): 154.9 (01-14-23 @ 17:31)  Weight (kg): 56.7 (01-14-23 @ 17:31)  BMI (kg/m2): 23.6 (01-14-23 @ 17:31)    [ X] PPSV2 < or = 30% [ ] significant weight loss [ ] poor nutritional intake [ ] anasarca   Artificial Nutrition [ ]     Other REFERRALS:    [ ] Hospice  [ ]Child Life  [X ]Social Work  [ ]Case management [ ]Holistic Therapy [ ] Physical Therapy [ ] Dietary     Progress Notes - Care Coordination [C. Provider] (01-24-23 @ 15:42)      Palliative Performance Scale:  http://npcrc.org/files/news/palliative_performance_scale_ppsv2.pdf  (Ctrl +  left click to view)  Respiratory Distress Observation Tool:  https://homecareinformation.net/handouts/hen/Respiratory_Distress_Observation_Scale.pdf (Ctrl +  left click to view)  PAINAD Score:  http://geriatrictoolkit.[place].[facility]/cog/painad.pdf (Ctrl +  left click to view)   GAP TEAM PALLIATIVE CARE UNIT PROGRESS NOTE:      [  ] Patient on hospice program.    INDICATION FOR PALLIATIVE CARE UNIT SERVICES/Interval HPI:  Symptom management in the setting of a 87y old female with  decompensated heart failure and new metastatic malignancy.    OVERNIGHT EVENTS: Chart reviewed. The patient is seen and examined at the bedside. Patient is Cantonese speaking. Cantonese  used. 's name: Marky.  ID #[id number]. She required PRN Dilaudid 2.5mg PO X2 for dyspnea within a 24hr period 8am-8am.    DNR on chart: Yes  Yes      Allergies    No Known Allergies    Intolerances    MEDICATIONS  (STANDING):  apixaban 2.5 milliGRAM(s) Oral every 12 hours  donepezil 5 milliGRAM(s) Oral at bedtime  folic acid 1 milliGRAM(s) Oral daily  furosemide   Injectable 40 milliGRAM(s) IV Push daily  metoprolol tartrate 50 milliGRAM(s) Oral three times a day  polyethylene glycol 3350 17 Gram(s) Oral two times a day  senna 2 Tablet(s) Oral at bedtime  simvastatin 20 milliGRAM(s) Oral at bedtime    MEDICATIONS  (PRN):  acetaminophen     Tablet .. 650 milliGRAM(s) Oral every 6 hours PRN Temp greater or equal to 38C (100.4F), Mild Pain (1 - 3), Moderate Pain (4 - 6), Severe Pain (7 - 10)  bisacodyl Suppository 10 milliGRAM(s) Rectal daily PRN Constipation  glycopyrrolate Injectable 0.4 milliGRAM(s) IV Push every 6 hours PRN secretions  HYDROmorphone   Solution 1.5 milliGRAM(s) Oral every 2 hours PRN Moderate Pain (4 - 6)  HYDROmorphone   Solution 2.5 milliGRAM(s) Oral every 2 hours PRN Severe Pain (7 - 10)  HYDROmorphone   Solution 2.5 milliGRAM(s) Oral every 2 hours PRN dyspnea  HYDROmorphone  Injectable 0.5 milliGRAM(s) IV Push every 1 hour PRN Severe Pain (7 - 10)  HYDROmorphone  Injectable 0.5 milliGRAM(s) IV Push every 1 hour PRN dyspnea  HYDROmorphone  Injectable 0.3 milliGRAM(s) IV Push every 1 hour PRN Moderate Pain (4 - 6)  HYDROmorphone  Injectable 0.5 milliGRAM(s) IV Push every 1 hour PRN Severe Pain (7 - 10)  LORazepam   Injectable 0.25 milliGRAM(s) IV Push every 2 hours PRN anxiety/agitation/refractory dyspnea  ondansetron Injectable 4 milliGRAM(s) IV Push every 6 hours PRN Nausea and/or Vomiting    ITEMS UNCHECKED ARE NOT PRESENT    PRESENT SYMPTOMS: [X ]Unable to self-report see PAINAD, RDOS below  Source if other than patient:  [ ]Family   [X ]Team     Pain: [ ] yes [ ] no  QOL impact -   Location -                    Aggravating factors -  Quality -  Radiation -  Timing-  Severity (0-10 scale):  Minimal acceptable level (0-10 scale):     Dyspnea:                           [ ]Mild [ ]Moderate [ ]Severe  Anxiety:                             [ ]Mild [ ]Moderate [ ]Severe  Fatigue:                             [ ]Mild [ ]Moderate [ ]Severe  Nausea:                             [ ]Mild [ ]Moderate [ ]Severe  Loss of appetite:              [ ]Mild [ ]Moderate [ ]Severe  Constipation:                    [ ]Mild [ ]Moderate [ ]Severe    PCSSQ [Palliative Care Spiritual Screening Question]   Severity (0-10):  Score of 4 or > indicate consideration of Chaplaincy referral.  Chaplaincy Referral: [ ] yes [ ] refused [ X] following [ ] deferred    Caregiver [place]? : [X ] yes [ ] no [ ] deferred:  Social work referral [X ] Patient & Family Centered Care Referral [ ]     Anticipatory Grief present?:  [ X] yes [ ] no [ ] deferred:  Social work referral [X ] Patient & Family Centered Care Referral [ ]  	  Other Symptoms:  [ ]All other review of systems negative: Unable to assess due to poor mentation.    PHYSICAL EXAM:   Vital Signs Last 24 Hrs  T(C): 36.4 (25 Jan 2023 08:06), Max: 36.4 (25 Jan 2023 08:06)  T(F): 97.5 (25 Jan 2023 08:06), Max: 97.5 (25 Jan 2023 08:06)  HR: 85 (25 Jan 2023 08:06) (85 - 102)  BP: 126/76 (25 Jan 2023 08:06) (126/76 - 126/78)  BP(mean): --  RR: 18 (25 Jan 2023 08:06) (18 - 18)  SpO2: 94% (25 Jan 2023 08:06) (94% - 94%)    Parameters below as of 25 Jan 2023 08:06  Patient On (Oxygen Delivery Method): nasal cannula    I&O's Summary    GENERAL: [ ] Cachexia  [ X]Alert  [ ]Oriented x   [ ]Lethargic  [ ]Unarousable  [ X]Verbal  [ ]Non-Verbal  Behavioral:   [ ] Anxiety  [ ] Delirium [ ] Agitation [ ] Other  HEENT:  [X ]Normal   [ ]Dry mouth   [ ]ET Tube/Trach  [ ]Oral lesions  PULMONARY:   [ X]Clear [ ]Tachypnea  [ ]Audible excessive secretions   [ ]Rhonchi        [ ]Right [ ]Left [ ]Bilateral  [ ]Crackles        [ ]Right [ ]Left [ ]Bilateral  [ ]Wheezing     [ ]Right [ ]Left [ ]Bilateral  [ ]Diminished BS [ ]Right [ ]Left [ ]Bilateral    CARDIOVASCULAR:    [X ]Regular [ ]Irregular [ ]Tachy  [ ]Tee [ ]Murmur [ ]Other  GASTROINTESTINAL:  [X]Soft  [ ]Distended   [ X]+BS  [X ]Non tender [ ]Tender  [ ]Other [ ]PEG [ ]OGT/ NGT   Last BM: 1/24/23  GENITOURINARY:  [ ]Normal [ X] Incontinent   [ ]Oliguria/Anuria   [ ]Pal  MUSCULOSKELETAL:   [ ]Normal   [X ]Weakness  [ ]Bed/Wheelchair bound [ ]Edema  NEUROLOGIC:   [ ]No focal deficits  [ ] Cognitive impairment  [ ] Dysphagia [ ]Dysarthria [ ] Paresis [ ]Other   SKIN: Ecchymosis   [ ]Normal  [ ]Rash  [ ]Other  [ ]Pressure ulcer(s)  [ ]y [ ]n  Present on admission      CRITICAL CARE:  [ ] Shock Present  [ ]Septic [ ]Cardiogenic [ ]Neurologic [ ]Hypovolemic  [ ]  Vasopressors [ ]  Inotropes   [ ] Respiratory failure present [ ] Mechanical Ventilation [ ] Non-invasive ventilatory support [ ] High-Flow  [ ] Acute  [ ] Chronic [ ] Hypoxic  [ ] Hypercarbic [ ] Other  [ ] Other organ failure     LABS: None noted.    RADIOLOGY & ADDITIONAL STUDIES: None noted.      PROTEIN CALORIE MALNUTRITION: [ ] mild [ ] moderate [ ] severe  [ ] underweight [ ] morbid obesity    https://www.andeal.org/vault/3586/web/files/ONC/Table_Clinical%20Characteristics%20to%20Document%20Malnutrition-White%20JV%20et%20al%202012.pdf    Height (cm): 154.9 (01-14-23 @ 17:31)  Weight (kg): 56.7 (01-14-23 @ 17:31)  BMI (kg/m2): 23.6 (01-14-23 @ 17:31)    [ X] PPSV2 < or = 30% [ ] significant weight loss [ ] poor nutritional intake [ ] anasarca   Artificial Nutrition [ ]     Other REFERRALS:    [ ] Hospice  [ ]Child Life  [X ]Social Work  [ ]Case management [ ]Holistic Therapy [ ] Physical Therapy [ ] Dietary     Progress Notes - Care Coordination [C. Provider] (01-24-23 @ 15:42)      Palliative Performance Scale:  http://npcrc.org/files/news/palliative_performance_scale_ppsv2.pdf  (Ctrl +  left click to view)  Respiratory Distress Observation Tool:  https://[email or web address].net/handouts/hen/Respiratory_Distress_Observation_Scale.pdf (Ctrl +  left click to view)  PAINAD Score:  http://[email or web address].[facility]/cog/painad.pdf (Ctrl +  left click to view)   GAP TEAM PALLIATIVE CARE UNIT PROGRESS NOTE:      [  ] Patient on hospice program.    INDICATION FOR PALLIATIVE CARE UNIT SERVICES/Interval HPI:  Symptom management in the setting of a 87y old female with  decompensated heart failure and new metastatic malignancy.    OVERNIGHT EVENTS: Chart reviewed. The patient is seen and examined at the bedside. Patient is Cantonese speaking. Cantonese  used. 's name: Marky.  ID #[id number]. She required PRN Dilaudid 2.5mg PO X2 for dyspnea within a 24hr period 8am-8am.    DNR on chart: Yes  Yes      Allergies    No Known Allergies    Intolerances    MEDICATIONS  (STANDING):  apixaban 2.5 milliGRAM(s) Oral every 12 hours  donepezil 5 milliGRAM(s) Oral at bedtime  folic acid 1 milliGRAM(s) Oral daily  furosemide   Injectable 40 milliGRAM(s) IV Push daily  metoprolol tartrate 50 milliGRAM(s) Oral three times a day  polyethylene glycol 3350 17 Gram(s) Oral two times a day  senna 2 Tablet(s) Oral at bedtime  simvastatin 20 milliGRAM(s) Oral at bedtime    MEDICATIONS  (PRN):  acetaminophen     Tablet .. 650 milliGRAM(s) Oral every 6 hours PRN Temp greater or equal to 38C (100.4F), Mild Pain (1 - 3), Moderate Pain (4 - 6), Severe Pain (7 - 10)  bisacodyl Suppository 10 milliGRAM(s) Rectal daily PRN Constipation  glycopyrrolate Injectable 0.4 milliGRAM(s) IV Push every 6 hours PRN secretions  HYDROmorphone   Solution 1.5 milliGRAM(s) Oral every 2 hours PRN Moderate Pain (4 - 6)  HYDROmorphone   Solution 2.5 milliGRAM(s) Oral every 2 hours PRN Severe Pain (7 - 10)  HYDROmorphone   Solution 2.5 milliGRAM(s) Oral every 2 hours PRN dyspnea  HYDROmorphone  Injectable 0.5 milliGRAM(s) IV Push every 1 hour PRN Severe Pain (7 - 10)  HYDROmorphone  Injectable 0.5 milliGRAM(s) IV Push every 1 hour PRN dyspnea  HYDROmorphone  Injectable 0.3 milliGRAM(s) IV Push every 1 hour PRN Moderate Pain (4 - 6)  HYDROmorphone  Injectable 0.5 milliGRAM(s) IV Push every 1 hour PRN Severe Pain (7 - 10)  LORazepam   Injectable 0.25 milliGRAM(s) IV Push every 2 hours PRN anxiety/agitation/refractory dyspnea  ondansetron Injectable 4 milliGRAM(s) IV Push every 6 hours PRN Nausea and/or Vomiting    ITEMS UNCHECKED ARE NOT PRESENT    PRESENT SYMPTOMS: [X ]Unable to self-report see PAINAD, RDOS below  Source if other than patient:  [ ]Family   [X ]Team     Pain: [ ] yes [ ] no  QOL impact -   Location -                    Aggravating factors -  Quality -  Radiation -  Timing-  Severity (0-10 scale):  Minimal acceptable level (0-10 scale):     Dyspnea:                           [ ]Mild [ ]Moderate [ ]Severe  Anxiety:                             [ ]Mild [ ]Moderate [ ]Severe  Fatigue:                             [ ]Mild [ ]Moderate [ ]Severe  Nausea:                             [ ]Mild [ ]Moderate [ ]Severe  Loss of appetite:              [ ]Mild [ ]Moderate [ ]Severe  Constipation:                    [ ]Mild [ ]Moderate [ ]Severe    PCSSQ [Palliative Care Spiritual Screening Question]   Severity (0-10):  Score of 4 or > indicate consideration of Chaplaincy referral.  Chaplaincy Referral: [ ] yes [ ] refused [ X] following [ ] deferred    Caregiver [place]? : [X ] yes [ ] no [ ] deferred:  Social work referral [X ] Patient & Family Centered Care Referral [ ]     Anticipatory Grief present?:  [ X] yes [ ] no [ ] deferred:  Social work referral [X ] Patient & Family Centered Care Referral [ ]  	  Other Symptoms:  [ ]All other review of systems negative: Unable to assess due to poor mentation.    PHYSICAL EXAM:   Vital Signs Last 24 Hrs  T(C): 36.4 (25 Jan 2023 08:06), Max: 36.4 (25 Jan 2023 08:06)  T(F): 97.5 (25 Jan 2023 08:06), Max: 97.5 (25 Jan 2023 08:06)  HR: 85 (25 Jan 2023 08:06) (85 - 102)  BP: 126/76 (25 Jan 2023 08:06) (126/76 - 126/78)  BP(mean): --  RR: 18 (25 Jan 2023 08:06) (18 - 18)  SpO2: 94% (25 Jan 2023 08:06) (94% - 94%)    Parameters below as of 25 Jan 2023 08:06  Patient On (Oxygen Delivery Method): nasal cannula    I&O's Summary    GENERAL: [ ] Cachexia  [ X]Alert  [ ]Oriented x   [ ]Lethargic  [ ]Unarousable  [ X]Verbal  [ ]Non-Verbal  Behavioral:   [ ] Anxiety  [ ] Delirium [ ] Agitation [ ] Other  HEENT:  [X ]Normal   [ ]Dry mouth   [ ]ET Tube/Trach  [ ]Oral lesions  PULMONARY:   [ X]Clear [ ]Tachypnea  [ ]Audible excessive secretions   [ ]Rhonchi        [ ]Right [ ]Left [ ]Bilateral  [ ]Crackles        [ ]Right [ ]Left [ ]Bilateral  [ ]Wheezing     [ ]Right [ ]Left [ ]Bilateral  [ ]Diminished BS [ ]Right [ ]Left [ ]Bilateral    CARDIOVASCULAR:    [X ]Regular [ ]Irregular [ ]Tachy  [ ]Tee [ ]Murmur [ ]Other  GASTROINTESTINAL:  [X]Soft  [ ]Distended   [ X]+BS  [X ]Non tender [ ]Tender  [ ]Other [ ]PEG [ ]OGT/ NGT   Last BM: 1/24/23  GENITOURINARY:  [ ]Normal [ X] Incontinent   [ ]Oliguria/Anuria   [ ]Pal  MUSCULOSKELETAL:   [ ]Normal   [X ]Weakness  [ ]Bed/Wheelchair bound [ ]Edema  NEUROLOGIC:   [ ]No focal deficits  [ ] Cognitive impairment  [ ] Dysphagia [ ]Dysarthria [ ] Paresis [ ]Other   SKIN: Ecchymosis   [ ]Normal  [ ]Rash  [ ]Other  [ ]Pressure ulcer(s)  [ ]y [ ]n  Present on admission      CRITICAL CARE:  [ ] Shock Present  [ ]Septic [ ]Cardiogenic [ ]Neurologic [ ]Hypovolemic  [ ]  Vasopressors [ ]  Inotropes   [ ] Respiratory failure present [ ] Mechanical Ventilation [ ] Non-invasive ventilatory support [ ] High-Flow  [ ] Acute  [ ] Chronic [ ] Hypoxic  [ ] Hypercarbic [ ] Other  [ ] Other organ failure     LABS: None noted.    RADIOLOGY & ADDITIONAL STUDIES: None noted.      PROTEIN CALORIE MALNUTRITION: [ ] mild [ ] moderate [ ] severe  [ ] underweight [ ] morbid obesity    https://www.andeal.org/vault/7906/web/files/ONC/Table_Clinical%20Characteristics%20to%20Document%20Malnutrition-White%20JV%20et%20al%202012.pdf    Height (cm): 154.9 (01-14-23 @ 17:31)  Weight (kg): 56.7 (01-14-23 @ 17:31)  BMI (kg/m2): 23.6 (01-14-23 @ 17:31)    [ X] PPSV2 < or = 30% [ ] significant weight loss [ ] poor nutritional intake [ ] anasarca   Artificial Nutrition [ ]     Other REFERRALS:    [ ] Hospice  [ ]Child Life  [X ]Social Work  [ ]Case management [ ]Holistic Therapy [ ] Physical Therapy [ ] Dietary     Progress Notes - Care Coordination [C. Provider] (01-24-23 @ 15:42)      Palliative Performance Scale:  http://npcrc.org/files/news/palliative_performance_scale_ppsv2.pdf  (Ctrl +  left click to view)  Respiratory Distress Observation Tool:  https://[email or web address].net/handouts/hen/Respiratory_Distress_Observation_Scale.pdf (Ctrl +  left click to view)  PAINAD Score:  http://[email or web address].[email or web address]/cog/painad.pdf (Ctrl +  left click to view)   GAP TEAM PALLIATIVE CARE UNIT PROGRESS NOTE:      [  ] Patient on hospice program.    INDICATION FOR PALLIATIVE CARE UNIT SERVICES/Interval HPI:  Symptom management in the setting of a 87y old female with  decompensated heart failure and new metastatic malignancy.    OVERNIGHT EVENTS: Chart reviewed. The patient is seen and examined at the bedside. Patient is Cantonese speaking. Cantonese  used. 's name: Marky.  ID #[id number]. She required PRN Dilaudid 2.5mg PO X2 for dyspnea within a 24hr period 8am-8am.    DNR on chart: Yes  Yes      Allergies    No Known Allergies    Intolerances    MEDICATIONS  (STANDING):  apixaban 2.5 milliGRAM(s) Oral every 12 hours  donepezil 5 milliGRAM(s) Oral at bedtime  folic acid 1 milliGRAM(s) Oral daily  furosemide   Injectable 40 milliGRAM(s) IV Push daily  metoprolol tartrate 50 milliGRAM(s) Oral three times a day  polyethylene glycol 3350 17 Gram(s) Oral two times a day  senna 2 Tablet(s) Oral at bedtime  simvastatin 20 milliGRAM(s) Oral at bedtime    MEDICATIONS  (PRN):  acetaminophen     Tablet .. 650 milliGRAM(s) Oral every 6 hours PRN Temp greater or equal to 38C (100.4F), Mild Pain (1 - 3), Moderate Pain (4 - 6), Severe Pain (7 - 10)  bisacodyl Suppository 10 milliGRAM(s) Rectal daily PRN Constipation  glycopyrrolate Injectable 0.4 milliGRAM(s) IV Push every 6 hours PRN secretions  HYDROmorphone   Solution 1.5 milliGRAM(s) Oral every 2 hours PRN Moderate Pain (4 - 6)  HYDROmorphone   Solution 2.5 milliGRAM(s) Oral every 2 hours PRN Severe Pain (7 - 10)  HYDROmorphone   Solution 2.5 milliGRAM(s) Oral every 2 hours PRN dyspnea  HYDROmorphone  Injectable 0.5 milliGRAM(s) IV Push every 1 hour PRN Severe Pain (7 - 10)  HYDROmorphone  Injectable 0.5 milliGRAM(s) IV Push every 1 hour PRN dyspnea  HYDROmorphone  Injectable 0.3 milliGRAM(s) IV Push every 1 hour PRN Moderate Pain (4 - 6)  HYDROmorphone  Injectable 0.5 milliGRAM(s) IV Push every 1 hour PRN Severe Pain (7 - 10)  LORazepam   Injectable 0.25 milliGRAM(s) IV Push every 2 hours PRN anxiety/agitation/refractory dyspnea  ondansetron Injectable 4 milliGRAM(s) IV Push every 6 hours PRN Nausea and/or Vomiting    ITEMS UNCHECKED ARE NOT PRESENT    PRESENT SYMPTOMS: [X ]Unable to self-report see PAINAD, RDOS below  Source if other than patient:  [ ]Family   [X ]Team     Pain: [ ] yes [ ] no  QOL impact -   Location -                    Aggravating factors -  Quality -  Radiation -  Timing-  Severity (0-10 scale):  Minimal acceptable level (0-10 scale):     Dyspnea:                           [ ]Mild [ ]Moderate [ ]Severe  Anxiety:                             [ ]Mild [ ]Moderate [ ]Severe  Fatigue:                             [ ]Mild [ ]Moderate [ ]Severe  Nausea:                             [ ]Mild [ ]Moderate [ ]Severe  Loss of appetite:              [ ]Mild [ ]Moderate [ ]Severe  Constipation:                    [ ]Mild [ ]Moderate [ ]Severe    PCSSQ [Palliative Care Spiritual Screening Question]   Severity (0-10):  Score of 4 or > indicate consideration of Chaplaincy referral.  Chaplaincy Referral: [ ] yes [ ] refused [ X] following [ ] deferred    Caregiver [place]? : [X ] yes [ ] no [ ] deferred:  Social work referral [X ] Patient & Family Centered Care Referral [ ]     Anticipatory Grief present?:  [ X] yes [ ] no [ ] deferred:  Social work referral [X ] Patient & Family Centered Care Referral [ ]  	  Other Symptoms:  [ ]All other review of systems negative: Unable to assess due to poor mentation.    PHYSICAL EXAM:   Vital Signs Last 24 Hrs  T(C): 36.4 (25 Jan 2023 08:06), Max: 36.4 (25 Jan 2023 08:06)  T(F): 97.5 (25 Jan 2023 08:06), Max: 97.5 (25 Jan 2023 08:06)  HR: 85 (25 Jan 2023 08:06) (85 - 102)  BP: 126/76 (25 Jan 2023 08:06) (126/76 - 126/78)  BP(mean): --  RR: 18 (25 Jan 2023 08:06) (18 - 18)  SpO2: 94% (25 Jan 2023 08:06) (94% - 94%)    Parameters below as of 25 Jan 2023 08:06  Patient On (Oxygen Delivery Method): nasal cannula    I&O's Summary    GENERAL: [ ] Cachexia  [ X]Alert  [ ]Oriented x   [ ]Lethargic  [ ]Unarousable  [ X]Verbal  [ ]Non-Verbal  Behavioral:   [ ] Anxiety  [ ] Delirium [ ] Agitation [ ] Other  HEENT:  [X ]Normal   [ ]Dry mouth   [ ]ET Tube/Trach  [ ]Oral lesions  PULMONARY:   [ X]Clear [ ]Tachypnea  [ ]Audible excessive secretions   [ ]Rhonchi        [ ]Right [ ]Left [ ]Bilateral  [ ]Crackles        [ ]Right [ ]Left [ ]Bilateral  [ ]Wheezing     [ ]Right [ ]Left [ ]Bilateral  [ ]Diminished BS [ ]Right [ ]Left [ ]Bilateral    CARDIOVASCULAR:    [X ]Regular [ ]Irregular [ ]Tachy  [ ]Tee [ ]Murmur [ ]Other  GASTROINTESTINAL:  [X]Soft  [ ]Distended   [ X]+BS  [X ]Non tender [ ]Tender  [ ]Other [ ]PEG [ ]OGT/ NGT   Last BM: 1/24/23  GENITOURINARY:  [ ]Normal [ X] Incontinent   [ ]Oliguria/Anuria   [ ]Pal  MUSCULOSKELETAL:   [ ]Normal   [X ]Weakness  [ ]Bed/Wheelchair bound [ ]Edema  NEUROLOGIC:   [ ]No focal deficits  [ ] Cognitive impairment  [ ] Dysphagia [ ]Dysarthria [ ] Paresis [ ]Other   SKIN: Ecchymosis   [ ]Normal  [ ]Rash  [ ]Other  [ ]Pressure ulcer(s)  [ ]y [ ]n  Present on admission      CRITICAL CARE:  [ ] Shock Present  [ ]Septic [ ]Cardiogenic [ ]Neurologic [ ]Hypovolemic  [ ]  Vasopressors [ ]  Inotropes   [ ] Respiratory failure present [ ] Mechanical Ventilation [ ] Non-invasive ventilatory support [ ] High-Flow  [ ] Acute  [ ] Chronic [ ] Hypoxic  [ ] Hypercarbic [ ] Other  [ ] Other organ failure     LABS: None noted.    RADIOLOGY & ADDITIONAL STUDIES: None noted.      PROTEIN CALORIE MALNUTRITION: [ ] mild [ ] moderate [ ] severe  [ ] underweight [ ] morbid obesity    https://www.andeal.org/vault/8301/web/files/ONC/Table_Clinical%20Characteristics%20to%20Document%20Malnutrition-White%20JV%20et%20al%202012.pdf    Height (cm): 154.9 (01-14-23 @ 17:31)  Weight (kg): 56.7 (01-14-23 @ 17:31)  BMI (kg/m2): 23.6 (01-14-23 @ 17:31)    [ X] PPSV2 < or = 30% [ ] significant weight loss [ ] poor nutritional intake [ ] anasarca   Artificial Nutrition [ ]     Other REFERRALS:    [ ] Hospice  [ ]Child Life  [X ]Social Work  [ ]Case management [ ]Holistic Therapy [ ] Physical Therapy [ ] Dietary     Progress Notes - Care Coordination [C. Provider] (01-24-23 @ 15:42)      Palliative Performance Scale:  http://npcrc.org/files/news/palliative_performance_scale_ppsv2.pdf  (Ctrl +  left click to view)  Respiratory Distress Observation Tool:  https://[email or web address]/handouts/hen/Respiratory_Distress_Observation_Scale.pdf (Ctrl +  left click to view)  PAINAD Score:  http://[email or web address].[facility]/cog/painad.pdf (Ctrl +  left click to view)   GAP TEAM PALLIATIVE CARE UNIT PROGRESS NOTE:      [  ] Patient on hospice program.    INDICATION FOR PALLIATIVE CARE UNIT SERVICES/Interval HPI:  Symptom management in the setting of a 87y old female with  decompensated heart failure and new metastatic malignancy.    OVERNIGHT EVENTS: Chart reviewed. The patient is seen and examined at the bedside. Patient is Cantonese speaking. Cantonese  used. 's name: Marky.  ID #[id number]. She required PRN Dilaudid 2.5mg PO X2 for dyspnea within a 24hr period 8am-8am.    DNR on chart: Yes  Yes      Allergies    No Known Allergies    Intolerances    MEDICATIONS  (STANDING):  apixaban 2.5 milliGRAM(s) Oral every 12 hours  donepezil 5 milliGRAM(s) Oral at bedtime  folic acid 1 milliGRAM(s) Oral daily  furosemide   Injectable 40 milliGRAM(s) IV Push daily  metoprolol tartrate 50 milliGRAM(s) Oral three times a day  polyethylene glycol 3350 17 Gram(s) Oral two times a day  senna 2 Tablet(s) Oral at bedtime  simvastatin 20 milliGRAM(s) Oral at bedtime    MEDICATIONS  (PRN):  acetaminophen     Tablet .. 650 milliGRAM(s) Oral every 6 hours PRN Temp greater or equal to 38C (100.4F), Mild Pain (1 - 3), Moderate Pain (4 - 6), Severe Pain (7 - 10)  bisacodyl Suppository 10 milliGRAM(s) Rectal daily PRN Constipation  glycopyrrolate Injectable 0.4 milliGRAM(s) IV Push every 6 hours PRN secretions  HYDROmorphone   Solution 1.5 milliGRAM(s) Oral every 2 hours PRN Moderate Pain (4 - 6)  HYDROmorphone   Solution 2.5 milliGRAM(s) Oral every 2 hours PRN Severe Pain (7 - 10)  HYDROmorphone   Solution 2.5 milliGRAM(s) Oral every 2 hours PRN dyspnea  HYDROmorphone  Injectable 0.5 milliGRAM(s) IV Push every 1 hour PRN Severe Pain (7 - 10)  HYDROmorphone  Injectable 0.5 milliGRAM(s) IV Push every 1 hour PRN dyspnea  HYDROmorphone  Injectable 0.3 milliGRAM(s) IV Push every 1 hour PRN Moderate Pain (4 - 6)  HYDROmorphone  Injectable 0.5 milliGRAM(s) IV Push every 1 hour PRN Severe Pain (7 - 10)  LORazepam   Injectable 0.25 milliGRAM(s) IV Push every 2 hours PRN anxiety/agitation/refractory dyspnea  ondansetron Injectable 4 milliGRAM(s) IV Push every 6 hours PRN Nausea and/or Vomiting    ITEMS UNCHECKED ARE NOT PRESENT    PRESENT SYMPTOMS: [X ]Unable to self-report see PAINAD, RDOS below  Source if other than patient:  [ ]Family   [X ]Team     Pain: [ ] yes [ ] no  QOL impact -   Location -                    Aggravating factors -  Quality -  Radiation -  Timing-  Severity (0-10 scale):  Minimal acceptable level (0-10 scale):     Dyspnea:                           [ ]Mild [ ]Moderate [ ]Severe  Anxiety:                             [ ]Mild [ ]Moderate [ ]Severe  Fatigue:                             [ ]Mild [ ]Moderate [ ]Severe  Nausea:                             [ ]Mild [ ]Moderate [ ]Severe  Loss of appetite:              [ ]Mild [ ]Moderate [ ]Severe  Constipation:                    [ ]Mild [ ]Moderate [ ]Severe    PCSSQ [Palliative Care Spiritual Screening Question]   Severity (0-10):  Score of 4 or > indicate consideration of Chaplaincy referral.  Chaplaincy Referral: [ ] yes [ ] refused [ X] following [ ] deferred    Caregiver [place]? : [X ] yes [ ] no [ ] deferred:  Social work referral [X ] Patient & Family Centered Care Referral [ ]     Anticipatory Grief present?:  [ X] yes [ ] no [ ] deferred:  Social work referral [X ] Patient & Family Centered Care Referral [ ]  	  Other Symptoms:  [ ]All other review of systems negative: Unable to assess due to poor mentation.    PHYSICAL EXAM:   Vital Signs Last 24 Hrs  T(C): 36.4 (25 Jan 2023 08:06), Max: 36.4 (25 Jan 2023 08:06)  T(F): 97.5 (25 Jan 2023 08:06), Max: 97.5 (25 Jan 2023 08:06)  HR: 85 (25 Jan 2023 08:06) (85 - 102)  BP: 126/76 (25 Jan 2023 08:06) (126/76 - 126/78)  BP(mean): --  RR: 18 (25 Jan 2023 08:06) (18 - 18)  SpO2: 94% (25 Jan 2023 08:06) (94% - 94%)    Parameters below as of 25 Jan 2023 08:06  Patient On (Oxygen Delivery Method): nasal cannula    I&O's Summary    GENERAL: [ ] Cachexia  [ X]Alert  [ ]Oriented x   [ ]Lethargic  [ ]Unarousable  [ X]Verbal  [ ]Non-Verbal  Behavioral:   [ ] Anxiety  [ ] Delirium [ ] Agitation [ ] Other  HEENT:  [X ]Normal   [ ]Dry mouth   [ ]ET Tube/Trach  [ ]Oral lesions  PULMONARY:   [ X]Clear [ ]Tachypnea  [ ]Audible excessive secretions   [ ]Rhonchi        [ ]Right [ ]Left [ ]Bilateral  [ ]Crackles        [ ]Right [ ]Left [ ]Bilateral  [ ]Wheezing     [ ]Right [ ]Left [ ]Bilateral  [ ]Diminished BS [ ]Right [ ]Left [ ]Bilateral    CARDIOVASCULAR:    [X ]Regular [ ]Irregular [ ]Tachy  [ ]Tee [ ]Murmur [ ]Other  GASTROINTESTINAL:  [X]Soft  [ ]Distended   [ X]+BS  [X ]Non tender [ ]Tender  [ ]Other [ ]PEG [ ]OGT/ NGT   Last BM: 1/24/23  GENITOURINARY:  [ ]Normal [ X] Incontinent   [ ]Oliguria/Anuria   [ ]Pal  MUSCULOSKELETAL:   [ ]Normal   [X ]Weakness  [ ]Bed/Wheelchair bound [ ]Edema  NEUROLOGIC:   [ ]No focal deficits  [ ] Cognitive impairment  [ x] Dysphagia [ ]Dysarthria [ ] Paresis [ ]Other   SKIN: Ecchymosis   [ ]Normal  [ ]Rash  [ ]Other  [ ]Pressure ulcer(s)  [ ]y [ ]n  Present on admission      CRITICAL CARE:  [ ] Shock Present  [ ]Septic [ ]Cardiogenic [ ]Neurologic [ ]Hypovolemic  [ ]  Vasopressors [ ]  Inotropes   [ ] Respiratory failure present [ ] Mechanical Ventilation [ ] Non-invasive ventilatory support [ ] High-Flow  [ ] Acute  [ ] Chronic [ ] Hypoxic  [ ] Hypercarbic [ ] Other  [ ] Other organ failure     LABS: None noted.    RADIOLOGY & ADDITIONAL STUDIES: None noted.      PROTEIN CALORIE MALNUTRITION: [ ] mild [ ] moderate [ ] severe  [ ] underweight [ ] morbid obesity    https://www.andeal.org/vault/7507/web/files/ONC/Table_Clinical%20Characteristics%20to%20Document%20Malnutrition-White%20JV%20et%20al%202012.pdf    Height (cm): 154.9 (01-14-23 @ 17:31)  Weight (kg): 56.7 (01-14-23 @ 17:31)  BMI (kg/m2): 23.6 (01-14-23 @ 17:31)    [ X] PPSV2 < or = 30% [ ] significant weight loss [ ] poor nutritional intake [ ] anasarca   Artificial Nutrition [ ]     Other REFERRALS:    [ ] Hospice  [ ]Child Life  [X ]Social Work  [ ]Case management [ ]Holistic Therapy [ ] Physical Therapy [ ] Dietary     Progress Notes - Care Coordination [C. Provider] (01-24-23 @ 15:42)      Palliative Performance Scale:  http://npcrc.org/files/news/palliative_performance_scale_ppsv2.pdf  (Ctrl +  left click to view)  Respiratory Distress Observation Tool:  https://[email or web address]/handouts/hen/Respiratory_Distress_Observation_Scale.pdf (Ctrl +  left click to view)  PAINAD Score:  [email or web address]://[email or web address]/cog/painad.pdf (Ctrl +  left click to view)   GAP TEAM PALLIATIVE CARE UNIT PROGRESS NOTE:      [  ] Patient on hospice program.    INDICATION FOR PALLIATIVE CARE UNIT SERVICES/Interval HPI:  Symptom management in the setting of a 87y old female with  decompensated heart failure and new metastatic malignancy.    OVERNIGHT EVENTS: Chart reviewed. The patient is seen and examined at the bedside. Patient is Cantonese speaking. Cantonese  used. 's name: Marky.  ID #[id number]. She required PRN Dilaudid 2.5mg PO X2 for dyspnea within a 24hr period 8am-8am.    DNR on chart: Yes  Yes      Allergies    No Known Allergies    Intolerances    MEDICATIONS  (STANDING):  apixaban 2.5 milliGRAM(s) Oral every 12 hours  donepezil 5 milliGRAM(s) Oral at bedtime  folic acid 1 milliGRAM(s) Oral daily  furosemide   Injectable 40 milliGRAM(s) IV Push daily  metoprolol tartrate 50 milliGRAM(s) Oral three times a day  polyethylene glycol 3350 17 Gram(s) Oral two times a day  senna 2 Tablet(s) Oral at bedtime  simvastatin 20 milliGRAM(s) Oral at bedtime    MEDICATIONS  (PRN):  acetaminophen     Tablet .. 650 milliGRAM(s) Oral every 6 hours PRN Temp greater or equal to 38C (100.4F), Mild Pain (1 - 3), Moderate Pain (4 - 6), Severe Pain (7 - 10)  bisacodyl Suppository 10 milliGRAM(s) Rectal daily PRN Constipation  glycopyrrolate Injectable 0.4 milliGRAM(s) IV Push every 6 hours PRN secretions  HYDROmorphone   Solution 1.5 milliGRAM(s) Oral every 2 hours PRN Moderate Pain (4 - 6)  HYDROmorphone   Solution 2.5 milliGRAM(s) Oral every 2 hours PRN Severe Pain (7 - 10)  HYDROmorphone   Solution 2.5 milliGRAM(s) Oral every 2 hours PRN dyspnea  HYDROmorphone  Injectable 0.5 milliGRAM(s) IV Push every 1 hour PRN Severe Pain (7 - 10)  HYDROmorphone  Injectable 0.5 milliGRAM(s) IV Push every 1 hour PRN dyspnea  HYDROmorphone  Injectable 0.3 milliGRAM(s) IV Push every 1 hour PRN Moderate Pain (4 - 6)  HYDROmorphone  Injectable 0.5 milliGRAM(s) IV Push every 1 hour PRN Severe Pain (7 - 10)  LORazepam   Injectable 0.25 milliGRAM(s) IV Push every 2 hours PRN anxiety/agitation/refractory dyspnea  ondansetron Injectable 4 milliGRAM(s) IV Push every 6 hours PRN Nausea and/or Vomiting    ITEMS UNCHECKED ARE NOT PRESENT    PRESENT SYMPTOMS: [X ]Unable to self-report see PAINAD, RDOS below  Source if other than patient:  [ ]Family   [X ]Team     Pain: [ ] yes [ ] no  QOL impact -   Location -                    Aggravating factors -  Quality -  Radiation -  Timing-  Severity (0-10 scale):  Minimal acceptable level (0-10 scale):     Dyspnea:                           [ ]Mild [ ]Moderate [ ]Severe  Anxiety:                             [ ]Mild [ ]Moderate [ ]Severe  Fatigue:                             [ ]Mild [ ]Moderate [ ]Severe  Nausea:                             [ ]Mild [ ]Moderate [ ]Severe  Loss of appetite:              [ ]Mild [ ]Moderate [ ]Severe  Constipation:                    [ ]Mild [ ]Moderate [ ]Severe    PCSSQ [Palliative Care Spiritual Screening Question]   Severity (0-10):  Score of 4 or > indicate consideration of Chaplaincy referral.  Chaplaincy Referral: [ ] yes [ ] refused [ X] following [ ] deferred    Caregiver [place]? : [X ] yes [ ] no [ ] deferred:  Social work referral [X ] Patient & Family Centered Care Referral [ ]     Anticipatory Grief present?:  [ X] yes [ ] no [ ] deferred:  Social work referral [X ] Patient & Family Centered Care Referral [ ]  	  Other Symptoms:  [ ]All other review of systems negative: Unable to assess due to poor mentation.    PHYSICAL EXAM:   Vital Signs Last 24 Hrs  T(C): 36.4 (25 Jan 2023 08:06), Max: 36.4 (25 Jan 2023 08:06)  T(F): 97.5 (25 Jan 2023 08:06), Max: 97.5 (25 Jan 2023 08:06)  HR: 85 (25 Jan 2023 08:06) (85 - 102)  BP: 126/76 (25 Jan 2023 08:06) (126/76 - 126/78)  BP(mean): --  RR: 18 (25 Jan 2023 08:06) (18 - 18)  SpO2: 94% (25 Jan 2023 08:06) (94% - 94%)    Parameters below as of 25 Jan 2023 08:06  Patient On (Oxygen Delivery Method): nasal cannula    I&O's Summary    GENERAL: [ ] Cachexia  [ X]Alert  [ ]Oriented x   [ ]Lethargic  [ ]Unarousable  [ X]Verbal  [ ]Non-Verbal  Behavioral:   [ ] Anxiety  [ ] Delirium [ ] Agitation [ ] Other  HEENT:  [X ]Normal   [ ]Dry mouth   [ ]ET Tube/Trach  [ ]Oral lesions  PULMONARY:   [ X]Clear [ ]Tachypnea  [ ]Audible excessive secretions   [ ]Rhonchi        [ ]Right [ ]Left [ ]Bilateral  [ ]Crackles        [ ]Right [ ]Left [ ]Bilateral  [ ]Wheezing     [ ]Right [ ]Left [ ]Bilateral  [ ]Diminished BS [ ]Right [ ]Left [ ]Bilateral    CARDIOVASCULAR:    [X ]Regular [ ]Irregular [ ]Tachy  [ ]Tee [ ]Murmur [ ]Other  GASTROINTESTINAL:  [X]Soft  [ ]Distended   [ X]+BS  [X ]Non tender [ ]Tender  [ ]Other [ ]PEG [ ]OGT/ NGT   Last BM: 1/24/23  GENITOURINARY:  [ ]Normal [ X] Incontinent   [ ]Oliguria/Anuria   [ ]Pal  MUSCULOSKELETAL:   [ ]Normal   [X ]Weakness  [ ]Bed/Wheelchair bound [ ]Edema  NEUROLOGIC:   [ ]No focal deficits  [ ] Cognitive impairment  [ x] Dysphagia [ ]Dysarthria [ ] Paresis [ ]Other   SKIN: Ecchymosis   [ ]Normal  [ ]Rash  [ ]Other  [ ]Pressure ulcer(s)  [ ]y [ ]n  Present on admission      CRITICAL CARE:  [ ] Shock Present  [ ]Septic [ ]Cardiogenic [ ]Neurologic [ ]Hypovolemic  [ ]  Vasopressors [ ]  Inotropes   [ ] Respiratory failure present [ ] Mechanical Ventilation [ ] Non-invasive ventilatory support [ ] High-Flow  [ ] Acute  [ ] Chronic [ ] Hypoxic  [ ] Hypercarbic [ ] Other  [ ] Other organ failure     LABS: None noted.    RADIOLOGY & ADDITIONAL STUDIES: None noted.      PROTEIN CALORIE MALNUTRITION: [ ] mild [ ] moderate [ ] severe  [ ] underweight [ ] morbid obesity    https://www.andeal.org/vault/5971/web/files/ONC/Table_Clinical%20Characteristics%20to%20Document%20Malnutrition-White%20JV%20et%20al%202012.pdf    Height (cm): 154.9 (01-14-23 @ 17:31)  Weight (kg): 56.7 (01-14-23 @ 17:31)  BMI (kg/m2): 23.6 (01-14-23 @ 17:31)    [ X] PPSV2 < or = 30% [ ] significant weight loss [ ] poor nutritional intake [ ] anasarca   Artificial Nutrition [ ]     Other REFERRALS:    [ ] Hospice  [ ]Child Life  [X ]Social Work  [ ]Case management [ ]Holistic Therapy [ ] Physical Therapy [ ] Dietary     Progress Notes - Care Coordination [C. Provider] (01-24-23 @ 15:42)      Palliative Performance Scale:  http://npcrc.org/files/news/palliative_performance_scale_ppsv2.pdf  (Ctrl +  left click to view)  Respiratory Distress Observation Tool:  https://homecareinformation.net/handouts/hen/Respiratory_Distress_Observation_Scale.pdf (Ctrl +  left click to view)  PAINAD Score:  http://geriatrictoolkit.[place].[facility]/cog/painad.pdf (Ctrl +  left click to view)   GAP TEAM PALLIATIVE CARE UNIT PROGRESS NOTE:      [  ] Patient on hospice program.    INDICATION FOR PALLIATIVE CARE UNIT SERVICES/Interval HPI:  Symptom management in the setting of a 87y old female with  decompensated heart failure and new metastatic malignancy.    OVERNIGHT EVENTS: Chart reviewed. The patient is seen and examined at the bedside. Patient is Cantonese speaking. Cantonese  used. 's name: Marky.  ID #[id number]. She required PRN Dilaudid 2.5mg PO X2 for dyspnea within a 24hr period 8am-8am.    DNR on chart: Yes  Yes      Allergies    No Known Allergies    Intolerances    MEDICATIONS  (STANDING):  apixaban 2.5 milliGRAM(s) Oral every 12 hours  donepezil 5 milliGRAM(s) Oral at bedtime  folic acid 1 milliGRAM(s) Oral daily  furosemide   Injectable 40 milliGRAM(s) IV Push daily  metoprolol tartrate 50 milliGRAM(s) Oral three times a day  polyethylene glycol 3350 17 Gram(s) Oral two times a day  senna 2 Tablet(s) Oral at bedtime  simvastatin 20 milliGRAM(s) Oral at bedtime    MEDICATIONS  (PRN):  acetaminophen     Tablet .. 650 milliGRAM(s) Oral every 6 hours PRN Temp greater or equal to 38C (100.4F), Mild Pain (1 - 3), Moderate Pain (4 - 6), Severe Pain (7 - 10)  bisacodyl Suppository 10 milliGRAM(s) Rectal daily PRN Constipation  glycopyrrolate Injectable 0.4 milliGRAM(s) IV Push every 6 hours PRN secretions  HYDROmorphone   Solution 1.5 milliGRAM(s) Oral every 2 hours PRN Moderate Pain (4 - 6)  HYDROmorphone   Solution 2.5 milliGRAM(s) Oral every 2 hours PRN Severe Pain (7 - 10)  HYDROmorphone   Solution 2.5 milliGRAM(s) Oral every 2 hours PRN dyspnea  HYDROmorphone  Injectable 0.5 milliGRAM(s) IV Push every 1 hour PRN Severe Pain (7 - 10)  HYDROmorphone  Injectable 0.5 milliGRAM(s) IV Push every 1 hour PRN dyspnea  HYDROmorphone  Injectable 0.3 milliGRAM(s) IV Push every 1 hour PRN Moderate Pain (4 - 6)  HYDROmorphone  Injectable 0.5 milliGRAM(s) IV Push every 1 hour PRN Severe Pain (7 - 10)  LORazepam   Injectable 0.25 milliGRAM(s) IV Push every 2 hours PRN anxiety/agitation/refractory dyspnea  ondansetron Injectable 4 milliGRAM(s) IV Push every 6 hours PRN Nausea and/or Vomiting    ITEMS UNCHECKED ARE NOT PRESENT    PRESENT SYMPTOMS: [X ]Unable to self-report see PAINAD, RDOS below  Source if other than patient:  [ ]Family   [X ]Team     Pain: [ ] yes [ ] no  QOL impact -   Location -                    Aggravating factors -  Quality -  Radiation -  Timing-  Severity (0-10 scale):  Minimal acceptable level (0-10 scale):     Dyspnea:                           [ ]Mild [ ]Moderate [ ]Severe  Anxiety:                             [ ]Mild [ ]Moderate [ ]Severe  Fatigue:                             [ ]Mild [ ]Moderate [ ]Severe  Nausea:                             [ ]Mild [ ]Moderate [ ]Severe  Loss of appetite:              [ ]Mild [ ]Moderate [ ]Severe  Constipation:                    [ ]Mild [ ]Moderate [ ]Severe    PCSSQ [Palliative Care Spiritual Screening Question]   Severity (0-10):  Score of 4 or > indicate consideration of Chaplaincy referral.  Chaplaincy Referral: [ ] yes [ ] refused [ X] following [ ] deferred    Caregiver [place]? : [X ] yes [ ] no [ ] deferred:  Social work referral [X ] Patient & Family Centered Care Referral [ ]     Anticipatory Grief present?:  [ X] yes [ ] no [ ] deferred:  Social work referral [X ] Patient & Family Centered Care Referral [ ]  	  Other Symptoms:  [ ]All other review of systems negative: Unable to assess due to poor mentation.    PHYSICAL EXAM:   Vital Signs Last 24 Hrs  T(C): 36.4 (25 Jan 2023 08:06), Max: 36.4 (25 Jan 2023 08:06)  T(F): 97.5 (25 Jan 2023 08:06), Max: 97.5 (25 Jan 2023 08:06)  HR: 85 (25 Jan 2023 08:06) (85 - 102)  BP: 126/76 (25 Jan 2023 08:06) (126/76 - 126/78)  BP(mean): --  RR: 18 (25 Jan 2023 08:06) (18 - 18)  SpO2: 94% (25 Jan 2023 08:06) (94% - 94%)    Parameters below as of 25 Jan 2023 08:06  Patient On (Oxygen Delivery Method): nasal cannula    I&O's Summary    GENERAL: [ ] Cachexia  [ X]Alert  [ ]Oriented x   [ ]Lethargic  [ ]Unarousable  [ X]Verbal  [ ]Non-Verbal  Behavioral:   [ ] Anxiety  [ ] Delirium [ ] Agitation [ ] Other  HEENT:  [X ]Normal   [ ]Dry mouth   [ ]ET Tube/Trach  [ ]Oral lesions  PULMONARY:   [ X]Clear [ ]Tachypnea  [ ]Audible excessive secretions   [ ]Rhonchi        [ ]Right [ ]Left [ ]Bilateral  [ ]Crackles        [ ]Right [ ]Left [ ]Bilateral  [ ]Wheezing     [ ]Right [ ]Left [ ]Bilateral  [ ]Diminished BS [ ]Right [ ]Left [ ]Bilateral    CARDIOVASCULAR:    [X ]Regular [ ]Irregular [ ]Tachy  [ ]Tee [ ]Murmur [ ]Other  GASTROINTESTINAL:  [X]Soft  [ ]Distended   [ X]+BS  [X ]Non tender [ ]Tender  [ ]Other [ ]PEG [ ]OGT/ NGT   Last BM: 1/24/23  GENITOURINARY:  [ ]Normal [ X] Incontinent   [ ]Oliguria/Anuria   [ ]Pal  MUSCULOSKELETAL:   [ ]Normal   [X ]Weakness  [ ]Bed/Wheelchair bound [ ]Edema  NEUROLOGIC:   [ ]No focal deficits  [ ] Cognitive impairment  [ x] Dysphagia [ ]Dysarthria [ ] Paresis [ ]Other   SKIN: Ecchymosis   [ ]Normal  [ ]Rash  [ ]Other  [ ]Pressure ulcer(s)  [ ]y [ ]n  Present on admission      CRITICAL CARE:  [ ] Shock Present  [ ]Septic [ ]Cardiogenic [ ]Neurologic [ ]Hypovolemic  [ ]  Vasopressors [ ]  Inotropes   [ ] Respiratory failure present [ ] Mechanical Ventilation [ ] Non-invasive ventilatory support [ ] High-Flow  [ ] Acute  [ ] Chronic [ ] Hypoxic  [ ] Hypercarbic [ ] Other  [ ] Other organ failure     LABS: None noted.    RADIOLOGY & ADDITIONAL STUDIES: None noted.      PROTEIN CALORIE MALNUTRITION: [ ] mild [ ] moderate [ ] severe  [ ] underweight [ ] morbid obesity    https://www.andeal.org/vault/4854/web/files/ONC/Table_Clinical%20Characteristics%20to%20Document%20Malnutrition-White%20JV%20et%20al%202012.pdf    Height (cm): 154.9 (01-14-23 @ 17:31)  Weight (kg): 56.7 (01-14-23 @ 17:31)  BMI (kg/m2): 23.6 (01-14-23 @ 17:31)    [ X] PPSV2 < or = 30% [ ] significant weight loss [ ] poor nutritional intake [ ] anasarca   Artificial Nutrition [ ]     Other REFERRALS:    [ ] Hospice  [ ]Child Life  [X ]Social Work  [ ]Case management [ ]Holistic Therapy [ ] Physical Therapy [ ] Dietary     Progress Notes - Care Coordination [C. Provider] (01-24-23 @ 15:42)      Palliative Performance Scale:  http://npcrc.org/files/news/palliative_performance_scale_ppsv2.pdf  (Ctrl +  left click to view)  Respiratory Distress Observation Tool:  https://homecareinformation.net/handouts/hen/Respiratory_Distress_Observation_Scale.pdf (Ctrl +  left click to view)  PAINAD Score:  http://geriatrictoolkit.[place].[facility]/cog/painad.pdf (Ctrl +  left click to view)   GAP TEAM PALLIATIVE CARE UNIT PROGRESS NOTE:      [  ] Patient on hospice program.    INDICATION FOR PALLIATIVE CARE UNIT SERVICES/Interval HPI:  Symptom management in the setting of a 87y old female with  decompensated heart failure and new metastatic malignancy.    OVERNIGHT EVENTS: Chart reviewed. The patient is seen and examined at the bedside. Patient is Cantonese speaking. Cantonese  used. 's name: Marky.  ID #[id number]. She required PRN Dilaudid 2.5mg PO X2 for dyspnea within a 24hr period 8am-8am.    DNR on chart: Yes  Yes      Allergies    No Known Allergies    Intolerances    MEDICATIONS  (STANDING):  apixaban 2.5 milliGRAM(s) Oral every 12 hours  donepezil 5 milliGRAM(s) Oral at bedtime  folic acid 1 milliGRAM(s) Oral daily  furosemide   Injectable 40 milliGRAM(s) IV Push daily  metoprolol tartrate 50 milliGRAM(s) Oral three times a day  polyethylene glycol 3350 17 Gram(s) Oral two times a day  senna 2 Tablet(s) Oral at bedtime  simvastatin 20 milliGRAM(s) Oral at bedtime    MEDICATIONS  (PRN):  acetaminophen     Tablet .. 650 milliGRAM(s) Oral every 6 hours PRN Temp greater or equal to 38C (100.4F), Mild Pain (1 - 3), Moderate Pain (4 - 6), Severe Pain (7 - 10)  bisacodyl Suppository 10 milliGRAM(s) Rectal daily PRN Constipation  glycopyrrolate Injectable 0.4 milliGRAM(s) IV Push every 6 hours PRN secretions  HYDROmorphone   Solution 1.5 milliGRAM(s) Oral every 2 hours PRN Moderate Pain (4 - 6)  HYDROmorphone   Solution 2.5 milliGRAM(s) Oral every 2 hours PRN Severe Pain (7 - 10)  HYDROmorphone   Solution 2.5 milliGRAM(s) Oral every 2 hours PRN dyspnea  HYDROmorphone  Injectable 0.5 milliGRAM(s) IV Push every 1 hour PRN Severe Pain (7 - 10)  HYDROmorphone  Injectable 0.5 milliGRAM(s) IV Push every 1 hour PRN dyspnea  HYDROmorphone  Injectable 0.3 milliGRAM(s) IV Push every 1 hour PRN Moderate Pain (4 - 6)  HYDROmorphone  Injectable 0.5 milliGRAM(s) IV Push every 1 hour PRN Severe Pain (7 - 10)  LORazepam   Injectable 0.25 milliGRAM(s) IV Push every 2 hours PRN anxiety/agitation/refractory dyspnea  ondansetron Injectable 4 milliGRAM(s) IV Push every 6 hours PRN Nausea and/or Vomiting    ITEMS UNCHECKED ARE NOT PRESENT    PRESENT SYMPTOMS: [X ]Unable to self-report see PAINAD, RDOS below  Source if other than patient:  [ ]Family   [X ]Team     Pain: [ ] yes [ ] no  QOL impact -   Location -                    Aggravating factors -  Quality -  Radiation -  Timing-  Severity (0-10 scale):  Minimal acceptable level (0-10 scale):     Dyspnea:                           [ ]Mild [ ]Moderate [ ]Severe  Anxiety:                             [ ]Mild [ ]Moderate [ ]Severe  Fatigue:                             [ ]Mild [ ]Moderate [ ]Severe  Nausea:                             [ ]Mild [ ]Moderate [ ]Severe  Loss of appetite:              [ ]Mild [ ]Moderate [ ]Severe  Constipation:                    [ ]Mild [ ]Moderate [ ]Severe    PCSSQ [Palliative Care Spiritual Screening Question]   Severity (0-10):  Score of 4 or > indicate consideration of Chaplaincy referral.  Chaplaincy Referral: [ ] yes [ ] refused [ X] following [ ] deferred    Caregiver [place]? : [X ] yes [ ] no [ ] deferred:  Social work referral [X ] Patient & Family Centered Care Referral [ ]     Anticipatory Grief present?:  [ X] yes [ ] no [ ] deferred:  Social work referral [X ] Patient & Family Centered Care Referral [ ]  	  Other Symptoms:  [ ]All other review of systems negative: Unable to assess due to poor mentation.    PHYSICAL EXAM:   Vital Signs Last 24 Hrs  T(C): 36.4 (25 Jan 2023 08:06), Max: 36.4 (25 Jan 2023 08:06)  T(F): 97.5 (25 Jan 2023 08:06), Max: 97.5 (25 Jan 2023 08:06)  HR: 85 (25 Jan 2023 08:06) (85 - 102)  BP: 126/76 (25 Jan 2023 08:06) (126/76 - 126/78)  BP(mean): --  RR: 18 (25 Jan 2023 08:06) (18 - 18)  SpO2: 94% (25 Jan 2023 08:06) (94% - 94%)    Parameters below as of 25 Jan 2023 08:06  Patient On (Oxygen Delivery Method): nasal cannula    I&O's Summary    GENERAL: [ ] Cachexia  [ X]Alert  [ ]Oriented x   [ ]Lethargic  [ ]Unarousable  [ X]Verbal  [ ]Non-Verbal  Behavioral:   [ ] Anxiety  [ ] Delirium [ ] Agitation [ ] Other  HEENT:  [X ]Normal   [ ]Dry mouth   [ ]ET Tube/Trach  [ ]Oral lesions  PULMONARY:   [ X]Clear [ ]Tachypnea  [ ]Audible excessive secretions   [ ]Rhonchi        [ ]Right [ ]Left [ ]Bilateral  [ ]Crackles        [ ]Right [ ]Left [ ]Bilateral  [ ]Wheezing     [ ]Right [ ]Left [ ]Bilateral  [ ]Diminished BS [ ]Right [ ]Left [ ]Bilateral    CARDIOVASCULAR:    [X ]Regular [ ]Irregular [ ]Tachy  [ ]Tee [ ]Murmur [ ]Other  GASTROINTESTINAL:  [X]Soft  [ ]Distended   [ X]+BS  [X ]Non tender [ ]Tender  [ ]Other [ ]PEG [ ]OGT/ NGT   Last BM: 1/24/23  GENITOURINARY:  [ ]Normal [ X] Incontinent   [ ]Oliguria/Anuria   [ ]Pal  MUSCULOSKELETAL:   [ ]Normal   [X ]Weakness  [ ]Bed/Wheelchair bound [ ]Edema  NEUROLOGIC:   [ ]No focal deficits  [ ] Cognitive impairment  [ x] Dysphagia [ ]Dysarthria [ ] Paresis [ ]Other   SKIN: Ecchymosis   [ ]Normal  [ ]Rash  [ ]Other  [ ]Pressure ulcer(s)  [ ]y [ ]n  Present on admission      CRITICAL CARE:  [ ] Shock Present  [ ]Septic [ ]Cardiogenic [ ]Neurologic [ ]Hypovolemic  [ ]  Vasopressors [ ]  Inotropes   [ ] Respiratory failure present [ ] Mechanical Ventilation [ ] Non-invasive ventilatory support [ ] High-Flow  [ ] Acute  [ ] Chronic [ ] Hypoxic  [ ] Hypercarbic [ ] Other  [ ] Other organ failure     LABS: None new.    RADIOLOGY & ADDITIONAL STUDIES: None new.      PROTEIN CALORIE MALNUTRITION: [ ] mild [ ] moderate [ ] severe  [ ] underweight [ ] morbid obesity    https://www.andeal.org/vault/5319/web/files/ONC/Table_Clinical%20Characteristics%20to%20Document%20Malnutrition-White%20JV%20et%20al%202012.pdf    Height (cm): 154.9 (01-14-23 @ 17:31)  Weight (kg): 56.7 (01-14-23 @ 17:31)  BMI (kg/m2): 23.6 (01-14-23 @ 17:31)    [ X] PPSV2 < or = 30% [ ] significant weight loss [ ] poor nutritional intake [ ] anasarca   Artificial Nutrition [ ]     Other REFERRALS:    [ ] Hospice  [ ]Child Life  [X ]Social Work  [ ]Case management [ ]Holistic Therapy [ ] Physical Therapy [ ] Dietary     Progress Notes - Care Coordination [C. Provider] (01-24-23 @ 15:42)      Palliative Performance Scale:  http://npcrc.org/files/news/palliative_performance_scale_ppsv2.pdf  (Ctrl +  left click to view)  Respiratory Distress Observation Tool:  https://homecareinformation.net/handouts/hen/Respiratory_Distress_Observation_Scale.pdf (Ctrl +  left click to view)  PAINAD Score:  http://geriatrictoolkit.[place].[email or web address]/cog/painad.pdf (Ctrl +  left click to view)   GAP TEAM PALLIATIVE CARE UNIT PROGRESS NOTE:      [  ] Patient on hospice program.    INDICATION FOR PALLIATIVE CARE UNIT SERVICES/Interval HPI:  Symptom management in the setting of a 87y old female with  decompensated heart failure and new metastatic malignancy.    OVERNIGHT EVENTS: Chart reviewed. The patient is seen and examined at the bedside. Patient is Cantonese speaking. Cantonese  used. 's name: Marky.  ID #[id number]. She required PRN Dilaudid 2.5mg PO X2 for dyspnea within a 24hr period 8am-8am.    DNR on chart: Yes  Yes      Allergies    No Known Allergies    Intolerances    MEDICATIONS  (STANDING):  apixaban 2.5 milliGRAM(s) Oral every 12 hours  donepezil 5 milliGRAM(s) Oral at bedtime  folic acid 1 milliGRAM(s) Oral daily  furosemide   Injectable 40 milliGRAM(s) IV Push daily  metoprolol tartrate 50 milliGRAM(s) Oral three times a day  polyethylene glycol 3350 17 Gram(s) Oral two times a day  senna 2 Tablet(s) Oral at bedtime  simvastatin 20 milliGRAM(s) Oral at bedtime    MEDICATIONS  (PRN):  acetaminophen     Tablet .. 650 milliGRAM(s) Oral every 6 hours PRN Temp greater or equal to 38C (100.4F), Mild Pain (1 - 3), Moderate Pain (4 - 6), Severe Pain (7 - 10)  bisacodyl Suppository 10 milliGRAM(s) Rectal daily PRN Constipation  glycopyrrolate Injectable 0.4 milliGRAM(s) IV Push every 6 hours PRN secretions  HYDROmorphone   Solution 1.5 milliGRAM(s) Oral every 2 hours PRN Moderate Pain (4 - 6)  HYDROmorphone   Solution 2.5 milliGRAM(s) Oral every 2 hours PRN Severe Pain (7 - 10)  HYDROmorphone   Solution 2.5 milliGRAM(s) Oral every 2 hours PRN dyspnea  HYDROmorphone  Injectable 0.5 milliGRAM(s) IV Push every 1 hour PRN Severe Pain (7 - 10)  HYDROmorphone  Injectable 0.5 milliGRAM(s) IV Push every 1 hour PRN dyspnea  HYDROmorphone  Injectable 0.3 milliGRAM(s) IV Push every 1 hour PRN Moderate Pain (4 - 6)  HYDROmorphone  Injectable 0.5 milliGRAM(s) IV Push every 1 hour PRN Severe Pain (7 - 10)  LORazepam   Injectable 0.25 milliGRAM(s) IV Push every 2 hours PRN anxiety/agitation/refractory dyspnea  ondansetron Injectable 4 milliGRAM(s) IV Push every 6 hours PRN Nausea and/or Vomiting    ITEMS UNCHECKED ARE NOT PRESENT    PRESENT SYMPTOMS: [X ]Unable to self-report see PAINAD, RDOS below  Source if other than patient:  [ ]Family   [X ]Team     Pain: [ ] yes [ ] no  QOL impact -   Location -                    Aggravating factors -  Quality -  Radiation -  Timing-  Severity (0-10 scale):  Minimal acceptable level (0-10 scale):     Dyspnea:                           [ ]Mild [ ]Moderate [ ]Severe  Anxiety:                             [ ]Mild [ ]Moderate [ ]Severe  Fatigue:                             [ ]Mild [ ]Moderate [ ]Severe  Nausea:                             [ ]Mild [ ]Moderate [ ]Severe  Loss of appetite:              [ ]Mild [ ]Moderate [ ]Severe  Constipation:                    [ ]Mild [ ]Moderate [ ]Severe    PCSSQ [Palliative Care Spiritual Screening Question]   Severity (0-10):  Score of 4 or > indicate consideration of Chaplaincy referral.  Chaplaincy Referral: [ ] yes [ ] refused [ X] following [ ] deferred    Caregiver [place]? : [X ] yes [ ] no [ ] deferred:  Social work referral [X ] Patient & Family Centered Care Referral [ ]     Anticipatory Grief present?:  [ X] yes [ ] no [ ] deferred:  Social work referral [X ] Patient & Family Centered Care Referral [ ]  	  Other Symptoms:  [ ]All other review of systems negative: Unable to assess due to poor mentation.    PHYSICAL EXAM:   Vital Signs Last 24 Hrs  T(C): 36.4 (25 Jan 2023 08:06), Max: 36.4 (25 Jan 2023 08:06)  T(F): 97.5 (25 Jan 2023 08:06), Max: 97.5 (25 Jan 2023 08:06)  HR: 85 (25 Jan 2023 08:06) (85 - 102)  BP: 126/76 (25 Jan 2023 08:06) (126/76 - 126/78)  BP(mean): --  RR: 18 (25 Jan 2023 08:06) (18 - 18)  SpO2: 94% (25 Jan 2023 08:06) (94% - 94%)    Parameters below as of 25 Jan 2023 08:06  Patient On (Oxygen Delivery Method): nasal cannula    I&O's Summary    GENERAL: [ ] Cachexia  [ X]Alert  [ ]Oriented x   [ ]Lethargic  [ ]Unarousable  [ X]Verbal  [ ]Non-Verbal  Behavioral:   [ ] Anxiety  [ ] Delirium [ ] Agitation [ ] Other  HEENT:  [X ]Normal   [ ]Dry mouth   [ ]ET Tube/Trach  [ ]Oral lesions  PULMONARY:   [ X]Clear [ ]Tachypnea  [ ]Audible excessive secretions   [ ]Rhonchi        [ ]Right [ ]Left [ ]Bilateral  [ ]Crackles        [ ]Right [ ]Left [ ]Bilateral  [ ]Wheezing     [ ]Right [ ]Left [ ]Bilateral  [ ]Diminished BS [ ]Right [ ]Left [ ]Bilateral    CARDIOVASCULAR:    [X ]Regular [ ]Irregular [ ]Tachy  [ ]Tee [ ]Murmur [ ]Other  GASTROINTESTINAL:  [X]Soft  [ ]Distended   [ X]+BS  [X ]Non tender [ ]Tender  [ ]Other [ ]PEG [ ]OGT/ NGT   Last BM: 1/24/23  GENITOURINARY:  [ ]Normal [ X] Incontinent   [ ]Oliguria/Anuria   [ ]Pal  MUSCULOSKELETAL:   [ ]Normal   [X ]Weakness  [ ]Bed/Wheelchair bound [ ]Edema  NEUROLOGIC:   [ ]No focal deficits  [ ] Cognitive impairment  [ x] Dysphagia [ ]Dysarthria [ ] Paresis [ ]Other   SKIN: Ecchymosis   [ ]Normal  [ ]Rash  [ ]Other  [ ]Pressure ulcer(s)  [ ]y [ ]n  Present on admission      CRITICAL CARE:  [ ] Shock Present  [ ]Septic [ ]Cardiogenic [ ]Neurologic [ ]Hypovolemic  [ ]  Vasopressors [ ]  Inotropes   [ ] Respiratory failure present [ ] Mechanical Ventilation [ ] Non-invasive ventilatory support [ ] High-Flow  [ ] Acute  [ ] Chronic [ ] Hypoxic  [ ] Hypercarbic [ ] Other  [ ] Other organ failure     LABS: None new.    RADIOLOGY & ADDITIONAL STUDIES: None new.      PROTEIN CALORIE MALNUTRITION: [ ] mild [ ] moderate [ ] severe  [ ] underweight [ ] morbid obesity    https://www.andeal.org/vault/0875/web/files/ONC/Table_Clinical%20Characteristics%20to%20Document%20Malnutrition-White%20JV%20et%20al%202012.pdf    Height (cm): 154.9 (01-14-23 @ 17:31)  Weight (kg): 56.7 (01-14-23 @ 17:31)  BMI (kg/m2): 23.6 (01-14-23 @ 17:31)    [ X] PPSV2 < or = 30% [ ] significant weight loss [ ] poor nutritional intake [ ] anasarca   Artificial Nutrition [ ]     Other REFERRALS:    [ ] Hospice  [ ]Child Life  [X ]Social Work  [ ]Case management [ ]Holistic Therapy [ ] Physical Therapy [ ] Dietary     Progress Notes - Care Coordination [C. Provider] (01-24-23 @ 15:42)      Palliative Performance Scale:  http://npcrc.org/files/news/palliative_performance_scale_ppsv2.pdf  (Ctrl +  left click to view)  Respiratory Distress Observation Tool:  https://[email or web address].[email or web address]/handouts/hen/Respiratory_Distress_Observation_Scale.pdf (Ctrl +  left click to view)  PAINAD Score:  http://geriatrictoolkit.[email or web address].[facility]/cog/painad.pdf (Ctrl +  left click to view)   GAP TEAM PALLIATIVE CARE UNIT PROGRESS NOTE:      [  ] Patient on hospice program.    INDICATION FOR PALLIATIVE CARE UNIT SERVICES/Interval HPI:  Symptom management in the setting of a 87y old female with  decompensated heart failure and new metastatic malignancy.    OVERNIGHT EVENTS: Chart reviewed. The patient is seen and examined at the bedside. Patient is Cantonese speaking. Cantonese  used. 's name: Marky.  ID #[id number]. She required PRN Dilaudid 2.5mg PO X2 for dyspnea within a 24hr period 8am-8am.    DNR on chart: Yes  Yes      Allergies    No Known Allergies    Intolerances    MEDICATIONS  (STANDING):  apixaban 2.5 milliGRAM(s) Oral every 12 hours  donepezil 5 milliGRAM(s) Oral at bedtime  folic acid 1 milliGRAM(s) Oral daily  furosemide   Injectable 40 milliGRAM(s) IV Push daily  metoprolol tartrate 50 milliGRAM(s) Oral three times a day  polyethylene glycol 3350 17 Gram(s) Oral two times a day  senna 2 Tablet(s) Oral at bedtime  simvastatin 20 milliGRAM(s) Oral at bedtime    MEDICATIONS  (PRN):  acetaminophen     Tablet .. 650 milliGRAM(s) Oral every 6 hours PRN Temp greater or equal to 38C (100.4F), Mild Pain (1 - 3), Moderate Pain (4 - 6), Severe Pain (7 - 10)  bisacodyl Suppository 10 milliGRAM(s) Rectal daily PRN Constipation  glycopyrrolate Injectable 0.4 milliGRAM(s) IV Push every 6 hours PRN secretions  HYDROmorphone   Solution 1.5 milliGRAM(s) Oral every 2 hours PRN Moderate Pain (4 - 6)  HYDROmorphone   Solution 2.5 milliGRAM(s) Oral every 2 hours PRN Severe Pain (7 - 10)  HYDROmorphone   Solution 2.5 milliGRAM(s) Oral every 2 hours PRN dyspnea  HYDROmorphone  Injectable 0.5 milliGRAM(s) IV Push every 1 hour PRN Severe Pain (7 - 10)  HYDROmorphone  Injectable 0.5 milliGRAM(s) IV Push every 1 hour PRN dyspnea  HYDROmorphone  Injectable 0.3 milliGRAM(s) IV Push every 1 hour PRN Moderate Pain (4 - 6)  HYDROmorphone  Injectable 0.5 milliGRAM(s) IV Push every 1 hour PRN Severe Pain (7 - 10)  LORazepam   Injectable 0.25 milliGRAM(s) IV Push every 2 hours PRN anxiety/agitation/refractory dyspnea  ondansetron Injectable 4 milliGRAM(s) IV Push every 6 hours PRN Nausea and/or Vomiting    ITEMS UNCHECKED ARE NOT PRESENT    PRESENT SYMPTOMS: [X ]Unable to self-report see PAINAD, RDOS below  Source if other than patient:  [ ]Family   [X ]Team     Pain: [ ] yes [ ] no  QOL impact -   Location -                    Aggravating factors -  Quality -  Radiation -  Timing-  Severity (0-10 scale):  Minimal acceptable level (0-10 scale):     Dyspnea:                           [ ]Mild [ ]Moderate [ ]Severe  Anxiety:                             [ ]Mild [ ]Moderate [ ]Severe  Fatigue:                             [ ]Mild [ ]Moderate [ ]Severe  Nausea:                             [ ]Mild [ ]Moderate [ ]Severe  Loss of appetite:              [ ]Mild [ ]Moderate [ ]Severe  Constipation:                    [ ]Mild [ ]Moderate [ ]Severe    PCSSQ [Palliative Care Spiritual Screening Question]   Severity (0-10):  Score of 4 or > indicate consideration of Chaplaincy referral.  Chaplaincy Referral: [ ] yes [ ] refused [ X] following [ ] deferred    Caregiver [place]? : [X ] yes [ ] no [ ] deferred:  Social work referral [X ] Patient & Family Centered Care Referral [ ]     Anticipatory Grief present?:  [ X] yes [ ] no [ ] deferred:  Social work referral [X ] Patient & Family Centered Care Referral [ ]  	  Other Symptoms:  [ ]All other review of systems negative: Unable to assess due to poor mentation.    PHYSICAL EXAM:   Vital Signs Last 24 Hrs  T(C): 36.4 (25 Jan 2023 08:06), Max: 36.4 (25 Jan 2023 08:06)  T(F): 97.5 (25 Jan 2023 08:06), Max: 97.5 (25 Jan 2023 08:06)  HR: 85 (25 Jan 2023 08:06) (85 - 102)  BP: 126/76 (25 Jan 2023 08:06) (126/76 - 126/78)  BP(mean): --  RR: 18 (25 Jan 2023 08:06) (18 - 18)  SpO2: 94% (25 Jan 2023 08:06) (94% - 94%)    Parameters below as of 25 Jan 2023 08:06  Patient On (Oxygen Delivery Method): nasal cannula    I&O's Summary    GENERAL: [ ] Cachexia  [ X]Alert  [ ]Oriented x   [ ]Lethargic  [ ]Unarousable  [ X]Verbal  [ ]Non-Verbal  Behavioral:   [ ] Anxiety  [ ] Delirium [ ] Agitation [ ] Other  HEENT:  [X ]Normal   [ ]Dry mouth   [ ]ET Tube/Trach  [ ]Oral lesions  PULMONARY:   [ X]Clear [ ]Tachypnea  [ ]Audible excessive secretions   [ ]Rhonchi        [ ]Right [ ]Left [ ]Bilateral  [ ]Crackles        [ ]Right [ ]Left [ ]Bilateral  [ ]Wheezing     [ ]Right [ ]Left [ ]Bilateral  [ ]Diminished BS [ ]Right [ ]Left [ ]Bilateral    CARDIOVASCULAR:    [X ]Regular [ ]Irregular [ ]Tachy  [ ]Tee [ ]Murmur [ ]Other  GASTROINTESTINAL:  [X]Soft  [ ]Distended   [ X]+BS  [X ]Non tender [ ]Tender  [ ]Other [ ]PEG [ ]OGT/ NGT   Last BM: 1/24/23  GENITOURINARY:  [ ]Normal [ X] Incontinent   [ ]Oliguria/Anuria   [ ]Pal  MUSCULOSKELETAL:   [ ]Normal   [X ]Weakness  [ ]Bed/Wheelchair bound [ ]Edema  NEUROLOGIC:   [ ]No focal deficits  [ ] Cognitive impairment  [ x] Dysphagia [ ]Dysarthria [ ] Paresis [ ]Other   SKIN: Ecchymosis   [ ]Normal  [ ]Rash  [ ]Other  [ ]Pressure ulcer(s)  [ ]y [ ]n  Present on admission      CRITICAL CARE:  [ ] Shock Present  [ ]Septic [ ]Cardiogenic [ ]Neurologic [ ]Hypovolemic  [ ]  Vasopressors [ ]  Inotropes   [ ] Respiratory failure present [ ] Mechanical Ventilation [ ] Non-invasive ventilatory support [ ] High-Flow  [ ] Acute  [ ] Chronic [ ] Hypoxic  [ ] Hypercarbic [ ] Other  [ ] Other organ failure     LABS: None new.    RADIOLOGY & ADDITIONAL STUDIES: None new.      PROTEIN CALORIE MALNUTRITION: [ ] mild [ ] moderate [ ] severe  [ ] underweight [ ] morbid obesity    https://www.andeal.org/vault/9655/web/files/ONC/Table_Clinical%20Characteristics%20to%20Document%20Malnutrition-White%20JV%20et%20al%202012.pdf    Height (cm): 154.9 (01-14-23 @ 17:31)  Weight (kg): 56.7 (01-14-23 @ 17:31)  BMI (kg/m2): 23.6 (01-14-23 @ 17:31)    [ X] PPSV2 < or = 30% [ ] significant weight loss [ ] poor nutritional intake [ ] anasarca   Artificial Nutrition [ ]     Other REFERRALS:    [ ] Hospice  [ ]Child Life  [X ]Social Work  [ ]Case management [ ]Holistic Therapy [ ] Physical Therapy [ ] Dietary     Progress Notes - Care Coordination [C. Provider] (01-24-23 @ 15:42)      Palliative Performance Scale:  http://npcrc.org/files/news/palliative_performance_scale_ppsv2.pdf  (Ctrl +  left click to view)  Respiratory Distress Observation Tool:  https://homecareinformation.net/handouts/hen/Respiratory_Distress_Observation_Scale.pdf (Ctrl +  left click to view)  PAINAD Score:  http://geriatrictoolkit.[place].[facility]/cog/painad.pdf (Ctrl +  left click to view)

## 2023-01-25 NOTE — PROGRESS NOTE ADULT - NS ATTEND AMEND GEN_ALL_CORE FT
Patient in PCU for symptom directed care 2/2 advanced heart failure and new likely malignancy that family deferred work up. their goals are aligned with symptom care and they are seeking placement in SNF. patient accepted to facility and awaiting family answer and insurance authorization, as well as bed availability. Patient tolerating PO meds including those for symptom relief. Vital signs stable, lat BM 1/24. Patient is medically appropriate for d/c to SNF if authorization in place. COVID swab recently negative. plan discussed with IDT.

## 2023-01-25 NOTE — PROGRESS NOTE ADULT - PROBLEM SELECTOR PLAN 3
- c/w lasix  c/w home meds for now while able to take orally  will discuss discontinuation based on clinical course. - PPSV 30%. The patient requires nursing assistance with ADLs.  - Supportive care.  - Turn and position.  - Continue with good skin care as per hospital protocol.

## 2023-01-25 NOTE — PROGRESS NOTE ADULT - PROBLEM SELECTOR PLAN 4
?new malignancy  family declines wanting to do further work up. - Continue with Lasix - Continue with Lasix, transition to PO

## 2023-01-26 PROCEDURE — 99232 SBSQ HOSP IP/OBS MODERATE 35: CPT

## 2023-01-26 RX ORDER — HYDROMORPHONE HYDROCHLORIDE 2 MG/ML
0.5 INJECTION INTRAMUSCULAR; INTRAVENOUS; SUBCUTANEOUS
Refills: 0 | Status: DISCONTINUED | OUTPATIENT
Start: 2023-01-26 | End: 2023-01-27

## 2023-01-26 RX ORDER — HYDROMORPHONE HYDROCHLORIDE 2 MG/ML
0.3 INJECTION INTRAMUSCULAR; INTRAVENOUS; SUBCUTANEOUS
Refills: 0 | Status: DISCONTINUED | OUTPATIENT
Start: 2023-01-26 | End: 2023-01-27

## 2023-01-26 RX ADMIN — Medication 40 MILLIGRAM(S): at 05:39

## 2023-01-26 RX ADMIN — Medication 0.25 MILLIGRAM(S): at 14:18

## 2023-01-26 RX ADMIN — HYDROMORPHONE HYDROCHLORIDE 0.5 MILLIGRAM(S): 2 INJECTION INTRAMUSCULAR; INTRAVENOUS; SUBCUTANEOUS at 20:05

## 2023-01-26 RX ADMIN — HYDROMORPHONE HYDROCHLORIDE 2.5 MILLIGRAM(S): 2 INJECTION INTRAMUSCULAR; INTRAVENOUS; SUBCUTANEOUS at 06:08

## 2023-01-26 RX ADMIN — Medication 0.25 MILLIGRAM(S): at 17:53

## 2023-01-26 RX ADMIN — Medication 0.25 MILLIGRAM(S): at 15:41

## 2023-01-26 RX ADMIN — HYDROMORPHONE HYDROCHLORIDE 0.5 MILLIGRAM(S): 2 INJECTION INTRAMUSCULAR; INTRAVENOUS; SUBCUTANEOUS at 22:47

## 2023-01-26 RX ADMIN — HYDROMORPHONE HYDROCHLORIDE 2.5 MILLIGRAM(S): 2 INJECTION INTRAMUSCULAR; INTRAVENOUS; SUBCUTANEOUS at 05:38

## 2023-01-26 RX ADMIN — Medication 50 MILLIGRAM(S): at 13:22

## 2023-01-26 RX ADMIN — APIXABAN 2.5 MILLIGRAM(S): 2.5 TABLET, FILM COATED ORAL at 05:39

## 2023-01-26 RX ADMIN — POLYETHYLENE GLYCOL 3350 17 GRAM(S): 17 POWDER, FOR SOLUTION ORAL at 05:40

## 2023-01-26 RX ADMIN — Medication 1 MILLIGRAM(S): at 11:24

## 2023-01-26 RX ADMIN — Medication 50 MILLIGRAM(S): at 05:39

## 2023-01-26 NOTE — PROGRESS NOTE ADULT - PROBLEM SELECTOR PLAN 2
- Continue with Dilaudid 1.5mg solution q2hr PRN for moderate pain ( 0 required within a 24hr period 8am-8am)  - Continue with Dilaudid 2.5mg solution q2hr PRN for severe pain ( 1 required within a 24hr period 8am-8am)  - Continue with bowel regimen.

## 2023-01-26 NOTE — PROGRESS NOTE ADULT - TIME BILLING
chart reviewing, history taking, physical exam, assessment and documentation, including speaking to specialist/SW/CM regarding the management.
Total time spent in care of patient and family as noted below:    [x ] Preparation to see the patient  [ ] Obtained and/or reviewed separately obtained history  [x ] Performed a medically appropriate examination and/or evaluation  [ ] Counseled and educated the patient/family/caregiver  [x ] Ordered medications, tests, or procedures  [ ] Referred and communicated with other health care professionals  [ x] Documentation of clinical information in the electronic or other health care record  [x ] Independently interpreted results and communicated results to patient/family/caregiver  [ x] Care coordination

## 2023-01-26 NOTE — PROGRESS NOTE ADULT - PROBLEM SELECTOR PLAN 1
- Continue with Dilaudid 2.5mg soln q2hr PRN ( 1 required within a 24hr period 8am-8am)   - Continue with Lasix daily, for comfort  - Continue with Ativan 0.2mg q1h PRN for refractory dyspnea.

## 2023-01-26 NOTE — PROGRESS NOTE ADULT - PROBLEM SELECTOR PLAN 3
- PPSV 30%. The patient requires nursing assistance with ADLs.  - Supportive care.  - Turn and position.  - Continue with good skin care as per hospital protocol.

## 2023-01-26 NOTE — PROGRESS NOTE ADULT - PROBLEM SELECTOR PLAN 6
- Spoke to the patient's son Elver Osborne at the bedside. Cantonese  used. 's name: Viridiana.  ID #[id number]. Updates provided.  Son with no questions at this time. Emotional support provided.   - Plan is for discharge to [facility]. - Spoke to the patient's son Elver Osborne at the bedside. Cantonese  used. 's name: Viridiana.  ID #[id number]. Updates provided.  Son with no questions at this time. Emotional support provided.   - Plan is for discharge to [facility]. - Spoke to the patient's son Elver Osborne at the bedside. Cantonese  used. 's name: Viridiana.  ID #[id number]. Updates provided.  Son with no questions at this time. Emotional support provided.   - Plan is for discharge to [facility].

## 2023-01-26 NOTE — PROGRESS NOTE ADULT - ASSESSMENT
The patient is an 87-year-old woman who is followed for hypertension, diabetes mellitus, and dementia and who has a history of breast and colon cancers (now in remission as per family) who presented today with shortness of breath. Patient admitted and treated for decompensated heart failure. patient also with abnormal findings on CT scan concerning for malignancy, family decided against further work up and wants to prioritize symptoms. Palliative consulted for PCU evaluation. The patient is now on the PCU for symptom management and a safe disposition plan. Patient accepted to [facility]. The patient is an 87-year-old woman who is followed for hypertension, diabetes mellitus, and dementia and who has a history of breast and colon cancers (now in remission as per family) who presented today with shortness of breath. Patient admitted and treated for decompensated heart failure. patient also with abnormal findings on CT scan concerning for malignancy, family decided against further work up and wants to prioritize symptoms. Palliative consulted for PCU evaluation. The patient is now on the PCU for symptom management and a safe disposition plan. Patient accepted to [facility]. The patient is an 87-year-old woman who is followed for hypertension, diabetes mellitus, and dementia and who has a history of breast and colon cancers (now in remission as per family) who presented today with shortness of breath. Patient admitted and treated for decompensated heart failure. patient also with abnormal findings on CT scan concerning for malignancy, family decided against further work up and wants to prioritize symptoms. Palliative consulted for PCU evaluation. The patient is now on the PCU for symptom management and a safe disposition plan. Patient accepted to [facility]. The patient is an 87-year-old woman who is followed for hypertension, diabetes mellitus, and dementia and who has a history of breast and colon cancers (now in remission as per family) who presented today with shortness of breath. Patient admitted and treated for decompensated heart failure. Patient also with abnormal findings on CT scan concerning for malignancy and family decided against further work up and wants to prioritize symptoms.  The patient is now on the PCU for symptom management and a safe disposition plan. Patient accepted to [facility].     The patient is an 87-year-old woman who is followed for hypertension, diabetes mellitus, and dementia and who has a history of breast and colon cancers (now in remission as per family) who presented today with shortness of breath. Patient admitted and treated for decompensated heart failure. Patient also with abnormal findings on CT scan concerning for malignancy and family decided against further work up and wants to prioritize symptoms.  The patient is now on the PCU for symptom management and a safe disposition plan. Patient accepted to [facility].     The patient is an 87-year-old woman who is followed for hypertension, diabetes mellitus, and dementia and who has a history of breast and colon cancers (now in remission as per family) who presented today with shortness of breath. Patient admitted and treated for decompensated heart failure. Patient also with abnormal findings on CT scan concerning for malignancy and family decided against further work up and wants to prioritize symptoms.  The patient is now on the PCU for symptom management and a safe disposition plan. Patient accepted to [facility].

## 2023-01-26 NOTE — PROGRESS NOTE ADULT - RESPIRATORY DISTRESS OBSERVATION: RESPIRATORY RATE
Less than or equal to 18 breaths

## 2023-01-26 NOTE — PROGRESS NOTE ADULT - NS ATTEND AMEND GEN_ALL_CORE FT
The patient is an 87-year-old woman who is followed for hypertension, diabetes mellitus, and dementia and who has a history of breast and colon cancers (now in remission as per family) who presented today with shortness of breath. Patient admitted and treated for decompensated heart failure. patient also with abnormal findings on CT scan concerning for malignancy, family decided against further work up and wants to prioritize symptoms. Palliative consulted for PCU evaluation. The patient is now on the PCU for symptom management and a safe disposition plan. Patient accepted to [facility].    I have reviewed all documentation from prior primary team and consultants, as well as relevant imaging and laboratory data as this patient is new to me.     Patient assessment and plan discussed on interdisciplinary team rounds today. The patient is an 87-year-old woman who is followed for hypertension, diabetes mellitus, and dementia and who has a history of breast and colon cancers (now in remission as per family) who presented today with shortness of breath. Patient admitted and treated for decompensated heart failure. patient also with abnormal findings on CT scan concerning for malignancy, family decided against further work up and wants to prioritize symptoms. Palliative consulted for PCU evaluation. The patient is now on the PCU for symptom management and a safe disposition plan. Patient accepted to [facility].    I have reviewed all documentation from prior primary team and consultants, as well as relevant imaging and laboratory data as this patient is new to me.     Patient assessment and plan discussed on interdisciplinary team rounds today. The patient is an 87-year-old woman who is followed for hypertension, diabetes mellitus, and dementia and who has a history of breast and colon cancers (now in remission as per family) who presented today with shortness of breath. Patient admitted and treated for decompensated heart failure. patient also with abnormal findings on CT scan concerning for malignancy, family decided against further work up and wants to prioritize symptoms. Palliative consulted for PCU evaluation. The patient is now on the PCU for symptom management and a safe disposition plan. Patient accepted to [facility].    I have reviewed all documentation from prior primary team and consultants, as well as relevant imaging and laboratory data as this patient is new to me.     Patient assessment and plan discussed on interdisciplinary team rounds today.

## 2023-01-26 NOTE — PROGRESS NOTE ADULT - PROVIDER SPECIALTY LIST ADULT
Nephrology
Internal Medicine
Nephrology
Palliative Care
Infectious Disease
Internal Medicine
Palliative Care
Palliative Care
Internal Medicine
Nephrology
Nephrology
Internal Medicine
Palliative Care
Internal Medicine
Palliative Care

## 2023-01-26 NOTE — PROGRESS NOTE ADULT - SUBJECTIVE AND OBJECTIVE BOX
GAP TEAM PALLIATIVE CARE UNIT PROGRESS NOTE:      [  ] Patient on hospice program.    INDICATION FOR PALLIATIVE CARE UNIT SERVICES/Interval HPI: Symptom management in the setting of a 87y old female with  decompensated heart failure and new metastatic malignancy.    OVERNIGHT EVENTS: Chart reviewed. The patient is seen and examined at the bedside. Patient and family is Cantonese speaking. Cantonese  used. 's name: Viridiana.  ID #[id number]. She required PRN Dilaudid 2.5mg PO X1 for dyspnea and X1 for severe pain within a 24hr period 8am-8am.    DNR on chart: Yes  Yes      Allergies    No Known Allergies    Intolerances    MEDICATIONS  (STANDING):  apixaban 2.5 milliGRAM(s) Oral every 12 hours  donepezil 5 milliGRAM(s) Oral at bedtime  folic acid 1 milliGRAM(s) Oral daily  furosemide    Tablet 40 milliGRAM(s) Oral daily  metoprolol tartrate 50 milliGRAM(s) Oral three times a day  polyethylene glycol 3350 17 Gram(s) Oral two times a day  senna 2 Tablet(s) Oral at bedtime  simvastatin 20 milliGRAM(s) Oral at bedtime    MEDICATIONS  (PRN):  acetaminophen     Tablet .. 650 milliGRAM(s) Oral every 6 hours PRN Temp greater or equal to 38C (100.4F), Mild Pain (1 - 3), Moderate Pain (4 - 6), Severe Pain (7 - 10)  bisacodyl Suppository 10 milliGRAM(s) Rectal daily PRN Constipation  glycopyrrolate Injectable 0.4 milliGRAM(s) IV Push every 6 hours PRN secretions  HYDROmorphone   Solution 1.5 milliGRAM(s) Oral every 2 hours PRN Moderate Pain (4 - 6)  HYDROmorphone   Solution 2.5 milliGRAM(s) Oral every 2 hours PRN Severe Pain (7 - 10)  HYDROmorphone   Solution 2.5 milliGRAM(s) Oral every 2 hours PRN dyspnea  LORazepam   Injectable 0.25 milliGRAM(s) IV Push every 2 hours PRN anxiety/agitation/refractory dyspnea  ondansetron Injectable 4 milliGRAM(s) IV Push every 6 hours PRN Nausea and/or Vomiting    ITEMS UNCHECKED ARE NOT PRESENT    PRESENT SYMPTOMS: [X ]Unable to self-report see PAINAD, RDOS below  Source if other than patient:  [ ]Family   [ X]Team     Pain: [ ] yes [ ] no  QOL impact -   Location -                    Aggravating factors -  Quality -  Radiation -  Timing-  Severity (0-10 scale):  Minimal acceptable level (0-10 scale):     Dyspnea:                           [ ]Mild [ ]Moderate [ ]Severe  Anxiety:                             [ ]Mild [ ]Moderate [ ]Severe  Fatigue:                             [ ]Mild [ ]Moderate [ ]Severe  Nausea:                             [ ]Mild [ ]Moderate [ ]Severe  Loss of appetite:              [ ]Mild [ ]Moderate [ ]Severe  Constipation:                    [ ]Mild [ ]Moderate [ ]Severe    PCSSQ [Palliative Care Spiritual Screening Question]   Severity (0-10):  Score of 4 or > indicate consideration of Chaplaincy referral.  Chaplaincy Referral: [ ] yes [ ] refused [ X] following [ ] deferred    Caregiver [place]? : [X ] yes [ ] no [ ] deferred:  Social work referral [X ] Patient & Family Centered Care Referral [ ]     Anticipatory Grief present?:  [X ] yes [ ] no [ ] deferred:  Social work referral [ X] Patient & Family Centered Care Referral [ ]  	  Other Symptoms:  [ ]All other review of systems negative- unable to assess due to poor mentation.     PHYSICAL EXAM:   Vital Signs Last 24 Hrs  T(C): 36.6 (26 Jan 2023 08:42), Max: 36.6 (26 Jan 2023 08:42)  T(F): 97.9 (26 Jan 2023 08:42), Max: 97.9 (26 Jan 2023 08:42)  HR: 93 (26 Jan 2023 08:42) (93 - 118)  BP: 135/78 (26 Jan 2023 08:42) (135/78 - 145/87)  BP(mean): --  RR: 18 (26 Jan 2023 08:42) (18 - 18)  SpO2: 99% (26 Jan 2023 08:42) (99% - 99%)    Parameters below as of 26 Jan 2023 08:42  Patient On (Oxygen Delivery Method): nasal cannula    I&O's Summary    GENERAL: [ ] Cachexia  [ X]Alert  [ ]Oriented x   [ ]Lethargic  [ ]Unarousable  [ X]Verbal  [ ]Non-Verbal  Behavioral:   [ ] Anxiety  [ ] Delirium [ ] Agitation [ ] Other  HEENT:  [X ]Normal   [ ]Dry mouth   [ ]ET Tube/Trach  [ ]Oral lesions  PULMONARY:   [ X]Clear [ ]Tachypnea  [ ]Audible excessive secretions   [ ]Rhonchi        [ ]Right [ ]Left [ ]Bilateral  [ ]Crackles        [ ]Right [ ]Left [ ]Bilateral  [ ]Wheezing     [ ]Right [ ]Left [ ]Bilateral  [ ]Diminished BS [ ]Right [ ]Left [ ]Bilateral    CARDIOVASCULAR:    [X ]Regular [ ]Irregular [ ]Tachy  [ ]Tee [ ]Murmur [ ]Other  GASTROINTESTINAL:  [X]Soft  [ ]Distended   [ X]+BS  [X ]Non tender [ ]Tender  [ ]Other [ ]PEG [ ]OGT/ NGT   Last BM: 1/24/23  GENITOURINARY:  [ ]Normal [ X] Incontinent   [ ]Oliguria/Anuria   [ ]Pal  MUSCULOSKELETAL:   [ ]Normal   [X ]Weakness  [ ]Bed/Wheelchair bound [ ]Edema  NEUROLOGIC:   [ ]No focal deficits  [ ] Cognitive impairment  [ x] Dysphagia [ ]Dysarthria [ ] Paresis [ ]Other   SKIN: Ecchymosis   [ ]Normal  [ ]Rash  [ ]Other  [ ]Pressure ulcer(s)  [ ]y [ ]n  Present on admission      CRITICAL CARE:  [ ] Shock Present  [ ]Septic [ ]Cardiogenic [ ]Neurologic [ ]Hypovolemic  [ ]  Vasopressors [ ]  Inotropes   [ ] Respiratory failure present [ ] Mechanical Ventilation [ ] Non-invasive ventilatory support [ ] High-Flow  [ ] Acute  [ ] Chronic [ ] Hypoxic  [ ] Hypercarbic [ ] Other  [ ] Other organ failure     LABS: None new.    RADIOLOGY & ADDITIONAL STUDIES: None new.    PROTEIN CALORIE MALNUTRITION: [ ] mild [ ] moderate [ ] severe  [ ] underweight [ ] morbid obesity    https://www.andeal.org/vault/2440/web/files/ONC/Table_Clinical%20Characteristics%20to%20Document%20Malnutrition-White%20JV%20et%20al%202012.pdf    Height (cm): 154.9 (01-14-23 @ 17:31)  Weight (kg): 56.7 (01-14-23 @ 17:31)  BMI (kg/m2): 23.6 (01-14-23 @ 17:31)    [ ] PPSV2 < or = 30% [ ] significant weight loss [ ] poor nutritional intake [ ] anasarca   Artificial Nutrition [ ]     Other REFERRALS:    [ ] Hospice  [ ]Child Life  [ ]Social Work  [ ]Case management [ ]Holistic Therapy [ ] Physical Therapy [ ] Dietary     Progress Notes - Care Coordination [C. Provider] (01-26-23 @ 11:18)      Palliative Performance Scale:  http://npcrc.org/files/news/palliative_performance_scale_ppsv2.pdf  (Ctrl +  left click to view)  Respiratory Distress Observation Tool:  https://homecareinformation.net/handouts/hen/Respiratory_Distress_Observation_Scale.pdf (Ctrl +  left click to view)  PAINAD Score:  http://geriatrictoolkit.[place].[facility]/cog/painad.pdf (Ctrl +  left click to view)   GAP TEAM PALLIATIVE CARE UNIT PROGRESS NOTE:      [  ] Patient on hospice program.    INDICATION FOR PALLIATIVE CARE UNIT SERVICES/Interval HPI: Symptom management in the setting of a 87y old female with  decompensated heart failure and new metastatic malignancy.    OVERNIGHT EVENTS: Chart reviewed. The patient is seen and examined at the bedside. Patient and family is Cantonese speaking. Cantonese  used. 's name: Viridiana.  ID #[id number]. She required PRN Dilaudid 2.5mg PO X1 for dyspnea and X1 for severe pain within a 24hr period 8am-8am.    DNR on chart: Yes  Yes      Allergies    No Known Allergies    Intolerances    MEDICATIONS  (STANDING):  apixaban 2.5 milliGRAM(s) Oral every 12 hours  donepezil 5 milliGRAM(s) Oral at bedtime  folic acid 1 milliGRAM(s) Oral daily  furosemide    Tablet 40 milliGRAM(s) Oral daily  metoprolol tartrate 50 milliGRAM(s) Oral three times a day  polyethylene glycol 3350 17 Gram(s) Oral two times a day  senna 2 Tablet(s) Oral at bedtime  simvastatin 20 milliGRAM(s) Oral at bedtime    MEDICATIONS  (PRN):  acetaminophen     Tablet .. 650 milliGRAM(s) Oral every 6 hours PRN Temp greater or equal to 38C (100.4F), Mild Pain (1 - 3), Moderate Pain (4 - 6), Severe Pain (7 - 10)  bisacodyl Suppository 10 milliGRAM(s) Rectal daily PRN Constipation  glycopyrrolate Injectable 0.4 milliGRAM(s) IV Push every 6 hours PRN secretions  HYDROmorphone   Solution 1.5 milliGRAM(s) Oral every 2 hours PRN Moderate Pain (4 - 6)  HYDROmorphone   Solution 2.5 milliGRAM(s) Oral every 2 hours PRN Severe Pain (7 - 10)  HYDROmorphone   Solution 2.5 milliGRAM(s) Oral every 2 hours PRN dyspnea  LORazepam   Injectable 0.25 milliGRAM(s) IV Push every 2 hours PRN anxiety/agitation/refractory dyspnea  ondansetron Injectable 4 milliGRAM(s) IV Push every 6 hours PRN Nausea and/or Vomiting    ITEMS UNCHECKED ARE NOT PRESENT    PRESENT SYMPTOMS: [X ]Unable to self-report see PAINAD, RDOS below  Source if other than patient:  [ ]Family   [ X]Team     Pain: [ ] yes [ ] no  QOL impact -   Location -                    Aggravating factors -  Quality -  Radiation -  Timing-  Severity (0-10 scale):  Minimal acceptable level (0-10 scale):     Dyspnea:                           [ ]Mild [ ]Moderate [ ]Severe  Anxiety:                             [ ]Mild [ ]Moderate [ ]Severe  Fatigue:                             [ ]Mild [ ]Moderate [ ]Severe  Nausea:                             [ ]Mild [ ]Moderate [ ]Severe  Loss of appetite:              [ ]Mild [ ]Moderate [ ]Severe  Constipation:                    [ ]Mild [ ]Moderate [ ]Severe    PCSSQ [Palliative Care Spiritual Screening Question]   Severity (0-10):  Score of 4 or > indicate consideration of Chaplaincy referral.  Chaplaincy Referral: [ ] yes [ ] refused [ X] following [ ] deferred    Caregiver [place]? : [X ] yes [ ] no [ ] deferred:  Social work referral [X ] Patient & Family Centered Care Referral [ ]     Anticipatory Grief present?:  [X ] yes [ ] no [ ] deferred:  Social work referral [ X] Patient & Family Centered Care Referral [ ]  	  Other Symptoms:  [ ]All other review of systems negative- unable to assess due to poor mentation.     PHYSICAL EXAM:   Vital Signs Last 24 Hrs  T(C): 36.6 (26 Jan 2023 08:42), Max: 36.6 (26 Jan 2023 08:42)  T(F): 97.9 (26 Jan 2023 08:42), Max: 97.9 (26 Jan 2023 08:42)  HR: 93 (26 Jan 2023 08:42) (93 - 118)  BP: 135/78 (26 Jan 2023 08:42) (135/78 - 145/87)  BP(mean): --  RR: 18 (26 Jan 2023 08:42) (18 - 18)  SpO2: 99% (26 Jan 2023 08:42) (99% - 99%)    Parameters below as of 26 Jan 2023 08:42  Patient On (Oxygen Delivery Method): nasal cannula    I&O's Summary    GENERAL: [ ] Cachexia  [ X]Alert  [ ]Oriented x   [ ]Lethargic  [ ]Unarousable  [ X]Verbal  [ ]Non-Verbal  Behavioral:   [ ] Anxiety  [ ] Delirium [ ] Agitation [ ] Other  HEENT:  [X ]Normal   [ ]Dry mouth   [ ]ET Tube/Trach  [ ]Oral lesions  PULMONARY:   [ X]Clear [ ]Tachypnea  [ ]Audible excessive secretions   [ ]Rhonchi        [ ]Right [ ]Left [ ]Bilateral  [ ]Crackles        [ ]Right [ ]Left [ ]Bilateral  [ ]Wheezing     [ ]Right [ ]Left [ ]Bilateral  [ ]Diminished BS [ ]Right [ ]Left [ ]Bilateral    CARDIOVASCULAR:    [X ]Regular [ ]Irregular [ ]Tachy  [ ]Tee [ ]Murmur [ ]Other  GASTROINTESTINAL:  [X]Soft  [ ]Distended   [ X]+BS  [X ]Non tender [ ]Tender  [ ]Other [ ]PEG [ ]OGT/ NGT   Last BM: 1/24/23  GENITOURINARY:  [ ]Normal [ X] Incontinent   [ ]Oliguria/Anuria   [ ]Pal  MUSCULOSKELETAL:   [ ]Normal   [X ]Weakness  [ ]Bed/Wheelchair bound [ ]Edema  NEUROLOGIC:   [ ]No focal deficits  [ ] Cognitive impairment  [ x] Dysphagia [ ]Dysarthria [ ] Paresis [ ]Other   SKIN: Ecchymosis   [ ]Normal  [ ]Rash  [ ]Other  [ ]Pressure ulcer(s)  [ ]y [ ]n  Present on admission      CRITICAL CARE:  [ ] Shock Present  [ ]Septic [ ]Cardiogenic [ ]Neurologic [ ]Hypovolemic  [ ]  Vasopressors [ ]  Inotropes   [ ] Respiratory failure present [ ] Mechanical Ventilation [ ] Non-invasive ventilatory support [ ] High-Flow  [ ] Acute  [ ] Chronic [ ] Hypoxic  [ ] Hypercarbic [ ] Other  [ ] Other organ failure     LABS: None new.    RADIOLOGY & ADDITIONAL STUDIES: None new.    PROTEIN CALORIE MALNUTRITION: [ ] mild [ ] moderate [ ] severe  [ ] underweight [ ] morbid obesity    https://www.andeal.org/vault/2440/web/files/ONC/Table_Clinical%20Characteristics%20to%20Document%20Malnutrition-White%20JV%20et%20al%202012.pdf    Height (cm): 154.9 (01-14-23 @ 17:31)  Weight (kg): 56.7 (01-14-23 @ 17:31)  BMI (kg/m2): 23.6 (01-14-23 @ 17:31)    [ ] PPSV2 < or = 30% [ ] significant weight loss [ ] poor nutritional intake [ ] anasarca   Artificial Nutrition [ ]     Other REFERRALS:    [ ] Hospice  [ ]Child Life  [ ]Social Work  [ ]Case management [ ]Holistic Therapy [ ] Physical Therapy [ ] Dietary     Progress Notes - Care Coordination [C. Provider] (01-26-23 @ 11:18)      Palliative Performance Scale:  http://npcrc.org/files/news/palliative_performance_scale_ppsv2.pdf  (Ctrl +  left click to view)  Respiratory Distress Observation Tool:  https://homecareinformation.net/handouts/hen/Respiratory_Distress_Observation_Scale.pdf (Ctrl +  left click to view)  PAINAD Score:  http://geriatrictoolkit.[place].[facility]/cog/painad.pdf (Ctrl +  left click to view)   GAP TEAM PALLIATIVE CARE UNIT PROGRESS NOTE:      [  ] Patient on hospice program.    INDICATION FOR PALLIATIVE CARE UNIT SERVICES/Interval HPI: Symptom management in the setting of a 87y old female with  decompensated heart failure and new metastatic malignancy.    OVERNIGHT EVENTS: Chart reviewed. The patient is seen and examined at the bedside. Patient and family is Cantonese speaking. Cantonese  used. 's name: Viridiana.  ID #[id number]. She required PRN Dilaudid 2.5mg PO X1 for dyspnea and X1 for severe pain within a 24hr period 8am-8am.    DNR on chart: Yes  Yes      Allergies    No Known Allergies    Intolerances    MEDICATIONS  (STANDING):  apixaban 2.5 milliGRAM(s) Oral every 12 hours  donepezil 5 milliGRAM(s) Oral at bedtime  folic acid 1 milliGRAM(s) Oral daily  furosemide    Tablet 40 milliGRAM(s) Oral daily  metoprolol tartrate 50 milliGRAM(s) Oral three times a day  polyethylene glycol 3350 17 Gram(s) Oral two times a day  senna 2 Tablet(s) Oral at bedtime  simvastatin 20 milliGRAM(s) Oral at bedtime    MEDICATIONS  (PRN):  acetaminophen     Tablet .. 650 milliGRAM(s) Oral every 6 hours PRN Temp greater or equal to 38C (100.4F), Mild Pain (1 - 3), Moderate Pain (4 - 6), Severe Pain (7 - 10)  bisacodyl Suppository 10 milliGRAM(s) Rectal daily PRN Constipation  glycopyrrolate Injectable 0.4 milliGRAM(s) IV Push every 6 hours PRN secretions  HYDROmorphone   Solution 1.5 milliGRAM(s) Oral every 2 hours PRN Moderate Pain (4 - 6)  HYDROmorphone   Solution 2.5 milliGRAM(s) Oral every 2 hours PRN Severe Pain (7 - 10)  HYDROmorphone   Solution 2.5 milliGRAM(s) Oral every 2 hours PRN dyspnea  LORazepam   Injectable 0.25 milliGRAM(s) IV Push every 2 hours PRN anxiety/agitation/refractory dyspnea  ondansetron Injectable 4 milliGRAM(s) IV Push every 6 hours PRN Nausea and/or Vomiting    ITEMS UNCHECKED ARE NOT PRESENT    PRESENT SYMPTOMS: [X ]Unable to self-report see PAINAD, RDOS below  Source if other than patient:  [ ]Family   [ X]Team     Pain: [ ] yes [ ] no  QOL impact -   Location -                    Aggravating factors -  Quality -  Radiation -  Timing-  Severity (0-10 scale):  Minimal acceptable level (0-10 scale):     Dyspnea:                           [ ]Mild [ ]Moderate [ ]Severe  Anxiety:                             [ ]Mild [ ]Moderate [ ]Severe  Fatigue:                             [ ]Mild [ ]Moderate [ ]Severe  Nausea:                             [ ]Mild [ ]Moderate [ ]Severe  Loss of appetite:              [ ]Mild [ ]Moderate [ ]Severe  Constipation:                    [ ]Mild [ ]Moderate [ ]Severe    PCSSQ [Palliative Care Spiritual Screening Question]   Severity (0-10):  Score of 4 or > indicate consideration of Chaplaincy referral.  Chaplaincy Referral: [ ] yes [ ] refused [ X] following [ ] deferred    Caregiver [place]? : [X ] yes [ ] no [ ] deferred:  Social work referral [X ] Patient & Family Centered Care Referral [ ]     Anticipatory Grief present?:  [X ] yes [ ] no [ ] deferred:  Social work referral [ X] Patient & Family Centered Care Referral [ ]  	  Other Symptoms:  [ ]All other review of systems negative- unable to assess due to poor mentation.     PHYSICAL EXAM:   Vital Signs Last 24 Hrs  T(C): 36.6 (26 Jan 2023 08:42), Max: 36.6 (26 Jan 2023 08:42)  T(F): 97.9 (26 Jan 2023 08:42), Max: 97.9 (26 Jan 2023 08:42)  HR: 93 (26 Jan 2023 08:42) (93 - 118)  BP: 135/78 (26 Jan 2023 08:42) (135/78 - 145/87)  BP(mean): --  RR: 18 (26 Jan 2023 08:42) (18 - 18)  SpO2: 99% (26 Jan 2023 08:42) (99% - 99%)    Parameters below as of 26 Jan 2023 08:42  Patient On (Oxygen Delivery Method): nasal cannula    I&O's Summary    GENERAL: [ ] Cachexia  [ X]Alert  [ ]Oriented x   [ ]Lethargic  [ ]Unarousable  [ X]Verbal  [ ]Non-Verbal  Behavioral:   [ ] Anxiety  [ ] Delirium [ ] Agitation [ ] Other  HEENT:  [X ]Normal   [ ]Dry mouth   [ ]ET Tube/Trach  [ ]Oral lesions  PULMONARY:   [ X]Clear [ ]Tachypnea  [ ]Audible excessive secretions   [ ]Rhonchi        [ ]Right [ ]Left [ ]Bilateral  [ ]Crackles        [ ]Right [ ]Left [ ]Bilateral  [ ]Wheezing     [ ]Right [ ]Left [ ]Bilateral  [ ]Diminished BS [ ]Right [ ]Left [ ]Bilateral    CARDIOVASCULAR:    [X ]Regular [ ]Irregular [ ]Tachy  [ ]Tee [ ]Murmur [ ]Other  GASTROINTESTINAL:  [X]Soft  [ ]Distended   [ X]+BS  [X ]Non tender [ ]Tender  [ ]Other [ ]PEG [ ]OGT/ NGT   Last BM: 1/24/23  GENITOURINARY:  [ ]Normal [ X] Incontinent   [ ]Oliguria/Anuria   [ ]Pal  MUSCULOSKELETAL:   [ ]Normal   [X ]Weakness  [ ]Bed/Wheelchair bound [ ]Edema  NEUROLOGIC:   [ ]No focal deficits  [ ] Cognitive impairment  [ x] Dysphagia [ ]Dysarthria [ ] Paresis [ ]Other   SKIN: Ecchymosis   [ ]Normal  [ ]Rash  [ ]Other  [ ]Pressure ulcer(s)  [ ]y [ ]n  Present on admission      CRITICAL CARE:  [ ] Shock Present  [ ]Septic [ ]Cardiogenic [ ]Neurologic [ ]Hypovolemic  [ ]  Vasopressors [ ]  Inotropes   [ ] Respiratory failure present [ ] Mechanical Ventilation [ ] Non-invasive ventilatory support [ ] High-Flow  [ ] Acute  [ ] Chronic [ ] Hypoxic  [ ] Hypercarbic [ ] Other  [ ] Other organ failure     LABS: None new.    RADIOLOGY & ADDITIONAL STUDIES: None new.    PROTEIN CALORIE MALNUTRITION: [ ] mild [ ] moderate [ ] severe  [ ] underweight [ ] morbid obesity    https://www.andeal.org/vault/2440/web/files/ONC/Table_Clinical%20Characteristics%20to%20Document%20Malnutrition-White%20JV%20et%20al%202012.pdf    Height (cm): 154.9 (01-14-23 @ 17:31)  Weight (kg): 56.7 (01-14-23 @ 17:31)  BMI (kg/m2): 23.6 (01-14-23 @ 17:31)    [ ] PPSV2 < or = 30% [ ] significant weight loss [ ] poor nutritional intake [ ] anasarca   Artificial Nutrition [ ]     Other REFERRALS:    [ ] Hospice  [ ]Child Life  [ ]Social Work  [ ]Case management [ ]Holistic Therapy [ ] Physical Therapy [ ] Dietary     Progress Notes - Care Coordination [C. Provider] (01-26-23 @ 11:18)      Palliative Performance Scale:  http://npcrc.org/files/news/palliative_performance_scale_ppsv2.pdf  (Ctrl +  left click to view)  Respiratory Distress Observation Tool:  https://homecareinformation.net/handouts/hen/Respiratory_Distress_Observation_Scale.pdf (Ctrl +  left click to view)  PAINAD Score:  http://geriatrictoolkit.[place].[facility]/cog/painad.pdf (Ctrl +  left click to view)   GAP TEAM PALLIATIVE CARE UNIT PROGRESS NOTE:      [  ] Patient on hospice program.    INDICATION FOR PALLIATIVE CARE UNIT SERVICES/Interval HPI: Symptom management in the setting of a 87y old female with  decompensated heart failure and new metastatic malignancy.    OVERNIGHT EVENTS: Chart reviewed. The patient is seen and examined at the bedside. Patient and family is Cantonese speaking. Spoke to the patient's son Elver Osborne at the bedside. Cantonese  used. 's name: Viridiana.  ID #[id number]. She required PRN Dilaudid 2.5mg PO X1 for dyspnea and X1 for severe pain within a 24hr period 8am-8am.    DNR on chart: Yes  Yes      Allergies    No Known Allergies    Intolerances    MEDICATIONS  (STANDING):  apixaban 2.5 milliGRAM(s) Oral every 12 hours  donepezil 5 milliGRAM(s) Oral at bedtime  folic acid 1 milliGRAM(s) Oral daily  furosemide    Tablet 40 milliGRAM(s) Oral daily  metoprolol tartrate 50 milliGRAM(s) Oral three times a day  polyethylene glycol 3350 17 Gram(s) Oral two times a day  senna 2 Tablet(s) Oral at bedtime  simvastatin 20 milliGRAM(s) Oral at bedtime    MEDICATIONS  (PRN):  acetaminophen     Tablet .. 650 milliGRAM(s) Oral every 6 hours PRN Temp greater or equal to 38C (100.4F), Mild Pain (1 - 3), Moderate Pain (4 - 6), Severe Pain (7 - 10)  bisacodyl Suppository 10 milliGRAM(s) Rectal daily PRN Constipation  glycopyrrolate Injectable 0.4 milliGRAM(s) IV Push every 6 hours PRN secretions  HYDROmorphone   Solution 1.5 milliGRAM(s) Oral every 2 hours PRN Moderate Pain (4 - 6)  HYDROmorphone   Solution 2.5 milliGRAM(s) Oral every 2 hours PRN Severe Pain (7 - 10)  HYDROmorphone   Solution 2.5 milliGRAM(s) Oral every 2 hours PRN dyspnea  LORazepam   Injectable 0.25 milliGRAM(s) IV Push every 2 hours PRN anxiety/agitation/refractory dyspnea  ondansetron Injectable 4 milliGRAM(s) IV Push every 6 hours PRN Nausea and/or Vomiting    ITEMS UNCHECKED ARE NOT PRESENT    PRESENT SYMPTOMS: [X ]Unable to self-report see PAINAD, RDOS below  Source if other than patient:  [ ]Family   [ X]Team     Pain: [ ] yes [ ] no  QOL impact -   Location -                    Aggravating factors -  Quality -  Radiation -  Timing-  Severity (0-10 scale):  Minimal acceptable level (0-10 scale):     Dyspnea:                           [ ]Mild [ ]Moderate [ ]Severe  Anxiety:                             [ ]Mild [ ]Moderate [ ]Severe  Fatigue:                             [ ]Mild [ ]Moderate [ ]Severe  Nausea:                             [ ]Mild [ ]Moderate [ ]Severe  Loss of appetite:              [ ]Mild [ ]Moderate [ ]Severe  Constipation:                    [ ]Mild [ ]Moderate [ ]Severe    PCSSQ [Palliative Care Spiritual Screening Question]   Severity (0-10):  Score of 4 or > indicate consideration of Chaplaincy referral.  Chaplaincy Referral: [ ] yes [ ] refused [ X] following [ ] deferred    Caregiver [place]? : [X ] yes [ ] no [ ] deferred:  Social work referral [X ] Patient & Family Centered Care Referral [ ]     Anticipatory Grief present?:  [X ] yes [ ] no [ ] deferred:  Social work referral [ X] Patient & Family Centered Care Referral [ ]  	  Other Symptoms:  [ ]All other review of systems negative- unable to assess due to poor mentation.     PHYSICAL EXAM:   Vital Signs Last 24 Hrs  T(C): 36.6 (26 Jan 2023 08:42), Max: 36.6 (26 Jan 2023 08:42)  T(F): 97.9 (26 Jan 2023 08:42), Max: 97.9 (26 Jan 2023 08:42)  HR: 93 (26 Jan 2023 08:42) (93 - 118)  BP: 135/78 (26 Jan 2023 08:42) (135/78 - 145/87)  BP(mean): --  RR: 18 (26 Jan 2023 08:42) (18 - 18)  SpO2: 99% (26 Jan 2023 08:42) (99% - 99%)    Parameters below as of 26 Jan 2023 08:42  Patient On (Oxygen Delivery Method): nasal cannula    I&O's Summary    GENERAL: [ ] Cachexia  [ X]Alert  [ ]Oriented x   [ ]Lethargic  [ ]Unarousable  [ X]Verbal  [ ]Non-Verbal  Behavioral:   [ ] Anxiety  [ ] Delirium [ ] Agitation [ ] Other  HEENT:  [X ]Normal   [ ]Dry mouth   [ ]ET Tube/Trach  [ ]Oral lesions  PULMONARY:   [ X]Clear [ ]Tachypnea  [ ]Audible excessive secretions   [ ]Rhonchi        [ ]Right [ ]Left [ ]Bilateral  [ ]Crackles        [ ]Right [ ]Left [ ]Bilateral  [ ]Wheezing     [ ]Right [ ]Left [ ]Bilateral  [ ]Diminished BS [ ]Right [ ]Left [ ]Bilateral    CARDIOVASCULAR:    [X ]Regular [ ]Irregular [ ]Tachy  [ ]Tee [ ]Murmur [ ]Other  GASTROINTESTINAL:  [X]Soft  [ ]Distended   [ X]+BS  [X ]Non tender [ ]Tender  [ ]Other [ ]PEG [ ]OGT/ NGT   Last BM: 1/24/23  GENITOURINARY:  [ ]Normal [ X] Incontinent   [ ]Oliguria/Anuria   [ ]Pal  MUSCULOSKELETAL:   [ ]Normal   [X ]Weakness  [ ]Bed/Wheelchair bound [ ]Edema  NEUROLOGIC:   [ ]No focal deficits  [ ] Cognitive impairment  [ x] Dysphagia [ ]Dysarthria [ ] Paresis [ ]Other   SKIN: Ecchymosis   [ ]Normal  [ ]Rash  [ ]Other  [ ]Pressure ulcer(s)  [ ]y [ ]n  Present on admission      CRITICAL CARE:  [ ] Shock Present  [ ]Septic [ ]Cardiogenic [ ]Neurologic [ ]Hypovolemic  [ ]  Vasopressors [ ]  Inotropes   [ ] Respiratory failure present [ ] Mechanical Ventilation [ ] Non-invasive ventilatory support [ ] High-Flow  [ ] Acute  [ ] Chronic [ ] Hypoxic  [ ] Hypercarbic [ ] Other  [ ] Other organ failure     LABS: None new.    RADIOLOGY & ADDITIONAL STUDIES: None new.    PROTEIN CALORIE MALNUTRITION: [ ] mild [ ] moderate [ ] severe  [ ] underweight [ ] morbid obesity    https://www.andeal.org/vault/2440/web/files/ONC/Table_Clinical%20Characteristics%20to%20Document%20Malnutrition-White%20JV%20et%20al%202012.pdf    Height (cm): 154.9 (01-14-23 @ 17:31)  Weight (kg): 56.7 (01-14-23 @ 17:31)  BMI (kg/m2): 23.6 (01-14-23 @ 17:31)    [ ] PPSV2 < or = 30% [ ] significant weight loss [ ] poor nutritional intake [ ] anasarca   Artificial Nutrition [ ]     Other REFERRALS:    [ ] Hospice  [ ]Child Life  [ X]Social Work  [ ]Case management [ ]Holistic Therapy [ ] Physical Therapy [ ] Dietary     Progress Notes - Care Coordination [C. Provider] (01-26-23 @ 11:18)      Palliative Performance Scale:  http://npcrc.org/files/news/palliative_performance_scale_ppsv2.pdf  (Ctrl +  left click to view)  Respiratory Distress Observation Tool:  https://homecareinformation.net/handouts/hen/Respiratory_Distress_Observation_Scale.pdf (Ctrl +  left click to view)  PAINAD Score:  http://geriatrictoolkit.[place].[facility]/cog/painad.pdf (Ctrl +  left click to view)   GAP TEAM PALLIATIVE CARE UNIT PROGRESS NOTE:      [  ] Patient on hospice program.    INDICATION FOR PALLIATIVE CARE UNIT SERVICES/Interval HPI: Symptom management in the setting of a 87y old female with  decompensated heart failure and new metastatic malignancy.    OVERNIGHT EVENTS: Chart reviewed. The patient is seen and examined at the bedside. Patient and family is Cantonese speaking. Spoke to the patient's son Elver Osborne at the bedside. Cantonese  used. 's name: Viridiana.  ID #[id number]. She required PRN Dilaudid 2.5mg PO X1 for dyspnea and X1 for severe pain within a 24hr period 8am-8am.    DNR on chart: Yes  Yes      Allergies    No Known Allergies    Intolerances    MEDICATIONS  (STANDING):  apixaban 2.5 milliGRAM(s) Oral every 12 hours  donepezil 5 milliGRAM(s) Oral at bedtime  folic acid 1 milliGRAM(s) Oral daily  furosemide    Tablet 40 milliGRAM(s) Oral daily  metoprolol tartrate 50 milliGRAM(s) Oral three times a day  polyethylene glycol 3350 17 Gram(s) Oral two times a day  senna 2 Tablet(s) Oral at bedtime  simvastatin 20 milliGRAM(s) Oral at bedtime    MEDICATIONS  (PRN):  acetaminophen     Tablet .. 650 milliGRAM(s) Oral every 6 hours PRN Temp greater or equal to 38C (100.4F), Mild Pain (1 - 3), Moderate Pain (4 - 6), Severe Pain (7 - 10)  bisacodyl Suppository 10 milliGRAM(s) Rectal daily PRN Constipation  glycopyrrolate Injectable 0.4 milliGRAM(s) IV Push every 6 hours PRN secretions  HYDROmorphone   Solution 1.5 milliGRAM(s) Oral every 2 hours PRN Moderate Pain (4 - 6)  HYDROmorphone   Solution 2.5 milliGRAM(s) Oral every 2 hours PRN Severe Pain (7 - 10)  HYDROmorphone   Solution 2.5 milliGRAM(s) Oral every 2 hours PRN dyspnea  LORazepam   Injectable 0.25 milliGRAM(s) IV Push every 2 hours PRN anxiety/agitation/refractory dyspnea  ondansetron Injectable 4 milliGRAM(s) IV Push every 6 hours PRN Nausea and/or Vomiting    ITEMS UNCHECKED ARE NOT PRESENT    PRESENT SYMPTOMS: [X ]Unable to self-report see PAINAD, RDOS below  Source if other than patient:  [ ]Family   [ X]Team     Pain: [ ] yes [ ] no  QOL impact -   Location -                    Aggravating factors -  Quality -  Radiation -  Timing-  Severity (0-10 scale):  Minimal acceptable level (0-10 scale):     Dyspnea:                           [ ]Mild [ ]Moderate [ ]Severe  Anxiety:                             [ ]Mild [ ]Moderate [ ]Severe  Fatigue:                             [ ]Mild [ ]Moderate [ ]Severe  Nausea:                             [ ]Mild [ ]Moderate [ ]Severe  Loss of appetite:              [ ]Mild [ ]Moderate [ ]Severe  Constipation:                    [ ]Mild [ ]Moderate [ ]Severe    PCSSQ [Palliative Care Spiritual Screening Question]   Severity (0-10):  Score of 4 or > indicate consideration of Chaplaincy referral.  Chaplaincy Referral: [ ] yes [ ] refused [ X] following [ ] deferred    Caregiver [place]? : [X ] yes [ ] no [ ] deferred:  Social work referral [X ] Patient & Family Centered Care Referral [ ]     Anticipatory Grief present?:  [X ] yes [ ] no [ ] deferred:  Social work referral [ X] Patient & Family Centered Care Referral [ ]  	  Other Symptoms:  [ ]All other review of systems negative- unable to assess due to poor mentation.     PHYSICAL EXAM:   Vital Signs Last 24 Hrs  T(C): 36.6 (26 Jan 2023 08:42), Max: 36.6 (26 Jan 2023 08:42)  T(F): 97.9 (26 Jan 2023 08:42), Max: 97.9 (26 Jan 2023 08:42)  HR: 93 (26 Jan 2023 08:42) (93 - 118)  BP: 135/78 (26 Jan 2023 08:42) (135/78 - 145/87)  BP(mean): --  RR: 18 (26 Jan 2023 08:42) (18 - 18)  SpO2: 99% (26 Jan 2023 08:42) (99% - 99%)    Parameters below as of 26 Jan 2023 08:42  Patient On (Oxygen Delivery Method): nasal cannula    I&O's Summary    GENERAL: [ ] Cachexia  [ X]Alert  [ ]Oriented x   [ ]Lethargic  [ ]Unarousable  [ X]Verbal  [ ]Non-Verbal  Behavioral:   [ ] Anxiety  [ ] Delirium [ ] Agitation [ ] Other  HEENT:  [X ]Normal   [ ]Dry mouth   [ ]ET Tube/Trach  [ ]Oral lesions  PULMONARY:   [ X]Clear [ ]Tachypnea  [ ]Audible excessive secretions   [ ]Rhonchi        [ ]Right [ ]Left [ ]Bilateral  [ ]Crackles        [ ]Right [ ]Left [ ]Bilateral  [ ]Wheezing     [ ]Right [ ]Left [ ]Bilateral  [ ]Diminished BS [ ]Right [ ]Left [ ]Bilateral    CARDIOVASCULAR:    [X ]Regular [ ]Irregular [ ]Tachy  [ ]Tee [ ]Murmur [ ]Other  GASTROINTESTINAL:  [X]Soft  [ ]Distended   [ X]+BS  [X ]Non tender [ ]Tender  [ ]Other [ ]PEG [ ]OGT/ NGT   Last BM: 1/24/23  GENITOURINARY:  [ ]Normal [ X] Incontinent   [ ]Oliguria/Anuria   [ ]Pal  MUSCULOSKELETAL:   [ ]Normal   [X ]Weakness  [ ]Bed/Wheelchair bound [ ]Edema  NEUROLOGIC:   [ ]No focal deficits  [ ] Cognitive impairment  [ x] Dysphagia [ ]Dysarthria [ ] Paresis [ ]Other   SKIN: Ecchymosis   [ ]Normal  [ ]Rash  [ ]Other  [ ]Pressure ulcer(s)  [ ]y [ ]n  Present on admission      CRITICAL CARE:  [ ] Shock Present  [ ]Septic [ ]Cardiogenic [ ]Neurologic [ ]Hypovolemic  [ ]  Vasopressors [ ]  Inotropes   [ ] Respiratory failure present [ ] Mechanical Ventilation [ ] Non-invasive ventilatory support [ ] High-Flow  [ ] Acute  [ ] Chronic [ ] Hypoxic  [ ] Hypercarbic [ ] Other  [ ] Other organ failure     LABS: None new.    RADIOLOGY & ADDITIONAL STUDIES: None new.    PROTEIN CALORIE MALNUTRITION: [ ] mild [ ] moderate [ ] severe  [ ] underweight [ ] morbid obesity    https://www.andeal.org/vault/2440/web/files/ONC/Table_Clinical%20Characteristics%20to%20Document%20Malnutrition-White%20JV%20et%20al%202012.pdf    Height (cm): 154.9 (01-14-23 @ 17:31)  Weight (kg): 56.7 (01-14-23 @ 17:31)  BMI (kg/m2): 23.6 (01-14-23 @ 17:31)    [ ] PPSV2 < or = 30% [ ] significant weight loss [ ] poor nutritional intake [ ] anasarca   Artificial Nutrition [ ]     Other REFERRALS:    [ ] Hospice  [ ]Child Life  [ X]Social Work  [ ]Case management [ ]Holistic Therapy [ ] Physical Therapy [ ] Dietary     Progress Notes - Care Coordination [C. Provider] (01-26-23 @ 11:18)      Palliative Performance Scale:  http://npcrc.org/files/news/palliative_performance_scale_ppsv2.pdf  (Ctrl +  left click to view)  Respiratory Distress Observation Tool:  https://homecareinformation.net/handouts/hen/Respiratory_Distress_Observation_Scale.pdf (Ctrl +  left click to view)  PAINAD Score:  http://geriatrictoolkit.[place].[facility]/cog/painad.pdf (Ctrl +  left click to view)   GAP TEAM PALLIATIVE CARE UNIT PROGRESS NOTE:      [  ] Patient on hospice program.    INDICATION FOR PALLIATIVE CARE UNIT SERVICES/Interval HPI: Symptom management in the setting of a 87y old female with  decompensated heart failure and new metastatic malignancy.    OVERNIGHT EVENTS: Chart reviewed. The patient is seen and examined at the bedside. Patient and family is Cantonese speaking. Spoke to the patient's son Elver Osborne at the bedside. Cantonese  used. 's name: Viridiana.  ID #[id number]. She required PRN Dilaudid 2.5mg PO X1 for dyspnea and X1 for severe pain within a 24hr period 8am-8am.    DNR on chart: Yes  Yes      Allergies    No Known Allergies    Intolerances    MEDICATIONS  (STANDING):  apixaban 2.5 milliGRAM(s) Oral every 12 hours  donepezil 5 milliGRAM(s) Oral at bedtime  folic acid 1 milliGRAM(s) Oral daily  furosemide    Tablet 40 milliGRAM(s) Oral daily  metoprolol tartrate 50 milliGRAM(s) Oral three times a day  polyethylene glycol 3350 17 Gram(s) Oral two times a day  senna 2 Tablet(s) Oral at bedtime  simvastatin 20 milliGRAM(s) Oral at bedtime    MEDICATIONS  (PRN):  acetaminophen     Tablet .. 650 milliGRAM(s) Oral every 6 hours PRN Temp greater or equal to 38C (100.4F), Mild Pain (1 - 3), Moderate Pain (4 - 6), Severe Pain (7 - 10)  bisacodyl Suppository 10 milliGRAM(s) Rectal daily PRN Constipation  glycopyrrolate Injectable 0.4 milliGRAM(s) IV Push every 6 hours PRN secretions  HYDROmorphone   Solution 1.5 milliGRAM(s) Oral every 2 hours PRN Moderate Pain (4 - 6)  HYDROmorphone   Solution 2.5 milliGRAM(s) Oral every 2 hours PRN Severe Pain (7 - 10)  HYDROmorphone   Solution 2.5 milliGRAM(s) Oral every 2 hours PRN dyspnea  LORazepam   Injectable 0.25 milliGRAM(s) IV Push every 2 hours PRN anxiety/agitation/refractory dyspnea  ondansetron Injectable 4 milliGRAM(s) IV Push every 6 hours PRN Nausea and/or Vomiting    ITEMS UNCHECKED ARE NOT PRESENT    PRESENT SYMPTOMS: [X ]Unable to self-report see PAINAD, RDOS below  Source if other than patient:  [ ]Family   [ X]Team     Pain: [ ] yes [ ] no  QOL impact -   Location -                    Aggravating factors -  Quality -  Radiation -  Timing-  Severity (0-10 scale):  Minimal acceptable level (0-10 scale):     Dyspnea:                           [ ]Mild [ ]Moderate [ ]Severe  Anxiety:                             [ ]Mild [ ]Moderate [ ]Severe  Fatigue:                             [ ]Mild [ ]Moderate [ ]Severe  Nausea:                             [ ]Mild [ ]Moderate [ ]Severe  Loss of appetite:              [ ]Mild [ ]Moderate [ ]Severe  Constipation:                    [ ]Mild [ ]Moderate [ ]Severe    PCSSQ [Palliative Care Spiritual Screening Question]   Severity (0-10):  Score of 4 or > indicate consideration of Chaplaincy referral.  Chaplaincy Referral: [ ] yes [ ] refused [ X] following [ ] deferred    Caregiver [place]? : [X ] yes [ ] no [ ] deferred:  Social work referral [X ] Patient & Family Centered Care Referral [ ]     Anticipatory Grief present?:  [X ] yes [ ] no [ ] deferred:  Social work referral [ X] Patient & Family Centered Care Referral [ ]  	  Other Symptoms:  [ ]All other review of systems negative- unable to assess due to poor mentation.     PHYSICAL EXAM:   Vital Signs Last 24 Hrs  T(C): 36.6 (26 Jan 2023 08:42), Max: 36.6 (26 Jan 2023 08:42)  T(F): 97.9 (26 Jan 2023 08:42), Max: 97.9 (26 Jan 2023 08:42)  HR: 93 (26 Jan 2023 08:42) (93 - 118)  BP: 135/78 (26 Jan 2023 08:42) (135/78 - 145/87)  BP(mean): --  RR: 18 (26 Jan 2023 08:42) (18 - 18)  SpO2: 99% (26 Jan 2023 08:42) (99% - 99%)    Parameters below as of 26 Jan 2023 08:42  Patient On (Oxygen Delivery Method): nasal cannula    I&O's Summary    GENERAL: [ ] Cachexia  [ X]Alert  [ ]Oriented x   [ ]Lethargic  [ ]Unarousable  [ X]Verbal  [ ]Non-Verbal  Behavioral:   [ ] Anxiety  [ ] Delirium [ ] Agitation [ ] Other  HEENT:  [X ]Normal   [ ]Dry mouth   [ ]ET Tube/Trach  [ ]Oral lesions  PULMONARY:   [ X]Clear [ ]Tachypnea  [ ]Audible excessive secretions   [ ]Rhonchi        [ ]Right [ ]Left [ ]Bilateral  [ ]Crackles        [ ]Right [ ]Left [ ]Bilateral  [ ]Wheezing     [ ]Right [ ]Left [ ]Bilateral  [ ]Diminished BS [ ]Right [ ]Left [ ]Bilateral    CARDIOVASCULAR:    [X ]Regular [ ]Irregular [ ]Tachy  [ ]Tee [ ]Murmur [ ]Other  GASTROINTESTINAL:  [X]Soft  [ ]Distended   [ X]+BS  [X ]Non tender [ ]Tender  [ ]Other [ ]PEG [ ]OGT/ NGT   Last BM: 1/24/23  GENITOURINARY:  [ ]Normal [ X] Incontinent   [ ]Oliguria/Anuria   [ ]Pal  MUSCULOSKELETAL:   [ ]Normal   [X ]Weakness  [ ]Bed/Wheelchair bound [ ]Edema  NEUROLOGIC:   [ ]No focal deficits  [ ] Cognitive impairment  [ x] Dysphagia [ ]Dysarthria [ ] Paresis [ ]Other   SKIN: Ecchymosis   [ ]Normal  [ ]Rash  [ ]Other  [ ]Pressure ulcer(s)  [ ]y [ ]n  Present on admission      CRITICAL CARE:  [ ] Shock Present  [ ]Septic [ ]Cardiogenic [ ]Neurologic [ ]Hypovolemic  [ ]  Vasopressors [ ]  Inotropes   [ ] Respiratory failure present [ ] Mechanical Ventilation [ ] Non-invasive ventilatory support [ ] High-Flow  [ ] Acute  [ ] Chronic [ ] Hypoxic  [ ] Hypercarbic [ ] Other  [ ] Other organ failure     LABS: None new.    RADIOLOGY & ADDITIONAL STUDIES: None new.    PROTEIN CALORIE MALNUTRITION: [ ] mild [ ] moderate [ ] severe  [ ] underweight [ ] morbid obesity    https://www.andeal.org/vault/2440/web/files/ONC/Table_Clinical%20Characteristics%20to%20Document%20Malnutrition-White%20JV%20et%20al%202012.pdf    Height (cm): 154.9 (01-14-23 @ 17:31)  Weight (kg): 56.7 (01-14-23 @ 17:31)  BMI (kg/m2): 23.6 (01-14-23 @ 17:31)    [ ] PPSV2 < or = 30% [ ] significant weight loss [ ] poor nutritional intake [ ] anasarca   Artificial Nutrition [ ]     Other REFERRALS:    [ ] Hospice  [ ]Child Life  [ X]Social Work  [ ]Case management [ ]Holistic Therapy [ ] Physical Therapy [ ] Dietary     Progress Notes - Care Coordination [C. Provider] (01-26-23 @ 11:18)      Palliative Performance Scale:  http://npcrc.org/files/news/palliative_performance_scale_ppsv2.pdf  (Ctrl +  left click to view)  Respiratory Distress Observation Tool:  https://homecareinformation.net/handouts/hen/Respiratory_Distress_Observation_Scale.pdf (Ctrl +  left click to view)  PAINAD Score:  http://geriatrictoolkit.[place].[facility]/cog/painad.pdf (Ctrl +  left click to view)   GAP TEAM PALLIATIVE CARE UNIT PROGRESS NOTE:      [  ] Patient on hospice program.    INDICATION FOR PALLIATIVE CARE UNIT SERVICES/Interval HPI: Symptom management in the setting of a 87y old female with  decompensated heart failure and new metastatic malignancy.    OVERNIGHT EVENTS: Chart reviewed. The patient is seen and examined at the bedside. Patient and family is Cantonese speaking. Spoke to the patient's son Elver Osborne at the bedside. Cantonese  used. 's name: Viridiana.  ID #[id number]. She required PRN Dilaudid 2.5mg PO X1 for dyspnea and X1 for severe pain within a 24hr period 8am-8am.    DNR on chart: Yes  Yes      Allergies    No Known Allergies    Intolerances    MEDICATIONS  (STANDING):  apixaban 2.5 milliGRAM(s) Oral every 12 hours  donepezil 5 milliGRAM(s) Oral at bedtime  folic acid 1 milliGRAM(s) Oral daily  furosemide    Tablet 40 milliGRAM(s) Oral daily  metoprolol tartrate 50 milliGRAM(s) Oral three times a day  polyethylene glycol 3350 17 Gram(s) Oral two times a day  senna 2 Tablet(s) Oral at bedtime  simvastatin 20 milliGRAM(s) Oral at bedtime    MEDICATIONS  (PRN):  acetaminophen     Tablet .. 650 milliGRAM(s) Oral every 6 hours PRN Temp greater or equal to 38C (100.4F), Mild Pain (1 - 3), Moderate Pain (4 - 6), Severe Pain (7 - 10)  bisacodyl Suppository 10 milliGRAM(s) Rectal daily PRN Constipation  glycopyrrolate Injectable 0.4 milliGRAM(s) IV Push every 6 hours PRN secretions  HYDROmorphone   Solution 1.5 milliGRAM(s) Oral every 2 hours PRN Moderate Pain (4 - 6)  HYDROmorphone   Solution 2.5 milliGRAM(s) Oral every 2 hours PRN Severe Pain (7 - 10)  HYDROmorphone   Solution 2.5 milliGRAM(s) Oral every 2 hours PRN dyspnea  LORazepam   Injectable 0.25 milliGRAM(s) IV Push every 2 hours PRN anxiety/agitation/refractory dyspnea  ondansetron Injectable 4 milliGRAM(s) IV Push every 6 hours PRN Nausea and/or Vomiting    ITEMS UNCHECKED ARE NOT PRESENT    PRESENT SYMPTOMS: [X ]Unable to self-report see PAINAD, RDOS below  Source if other than patient:  [ ]Family   [ X]Team     Pain: [ ] yes [ ] no  QOL impact -   Location -                    Aggravating factors -  Quality -  Radiation -  Timing-  Severity (0-10 scale):  Minimal acceptable level (0-10 scale):     Dyspnea:                           [ ]Mild [ ]Moderate [ ]Severe  Anxiety:                             [ ]Mild [ ]Moderate [ ]Severe  Fatigue:                             [ ]Mild [ ]Moderate [ ]Severe  Nausea:                             [ ]Mild [ ]Moderate [ ]Severe  Loss of appetite:              [ ]Mild [ ]Moderate [ ]Severe  Constipation:                    [ ]Mild [ ]Moderate [ ]Severe    PCSSQ [Palliative Care Spiritual Screening Question]   Severity (0-10):  Score of 4 or > indicate consideration of Chaplaincy referral.  Chaplaincy Referral: [ ] yes [ ] refused [ X] following [ ] deferred    Caregiver [place]? : [X ] yes [ ] no [ ] deferred:  Social work referral [X ] Patient & Family Centered Care Referral [ ]     Anticipatory Grief present?:  [X ] yes [ ] no [ ] deferred:  Social work referral [ X] Patient & Family Centered Care Referral [ ]  	  Other Symptoms:  [ ]All other review of systems negative- unable to assess due to poor mentation.     PHYSICAL EXAM:   Vital Signs Last 24 Hrs  T(C): 36.6 (26 Jan 2023 08:42), Max: 36.6 (26 Jan 2023 08:42)  T(F): 97.9 (26 Jan 2023 08:42), Max: 97.9 (26 Jan 2023 08:42)  HR: 93 (26 Jan 2023 08:42) (93 - 118)  BP: 135/78 (26 Jan 2023 08:42) (135/78 - 145/87)  BP(mean): --  RR: 18 (26 Jan 2023 08:42) (18 - 18)  SpO2: 99% (26 Jan 2023 08:42) (99% - 99%)    Parameters below as of 26 Jan 2023 08:42  Patient On (Oxygen Delivery Method): nasal cannula    I&O's Summary    GENERAL: [ ] Cachexia  [ X]Alert  [ ]Oriented x   [ ]Lethargic  [ ]Unarousable  [ X]Verbal  [ ]Non-Verbal  Behavioral:   [ ] Anxiety  [ ] Delirium [ ] Agitation [ ] Other  HEENT:  [X ]Normal   [ ]Dry mouth   [ ]ET Tube/Trach  [ ]Oral lesions  PULMONARY:   [ X]Clear [ ]Tachypnea  [ ]Audible excessive secretions   [ ]Rhonchi        [ ]Right [ ]Left [ ]Bilateral  [ ]Crackles        [ ]Right [ ]Left [ ]Bilateral  [ ]Wheezing     [ ]Right [ ]Left [ ]Bilateral  [ ]Diminished BS [ ]Right [ ]Left [ ]Bilateral    CARDIOVASCULAR:    [X ]Regular [ ]Irregular [ ]Tachy  [ ]Tee [ ]Murmur [ ]Other  GASTROINTESTINAL:  [X]Soft  [ ]Distended   [ X]+BS  [X ]Non tender [ ]Tender  [ ]Other [ ]PEG [ ]OGT/ NGT   Last BM: 1/24/23  GENITOURINARY:  [x ]Normal [ X] Incontinent   [ ]Oliguria/Anuria   [ ]Pal  MUSCULOSKELETAL:   [ ]Normal   [X ]Weakness  [ ]Bed/Wheelchair bound [ ]Edema  NEUROLOGIC:   [ ]No focal deficits  [ ] Cognitive impairment  [ x] Dysphagia [ ]Dysarthria [ ] Paresis [ ]Other   SKIN: Ecchymosis   [x ]Normal  [ ]Rash  [ ]Other  [ ]Pressure ulcer(s)  [ ]y [ ]n  Present on admission      CRITICAL CARE:  [ ] Shock Present  [ ]Septic [ ]Cardiogenic [ ]Neurologic [ ]Hypovolemic  [ ]  Vasopressors [ ]  Inotropes   [ ] Respiratory failure present [ ] Mechanical Ventilation [ ] Non-invasive ventilatory support [ ] High-Flow  [ ] Acute  [ ] Chronic [ ] Hypoxic  [ ] Hypercarbic [ ] Other  [ x] Other organ failure heart    LABS: None new.    RADIOLOGY & ADDITIONAL STUDIES: I have reviewed all documentation from prior primary team and consultants, as well as relevant imaging and laboratory data as this patient is new to me.     PROTEIN CALORIE MALNUTRITION: [ ] mild [ ] moderate [ ] severe  [ ] underweight [ ] morbid obesity    https://www.andeal.org/vault/2440/web/files/ONC/Table_Clinical%20Characteristics%20to%20Document%20Malnutrition-White%20JV%20et%20al%202012.pdf    Height (cm): 154.9 (01-14-23 @ 17:31)  Weight (kg): 56.7 (01-14-23 @ 17:31)  BMI (kg/m2): 23.6 (01-14-23 @ 17:31)    [x ] PPSV2 < or = 30% [ ] significant weight loss [x ] poor nutritional intake [ ] anasarca   Artificial Nutrition [ ]     Other REFERRALS:    [ ] Hospice  [ ]Child Life  [ X]Social Work  [ ]Case management [ ]Holistic Therapy [ ] Physical Therapy [ ] Dietary     Progress Notes - Care Coordination [C. Provider] (01-26-23 @ 11:18)      Palliative Performance Scale:  http://npcrc.org/files/news/palliative_performance_scale_ppsv2.pdf  (Ctrl +  left click to view)  Respiratory Distress Observation Tool:  https://homecareinformation.net/handouts/hen/Respiratory_Distress_Observation_Scale.pdf (Ctrl +  left click to view)  PAINAD Score:  http://geriatrictoolkit.[place].[facility]/cog/painad.pdf (Ctrl +  left click to view)   GAP TEAM PALLIATIVE CARE UNIT PROGRESS NOTE:      [  ] Patient on hospice program.    INDICATION FOR PALLIATIVE CARE UNIT SERVICES/Interval HPI: Symptom management in the setting of a 87y old female with  decompensated heart failure and new metastatic malignancy.    OVERNIGHT EVENTS: Chart reviewed. The patient is seen and examined at the bedside. Patient and family is Cantonese speaking. Spoke to the patient's son Elver Osborne at the bedside. Cantonese  used. 's name: Viridiana.  ID #[id number]. She required PRN Dilaudid 2.5mg PO X1 for dyspnea and X1 for severe pain within a 24hr period 8am-8am.    DNR on chart: Yes  Yes      Allergies    No Known Allergies    Intolerances    MEDICATIONS  (STANDING):  apixaban 2.5 milliGRAM(s) Oral every 12 hours  donepezil 5 milliGRAM(s) Oral at bedtime  folic acid 1 milliGRAM(s) Oral daily  furosemide    Tablet 40 milliGRAM(s) Oral daily  metoprolol tartrate 50 milliGRAM(s) Oral three times a day  polyethylene glycol 3350 17 Gram(s) Oral two times a day  senna 2 Tablet(s) Oral at bedtime  simvastatin 20 milliGRAM(s) Oral at bedtime    MEDICATIONS  (PRN):  acetaminophen     Tablet .. 650 milliGRAM(s) Oral every 6 hours PRN Temp greater or equal to 38C (100.4F), Mild Pain (1 - 3), Moderate Pain (4 - 6), Severe Pain (7 - 10)  bisacodyl Suppository 10 milliGRAM(s) Rectal daily PRN Constipation  glycopyrrolate Injectable 0.4 milliGRAM(s) IV Push every 6 hours PRN secretions  HYDROmorphone   Solution 1.5 milliGRAM(s) Oral every 2 hours PRN Moderate Pain (4 - 6)  HYDROmorphone   Solution 2.5 milliGRAM(s) Oral every 2 hours PRN Severe Pain (7 - 10)  HYDROmorphone   Solution 2.5 milliGRAM(s) Oral every 2 hours PRN dyspnea  LORazepam   Injectable 0.25 milliGRAM(s) IV Push every 2 hours PRN anxiety/agitation/refractory dyspnea  ondansetron Injectable 4 milliGRAM(s) IV Push every 6 hours PRN Nausea and/or Vomiting    ITEMS UNCHECKED ARE NOT PRESENT    PRESENT SYMPTOMS: [X ]Unable to self-report see PAINAD, RDOS below  Source if other than patient:  [ ]Family   [ X]Team     Pain: [ ] yes [ ] no  QOL impact -   Location -                    Aggravating factors -  Quality -  Radiation -  Timing-  Severity (0-10 scale):  Minimal acceptable level (0-10 scale):     Dyspnea:                           [ ]Mild [ ]Moderate [ ]Severe  Anxiety:                             [ ]Mild [ ]Moderate [ ]Severe  Fatigue:                             [ ]Mild [ ]Moderate [ ]Severe  Nausea:                             [ ]Mild [ ]Moderate [ ]Severe  Loss of appetite:              [ ]Mild [ ]Moderate [ ]Severe  Constipation:                    [ ]Mild [ ]Moderate [ ]Severe    PCSSQ [Palliative Care Spiritual Screening Question]   Severity (0-10):  Score of 4 or > indicate consideration of Chaplaincy referral.  Chaplaincy Referral: [ ] yes [ ] refused [ X] following [ ] deferred    Caregiver [place]? : [X ] yes [ ] no [ ] deferred:  Social work referral [X ] Patient & Family Centered Care Referral [ ]     Anticipatory Grief present?:  [X ] yes [ ] no [ ] deferred:  Social work referral [ X] Patient & Family Centered Care Referral [ ]  	  Other Symptoms:  [ ]All other review of systems negative- unable to assess due to poor mentation.     PHYSICAL EXAM:   Vital Signs Last 24 Hrs  T(C): 36.6 (26 Jan 2023 08:42), Max: 36.6 (26 Jan 2023 08:42)  T(F): 97.9 (26 Jan 2023 08:42), Max: 97.9 (26 Jan 2023 08:42)  HR: 93 (26 Jan 2023 08:42) (93 - 118)  BP: 135/78 (26 Jan 2023 08:42) (135/78 - 145/87)  BP(mean): --  RR: 18 (26 Jan 2023 08:42) (18 - 18)  SpO2: 99% (26 Jan 2023 08:42) (99% - 99%)    Parameters below as of 26 Jan 2023 08:42  Patient On (Oxygen Delivery Method): nasal cannula    I&O's Summary    GENERAL: [ ] Cachexia  [ X]Alert  [ ]Oriented x   [ ]Lethargic  [ ]Unarousable  [ X]Verbal  [ ]Non-Verbal  Behavioral:   [ ] Anxiety  [ ] Delirium [ ] Agitation [ ] Other  HEENT:  [X ]Normal   [ ]Dry mouth   [ ]ET Tube/Trach  [ ]Oral lesions  PULMONARY:   [ X]Clear [ ]Tachypnea  [ ]Audible excessive secretions   [ ]Rhonchi        [ ]Right [ ]Left [ ]Bilateral  [ ]Crackles        [ ]Right [ ]Left [ ]Bilateral  [ ]Wheezing     [ ]Right [ ]Left [ ]Bilateral  [ ]Diminished BS [ ]Right [ ]Left [ ]Bilateral    CARDIOVASCULAR:    [X ]Regular [ ]Irregular [ ]Tachy  [ ]Tee [ ]Murmur [ ]Other  GASTROINTESTINAL:  [X]Soft  [ ]Distended   [ X]+BS  [X ]Non tender [ ]Tender  [ ]Other [ ]PEG [ ]OGT/ NGT   Last BM: 1/24/23  GENITOURINARY:  [x ]Normal [ X] Incontinent   [ ]Oliguria/Anuria   [ ]Pal  MUSCULOSKELETAL:   [ ]Normal   [X ]Weakness  [ ]Bed/Wheelchair bound [ ]Edema  NEUROLOGIC:   [ ]No focal deficits  [ ] Cognitive impairment  [ x] Dysphagia [ ]Dysarthria [ ] Paresis [ ]Other   SKIN: Ecchymosis   [x ]Normal  [ ]Rash  [ ]Other  [ ]Pressure ulcer(s)  [ ]y [ ]n  Present on admission      CRITICAL CARE:  [ ] Shock Present  [ ]Septic [ ]Cardiogenic [ ]Neurologic [ ]Hypovolemic  [ ]  Vasopressors [ ]  Inotropes   [ ] Respiratory failure present [ ] Mechanical Ventilation [ ] Non-invasive ventilatory support [ ] High-Flow  [ ] Acute  [ ] Chronic [ ] Hypoxic  [ ] Hypercarbic [ ] Other  [ x] Other organ failure heart    LABS: None new.    RADIOLOGY & ADDITIONAL STUDIES: I have reviewed all documentation from prior primary team and consultants, as well as relevant imaging and laboratory data as this patient is new to me.     PROTEIN CALORIE MALNUTRITION: [ ] mild [ ] moderate [ ] severe  [ ] underweight [ ] morbid obesity    https://www.andeal.org/vault/2440/web/files/ONC/Table_Clinical%20Characteristics%20to%20Document%20Malnutrition-White%20JV%20et%20al%202012.pdf    Height (cm): 154.9 (01-14-23 @ 17:31)  Weight (kg): 56.7 (01-14-23 @ 17:31)  BMI (kg/m2): 23.6 (01-14-23 @ 17:31)    [x ] PPSV2 < or = 30% [ ] significant weight loss [x ] poor nutritional intake [ ] anasarca   Artificial Nutrition [ ]     Other REFERRALS:    [ ] Hospice  [ ]Child Life  [ X]Social Work  [ ]Case management [ ]Holistic Therapy [ ] Physical Therapy [ ] Dietary     Progress Notes - Care Coordination [C. Provider] (01-26-23 @ 11:18)      Palliative Performance Scale:  http://npcrc.org/files/news/palliative_performance_scale_ppsv2.pdf  (Ctrl +  left click to view)  Respiratory Distress Observation Tool:  https://homecareinformation.net/handouts/hen/Respiratory_Distress_Observation_Scale.pdf (Ctrl +  left click to view)  PAINAD Score:  http://geriatrictoolkit.[place].[facility]/cog/painad.pdf (Ctrl +  left click to view)   GAP TEAM PALLIATIVE CARE UNIT PROGRESS NOTE:      [  ] Patient on hospice program.    INDICATION FOR PALLIATIVE CARE UNIT SERVICES/Interval HPI: Symptom management in the setting of a 87y old female with  decompensated heart failure and new metastatic malignancy.    OVERNIGHT EVENTS: Chart reviewed. The patient is seen and examined at the bedside. Patient and family is Cantonese speaking. Spoke to the patient's son Elver Osborne at the bedside. Cantonese  used. 's name: Viridiana.  ID #[id number]. She required PRN Dilaudid 2.5mg PO X1 for dyspnea and X1 for severe pain within a 24hr period 8am-8am.    DNR on chart: Yes  Yes      Allergies    No Known Allergies    Intolerances    MEDICATIONS  (STANDING):  apixaban 2.5 milliGRAM(s) Oral every 12 hours  donepezil 5 milliGRAM(s) Oral at bedtime  folic acid 1 milliGRAM(s) Oral daily  furosemide    Tablet 40 milliGRAM(s) Oral daily  metoprolol tartrate 50 milliGRAM(s) Oral three times a day  polyethylene glycol 3350 17 Gram(s) Oral two times a day  senna 2 Tablet(s) Oral at bedtime  simvastatin 20 milliGRAM(s) Oral at bedtime    MEDICATIONS  (PRN):  acetaminophen     Tablet .. 650 milliGRAM(s) Oral every 6 hours PRN Temp greater or equal to 38C (100.4F), Mild Pain (1 - 3), Moderate Pain (4 - 6), Severe Pain (7 - 10)  bisacodyl Suppository 10 milliGRAM(s) Rectal daily PRN Constipation  glycopyrrolate Injectable 0.4 milliGRAM(s) IV Push every 6 hours PRN secretions  HYDROmorphone   Solution 1.5 milliGRAM(s) Oral every 2 hours PRN Moderate Pain (4 - 6)  HYDROmorphone   Solution 2.5 milliGRAM(s) Oral every 2 hours PRN Severe Pain (7 - 10)  HYDROmorphone   Solution 2.5 milliGRAM(s) Oral every 2 hours PRN dyspnea  LORazepam   Injectable 0.25 milliGRAM(s) IV Push every 2 hours PRN anxiety/agitation/refractory dyspnea  ondansetron Injectable 4 milliGRAM(s) IV Push every 6 hours PRN Nausea and/or Vomiting    ITEMS UNCHECKED ARE NOT PRESENT    PRESENT SYMPTOMS: [X ]Unable to self-report see PAINAD, RDOS below  Source if other than patient:  [ ]Family   [ X]Team     Pain: [ ] yes [ ] no  QOL impact -   Location -                    Aggravating factors -  Quality -  Radiation -  Timing-  Severity (0-10 scale):  Minimal acceptable level (0-10 scale):     Dyspnea:                           [ ]Mild [ ]Moderate [ ]Severe  Anxiety:                             [ ]Mild [ ]Moderate [ ]Severe  Fatigue:                             [ ]Mild [ ]Moderate [ ]Severe  Nausea:                             [ ]Mild [ ]Moderate [ ]Severe  Loss of appetite:              [ ]Mild [ ]Moderate [ ]Severe  Constipation:                    [ ]Mild [ ]Moderate [ ]Severe    PCSSQ [Palliative Care Spiritual Screening Question]   Severity (0-10):  Score of 4 or > indicate consideration of Chaplaincy referral.  Chaplaincy Referral: [ ] yes [ ] refused [ X] following [ ] deferred    Caregiver [place]? : [X ] yes [ ] no [ ] deferred:  Social work referral [X ] Patient & Family Centered Care Referral [ ]     Anticipatory Grief present?:  [X ] yes [ ] no [ ] deferred:  Social work referral [ X] Patient & Family Centered Care Referral [ ]  	  Other Symptoms:  [ ]All other review of systems negative- unable to assess due to poor mentation.     PHYSICAL EXAM:   Vital Signs Last 24 Hrs  T(C): 36.6 (26 Jan 2023 08:42), Max: 36.6 (26 Jan 2023 08:42)  T(F): 97.9 (26 Jan 2023 08:42), Max: 97.9 (26 Jan 2023 08:42)  HR: 93 (26 Jan 2023 08:42) (93 - 118)  BP: 135/78 (26 Jan 2023 08:42) (135/78 - 145/87)  BP(mean): --  RR: 18 (26 Jan 2023 08:42) (18 - 18)  SpO2: 99% (26 Jan 2023 08:42) (99% - 99%)    Parameters below as of 26 Jan 2023 08:42  Patient On (Oxygen Delivery Method): nasal cannula    I&O's Summary    GENERAL: [ ] Cachexia  [ X]Alert  [ ]Oriented x   [ ]Lethargic  [ ]Unarousable  [ X]Verbal  [ ]Non-Verbal  Behavioral:   [ ] Anxiety  [ ] Delirium [ ] Agitation [ ] Other  HEENT:  [X ]Normal   [ ]Dry mouth   [ ]ET Tube/Trach  [ ]Oral lesions  PULMONARY:   [ X]Clear [ ]Tachypnea  [ ]Audible excessive secretions   [ ]Rhonchi        [ ]Right [ ]Left [ ]Bilateral  [ ]Crackles        [ ]Right [ ]Left [ ]Bilateral  [ ]Wheezing     [ ]Right [ ]Left [ ]Bilateral  [ ]Diminished BS [ ]Right [ ]Left [ ]Bilateral    CARDIOVASCULAR:    [X ]Regular [ ]Irregular [ ]Tachy  [ ]Tee [ ]Murmur [ ]Other  GASTROINTESTINAL:  [X]Soft  [ ]Distended   [ X]+BS  [X ]Non tender [ ]Tender  [ ]Other [ ]PEG [ ]OGT/ NGT   Last BM: 1/24/23  GENITOURINARY:  [x ]Normal [ X] Incontinent   [ ]Oliguria/Anuria   [ ]Pal  MUSCULOSKELETAL:   [ ]Normal   [X ]Weakness  [ ]Bed/Wheelchair bound [ ]Edema  NEUROLOGIC:   [ ]No focal deficits  [ ] Cognitive impairment  [ x] Dysphagia [ ]Dysarthria [ ] Paresis [ ]Other   SKIN: Ecchymosis   [x ]Normal  [ ]Rash  [ ]Other  [ ]Pressure ulcer(s)  [ ]y [ ]n  Present on admission      CRITICAL CARE:  [ ] Shock Present  [ ]Septic [ ]Cardiogenic [ ]Neurologic [ ]Hypovolemic  [ ]  Vasopressors [ ]  Inotropes   [ ] Respiratory failure present [ ] Mechanical Ventilation [ ] Non-invasive ventilatory support [ ] High-Flow  [ ] Acute  [ ] Chronic [ ] Hypoxic  [ ] Hypercarbic [ ] Other  [ x] Other organ failure heart    LABS: None new.    RADIOLOGY & ADDITIONAL STUDIES: I have reviewed all documentation from prior primary team and consultants, as well as relevant imaging and laboratory data as this patient is new to me.     PROTEIN CALORIE MALNUTRITION: [ ] mild [ ] moderate [ ] severe  [ ] underweight [ ] morbid obesity    https://www.andeal.org/vault/2440/web/files/ONC/Table_Clinical%20Characteristics%20to%20Document%20Malnutrition-White%20JV%20et%20al%202012.pdf    Height (cm): 154.9 (01-14-23 @ 17:31)  Weight (kg): 56.7 (01-14-23 @ 17:31)  BMI (kg/m2): 23.6 (01-14-23 @ 17:31)    [x ] PPSV2 < or = 30% [ ] significant weight loss [x ] poor nutritional intake [ ] anasarca   Artificial Nutrition [ ]     Other REFERRALS:    [ ] Hospice  [ ]Child Life  [ X]Social Work  [ ]Case management [ ]Holistic Therapy [ ] Physical Therapy [ ] Dietary     Progress Notes - Care Coordination [C. Provider] (01-26-23 @ 11:18)      Palliative Performance Scale:  http://npcrc.org/files/news/palliative_performance_scale_ppsv2.pdf  (Ctrl +  left click to view)  Respiratory Distress Observation Tool:  https://homecareinformation.net/handouts/hen/Respiratory_Distress_Observation_Scale.pdf (Ctrl +  left click to view)  PAINAD Score:  http://geriatrictoolkit.[place].[facility]/cog/painad.pdf (Ctrl +  left click to view)

## 2023-01-27 VITALS
DIASTOLIC BLOOD PRESSURE: 44 MMHG | RESPIRATION RATE: 26 BRPM | HEART RATE: 113 BPM | SYSTOLIC BLOOD PRESSURE: 73 MMHG | TEMPERATURE: 98 F

## 2023-01-27 PROCEDURE — 84484 ASSAY OF TROPONIN QUANT: CPT

## 2023-01-27 PROCEDURE — 83735 ASSAY OF MAGNESIUM: CPT

## 2023-01-27 PROCEDURE — 83605 ASSAY OF LACTIC ACID: CPT

## 2023-01-27 PROCEDURE — 83036 HEMOGLOBIN GLYCOSYLATED A1C: CPT

## 2023-01-27 PROCEDURE — U0003: CPT

## 2023-01-27 PROCEDURE — 87449 NOS EACH ORGANISM AG IA: CPT

## 2023-01-27 PROCEDURE — 84295 ASSAY OF SERUM SODIUM: CPT

## 2023-01-27 PROCEDURE — 82803 BLOOD GASES ANY COMBINATION: CPT

## 2023-01-27 PROCEDURE — 74181 MRI ABDOMEN W/O CONTRAST: CPT

## 2023-01-27 PROCEDURE — C8929: CPT

## 2023-01-27 PROCEDURE — 84133 ASSAY OF URINE POTASSIUM: CPT

## 2023-01-27 PROCEDURE — 85025 COMPLETE CBC W/AUTO DIFF WBC: CPT

## 2023-01-27 PROCEDURE — 77075 RADEX OSSEOUS SURVEY COMPL: CPT

## 2023-01-27 PROCEDURE — 84145 PROCALCITONIN (PCT): CPT

## 2023-01-27 PROCEDURE — 84439 ASSAY OF FREE THYROXINE: CPT

## 2023-01-27 PROCEDURE — 85027 COMPLETE CBC AUTOMATED: CPT

## 2023-01-27 PROCEDURE — 83930 ASSAY OF BLOOD OSMOLALITY: CPT

## 2023-01-27 PROCEDURE — 82962 GLUCOSE BLOOD TEST: CPT

## 2023-01-27 PROCEDURE — 71250 CT THORAX DX C-: CPT | Mod: MA

## 2023-01-27 PROCEDURE — 93308 TTE F-UP OR LMTD: CPT

## 2023-01-27 PROCEDURE — 80048 BASIC METABOLIC PNL TOTAL CA: CPT

## 2023-01-27 PROCEDURE — 80053 COMPREHEN METABOLIC PANEL: CPT

## 2023-01-27 PROCEDURE — 84156 ASSAY OF PROTEIN URINE: CPT

## 2023-01-27 PROCEDURE — 83935 ASSAY OF URINE OSMOLALITY: CPT

## 2023-01-27 PROCEDURE — 81001 URINALYSIS AUTO W/SCOPE: CPT

## 2023-01-27 PROCEDURE — 82977 ASSAY OF GGT: CPT

## 2023-01-27 PROCEDURE — 83880 ASSAY OF NATRIURETIC PEPTIDE: CPT

## 2023-01-27 PROCEDURE — 84550 ASSAY OF BLOOD/URIC ACID: CPT

## 2023-01-27 PROCEDURE — 82550 ASSAY OF CK (CPK): CPT

## 2023-01-27 PROCEDURE — 74177 CT ABD & PELVIS W/CONTRAST: CPT

## 2023-01-27 PROCEDURE — 82553 CREATINE MB FRACTION: CPT

## 2023-01-27 PROCEDURE — 85018 HEMOGLOBIN: CPT

## 2023-01-27 PROCEDURE — 84300 ASSAY OF URINE SODIUM: CPT

## 2023-01-27 PROCEDURE — 36415 COLL VENOUS BLD VENIPUNCTURE: CPT

## 2023-01-27 PROCEDURE — 84132 ASSAY OF SERUM POTASSIUM: CPT

## 2023-01-27 PROCEDURE — 97110 THERAPEUTIC EXERCISES: CPT

## 2023-01-27 PROCEDURE — 85610 PROTHROMBIN TIME: CPT

## 2023-01-27 PROCEDURE — 82565 ASSAY OF CREATININE: CPT

## 2023-01-27 PROCEDURE — 96375 TX/PRO/DX INJ NEW DRUG ADDON: CPT

## 2023-01-27 PROCEDURE — 97530 THERAPEUTIC ACTIVITIES: CPT

## 2023-01-27 PROCEDURE — 87086 URINE CULTURE/COLONY COUNT: CPT

## 2023-01-27 PROCEDURE — 85730 THROMBOPLASTIN TIME PARTIAL: CPT

## 2023-01-27 PROCEDURE — 83615 LACTATE (LD) (LDH) ENZYME: CPT

## 2023-01-27 PROCEDURE — 84443 ASSAY THYROID STIM HORMONE: CPT

## 2023-01-27 PROCEDURE — 96365 THER/PROPH/DIAG IV INF INIT: CPT

## 2023-01-27 PROCEDURE — 84540 ASSAY OF URINE/UREA-N: CPT

## 2023-01-27 PROCEDURE — 76705 ECHO EXAM OF ABDOMEN: CPT

## 2023-01-27 PROCEDURE — 71045 X-RAY EXAM CHEST 1 VIEW: CPT

## 2023-01-27 PROCEDURE — 87899 AGENT NOS ASSAY W/OPTIC: CPT

## 2023-01-27 PROCEDURE — 0225U NFCT DS DNA&RNA 21 SARSCOV2: CPT

## 2023-01-27 PROCEDURE — 70450 CT HEAD/BRAIN W/O DYE: CPT

## 2023-01-27 PROCEDURE — 87641 MR-STAPH DNA AMP PROBE: CPT

## 2023-01-27 PROCEDURE — 82570 ASSAY OF URINE CREATININE: CPT

## 2023-01-27 PROCEDURE — 83010 ASSAY OF HAPTOGLOBIN QUANT: CPT

## 2023-01-27 PROCEDURE — 87640 STAPH A DNA AMP PROBE: CPT

## 2023-01-27 PROCEDURE — 82947 ASSAY GLUCOSE BLOOD QUANT: CPT

## 2023-01-27 PROCEDURE — 82330 ASSAY OF CALCIUM: CPT

## 2023-01-27 PROCEDURE — 99285 EMERGENCY DEPT VISIT HI MDM: CPT | Mod: 25

## 2023-01-27 PROCEDURE — 94640 AIRWAY INHALATION TREATMENT: CPT

## 2023-01-27 PROCEDURE — 87637 SARSCOV2&INF A&B&RSV AMP PRB: CPT

## 2023-01-27 PROCEDURE — 85014 HEMATOCRIT: CPT

## 2023-01-27 PROCEDURE — 97161 PT EVAL LOW COMPLEX 20 MIN: CPT

## 2023-01-27 PROCEDURE — 82435 ASSAY OF BLOOD CHLORIDE: CPT

## 2023-01-27 PROCEDURE — 84480 ASSAY TRIIODOTHYRONINE (T3): CPT

## 2023-01-27 PROCEDURE — U0005: CPT

## 2023-01-27 PROCEDURE — 87040 BLOOD CULTURE FOR BACTERIA: CPT

## 2023-01-27 PROCEDURE — 84100 ASSAY OF PHOSPHORUS: CPT

## 2023-01-27 PROCEDURE — 82436 ASSAY OF URINE CHLORIDE: CPT

## 2023-01-27 PROCEDURE — 71275 CT ANGIOGRAPHY CHEST: CPT

## 2023-01-27 RX ORDER — HYDROMORPHONE HYDROCHLORIDE 2 MG/ML
0.5 INJECTION INTRAMUSCULAR; INTRAVENOUS; SUBCUTANEOUS
Refills: 0 | Status: DISCONTINUED | OUTPATIENT
Start: 2023-01-27 | End: 2023-01-27

## 2023-01-27 RX ORDER — HYDROMORPHONE HYDROCHLORIDE 2 MG/ML
1 INJECTION INTRAMUSCULAR; INTRAVENOUS; SUBCUTANEOUS
Refills: 0 | Status: DISCONTINUED | OUTPATIENT
Start: 2023-01-27 | End: 2023-01-27

## 2023-01-27 RX ADMIN — HYDROMORPHONE HYDROCHLORIDE 0.5 MILLIGRAM(S): 2 INJECTION INTRAMUSCULAR; INTRAVENOUS; SUBCUTANEOUS at 07:53

## 2023-01-27 RX ADMIN — HYDROMORPHONE HYDROCHLORIDE 0.5 MILLIGRAM(S): 2 INJECTION INTRAMUSCULAR; INTRAVENOUS; SUBCUTANEOUS at 03:45

## 2023-01-27 RX ADMIN — ROBINUL 0.4 MILLIGRAM(S): 0.2 INJECTION INTRAMUSCULAR; INTRAVENOUS at 05:21

## 2023-01-27 RX ADMIN — HYDROMORPHONE HYDROCHLORIDE 1 MILLIGRAM(S): 2 INJECTION INTRAMUSCULAR; INTRAVENOUS; SUBCUTANEOUS at 11:22

## 2023-01-27 NOTE — DIETITIAN INITIAL EVALUATION ADULT - PERTINENT MEDS FT
MEDICATIONS  (STANDING):  apixaban 2.5 milliGRAM(s) Oral every 12 hours  donepezil 5 milliGRAM(s) Oral at bedtime  folic acid 1 milliGRAM(s) Oral daily  furosemide    Tablet 40 milliGRAM(s) Oral daily  metoprolol tartrate 50 milliGRAM(s) Oral three times a day  polyethylene glycol 3350 17 Gram(s) Oral two times a day  senna 2 Tablet(s) Oral at bedtime  simvastatin 20 milliGRAM(s) Oral at bedtime    MEDICATIONS  (PRN):  acetaminophen     Tablet .. 650 milliGRAM(s) Oral every 6 hours PRN Temp greater or equal to 38C (100.4F), Mild Pain (1 - 3), Moderate Pain (4 - 6), Severe Pain (7 - 10)  bisacodyl Suppository 10 milliGRAM(s) Rectal daily PRN Constipation  glycopyrrolate Injectable 0.4 milliGRAM(s) IV Push every 6 hours PRN secretions  HYDROmorphone  Injectable 0.5 milliGRAM(s) IV Push every 1 hour PRN dyspnea  HYDROmorphone  Injectable 0.5 milliGRAM(s) IV Push every 1 hour PRN Severe Pain (7 - 10)  HYDROmorphone  Injectable 0.3 milliGRAM(s) IV Push every 1 hour PRN Moderate Pain (4 - 6)  LORazepam   Injectable 0.25 milliGRAM(s) IV Push every 1 hour PRN anxiety/agitation/refractory dyspnea  ondansetron Injectable 4 milliGRAM(s) IV Push every 6 hours PRN Nausea and/or Vomiting

## 2023-01-27 NOTE — PROVIDER CONTACT NOTE (OTHER) - RECOMMENDATIONS
25 mg Lopressor given PO as scheduled order.
Notify provider.
Patient pronounced dead at 12:15,  Family at bedside.
iv dilaudid

## 2023-01-27 NOTE — PROVIDER CONTACT NOTE (OTHER) - BACKGROUND
Patient admitted for pneumonia
Patient has DNR order.
dilaudid po for dyspnea prn, plan for discharge
Pt admitted with Pneumonia due to infectious organism
Admitted for pneumonia, hx of breast and colon cancer.

## 2023-01-27 NOTE — DIETITIAN INITIAL EVALUATION ADULT - REASON INDICATOR FOR ASSESSMENT
RD assessment warranted for: length of stay   Source: electronic medical record, communication with team   * Per team, Pt not appropriate for nutrition intervention at this time

## 2023-01-27 NOTE — DIETITIAN INITIAL EVALUATION ADULT - NAME AND PHONE
TRAVIS Stinson [facility] #[id number] or TEAMS  TRAVIS Stinson [facility] #[phone] or TEAMS  TRAVIS Stinson [facility] #[id number] or TEAMS

## 2023-01-27 NOTE — PROVIDER CONTACT NOTE (OTHER) - REASON
Pt unable to take po dilaudid
Patient complaining of chest discomfort and dizziness
Tachycardic on tele
Pt complains of chest pain 5/10
Patient

## 2023-01-27 NOTE — PROVIDER CONTACT NOTE (OTHER) - ACTION/TREATMENT ORDERED:
ACP Ivan Martínez made aware. VS and EKG performed. Provider to bedside trops ordered. Will continue to monitor pt.
MD made aware. 12 lead ecg ordered. Troponin ordered. MD to assess patient. Continue to monitor patient.
IV dilaudid
See patient expiration note.
Monitor pt, MD assessed pt at bedside.

## 2023-01-27 NOTE — PROVIDER CONTACT NOTE (OTHER) - SITUATION
HR 160s, afib on tele
Pt is now sedated, almost obtunded and unable to take po meds. Declining condition
Pt complains of chest pain 5/10
Patient complaining of chest discomfort and dizziness
Patient without respirations or pulse.

## 2023-01-27 NOTE — DISCHARGE NOTE FOR THE EXPIRED PATIENT - HOSPITAL COURSE
Palliative Care Initial Visit      Facility:[facility]      Patient Name: Denzel Díaz  YOB: 1916  MRN: [id number]    Date of Visit: 3/17/2020   Visit Made By: Elizabeth Ruff CNP    Location or unit: 2 CAR         Primary Care Physician:   Devaughn Schwab MD  Office Phone #: [phone]  Fax Phone #: [phone]    Reason for Palliative Care: Complex Decision Making, Established Advance Directives, Goals of Care/Level or Care, Pain and Symptom Management and Psychosocial Support, Patient/Family    Advanced Care Documents: Yes , where is it located? in the chart    POA / Surrogate : (can this be pulled from the history section) drop down confirmed yes or no? yes  POLST: Yes , where is it located? in the chart  Code Status: DNR    Prognosis (quality & quantity): fair  Basis for estimate:chart review and clinical assessment  High risk of death within one year? Yes       Chief Complaint   Patient presents with   • Vomiting       HX Obtained by: Patient    HPI  89 year old male with history of arthritis, GERD, hypothyroidism who presented to the ED with complaints of vomiting.  Patient is a poor historian unable to obtain HPI, will attempt to contact family in a.m. to gather more information.  Per ER note patient was brought into the emergency room via paramedics with vomiting since yesterday afternoon.  CT scan shows scattered segments of circumferential colonic wall thickening (cecum and mid rectum) secondary most likely secondary to underdistention and normal peristalsis.  Underlying lesion versus colitis cannot entirely be excluded. Bilateral pleural effusions (right greater than left).   pt is alert, oriented. He is independent, he is standing up and dressing himself. He is ready to go home. He states his daughter is in [place] when I asked him why I cannot get hold of her.   ,      History  Past Medical History:   Diagnosis Date   • Arthritis    • Gastroesophageal reflux disease    •  Thyroid condition      History reviewed. No pertinent family history.    Social History   reports that he has never smoked. He has never used smokeless tobacco. He reports current alcohol use of about 1.0 standard drinks of alcohol per week. He reports that he does not use drugs.     Review of Systems   Constitutional: Negative for appetite change, chills, fatigue, fever and unexpected weight change.        No pain. Ambulatory.   HENT: Negative for congestion, mouth sores, sore throat and trouble swallowing.         No excessive secretions   Eyes: Negative for visual disturbance.   Respiratory: Negative for cough, shortness of breath and wheezing.         No orthopnea   Cardiovascular: Negative for chest pain and palpitations.   Gastrointestinal: Negative for abdominal distention, abdominal pain, constipation, diarrhea and nausea.        No vomiting. No bloating.   Genitourinary: Negative for dysuria and hematuria.        No incontinence.   Musculoskeletal: Negative for arthralgias, back pain and myalgias.        No use of assistive devices   Skin: Negative for pallor, rash and wound.        No pruritus    Neurological: Negative for dizziness, syncope, weakness and headaches.   Hematological: Does not bruise/bleed easily.        No night sweats    Psychiatric/Behavioral: Negative for behavioral problems and dysphoric mood. The patient is not nervous/anxious.         No change in emotions. No depression.       Vitals:    20 0807 20 0817 20 1021 20 1100   BP:  120/74  133/68   Pulse: 63 62 99 (!) 55   Resp:  18  18   Temp:  97.3 °F (36.3 °C)  96.8 °F (36 °C)   TempSrc:  Temporal  Temporal   SpO2:  97%  96%   Weight:       Height:              ESAS Scale   [place] Symptom Assessment System  Pain: 0  Tiredness: 0  Drowsiness: 0  Nausea: 0  Appetite: 0  Shortness of Breath: 0  Depression: 0  Anxiety: 0  Best Wellbein    Palliative Performance Scale  Palliative Performance Scale  Palliative  Performance Scale: Ambulation Reduced. unable to do Hobby/House Work Significant Disease. Self-Care Occasional Assistance Necessary. Intake Normal or Reduced. Consciousness Level Full or Confusion.            Objective   Physical Exam  Constitutional: comfortable, cooperative, No obvious signs of pain. Ambulatory, Eating.   HEENT: normocephalic, mucosa moist, sclerae anicteric, PERRLA  RESP: CTA bilateral, unlabored breathing, no wheezes, crackles, or rhonchi  CV: Nl S1 S2, no S3, S4.  RRR, no murmurs, rubs, gallops  GI: soft, NT, ND, BS+  MUSC: no limitation in passive, active ROM or UE/LE. No pain in cervical, thoracic, lumbar spine on palpation.  NEURO: alert, oriented, no dementia, delirum, no focal deficits  PSYCH: calm, euthymic  INTEG: warm, dry, no open sores  EXT: warm, palpable PT/DT pulses, no cyanosis, clubbing or edema    Labs   Recent Results (from the past 24 hour(s))   CBC with Automated Differential    Collection Time: 03/17/20  4:20 AM   Result Value Ref Range    WBC 5.6 4.2 - 11.0 K/mcL    RBC 2.94 (L) 4.50 - 5.90 mil/mcL    HGB 9.7 (L) 13.0 - 17.0 g/dL    HCT 30.0 (L) 39.0 - 51.0 %    .0 (H) 78.0 - 100.0 fl    MCH 33.0 26.0 - 34.0 pg    MCHC 32.3 32.0 - 36.5 g/dL    RDW-CV 13.3 11.0 - 15.0 %     140 - 450 K/mcL    NRBC 0 0 /100 WBC    DIFF TYPE MANUAL DIFFERENTIAL     SEG 70 %    LYMPH 16 %    MONO 8 %    EOS 5 %    BASO 0 %    MYELO 1 (H) 0 %    Absolute Neut 3.9 1.8 - 7.7 K/mcL    Absolute Lymph 0.9 (L) 1.0 - 4.0 K/mcL    Absolute Mono 0.4 0.3 - 0.9 K/mcL    Absolute Eos 0.3 0.1 - 0.5 K/mcL    Absolute Baso 0.0 0.0 - 0.3 K/mcL    RBC MORPHOLOGY NORMAL NORMAL    WBC MORPHOLOGY NORMAL NORMAL    PLATELETS APPEAR NORMAL NORMAL   Magnesium    Collection Time: 03/17/20  4:20 AM   Result Value Ref Range    MAGNESIUM 1.5 (L) 1.7 - 2.4 mg/dL   Basic Metabolic Panel    Collection Time: 03/17/20  4:20 AM   Result Value Ref Range    Sodium 140 135 - 145 mmol/L    Potassium 3.9 3.4 - 5.1  mmol/L    Chloride 109 (H) 98 - 107 mmol/L    Carbon Dioxide 24 21 - 32 mmol/L    Anion Gap 11 10 - 20 mmol/L    Glucose 86 65 - 99 mg/dL    BUN 19 6 - 20 mg/dL    Creatinine 1.69 (H) 0.67 - 1.17 mg/dL    GFR Estimate,  37     GFR Estimate, Non African American 32     BUN/Creatinine Ratio 11 7 - 25    CALCIUM 8.1 (L) 8.4 - 10.2 mg/dL       Imaging   LAST CT:  03/16/20   CT HEAD WO CONTRAST  Narrative  EXAM: CT HEAD WO CONTRASTCLINICAL INDICATION:  Fall, painCOMPARISON: 01/28/2018TECHNIQUE: Noncontrast axial head CT images obtained from the vertex to the skull base.   Automated exposure control was utilized for this exam.FINDINGS:No evidence of acute intracranial hemorrhage, cerebral edema, mass effect or midline shift.  Vascular calcifications are present. There is diffuse parenchymal volume loss within normal limits for patient's age.  Periventricular and subcortical white matter hypodensity compatible with chronic small vessel ischemic disease.  Bifrontal encephalomalacia present, unchanged.Visualized paranasal sinuses, mastoids, and orbits are unremarkable.No bony abnormality seen.  Impression  No CT evidence for an acute intracranial process.  Expected age-related changes.Electronically Signed by: YASEMIN BARTH M.D. Signed on: 3/16/2020 1:23 PM      CT ABDOMEN PELVIS W CONTRAST  Narrative  EXAM: CT ABDOMEN PELVIS W CONTRASTCLINICAL INDICATION:  A 89-year-old male with history of nausea and vomiting.. Technique: Axial acquisitions were obtained at 2.5 mm slice increments through the abdomen and pelvis after administration of 80cc of Omnipaque 350. Multiplanar reformatted imaging was performed. Automated exposure control employed as radiation dose reduction strategy on this patient.COMPARISON: NoneFINDINGS:Mild cardiomegaly.  Moderate diffuse coronary atherosclerotic calcification.Mild bibasilar atelectasis.Small right pleural effusion, trace left pleural effusion..  Moderate-sized sliding-type  hiatal hernia.Trace pericardial effusion.Punctate granulomatous type calcifications throughout the splenic parenchyma and right liver dome.Subtle irregularity within the dependent portion of the gallbladder neck likely represents a non-radiodense gallstones; no pericholecystic fluid or gallbladder wall thickening.Mild, symmetrical renal cortical thinning consistent with chronic renal disease.Liver, gallbladder, pancreas, spleen, adrenal glands and kidneys are otherwise within normal limits.There is no intra or extrahepatic biliary dilation.No hydronephrosis or hydroureter.No ascites.  No mesenteric or retroperitoneal lymphadenopathy. Small bowel and colon are of normal caliber; there is no obstruction.  [place] descending colonic and sigmoid diverticulosis without evidence of diverticulitis.Scattered areas of colonic wall thickening within the proximal cecum 2/ and mid rectum 2/138: These findings may be seen with normal peristalsis and diffuse underdistention of the colon however underlying colitis versus infiltrative lesion may also have this appearance.No pericolonic induration.  No abnormal fluid collection.The appendix is normal.Bladder is normal.Prostate is enlarged 4.7 cm with internal dystrophic calcification.Abdominal Aorta, Mesenteric and Pelvic Vasculature is patent without stenosis or abnormal dilation. Soft tissues and osseous structures are within normal limits  Impression  1.  No acute intra-abdominal or intrapelvic process.2.  Large sliding-type hiatal hernia.3.  Uncomplicated Descending and sigmoid diverticulosis.4.  Cholelithiasis without evidence of cholecystitis.5.  Scattered segments of circumferential colonic wall thickening (cecum and mid rectum) secondary most likely secondary to underdistention and normal peristalsis.  Underlying lesion versus colitis cannot entirely be excluded.  Recommend endoscopic correlation if clinically indicated.6.  Mild cardiomegaly; bilateral pleural effusions  (right greater than left).Electronically Signed by: BEATRIZ HADDAD MD Signed on: 3/16/2020 2:53 PM     Assessment   Pleural effusion, bilateral  monitor for fluid overload  monitor for dyspnea    Acute renal failure superimposed on stage 3 chronic kidney disease (CMS/HCC)  Continue monitoring    Intractable nausea and vomiting  Resolved      Encounter for palliative care  Role of palliative care benefiting those with advanced and complex illnesses was introduced. Education was provided as to how palliative care assists patients with symptom management, psychosocial issues and decision making.  Will have palliative care follow as outpatient to help with future decision making when patient's health status further declines. Role of Palliative care benefiting those with advanced and complex illnesses was introduced.  Patient understands that Palliative care will be provided alongside other therapies and will focus on providing relief from symptoms such as pain, nausea and shortness of breath.       Advance care planning  I talked to patient about code status. Components of the DNR order and the roles of these measures including; CPR, intubation, defibrillation, antiarrhythmics and Vasopressors were discussed. I explored in length what the above measures would mean to the patient in a medical emergency.    POLST form was completed w family at the bedside. Components of POLST including; CPR vs DNR, full vs selective vs comfort base care, long-term medically administered nutrition vs trial period of medically administered nutrition vs no medically administered nutrition were explained. Completed POLST form reflects patient’s wishes for no CPR and comfort measures.        Plan  Ethical, Legal Decision-making capacity: decisional    Advance Care Planning/Goals of Care:   Palliative care will continue f/u for support.     POA at Time of First Visit: Yes    POA at End of Visit: Yes    POLST at Time of Visit: No    POLST at  End of Visit: Yes    LET Order at Time of Visit: No    LET Order at Time of Disch: Yes    Reason if No POA on Chart Prior to DC: OTHER    Discharge Disposition: Extended Care Facility    Total combined time for today's Face to Face encounter was 70 minutes, with > 50% of that time spent counseling and coordinating care regarding the above. Encounter started at 1300 and Ended at 1410  ACP time spent = 20mins    Thank you for involving Palliative and Supportive Care.  Please contact the covering provider via [employer] with further questions or concerns.     The patient is an 87-year-old woman who is followed for hypertension, diabetes mellitus, and dementia and who has a history of breast and colon cancers (now in remission as per family) who presented today with shortness of breath. Patient admitted and treated for decompensated heart failure. patient also with abnormal findings on CT scan concerning for malignancy, family decided against further work up and wants to prioritize symptoms. Palliative consulted for PCU evaluation. The patient was transferred to PCU for symptom management.  On 1/27/23 at 12:14pm was pronounced and son was at bedside. Palliative team provided support to the family.

## 2023-05-11 NOTE — CONSULT NOTE ADULT - PROBLEM SELECTOR PROBLEM 1
Subjective   Patient ID: Katerina Joyner is a 74 y.o. female who presents for Rash.     The patient has noticed a rash over the left  neck area and was concerned that this may mean that she has shingles . It did not spread and has remained a small papule . It does not itch .  I advised her to continue to monitor it and if it changes in quality to let me know .   She has not have any new foods or been exposed to new soaps she can have a reaction to .   Review of Systems    Objective   There were no vitals taken for this visit.    Physical Exam    Assessment/Plan   Problem List Items Addressed This Visit    None  Visit Diagnoses       Rash    -  Primary               
Hyponatremia
Pain

## 2023-12-13 RX ORDER — DONEPEZIL HYDROCHLORIDE 10 MG/1
1 TABLET, FILM COATED ORAL
Qty: 0 | Refills: 0 | DISCHARGE

## 2023-12-13 RX ORDER — SENNA PLUS 8.6 MG/1
1 TABLET ORAL
Qty: 0 | Refills: 0 | DISCHARGE

## 2023-12-13 RX ORDER — ASPIRIN/CALCIUM CARB/MAGNESIUM 324 MG
1 TABLET ORAL
Qty: 0 | Refills: 0 | DISCHARGE

## 2023-12-13 RX ORDER — ERGOCALCIFEROL 1.25 MG/1
1 CAPSULE ORAL
Qty: 0 | Refills: 0 | DISCHARGE

## 2023-12-13 RX ORDER — TRAZODONE HCL 50 MG
1 TABLET ORAL
Qty: 0 | Refills: 0 | DISCHARGE

## 2023-12-13 RX ORDER — SIMVASTATIN 20 MG/1
1 TABLET, FILM COATED ORAL
Qty: 0 | Refills: 0 | DISCHARGE

## 2023-12-13 RX ORDER — METOPROLOL TARTRATE 50 MG
0.5 TABLET ORAL
Qty: 0 | Refills: 0 | DISCHARGE

## 2023-12-13 RX ORDER — AMLODIPINE BESYLATE 2.5 MG/1
1 TABLET ORAL
Qty: 0 | Refills: 0 | DISCHARGE

## 2023-12-13 RX ORDER — CELECOXIB 200 MG/1
1 CAPSULE ORAL
Qty: 0 | Refills: 0 | DISCHARGE

## 2023-12-13 RX ORDER — SITAGLIPTIN AND METFORMIN HYDROCHLORIDE 500; 50 MG/1; MG/1
1 TABLET, FILM COATED ORAL
Qty: 0 | Refills: 0 | DISCHARGE

## 2023-12-13 RX ORDER — FOLIC ACID 0.8 MG
1 TABLET ORAL
Qty: 0 | Refills: 0 | DISCHARGE

## 2024-09-09 NOTE — PROGRESS NOTE ADULT - ATTENDING COMMENTS
Distress Score [09/09/24 1500]   [facility]      Distress Scale (0-10) 2        Pt. with acute on chronic, persistent, severe symptomatic hyponatremia that has improved appropriately with use of HTS, however most recent labs still with severe hyponatremia. Pending noon labs, may need more HTS. recommend free water restriction and intake of solute rich food and fluids. Monitor serum sodium, Avoid overcorrection of >6-8 mEq in 24 hours.

## 2025-04-02 NOTE — ASU DISCHARGE PLAN (ADULT/PEDIATRIC) - POST OP PHONE #
[Well Developed] : well developed [NAD] : in no acute distress [PERRL] : pupils were equal, round, reactive to light  [Moist & Pink Mucous Membranes] : moist and pink mucous membranes [CTAB] : lungs clear to auscultation bilaterally [Regular Rate and Rhythm] : regular rate and rhythm [Normal S1, S2] : normal S1 and S2 [Soft] : soft  [Normal Bowel Sounds] : normal bowel sounds [No HSM] : no hepatosplenomegaly appreciated [Normal Tone] : normal tone [Well-Perfused] : well-perfused [Interactive] : interactive [icteric] : anicteric [Respiratory Distress] : no respiratory distress  [Distended] : non distended [Tender] : non tender [Edema] : no edema [Cyanosis] : no cyanosis [Rash] : no rash [Jaundice] : no jaundice [phone] pt. granted permission to leave message /and or speak with whoever answers the phone.

## 2025-05-28 NOTE — PROGRESS NOTE ADULT - PROBLEM SELECTOR PLAN 11
Spoke with patient and notified of positive urine culture.  Patient reports she is feeling better since starting Bactrim.  Advised patient to continue Bactrim and will notify of sensitivities if medications need to be switched.   -Lovenox for dvt prophylaxis   -PT consult   -Full code Residential stability/Engagement in treatment
